# Patient Record
Sex: FEMALE | Race: WHITE | NOT HISPANIC OR LATINO | Employment: OTHER | URBAN - METROPOLITAN AREA
[De-identification: names, ages, dates, MRNs, and addresses within clinical notes are randomized per-mention and may not be internally consistent; named-entity substitution may affect disease eponyms.]

---

## 2017-01-07 DIAGNOSIS — M17.11 OSTEOARTHRITIS OF RIGHT KNEE, UNSPECIFIED OSTEOARTHRITIS TYPE: ICD-10-CM

## 2017-01-07 RX ORDER — MELOXICAM 15 MG/1
TABLET ORAL
Qty: 90 TABLET | Refills: 3 | Status: SHIPPED | OUTPATIENT
Start: 2017-01-07 | End: 2018-03-03 | Stop reason: SDUPTHER

## 2017-01-12 ENCOUNTER — TELEPHONE (OUTPATIENT)
Dept: OPHTHALMOLOGY | Facility: CLINIC | Age: 82
End: 2017-01-12

## 2017-01-12 DIAGNOSIS — H02.413 MECHANICAL PTOSIS OF EYELID OF BOTH EYES: Primary | ICD-10-CM

## 2017-02-03 DIAGNOSIS — E11.9 TYPE 2 DIABETES MELLITUS WITHOUT COMPLICATION: ICD-10-CM

## 2017-02-23 ENCOUNTER — TELEPHONE (OUTPATIENT)
Dept: OPTOMETRY | Facility: CLINIC | Age: 82
End: 2017-02-23

## 2017-02-23 PROBLEM — H02.413 MECHANICAL PTOSIS OF BILATERAL EYELIDS: Status: ACTIVE | Noted: 2017-02-23

## 2017-02-24 ENCOUNTER — TELEPHONE (OUTPATIENT)
Dept: OPHTHALMOLOGY | Facility: CLINIC | Age: 82
End: 2017-02-24

## 2017-02-24 ENCOUNTER — ANESTHESIA (OUTPATIENT)
Dept: SURGERY | Facility: HOSPITAL | Age: 82
End: 2017-02-24
Payer: MEDICARE

## 2017-02-24 ENCOUNTER — TELEPHONE (OUTPATIENT)
Dept: FAMILY MEDICINE | Facility: CLINIC | Age: 82
End: 2017-02-24

## 2017-02-24 ENCOUNTER — ANESTHESIA EVENT (OUTPATIENT)
Dept: SURGERY | Facility: HOSPITAL | Age: 82
End: 2017-02-24
Payer: MEDICARE

## 2017-02-24 ENCOUNTER — HOSPITAL ENCOUNTER (OUTPATIENT)
Facility: HOSPITAL | Age: 82
Discharge: HOME | End: 2017-02-24
Attending: OPHTHALMOLOGY | Admitting: OPHTHALMOLOGY
Payer: MEDICARE

## 2017-02-24 VITALS
DIASTOLIC BLOOD PRESSURE: 70 MMHG | SYSTOLIC BLOOD PRESSURE: 155 MMHG | TEMPERATURE: 98 F | BODY MASS INDEX: 25.52 KG/M2 | HEART RATE: 74 BPM | RESPIRATION RATE: 19 BRPM | HEIGHT: 60 IN | WEIGHT: 130 LBS | OXYGEN SATURATION: 100 %

## 2017-02-24 DIAGNOSIS — H02.413 MECHANICAL PTOSIS OF BILATERAL EYELIDS: Primary | ICD-10-CM

## 2017-02-24 LAB — POCT GLUCOSE: 95 MG/DL (ref 70–110)

## 2017-02-24 PROCEDURE — 36000706: Performed by: OPHTHALMOLOGY

## 2017-02-24 PROCEDURE — 37000008 HC ANESTHESIA 1ST 15 MINUTES: Performed by: OPHTHALMOLOGY

## 2017-02-24 PROCEDURE — 15822 BLEPHAROPLASTY UPPER EYELID: CPT | Mod: 50,,, | Performed by: OPHTHALMOLOGY

## 2017-02-24 PROCEDURE — 71000044 HC DOSC ROUTINE RECOVERY FIRST HOUR: Performed by: OPHTHALMOLOGY

## 2017-02-24 PROCEDURE — 36000707: Performed by: OPHTHALMOLOGY

## 2017-02-24 PROCEDURE — D9220A PRA ANESTHESIA: Mod: ANES,,, | Performed by: ANESTHESIOLOGY

## 2017-02-24 PROCEDURE — 25000003 PHARM REV CODE 250: Performed by: OPHTHALMOLOGY

## 2017-02-24 PROCEDURE — 27201423 OPTIME MED/SURG SUP & DEVICES STERILE SUPPLY: Performed by: OPHTHALMOLOGY

## 2017-02-24 PROCEDURE — 37000009 HC ANESTHESIA EA ADD 15 MINS: Performed by: OPHTHALMOLOGY

## 2017-02-24 PROCEDURE — D9220A PRA ANESTHESIA: Mod: CRNA,,, | Performed by: NURSE ANESTHETIST, CERTIFIED REGISTERED

## 2017-02-24 PROCEDURE — 71000015 HC POSTOP RECOV 1ST HR: Performed by: OPHTHALMOLOGY

## 2017-02-24 PROCEDURE — 63600175 PHARM REV CODE 636 W HCPCS: Performed by: OPHTHALMOLOGY

## 2017-02-24 PROCEDURE — 25000003 PHARM REV CODE 250: Performed by: NURSE ANESTHETIST, CERTIFIED REGISTERED

## 2017-02-24 RX ORDER — TETRACAINE HYDROCHLORIDE 5 MG/ML
1 SOLUTION OPHTHALMIC
Status: DISCONTINUED | OUTPATIENT
Start: 2017-02-24 | End: 2017-02-24 | Stop reason: ALTCHOICE

## 2017-02-24 RX ORDER — HYDROCODONE BITARTRATE AND ACETAMINOPHEN 5; 325 MG/1; MG/1
1 TABLET ORAL EVERY 6 HOURS PRN
Qty: 10 TABLET | Refills: 0 | Status: SHIPPED | OUTPATIENT
Start: 2017-02-24 | End: 2017-03-21

## 2017-02-24 RX ORDER — HYDROCODONE BITARTRATE AND ACETAMINOPHEN 5; 325 MG/1; MG/1
1 TABLET ORAL EVERY 4 HOURS PRN
Status: DISCONTINUED | OUTPATIENT
Start: 2017-02-24 | End: 2017-02-24 | Stop reason: HOSPADM

## 2017-02-24 RX ORDER — SODIUM CHLORIDE, SODIUM LACTATE, POTASSIUM CHLORIDE, CALCIUM CHLORIDE 600; 310; 30; 20 MG/100ML; MG/100ML; MG/100ML; MG/100ML
INJECTION, SOLUTION INTRAVENOUS CONTINUOUS
Status: DISCONTINUED | OUTPATIENT
Start: 2017-02-24 | End: 2017-02-24 | Stop reason: HOSPADM

## 2017-02-24 RX ORDER — TETRACAINE HYDROCHLORIDE 5 MG/ML
1 SOLUTION OPHTHALMIC
Status: DISCONTINUED | OUTPATIENT
Start: 2017-02-24 | End: 2017-02-24 | Stop reason: HOSPADM

## 2017-02-24 RX ORDER — BUPIVACAINE HYDROCHLORIDE 5 MG/ML
INJECTION, SOLUTION EPIDURAL; INTRACAUDAL
Status: DISCONTINUED | OUTPATIENT
Start: 2017-02-24 | End: 2017-02-24 | Stop reason: HOSPADM

## 2017-02-24 RX ORDER — ETOMIDATE 2 MG/ML
INJECTION INTRAVENOUS
Status: DISCONTINUED | OUTPATIENT
Start: 2017-02-24 | End: 2017-02-24

## 2017-02-24 RX ORDER — LIDOCAINE HCL/PF 100 MG/5ML
SYRINGE (ML) INTRAVENOUS
Status: DISCONTINUED | OUTPATIENT
Start: 2017-02-24 | End: 2017-02-24

## 2017-02-24 RX ORDER — BUPIVACAINE HYDROCHLORIDE 5 MG/ML
INJECTION, SOLUTION EPIDURAL; INTRACAUDAL
Status: DISCONTINUED
Start: 2017-02-24 | End: 2017-02-24 | Stop reason: HOSPADM

## 2017-02-24 RX ORDER — LIDOCAINE HYDROCHLORIDE AND EPINEPHRINE 20; 10 MG/ML; UG/ML
INJECTION, SOLUTION INFILTRATION; PERINEURAL
Status: DISCONTINUED | OUTPATIENT
Start: 2017-02-24 | End: 2017-02-24 | Stop reason: HOSPADM

## 2017-02-24 RX ORDER — LIDOCAINE HYDROCHLORIDE 10 MG/ML
1 INJECTION, SOLUTION EPIDURAL; INFILTRATION; INTRACAUDAL; PERINEURAL ONCE
Status: COMPLETED | OUTPATIENT
Start: 2017-02-24 | End: 2017-02-24

## 2017-02-24 RX ORDER — LIDOCAINE HCL/EPINEPHRINE/PF 2%-1:200K
VIAL (ML) INJECTION
Status: DISCONTINUED
Start: 2017-02-24 | End: 2017-02-24 | Stop reason: HOSPADM

## 2017-02-24 RX ORDER — ONDANSETRON 8 MG/1
8 TABLET, ORALLY DISINTEGRATING ORAL EVERY 8 HOURS PRN
Status: DISCONTINUED | OUTPATIENT
Start: 2017-02-24 | End: 2017-02-24 | Stop reason: HOSPADM

## 2017-02-24 RX ADMIN — SODIUM CHLORIDE, SODIUM LACTATE, POTASSIUM CHLORIDE, AND CALCIUM CHLORIDE: .6; .31; .03; .02 INJECTION, SOLUTION INTRAVENOUS at 01:02

## 2017-02-24 RX ADMIN — ETOMIDATE 2 MCG: 2 INJECTION, SOLUTION INTRAVENOUS at 06:02

## 2017-02-24 RX ADMIN — ETOMIDATE 6 MCG: 2 INJECTION, SOLUTION INTRAVENOUS at 06:02

## 2017-02-24 RX ADMIN — LIDOCAINE HYDROCHLORIDE 0.2 MG: 10 INJECTION, SOLUTION EPIDURAL; INFILTRATION; INTRACAUDAL; PERINEURAL at 01:02

## 2017-02-24 RX ADMIN — LIDOCAINE HYDROCHLORIDE 75 MG: 20 INJECTION, SOLUTION INTRAVENOUS at 06:02

## 2017-02-24 NOTE — PROGRESS NOTES
Patient awake and alert. Voice no complaints. No s/s of distress noted. All questions addressed. Verbalized understanding.     Report given to GRISELDA Hwang. All questions addressed. Verbalized understanding.

## 2017-02-24 NOTE — PROGRESS NOTES
OR nurse at bedside updating patient in reference to delay. Explained to patient that surgery will be delayed until 1700 or 1800. Patient verbalized understanding.

## 2017-02-24 NOTE — IP AVS SNAPSHOT
[facility]  [address]  [facility] [address]  Phone: [phone]           Patient Discharge Instructions     Our goal is to set you up for success. This packet includes information on your condition, medications, and your home care. It will help you to care for yourself so you don't get sicker and need to go back to the hospital.     Please ask your nurse if you have any questions.        There are many details to remember when preparing to leave the hospital. Here is what you will need to do:    1. Take your medicine. If you are prescribed medications, review your Medication List in the following pages. You may have new medications to  at the pharmacy and others that you'll need to stop taking. Review the instructions for how and when to take your medications. Talk with your doctor or nurses if you are unsure of what to do.     2. Go to your follow-up appointments. Specific follow-up information is listed in the following pages. Your may be contacted by a transition nurse or clinical provider about future appointments. Be sure we have all of the phone numbers to reach you, if needed. Please contact your provider's office if you are unable to make an appointment.     3. Watch for warning signs. Your doctor or nurse will give you detailed warning signs to watch for and when to call for assistance. These instructions may also include educational information about your condition. If you experience any of warning signs to your health, call your doctor.               [facility]  Unless otherwise directed by your provider, please contact [facility], our nurse care line that is available for 24/7 assistance.     [phone] (toll-free)    Registered nurses in the [facility] provide clinical advisement, health education, appointment booking, and other advisory services.                    ** Verify the list of medication(s) below is accurate and up  to date. Carry this with you in case of emergency. If your medications have changed, please notify your healthcare provider.             Medication List      ASK your doctor about these medications        Additional Info                      albuterol 90 mcg/actuation inhaler   Quantity:  18 g   Refills:  4   Dose:  2 puff    Instructions:  Inhale 2 puffs into the lungs every 4 (four) hours as needed for Wheezing.     Begin Date    AM    Noon    PM    Bedtime       atorvastatin 10 MG tablet   Commonly known as:  LIPITOR   Quantity:  30 tablet   Refills:  2   Dose:  10 mg    Instructions:  Take 1 tablet (10 mg total) by mouth once daily.     Begin Date    AM    Noon    PM    Bedtime       benzocaine 20 % Drop   Commonly known as:  OTICAINE   Quantity:  15 mL   Refills:  2   Dose:  1 drop    Instructions:  Place 1 drop into the left ear 4 (four) times daily as needed.     Begin Date    AM    Noon    PM    Bedtime       cetirizine 10 MG tablet   Commonly known as:  ZYRTEC   Quantity:  30 tablet   Refills:  2   Dose:  10 mg    Instructions:  Take 1 tablet (10 mg total) by mouth once daily.     Begin Date    AM    Noon    PM    Bedtime       cyclobenzaprine 10 MG tablet   Commonly known as:  FLEXERIL   Quantity:  90 tablet   Refills:  3   Dose:  10 mg    Instructions:  Take 1 tablet (10 mg total) by mouth nightly as needed.     Begin Date    AM    Noon    PM    Bedtime       escitalopram oxalate 10 MG tablet   Commonly known as:  LEXAPRO   Quantity:  30 tablet   Refills:  11   Dose:  10 mg    Instructions:  Take 1 tablet (10 mg total) by mouth once daily.     Begin Date    AM    Noon    PM    Bedtime       * estradiol 0.1 mg/24 hr td ptwk 0.1 mg/24 hr Ptwk   Quantity:  12 patch   Refills:  0    Instructions:  APPLY ONE PATCH TOPICALLY ONCE A WEEK     Begin Date    AM    Noon    PM    Bedtime       * estradiol 0.1 mg/24 hr td ptwk 0.1 mg/24 hr Ptwk   Quantity:  12 patch   Refills:  3   Dose:  1 patch    Instructions:   Place 1 patch onto the skin every 7 days.     Begin Date    AM    Noon    PM    Bedtime       fluoxetine 10 MG capsule   Commonly known as:  PROZAC   Quantity:  90 capsule   Refills:  3   Dose:  10 mg    Instructions:  Take 1 capsule (10 mg total) by mouth once daily.     Begin Date    AM    Noon    PM    Bedtime       gabapentin 100 MG capsule   Commonly known as:  NEURONTIN   Quantity:  90 capsule   Refills:  3   Dose:  100 mg    Instructions:  Take 1 capsule (100 mg total) by mouth 3 (three) times daily.     Begin Date    AM    Noon    PM    Bedtime       imipramine 25 MG tablet   Commonly known as:  TOFRANIL   Quantity:  270 tablet   Refills:  3   Dose:  25 mg    Instructions:  Take 1 tablet (25 mg total) by mouth 3 (three) times daily.     Begin Date    AM    Noon    PM    Bedtime       lisinopril 10 MG tablet   Quantity:  90 tablet   Refills:  3   Dose:  10 mg    Instructions:  Take 1 tablet (10 mg total) by mouth once daily.     Begin Date    AM    Noon    PM    Bedtime       lorazepam 0.5 MG tablet   Commonly known as:  ATIVAN   Quantity:  30 tablet   Refills:  0   Dose:  0.5 mg    Instructions:  Take 1 tablet (0.5 mg total) by mouth as needed.     Begin Date    AM    Noon    PM    Bedtime       meloxicam 15 MG tablet   Commonly known as:  MOBIC   Quantity:  90 tablet   Refills:  3    Instructions:  TAKE 1 TABLET DAILY AS     NEEDED FOR PAIN     Begin Date    AM    Noon    PM    Bedtime       omeprazole 20 MG capsule   Commonly known as:  PRILOSEC   Quantity:  180 capsule   Refills:  4   Dose:  40 mg    Instructions:  Take 2 capsules (40 mg total) by mouth once daily.     Begin Date    AM    Noon    PM    Bedtime       tretinoin 0.025 % gel   Commonly known as:  RETIN-A   Quantity:  45 g   Refills:  3    Instructions:  Apply topically as needed.     Begin Date    AM    Noon    PM    Bedtime       triamcinolone acetonide 0.1% 0.1 % ointment   Commonly known as:  KENALOG   Quantity:  90 g   Refills:  3     Instructions:  Apply topically 2 (two) times daily.     Begin Date    AM    Noon    PM    Bedtime       * Notice:  This list has 2 medication(s) that are the same as other medications prescribed for you. Read the directions carefully, and ask your doctor or other care provider to review them with you.               Please bring to all follow up appointments:    1. A copy of your discharge instructions.  2. All medicines you are currently taking in their original bottles.  3. Identification and insurance card.    Please arrive 15 minutes ahead of scheduled appointment time.    Please call 24 hours in advance if you must reschedule your appointment and/or time.            Discharge Instructions         Eyelid Surgery (Blepharoplasty)  Eyelid surgery (blepharoplasty) is a type of cosmetic surgery. It is most often done to improve the look of the eyelids. Both the upper and lower eyelids can be treated during the surgery. Discuss your treatment goals with your doctor. He or she can tell you more about what to expect.    Preparing for surgery  Prepare for the surgery as you have been told. In addition:  · Tell your doctor about all medications you take. This includes herbs and other supplements. It also includes any blood thinners, such as warfarin, clopidogrel, or daily aspirin. You may need to stop taking some or all of them before surgery.  · Do not eat or drink during the 8 hours before your surgery, or as directed by your surgeon. This includes coffee, water, gum, and mints. (If you have been instructed to take medications, take them with a small sip of water.)  The day of the surgery  The surgery takes about 1 to 2 hours. You will likely go home the same day.  Before the surgery begins:  · Youll remove any makeup from your eyes. Youll also remove contact lenses if you wear them.  · An IV line is put into a vein in your arm or hand. This line supplies fluids and medications.  · Youll be given medication to keep  you free of pain during the surgery. You may have general anesthesia, which puts you into a state like deep sleep during the surgery. (A tube may be inserted into your throat to help you breathe.) Or you may have sedation, which makes you relaxed and sleepy. With sedation, local anesthesia will be injected to numb the areas being worked on. The anesthesiologist will discuss your options with you.  During the surgery:  · For the upper eyelids, an incision is made along the eyelid crease.  · For the lower eyelids, an incision is made inside the lower eyelid. Or it is made in the skin just under the lower lash line.  · With either the upper or lower eyelids, fat may be shaped or removed. Skin may be removed. If muscles are loose, stretched, or torn, they may be tightened or repaired.  · Incisions are closed with stitches (sutures). In some cases, surgical glue is used.  After the surgery  Youre taken to a recovery room to wake up from the anesthesia. You may feel sleepy and nauseated. If a breathing tube was used, your throat may be sore at first. If needed, youll be given pain medication to relieve any discomfort. Youll sit semi-inclined or with your head propped on pillows, and may have cold packs on your eyes. These measures help reduce bruising and swelling. When you are ready to leave the hospital, an adult family member or friend must drive you.  Recovering at home  Once at home, follow all instructions you are given. Your doctor will tell you when you can return to your normal routine. You may have some bruising and swelling around your eyes and your vision may be blurry. This is normal and should improve within a week or two. And you may notice your eyes burning or feeling strained during certain activities, such as watching TV, reading a book, or using the computer. While this persists, avoid doing such activities for too long at a time. Be sure to:  · Take all medications exactly as directed. These may  include applying eye ointment or using eye drops.  · Apply an ice pack or cold compress to the eyes as directed, for the first 12 to 24 hours after surgery.  · Care for your incisions as instructed.  · Wear protective sunglasses as directed.  · Avoid wearing eye makeup and contact lenses as directed.  · Avoid swimming or placing your head under water as directed.  · Avoid heavy lifting and strenuous activities as directed.  · Avoid driving until your doctor says its OK. Do not drive while taking medications that make you drowsy or sleepy.  When to call the doctor  Call the doctor if you have any of the following:  · Chest pain or trouble breathing (call 911 or other emergency service)  · Fever of 100.4°F (38.0°C) or higher (or as directed by your doctor)  · Increased redness, tearing, or itching of the eyes  · Changes in vision, such as double vision, blurry vision, or loss of light perception  · Symptoms of infection at an incision site, such as increased redness or swelling, warmth, worsening pain, or foul-smelling drainage  · Discomfort or swelling thats worse in one eye than the other  · Pain that cannot be managed with medications  · Other signs or symptoms as indicated by your doctor   Follow-up  Youll have follow-up visits with your doctor. During these visits, your doctor will check the results of your surgery and how well youre healing. If stitches need to be removed, this is done in about 5 to 7 days.  Risks and complications  Risks and possible complications include:  · Bleeding  · Infection  · Problems with vision, such as blurry or double vision and trouble closing the eyes  · Dry or teary eyes  · Changes in sensation, such as numbness or pain  · Skin discoloration  · Damage or injury to the eyes  · Vision problems (including blindness)  · Displacement of the lower eyelid margin (ectropia), which can be temporary or permanent  · Not happy with cosmetic results  · Risks of anesthesia (the  anesthesiologist will discuss these with you)             Primary Diagnosis     Your primary diagnosis was:  Drooping Eyelid      Admission Information     Date & Time Provider Department CSN    2/24/2017  1:06 PM Jenn Smith MD [facility] [id number]      Care Providers     Provider Role Specialty Primary office phone    Jenn Smith MD Attending Provider Ophthalmology [phone]    Jenn Smith MD Surgeon  Ophthalmology [phone]      Your Vitals Were     BP                   156/69           Recent Lab Values        2/15/2012 2/19/2013 10/29/2013 2/19/2014 11/13/2014 2/20/2015 1/25/2016 7/26/2016      7:40 AM  7:20 AM  8:10 AM  7:21 AM  4:05 AM  9:48 AM  9:55 AM  7:20 AM    A1C 5.0 5.4 5.6 5.8 5.4 5.6 5.4 5.6    Comment for A1C at  7:20 AM on 7/26/2016:  According to ADA guidelines, hemoglobin A1C <7.0% represents  optimal control in non-pregnant diabetic patients.  Different  metrics may apply to specific populations.   Standards of Medical Care in Diabetes - 2016.  For the purpose of screening for the presence of diabetes:  <5.7%     Consistent with the absence of diabetes  5.7-6.4%  Consistent with increasing risk for diabetes   (prediabetes)  >or=6.5%  Consistent with diabetes  Currently no consensus exists for use of hemoglobin A1C  for diagnosis of diabetes for children.        Allergies as of 2/24/2017        Reactions    Propofol Analogues Other (See Comments)    Low blood pressure, tremors    Oxycodone Nausea And Vomiting    Pcn [Penicillins] Nausea Only    Tetracyclines Nausea Only      Advance Directives     An advance directive is a document which, in the event you are no longer able to make decisions for yourself, tells your healthcare team what kind of treatment you do or do not want to receive, or who you would like to make those decisions for you.  If you do not currently have an advance directive, [facility] encourages you to create one.  For more information call:   [phone]), [phone] ([phone]),  or log on to [email or web address].        Smoking Cessation     If you would like to quit smoking:   You may be eligible for free services if you are a [place] resident and started smoking cigarettes before September 1, 1988.  Call the Smoking Cessation Trust ([facility]) toll free at (481) 520-2359 or (954) [phone].   Call 9-570-QUIT-NOW if you do not meet the above criteria.            Language Assistance Services     ATTENTION: Language assistance services are available, free of charge. Please call [phone].      ATENCIÓN: Si oniella robyn, tiene a gomez disposición servicios gratuitos de asistencia lingüística. Llame al [phone].     CHÚ Ý: N?u b?n nói Ti?ng Vi?t, có các d?ch v? h? tr? ngôn ng? mi?n phí dành cho b?n. G?i s? [phone].        Diabetes Discharge Instructions                                    [facility] complies with applicable Federal civil rights laws and does not discriminate on the basis of race, color, national origin, age, disability, or sex.

## 2017-02-24 NOTE — TELEPHONE ENCOUNTER
Returned patients call. I informed the patient I never received the pre op form. I also informed her an appt is usually required for pre op. Patient verbalized understanding. She stated she will call the doctor and inform them we did not receive the paperwork.

## 2017-02-24 NOTE — H&P
Pre-Operative History & Physical  Ophthalmology      SUBJECTIVE:     History of Present Illness:  Patient is a 85 y.o. female presents with an eye problem requiring surgery as noted in the most recent ophthalmology progress note.    MEDICATIONS:   Prior to Admission medications    Medication Sig Start Date End Date Taking? Authorizing Provider   albuterol 90 mcg/actuation inhaler Inhale 2 puffs into the lungs every 4 (four) hours as needed for Wheezing. 2/26/15   Jaylin Sheppard MD   atorvastatin (LIPITOR) 10 MG tablet Take 1 tablet (10 mg total) by mouth once daily. 4/26/16 4/26/17  Francisco Munson MD   benzocaine (OTICAINE) 20 % Drop Place 1 drop into the left ear 4 (four) times daily as needed. 7/27/16   Francisco Munson MD   cetirizine (ZYRTEC) 10 MG tablet Take 1 tablet (10 mg total) by mouth once daily. 7/22/16 7/22/17  Francisco Munson MD   cyclobenzaprine (FLEXERIL) 10 MG tablet Take 1 tablet (10 mg total) by mouth nightly as needed. 2/26/15   Jaylin Sheppard MD   escitalopram oxalate (LEXAPRO) 10 MG tablet Take 1 tablet (10 mg total) by mouth once daily. 9/22/16 9/22/17  Wild Ortiz MD   estradiol 0.1 mg/24 hr td ptwk 0.1 mg/24 hr PTWK APPLY ONE PATCH TOPICALLY ONCE A WEEK 12/2/16   Francisco Munson MD   estradiol 0.1 mg/24 hr td ptwk 0.1 mg/24 hr PTWK Place 1 patch onto the skin every 7 days. 12/2/16   Francisco Munson MD   fluoxetine (PROZAC) 10 MG capsule Take 1 capsule (10 mg total) by mouth once daily. 1/25/16   Francisco Munson MD   gabapentin (NEURONTIN) 100 MG capsule Take 1 capsule (100 mg total) by mouth 3 (three) times daily. 2/26/15 2/26/16  Jaylin Sheppard MD   imipramine (TOFRANIL) 25 MG tablet Take 1 tablet (25 mg total) by mouth 3 (three) times daily. 3/11/14 7/23/15  Femi H. Vega, MD   lisinopril 10 MG tablet Take 1 tablet (10 mg total) by mouth once daily. 9/22/16 9/22/17  Wild Ortiz MD   lorazepam (ATIVAN) 0.5 MG tablet Take 1 tablet (0.5 mg total) by mouth as needed. 2/26/15   Jaylin Sheppard MD    meloxicam (MOBIC) 15 MG tablet TAKE 1 TABLET DAILY AS     NEEDED FOR PAIN 1/7/17   Francisco Munson MD   omeprazole (PRILOSEC) 20 MG capsule Take 2 capsules (40 mg total) by mouth once daily. 1/25/16   Francisco Munson MD   tretinoin (RETIN-A) 0.025 % gel Apply topically as needed. 1/25/16   Francisco Munson MD   triamcinolone acetonide 0.1% (KENALOG) 0.1 % ointment Apply topically 2 (two) times daily. 1/25/16 8/18/16  Francisco Munson MD     ALLERGIES:   Review of patient's allergies indicates:   Allergen Reactions    Propofol analogues Other (See Comments)     ELEVATED BLOOD PRESSURE, TREMORS    Oxycodone Nausea And Vomiting    Pcn [penicillins] Nausea Only    Tetracyclines Nausea Only     PAST MEDICAL HISTORY:   Past Medical History:   Diagnosis Date    Allergy     Seasonal    Depression     Diabetes mellitus     diet controlled    Diverticulosis     Fever blister     GERD (gastroesophageal reflux disease)     Hypertension 11/12/2014    Joint pain     Nuclear sclerosis - Both Eyes 7/16/2013    Osteoarthritis     Osteopenia     Osteoporosis     Seizures 2008    TIA    Tachycardia      PAST SURGICAL HISTORY:   Past Surgical History:   Procedure Laterality Date    ANKLE SURGERY  1951    ankle replacement, left    BLADDER SUSPENSION      FOOT SURGERY      HYSTERECTOMY      left ovary intact    JOINT REPLACEMENT      left ankle and knee arthroplasty    KNEE SURGERY  2008    left TKR    KNEE SURGERY  11-11-14    right TKR    SHOULDER SURGERY  2009    right arthroscopy    TONSILLECTOMY       PAST FAMILY HISTORY:   Family History   Problem Relation Age of Onset    Diabetes Mother     Diabetes Father     Heart disease Father     Diabetes Sister     Polychondritis Sister     Polychondritis      Cancer Neg Hx     Amblyopia Neg Hx     Blindness Neg Hx     Cataracts Neg Hx     Glaucoma Neg Hx     Hypertension Neg Hx     Macular degeneration Neg Hx     Retinal detachment Neg Hx      Strabismus Neg Hx     Stroke Neg Hx     Thyroid disease Neg Hx     Melanoma Neg Hx      SOCIAL HISTORY:   Social History   Substance Use Topics    Smoking status: Former Smoker     Types: Cigarettes     Quit date: 4/29/1981    Smokeless tobacco: Never Used    Alcohol use 1.2 oz/week     2 Glasses of wine per week      Comment: Nightly      MENTAL STATUS: Alert  REVIEW OF SYSTEMS: Negative x 10    OBJECTIVE:     Vital Signs (Most Recent)       Physical Exam:  General: NAD  HEENT: see ophthalmology note for eye exam  Lungs: Adequate respirations  Heart: Heart beating + pulses  Abdomen: Soft    ASSESSMENT/PLAN:     Patient is a 85 y.o. female with eye problem, planning for bul mechanical ptosis repair   - Risks/benefits/alternatives of the procedure including, but not limited to scarring, bleeding, infection, loss or decreased vision, and/or need for possible repeat surgery discussed with the patient and/or family, and Informed consent obtained prior to surgery and the patient/family voiced good understanding, witnessed, and placed in chart.  - Proceed to OR

## 2017-02-24 NOTE — TELEPHONE ENCOUNTER
----- Message from Peyton Centeno sent at 2/24/2017  8:54 AM CST -----  No. [phone]    Patient is having surgery at 1:00 today.  Has the surgery clearance been faxed yet.   Please call.

## 2017-02-24 NOTE — ANESTHESIA PREPROCEDURE EVALUATION
02/24/2017     Candy Escobar is a 85 y.o., female here for bilateral blepharoplasty.  H/o HTN, diet controlled diabetes, TIA.  History of low blood pressure in response to propofol.      [facility] Anesthesia Evaluation    I have reviewed the Patient Summary Reports.    I have reviewed the Nursing Notes.   I have reviewed the Medications.     Review of Systems  Anesthesia Hx:  Hx of Anesthetic complications Reports very low blood pressure in response to propofol History of prior surgery of interest to airway management or planning: Denies Family Hx of Anesthesia complications.  Personal Hx of Anesthesia complications (reports very low blood pressure in response to propofol)   Cardiovascular:   Exercise tolerance: good Hypertension Denies CP  Denies SOB   Pulmonary:  Pulmonary Normal    Hepatic/GI:   GERD, well controlled    Neurological:   TIA, Seizures    Psych:   depression          Physical Exam  General:  Well nourished    Airway/Jaw/Neck:  Airway Findings: Mouth Opening: Normal Tongue: Normal  General Airway Assessment: Adult  Mallampati: I  TM Distance: Normal, at least 6 cm      Dental:  Dental Findings: In tact   Chest/Lungs:  Chest/Lungs Findings: Normal Respiratory Rate     Heart/Vascular:  Heart Findings: Rate: Normal  Rhythm: Regular Rhythm        Mental Status:  Mental Status Findings:  Alert and Oriented, Cooperative         Anesthesia Plan  Type of Anesthesia, risks & benefits discussed:  Anesthesia Type:  MAC  Patient's Preference:   Intra-op Monitoring Plan:   Intra-op Monitoring Plan Comments:   Post Op Pain Control Plan:   Post Op Pain Control Plan Comments:   Induction:   IV  Beta Blocker:  Patient is not currently on a Beta-Blocker (No further documentation required).       Informed Consent: Patient understands risks and agrees with Anesthesia plan.  Questions answered. Anesthesia consent  signed with patient.  ASA Score: 2     Day of Surgery Review of History & Physical:    H&P update referred to the surgeon.         Ready For Surgery From Anesthesia Perspective.

## 2017-02-25 NOTE — TRANSFER OF CARE
Anesthesia Transfer of Care Note    Patient: Candy Escobar    Procedure(s) Performed: Procedure(s) (LRB):  BLEPHAROPLASTY (Bilateral)    Patient location: PACU    Anesthesia Type: general    Transport from OR: Transported from OR on room air with adequate spontaneous ventilation    Post pain: adequate analgesia    Post assessment: no apparent anesthetic complications    Post vital signs: stable    Level of consciousness: awake, alert and oriented    Nausea/Vomiting: no nausea/vomiting    Complications: none          Last vitals:   Visit Vitals    BP (!) 140/81 (BP Location: Right arm, Patient Position: Lying, BP Method: Automatic)    Pulse 73    Temp 36.7 °C (98.1 °F) (Oral)    Resp 18    Ht 5' (1.524 m)    Wt 59 kg (130 lb)    SpO2 99%    Breastfeeding No    BMI 25.39 kg/m2

## 2017-02-25 NOTE — DISCHARGE INSTRUCTIONS
Eyelid Surgery (Blepharoplasty)  Eyelid surgery (blepharoplasty) is a type of cosmetic surgery. It is most often done to improve the look of the eyelids. Both the upper and lower eyelids can be treated during the surgery. Discuss your treatment goals with your doctor. He or she can tell you more about what to expect.    Preparing for surgery  Prepare for the surgery as you have been told. In addition:  · Tell your doctor about all medications you take. This includes herbs and other supplements. It also includes any blood thinners, such as warfarin, clopidogrel, or daily aspirin. You may need to stop taking some or all of them before surgery.  · Do not eat or drink during the 8 hours before your surgery, or as directed by your surgeon. This includes coffee, water, gum, and mints. (If you have been instructed to take medications, take them with a small sip of water.)  The day of the surgery  The surgery takes about 1 to 2 hours. You will likely go home the same day.  Before the surgery begins:  · Youll remove any makeup from your eyes. Youll also remove contact lenses if you wear them.  · An IV line is put into a vein in your arm or hand. This line supplies fluids and medications.  · Youll be given medication to keep you free of pain during the surgery. You may have general anesthesia, which puts you into a state like deep sleep during the surgery. (A tube may be inserted into your throat to help you breathe.) Or you may have sedation, which makes you relaxed and sleepy. With sedation, local anesthesia will be injected to numb the areas being worked on. The anesthesiologist will discuss your options with you.  During the surgery:  · For the upper eyelids, an incision is made along the eyelid crease.  · For the lower eyelids, an incision is made inside the lower eyelid. Or it is made in the skin just under the lower lash line.  · With either the upper or lower eyelids, fat may be shaped or removed. Skin may be  removed. If muscles are loose, stretched, or torn, they may be tightened or repaired.  · Incisions are closed with stitches (sutures). In some cases, surgical glue is used.  After the surgery  Youre taken to a recovery room to wake up from the anesthesia. You may feel sleepy and nauseated. If a breathing tube was used, your throat may be sore at first. If needed, youll be given pain medication to relieve any discomfort. Youll sit semi-inclined or with your head propped on pillows, and may have cold packs on your eyes. These measures help reduce bruising and swelling. When you are ready to leave the hospital, an adult family member or friend must drive you.  Recovering at home  Once at home, follow all instructions you are given. Your doctor will tell you when you can return to your normal routine. You may have some bruising and swelling around your eyes and your vision may be blurry. This is normal and should improve within a week or two. And you may notice your eyes burning or feeling strained during certain activities, such as watching TV, reading a book, or using the computer. While this persists, avoid doing such activities for too long at a time. Be sure to:  · Take all medications exactly as directed. These may include applying eye ointment or using eye drops.  · Apply an ice pack or cold compress to the eyes as directed, for the first 12 to 24 hours after surgery.  · Care for your incisions as instructed.  · Wear protective sunglasses as directed.  · Avoid wearing eye makeup and contact lenses as directed.  · Avoid swimming or placing your head under water as directed.  · Avoid heavy lifting and strenuous activities as directed.  · Avoid driving until your doctor says its OK. Do not drive while taking medications that make you drowsy or sleepy.  When to call the doctor  Call the doctor if you have any of the following:  · Chest pain or trouble breathing (call 911 or other emergency service)  · Fever of  100.4°F (38.0°C) or higher (or as directed by your doctor)  · Increased redness, tearing, or itching of the eyes  · Changes in vision, such as double vision, blurry vision, or loss of light perception  · Symptoms of infection at an incision site, such as increased redness or swelling, warmth, worsening pain, or foul-smelling drainage  · Discomfort or swelling thats worse in one eye than the other  · Pain that cannot be managed with medications  · Other signs or symptoms as indicated by your doctor   Follow-up  Youll have follow-up visits with your doctor. During these visits, your doctor will check the results of your surgery and how well youre healing. If stitches need to be removed, this is done in about 5 to 7 days.  Risks and complications  Risks and possible complications include:  · Bleeding  · Infection  · Problems with vision, such as blurry or double vision and trouble closing the eyes  · Dry or teary eyes  · Changes in sensation, such as numbness or pain  · Skin discoloration  · Damage or injury to the eyes  · Vision problems (including blindness)  · Displacement of the lower eyelid margin (ectropia), which can be temporary or permanent  · Not happy with cosmetic results  · Risks of anesthesia (the anesthesiologist will discuss these with you)

## 2017-02-25 NOTE — OP NOTE
Operative Note  Ophthalmology Service        Date of Procedure: 02/24/2017     Attending Physician: Jenn Smith MD     Assistant: Sukumar Bolanos MD     Pre-Operative Diagnosis: Bilateral mechanical upper eyelid mechanical ptosis, bilateral upper eyelid dermatochalasis     Post-Operative Diagnosis: Same as pre-operative diagnosis     Treatments/Procedures: Bilateral upper eyelid blepharoplasty (76553)     Intraoperative Findings: Bilateral mechanical upper eyelid ptosis, bilateral upper eyelid dermatochalasis     Anesthesia: General with local     Complications: None     Estimated Blood Loss: < 20 cc     Indications for surgery: 85 y.o. female with a history of bilateral upper eyelid ptosis interfering with activities of daily living. Patient understands the risk of pain, bleeding, infection, ocular injury, loss of the eye, asymmetry, need for revision in future, scarring.       Procedure in detail: Prior to induction of anesthesia, with patient sitting in an upright position, both upper eyelids were marked at the medial pupil border. The patient was brought to the operating room and placed in supine position. Once adequate anesthesia was achieved, the skin incisions were remarked. Calipers were used to measure and meghana the natural eyelid creases of both upper eyelids. Several cc of 2% lidocaine with epinephrine, 0.75% bupivacaine, 4% bicarbonate, and Vitrase was injected subcutaneously into both upper eyelids. The full face was then prepped using topical Betadine, and the patient was draped in sterile fashion.       Attention was turned to the right upper eyelid. A corneal shield was placed in the right palpebral fissure. The skin was incised along the markings using a #15 scalpel. A skin-only flap was raised with cautery and Chance forceps. The orbicularis and the septum were incised on the medial aspect using a Leobardo scissors to expose the medial fat pad. The medial fat pad was resected using a monopolar cautery  with good hemostasis.The traction suture and corneal shield were removed. The procedure was repeated for the left upper eyelid.      The skin incisions were closed with a running 6-0 Prolene on a P-1 needle. Tobradex ointment was applied to both upper eyelids and to both eyes. The patient tolerated the procedure well without any complications. She was awoken and taken to the recovery room in stable condition.    Specimen: None

## 2017-02-25 NOTE — PLAN OF CARE
Problem: Patient Care Overview  Goal: Plan of Care Review  Outcome: Outcome(s) achieved Date Met:  02/24/17  Discharge instructions given and explained to patient and family with verbalization of understanding all instructions. Prescription given and explained next time and doses of each medication. Patients v/s stable, denies n/v and tolerating po, rates pain level tolerable, IV removed, and family at bedside for patient discharge home. Anesthesia and surgical consent in pt's chart at time of discharge.

## 2017-02-25 NOTE — ANESTHESIA RELEASE NOTE
Anesthesia Release from PACU Note    Patient: Candy Escobar    Procedure(s) Performed: Procedure(s) (LRB):  BLEPHAROPLASTY (Bilateral)    Anesthesia type: MAC    Post pain: Adequate analgesia    Post assessment: no apparent anesthetic complications, tolerated procedure well and no evidence of recall    Last Vitals:   Visit Vitals    BP (!) 156/69    Pulse 77    Temp 36.7 °C (98.1 °F) (Oral)    Resp 19    Ht 5' (1.524 m)    Wt 59 kg (130 lb)    SpO2 98%    Breastfeeding No    BMI 25.39 kg/m2       Post vital signs: stable    Level of consciousness: awake and alert     Nausea/Vomiting: no nausea/no vomiting    Complications: none    Airway Patency: patent    Respiratory: unassisted, spontaneous ventilation, room air    Cardiovascular: stable and blood pressure at baseline    Hydration: euvolemic

## 2017-02-25 NOTE — ANESTHESIA POSTPROCEDURE EVALUATION
Anesthesia Post Evaluation    Patient: Candy Escobar    Procedure(s) Performed: Procedure(s) (LRB):  BLEPHAROPLASTY (Bilateral)    Final Anesthesia Type: MAC  Patient location during evaluation: [facility]  Patient participation: Yes- Able to Participate  Level of consciousness: awake and alert  Post-procedure vital signs: reviewed and stable  Pain management: adequate  Airway patency: patent  PONV status at discharge: No PONV  Anesthetic complications: no      Cardiovascular status: blood pressure returned to baseline and hemodynamically stable  Respiratory status: unassisted, spontaneous ventilation and room air  Hydration status: euvolemic  Follow-up not needed.        Visit Vitals    BP (!) 149/71    Pulse 75    Temp 36.6 °C (97.9 °F) (Temporal)    Resp 18    Ht 5' (1.524 m)    Wt 59 kg (130 lb)    SpO2 99%    Breastfeeding No    BMI 25.39 kg/m2       Pain/Roxanne Score: Pain Assessment Performed: Yes (2/24/2017  8:00 PM)  Presence of Pain: denies (2/24/2017  8:00 PM)  Roxanne Score: 10 (2/24/2017  8:00 PM)  Modified Roxanne Score: 20 (2/24/2017  8:00 PM)

## 2017-02-27 LAB — POCT GLUCOSE: 90 MG/DL (ref 70–110)

## 2017-03-01 NOTE — DISCHARGE SUMMARY
Discharge Summary  Ophthalmology Service    Admit Date: 2/24/2017     Discharge Date: 2/24/2017    Attending Physician: Jenn Smith MD    Discharge Physician: Sukumar Bolanos MD    Discharged Condition: Good    Reason for Admission: Mechanical ptosis of eyelid of both eyes [H02.413]  Mechanical ptosis of bilateral eyelids [H02.413]     Treatments/Procedures: Procedure(s) (LRB):  BLEPHAROPLASTY (Bilateral) (see dictated report for details)    Hospital Course: Stable    Consults: None    Significant Diagnostic Studies: None     Disposition: Home    Patient Instructions:   - Resume same diet as prior to surgery  - Limit activity, no heavy lifting  - Call MD for severe uncontrolled pain  - Follow-up with ophthalmology as instructed    Patient Instructions:   Discharge Medication List as of 2/24/2017  8:06 PM      CONTINUE these medications which have NOT CHANGED    Details   fluoxetine (PROZAC) 10 MG capsule Take 1 capsule (10 mg total) by mouth once daily., Starting 1/25/2016, Until Discontinued, Normal      omeprazole (PRILOSEC) 20 MG capsule Take 2 capsules (40 mg total) by mouth once daily., Starting 1/25/2016, Until Discontinued, Normal      tretinoin (RETIN-A) 0.025 % gel Apply topically as needed., Starting 1/25/2016, Until Discontinued, Print      albuterol 90 mcg/actuation inhaler Inhale 2 puffs into the lungs every 4 (four) hours as needed for Wheezing., Starting 2/26/2015, Until Discontinued, Print      cyclobenzaprine (FLEXERIL) 10 MG tablet Take 1 tablet (10 mg total) by mouth nightly as needed., Starting 2/26/2015, Until Discontinued, Print      escitalopram oxalate (LEXAPRO) 10 MG tablet Take 1 tablet (10 mg total) by mouth once daily., Starting 9/22/2016, Until Fri 9/22/17, Normal      !! estradiol 0.1 mg/24 hr td ptwk 0.1 mg/24 hr PTWK APPLY ONE PATCH TOPICALLY ONCE A WEEK, Normal      !! estradiol 0.1 mg/24 hr td ptwk 0.1 mg/24 hr PTWK Place 1 patch onto the skin every 7 days., Starting 12/2/2016, Until  Discontinued, Print      gabapentin (NEURONTIN) 100 MG capsule Take 1 capsule (100 mg total) by mouth 3 (three) times daily., Starting 2/26/2015, Until Fri 2/26/16, Print      lisinopril 10 MG tablet Take 1 tablet (10 mg total) by mouth once daily., Starting 9/22/2016, Until Fri 9/22/17, Normal      lorazepam (ATIVAN) 0.5 MG tablet Take 1 tablet (0.5 mg total) by mouth as needed., Starting 2/26/2015, Until Discontinued, Print      meloxicam (MOBIC) 15 MG tablet TAKE 1 TABLET DAILY AS     NEEDED FOR PAIN, Normal      triamcinolone acetonide 0.1% (KENALOG) 0.1 % ointment Apply topically 2 (two) times daily., Starting 1/25/2016, Until Thu 8/18/16, Normal      atorvastatin (LIPITOR) 10 MG tablet Take 1 tablet (10 mg total) by mouth once daily., Starting 4/26/2016, Until Wed 4/26/17, Normal      benzocaine (OTICAINE) 20 % Drop Place 1 drop into the left ear 4 (four) times daily as needed., Starting 7/27/2016, Until Discontinued, Normal      cetirizine (ZYRTEC) 10 MG tablet Take 1 tablet (10 mg total) by mouth once daily., Starting 7/22/2016, Until Sat 7/22/17, Normal      imipramine (TOFRANIL) 25 MG tablet Take 1 tablet (25 mg total) by mouth 3 (three) times daily., Starting 3/11/2014, Until Thu 7/23/15, Normal       !! - Potential duplicate medications found. Please discuss with provider.            Discharge Procedure Orders  Diet general     Lifting restrictions   Order Comments: Avoid heavy lifting     Call MD for:  temperature >100.4     Call MD for:  persistent nausea and vomiting     Call MD for:  severe uncontrolled pain     Call MD for:  difficulty breathing, headache or visual disturbances     Call MD for:  redness, tenderness, or signs of infection (pain, swelling, redness, odor or green/yellow discharge around incision site)     Call MD for:  hives     Call MD for:  persistent dizziness or light-headedness     Call MD for:  extreme fatigue     No dressing needed

## 2017-03-06 DIAGNOSIS — F32.0 MILD SINGLE CURRENT EPISODE OF MAJOR DEPRESSIVE DISORDER: ICD-10-CM

## 2017-03-06 DIAGNOSIS — K21.9 GASTROESOPHAGEAL REFLUX DISEASE, ESOPHAGITIS PRESENCE NOT SPECIFIED: ICD-10-CM

## 2017-03-06 DIAGNOSIS — L70.0 ACNE VULGARIS: ICD-10-CM

## 2017-03-06 RX ORDER — FLUOXETINE 10 MG/1
10 CAPSULE ORAL DAILY
Qty: 90 CAPSULE | Refills: 3 | Status: SHIPPED | OUTPATIENT
Start: 2017-03-06 | End: 2018-05-01 | Stop reason: SDUPTHER

## 2017-03-06 RX ORDER — TRETINOIN 0.25 MG/G
GEL TOPICAL
Qty: 45 G | Refills: 3 | Status: SHIPPED | OUTPATIENT
Start: 2017-03-06 | End: 2020-03-26 | Stop reason: SDUPTHER

## 2017-03-06 RX ORDER — OMEPRAZOLE 20 MG/1
40 CAPSULE, DELAYED RELEASE ORAL DAILY
Qty: 180 CAPSULE | Refills: 4 | Status: SHIPPED | OUTPATIENT
Start: 2017-03-06 | End: 2017-07-05 | Stop reason: SDUPTHER

## 2017-03-06 NOTE — TELEPHONE ENCOUNTER
----- Message from Sharyn Brennan sent at 3/6/2017 10:54 AM CST -----  Contact: [phone]/ self   Pt requesting to speak with you in regarding her medications  . Please advise

## 2017-03-21 ENCOUNTER — OFFICE VISIT (OUTPATIENT)
Dept: OPHTHALMOLOGY | Facility: CLINIC | Age: 82
End: 2017-03-21
Payer: MEDICARE

## 2017-03-21 DIAGNOSIS — H02.413 MECHANICAL PTOSIS OF EYELID OF BOTH EYES: ICD-10-CM

## 2017-03-21 DIAGNOSIS — Z98.890 POST-OPERATIVE STATE: Primary | ICD-10-CM

## 2017-03-21 PROCEDURE — 99999 PR PBB SHADOW E&M-EST. PATIENT-LVL III: CPT | Mod: PBBFAC,,, | Performed by: OPHTHALMOLOGY

## 2017-03-21 PROCEDURE — 99024 POSTOP FOLLOW-UP VISIT: CPT | Mod: S$GLB,,, | Performed by: OPHTHALMOLOGY

## 2017-04-01 NOTE — PROGRESS NOTES
HPI     Post-op Evaluation    Additional comments: s/p bleph 2/24/17           Comments   Still has sutures, TD tamara Tid       Last edited by Bibi Flores on 3/21/2017  3:11 PM. (History)            Assessment /Plan     For exam results, see Encounter Report.    Post-operative state    Mechanical ptosis of eyelid of both eyes      Patient doing well! Sutures removed. Post-operative instructions reviewed. All questions answered. Return in 4 weeks prn sooner any worsening of vision/symptoms or any concerns.

## 2017-04-21 ENCOUNTER — PATIENT MESSAGE (OUTPATIENT)
Dept: FAMILY MEDICINE | Facility: CLINIC | Age: 82
End: 2017-04-21

## 2017-04-24 ENCOUNTER — OFFICE VISIT (OUTPATIENT)
Dept: FAMILY MEDICINE | Facility: CLINIC | Age: 82
End: 2017-04-24
Payer: MEDICARE

## 2017-04-24 VITALS
DIASTOLIC BLOOD PRESSURE: 73 MMHG | HEART RATE: 81 BPM | WEIGHT: 129.63 LBS | BODY MASS INDEX: 25.45 KG/M2 | OXYGEN SATURATION: 97 % | HEIGHT: 60 IN | SYSTOLIC BLOOD PRESSURE: 130 MMHG

## 2017-04-24 DIAGNOSIS — H10.13 ALLERGIC CONJUNCTIVITIS OF BOTH EYES: ICD-10-CM

## 2017-04-24 DIAGNOSIS — Z00.00 ROUTINE GENERAL MEDICAL EXAMINATION AT A HEALTH CARE FACILITY: Primary | ICD-10-CM

## 2017-04-24 DIAGNOSIS — E11.59 HYPERTENSION ASSOCIATED WITH DIABETES: ICD-10-CM

## 2017-04-24 DIAGNOSIS — E11.69 DYSLIPIDEMIA ASSOCIATED WITH TYPE 2 DIABETES MELLITUS: ICD-10-CM

## 2017-04-24 DIAGNOSIS — K21.9 GASTROESOPHAGEAL REFLUX DISEASE, ESOPHAGITIS PRESENCE NOT SPECIFIED: ICD-10-CM

## 2017-04-24 DIAGNOSIS — E78.5 DYSLIPIDEMIA ASSOCIATED WITH TYPE 2 DIABETES MELLITUS: ICD-10-CM

## 2017-04-24 DIAGNOSIS — F32.4 MAJOR DEPRESSIVE DISORDER WITH SINGLE EPISODE, IN PARTIAL REMISSION: ICD-10-CM

## 2017-04-24 DIAGNOSIS — I15.2 HYPERTENSION ASSOCIATED WITH DIABETES: ICD-10-CM

## 2017-04-24 DIAGNOSIS — E11.9 CONTROLLED TYPE 2 DIABETES MELLITUS WITHOUT COMPLICATION, WITHOUT LONG-TERM CURRENT USE OF INSULIN: ICD-10-CM

## 2017-04-24 DIAGNOSIS — E11.9 TYPE 2 DIABETES MELLITUS WITHOUT RETINOPATHY: ICD-10-CM

## 2017-04-24 PROCEDURE — 99999 PR PBB SHADOW E&M-EST. PATIENT-LVL III: CPT | Mod: PBBFAC,,, | Performed by: FAMILY MEDICINE

## 2017-04-24 PROCEDURE — 99397 PER PM REEVAL EST PAT 65+ YR: CPT | Mod: S$GLB,,, | Performed by: FAMILY MEDICINE

## 2017-04-24 PROCEDURE — 3078F DIAST BP <80 MM HG: CPT | Mod: S$GLB,,, | Performed by: FAMILY MEDICINE

## 2017-04-24 PROCEDURE — 3075F SYST BP GE 130 - 139MM HG: CPT | Mod: S$GLB,,, | Performed by: FAMILY MEDICINE

## 2017-04-24 PROCEDURE — 99499 UNLISTED E&M SERVICE: CPT | Mod: S$PBB,,, | Performed by: FAMILY MEDICINE

## 2017-04-24 RX ORDER — AZELASTINE HYDROCHLORIDE 0.5 MG/ML
1 SOLUTION/ DROPS OPHTHALMIC 2 TIMES DAILY
Qty: 4 ML | Refills: 11 | Status: SHIPPED | OUTPATIENT
Start: 2017-04-24 | End: 2019-01-22 | Stop reason: SDUPTHER

## 2017-04-24 NOTE — PROGRESS NOTES
Subjective:       Patient ID: Candy Escobar is a 85 y.o. female.    Chief Complaint: Annual Exam    HPI Comments: 85 yr old pleasant white female with  DM II controlled,  hypertension, depression, anxiety, GERD, OA knee, presents today for her routine 3 month follow up and also annual wellness check and lab work.    DM II - diet controlled - HGBA1C                   5.6                 07/26/2016                   foot and eye exam UTD - not on ACE    HTN - chronic - controlled - she is asymptomatic - no CP, SOB, MINER, PND or orthopnea, no bowel or bladder problems    Depression/anxiety - controlled - on prozac daily for years and ativan as needed - no current mood problems - she is lil anxious taking on surgery - no SI/HI    GERD - controlled - on PPI as needed    OA knee - she will be having knee replacement and she is anxious about it    HLD - at goal -  LDLCALC                  96.0                07/26/2016                - not on any meds - diet compliant     History as below - reviewed      Health maintenance  -labs UTD  -vaccines reports UTD      Hypertension   This is a chronic problem. The current episode started more than 1 year ago. The problem has been gradually improving since onset. The problem is controlled. Pertinent negatives include no chest pain, headaches, palpitations or shortness of breath. There are no associated agents to hypertension. Risk factors for coronary artery disease include dyslipidemia, diabetes mellitus and post-menopausal state. Past treatments include lifestyle changes. The current treatment provides significant improvement. There are no compliance problems.  There is no history of angina, CAD/MI, CVA, left ventricular hypertrophy, PVD, renovascular disease, retinopathy or a thyroid problem. There is no history of chronic renal disease, hypercortisolism, hyperparathyroidism or pheochromocytoma.     Review of Systems   Constitutional: Negative.  Negative for activity change,  diaphoresis and unexpected weight change.   HENT: Negative.  Negative for congestion, ear discharge, hearing loss, rhinorrhea, sore throat and voice change.    Eyes: Negative.  Negative for pain, discharge and visual disturbance.   Respiratory: Negative.  Negative for chest tightness, shortness of breath and wheezing.    Cardiovascular: Negative.  Negative for chest pain and palpitations.   Gastrointestinal: Negative.  Negative for abdominal distention, anal bleeding, constipation and nausea.   Endocrine: Negative.  Negative for cold intolerance, polydipsia and polyuria.   Genitourinary: Negative.  Negative for decreased urine volume, difficulty urinating, dysuria, frequency, menstrual problem and vaginal pain.   Musculoskeletal: Negative.  Negative for arthralgias, gait problem and myalgias.   Skin: Negative.  Negative for color change, pallor and wound.   Allergic/Immunologic: Negative.  Negative for environmental allergies and immunocompromised state.   Neurological: Negative.  Negative for dizziness, tremors, seizures, speech difficulty and headaches.   Hematological: Negative.  Negative for adenopathy. Does not bruise/bleed easily.   Psychiatric/Behavioral: Negative.  Negative for agitation, confusion, decreased concentration, hallucinations, self-injury and suicidal ideas. The patient is not nervous/anxious.        PMH/PSH/FH/SH/MED/ALLERGY reviewed    Objective:       Vitals:    04/24/17 1444   BP: 130/73   Pulse: 81       Physical Exam   Constitutional: She is oriented to person, place, and time. She appears well-developed and well-nourished. No distress.   HENT:   Head: Normocephalic and atraumatic.   Right Ear: External ear normal.   Left Ear: External ear normal.   Nose: Nose normal.   Mouth/Throat: Oropharynx is clear and moist. No oropharyngeal exudate.   Eyes: Conjunctivae and EOM are normal. Pupils are equal, round, and reactive to light. Right eye exhibits no discharge. Left eye exhibits no  discharge. No scleral icterus.   Neck: Normal range of motion. Neck supple. No JVD present. No tracheal deviation present. No thyromegaly present.   Cardiovascular: Normal rate, regular rhythm, normal heart sounds and intact distal pulses.  Exam reveals no gallop and no friction rub.    No murmur heard.  Pulmonary/Chest: Effort normal and breath sounds normal. No stridor. She has no wheezes. She has no rales. She exhibits no tenderness.   Abdominal: Soft. Bowel sounds are normal. She exhibits no distension and no mass. There is no tenderness. There is no rebound and no guarding. No hernia.   Musculoskeletal: Normal range of motion. She exhibits no edema or tenderness.   Lymphadenopathy:     She has no cervical adenopathy.   Neurological: She is alert and oriented to person, place, and time. She has normal reflexes. She displays normal reflexes. No cranial nerve deficit. She exhibits normal muscle tone. Coordination normal.   Skin: Skin is warm and dry. No rash noted. She is not diaphoretic. No erythema. No pallor.   Psychiatric: She has a normal mood and affect. Her behavior is normal. Judgment and thought content normal.       Assessment:       1. Routine general medical examination at a health care facility    2. Severe single current episode of major depressive disorder, without psychotic features    3. Hypertension associated with diabetes    4. Controlled type 2 diabetes mellitus without complication, without long-term current use of insulin    5. Dyslipidemia associated with type 2 diabetes mellitus    6. Type 2 diabetes mellitus without retinopathy    7. Gastroesophageal reflux disease, esophagitis presence not specified        Plan:       Candy was seen today for annual exam.    Diagnoses and all orders for this visit:    Routine general medical examination at a health care facility  -     CBC auto differential; Future  -     Comprehensive metabolic panel; Future  -     Lipid panel; Future    Hypertension  associated with diabetes  -     CBC auto differential; Future  -     Comprehensive metabolic panel; Future  -     Lipid panel; Future    Controlled type 2 diabetes mellitus without complication, without long-term current use of insulin  -     CBC auto differential; Future  -     Comprehensive metabolic panel; Future  -     Lipid panel; Future  -     Hemoglobin A1c; Future  -     Microalbumin/creatinine urine ratio; Future    Dyslipidemia associated with type 2 diabetes mellitus  -     CBC auto differential; Future  -     Comprehensive metabolic panel; Future  -     Lipid panel; Future    Type 2 diabetes mellitus without retinopathy  -     CBC auto differential; Future  -     Comprehensive metabolic panel; Future  -     Lipid panel; Future  -     Hemoglobin A1c; Future  -     Microalbumin/creatinine urine ratio; Future    Gastroesophageal reflux disease, esophagitis presence not specified    Allergic conjunctivitis of both eyes  -     azelastine (OPTIVAR) 0.05 % ophthalmic solution; Place 1 drop into both eyes 2 (two) times daily.    Major depressive disorder with single episode, in partial remission      Wellness check  -normal exam  -labs      HTN  -controlled    HLD  - at goal  -continue statin  -labs    DM II  -controlled  -labs    SK  -reassurance    MDD  -controlled    Spent adequate time in obtaining history and explaining differentials    40 minutes spent during this visit of which greater than 50% devoted to face-face counseling and coordination of care regarding diagnosis and management plan      Return in about 6 months (around 10/24/2017), or if symptoms worsen or fail to improve.

## 2017-04-24 NOTE — MR AVS SNAPSHOT
[address]  [place] [address]  Phone: [phone]  Fax: [phone]                  Candy Escobar   2017 2:20 PM   Office Visit    Description:  Female : 1932   Provider:  Francisco Munson MD   Department:  [facility]           Reason for Visit     Annual Exam           Diagnoses this Visit        Comments    Routine general medical examination at a health care facility    -  Primary     Severe single current episode of major depressive disorder, without psychotic features         Hypertension associated with diabetes         Controlled type 2 diabetes mellitus without complication, without long-term current use of insulin         Dyslipidemia associated with type 2 diabetes mellitus         Type 2 diabetes mellitus without retinopathy         Gastroesophageal reflux disease, esophagitis presence not specified         Allergic conjunctivitis of both eyes                To Do List           Goals (5 Years of Data)     None      Follow-Up and Disposition     Return in about 6 months (around 10/24/2017), or if symptoms worsen or fail to improve.       These Medications        Disp Refills Start End    azelastine (OPTIVAR) 0.05 % ophthalmic solution 4 mL 11 2017    Place 1 drop into both eyes 2 (two) times daily. - Both Eyes    Pharmacy: [facility] - [place] [address] AT [facility] Ph #: [phone]         [facility]     [facility] Nurse Care Line -  Assistance  Unless otherwise directed by your provider, please contact [facility], our nurse care line that is available for  assistance.     Registered nurses in the [facility] provide: appointment scheduling, clinical advisement, health education, and other advisory services.  Call: [phone] (toll free)               Medications           Message regarding  Medications     Verify the changes and/or additions to your medication regime listed below are the same as discussed with your clinician today.  If any of these changes or additions are incorrect, please notify your healthcare provider.        START taking these NEW medications        Refills    azelastine (OPTIVAR) 0.05 % ophthalmic solution 11    Sig: Place 1 drop into both eyes 2 (two) times daily.    Class: Normal    Route: Both Eyes      STOP taking these medications     lisinopril 10 MG tablet Take 1 tablet (10 mg total) by mouth once daily.           Verify that the below list of medications is an accurate representation of the medications you are currently taking.  If none reported, the list may be blank. If incorrect, please contact your healthcare provider. Carry this list with you in case of emergency.           Current Medications     albuterol 90 mcg/actuation inhaler Inhale 2 puffs into the lungs every 4 (four) hours as needed for Wheezing.    escitalopram oxalate (LEXAPRO) 10 MG tablet Take 1 tablet (10 mg total) by mouth once daily.    estradiol 0.1 mg/24 hr td ptwk 0.1 mg/24 hr PTWK APPLY ONE PATCH TOPICALLY ONCE A WEEK    estradiol 0.1 mg/24 hr td ptwk 0.1 mg/24 hr PTWK Place 1 patch onto the skin every 7 days.    fluoxetine (PROZAC) 10 MG capsule Take 1 capsule (10 mg total) by mouth once daily.    meloxicam (MOBIC) 15 MG tablet TAKE 1 TABLET DAILY AS     NEEDED FOR PAIN    omeprazole (PRILOSEC) 20 MG capsule Take 2 capsules (40 mg total) by mouth once daily.    tretinoin (RETIN-A) 0.025 % gel Apply topically as needed.    azelastine (OPTIVAR) 0.05 % ophthalmic solution Place 1 drop into both eyes 2 (two) times daily.    cyclobenzaprine (FLEXERIL) 10 MG tablet Take 1 tablet (10 mg total) by mouth nightly as needed.    gabapentin (NEURONTIN) 100 MG capsule Take 1 capsule (100 mg total) by mouth 3 (three) times daily.    triamcinolone acetonide 0.1% (KENALOG) 0.1 % ointment Apply topically 2 (two)  times daily.           Clinical Reference Information           Your Vitals Were     BP Pulse Height Weight SpO2 BMI    140/73 81 5' (1.524 m) 58.8 kg (129 lb 10.1 oz) 97% 25.32 kg/m2      Blood Pressure          Most Recent Value    BP  (!)  140/73      Allergies as of 4/24/2017     Propofol Analogues    Oxycodone    Pcn [Penicillins]    Tetracyclines      Immunizations Administered on Date of Encounter - 4/24/2017     None      Orders Placed During Today's Visit     Future Labs/Procedures Expected by Expires    CBC auto differential  4/24/2017 6/23/2018    Comprehensive metabolic panel  4/24/2017 6/23/2018    Hemoglobin A1c  4/24/2017 6/23/2018    Lipid panel  4/24/2017 6/23/2018    Microalbumin/creatinine urine ratio  4/24/2017 6/23/2018      Language Assistance Services     ATTENTION: Language assistance services are available, free of charge. Please call [phone].      ATENCIÓN: Si habla español, tiene a gomez disposición servicios gratuitos de asistencia lingüística. Llame al [phone].     CHÚ Ý: N?u b?n nói Ti?ng Vi?t, có các d?ch v? h? tr? ngôn ng? mi?n phí marcinh cho b?n. G?i s? [phone].         [facility] complies with applicable Federal civil rights laws and does not discriminate on the basis of race, color, national origin, age, disability, or sex.

## 2017-04-25 ENCOUNTER — LAB VISIT (OUTPATIENT)
Dept: LAB | Facility: HOSPITAL | Age: 82
End: 2017-04-25
Attending: FAMILY MEDICINE
Payer: MEDICARE

## 2017-04-25 DIAGNOSIS — E11.9 CONTROLLED TYPE 2 DIABETES MELLITUS WITHOUT COMPLICATION, WITHOUT LONG-TERM CURRENT USE OF INSULIN: ICD-10-CM

## 2017-04-25 DIAGNOSIS — E11.69 DYSLIPIDEMIA ASSOCIATED WITH TYPE 2 DIABETES MELLITUS: ICD-10-CM

## 2017-04-25 DIAGNOSIS — Z00.00 ROUTINE GENERAL MEDICAL EXAMINATION AT A HEALTH CARE FACILITY: ICD-10-CM

## 2017-04-25 DIAGNOSIS — E11.9 TYPE 2 DIABETES MELLITUS WITHOUT RETINOPATHY: ICD-10-CM

## 2017-04-25 DIAGNOSIS — E11.59 HYPERTENSION ASSOCIATED WITH DIABETES: ICD-10-CM

## 2017-04-25 DIAGNOSIS — I15.2 HYPERTENSION ASSOCIATED WITH DIABETES: ICD-10-CM

## 2017-04-25 DIAGNOSIS — E78.5 DYSLIPIDEMIA ASSOCIATED WITH TYPE 2 DIABETES MELLITUS: ICD-10-CM

## 2017-04-25 LAB
ALBUMIN SERPL BCP-MCNC: 3.6 G/DL
ALP SERPL-CCNC: 49 U/L
ALT SERPL W/O P-5'-P-CCNC: 10 U/L
ANION GAP SERPL CALC-SCNC: 5 MMOL/L
AST SERPL-CCNC: 19 U/L
BASOPHILS # BLD AUTO: 0.05 K/UL
BASOPHILS NFR BLD: 1.3 %
BILIRUB SERPL-MCNC: 0.8 MG/DL
BUN SERPL-MCNC: 12 MG/DL
CALCIUM SERPL-MCNC: 9.3 MG/DL
CHLORIDE SERPL-SCNC: 102 MMOL/L
CHOLEST/HDLC SERPL: 2.5 {RATIO}
CO2 SERPL-SCNC: 32 MMOL/L
CREAT SERPL-MCNC: 0.9 MG/DL
DIFFERENTIAL METHOD: ABNORMAL
EOSINOPHIL # BLD AUTO: 0.3 K/UL
EOSINOPHIL NFR BLD: 7.3 %
ERYTHROCYTE [DISTWIDTH] IN BLOOD BY AUTOMATED COUNT: 13.8 %
EST. GFR  (AFRICAN AMERICAN): >60 ML/MIN/1.73 M^2
EST. GFR  (NON AFRICAN AMERICAN): 58 ML/MIN/1.73 M^2
GLUCOSE SERPL-MCNC: 94 MG/DL
HCT VFR BLD AUTO: 36.2 %
HDL/CHOLESTEROL RATIO: 40 %
HDLC SERPL-MCNC: 200 MG/DL
HDLC SERPL-MCNC: 80 MG/DL
HGB BLD-MCNC: 12.3 G/DL
LDLC SERPL CALC-MCNC: 108.2 MG/DL
LYMPHOCYTES # BLD AUTO: 1.1 K/UL
LYMPHOCYTES NFR BLD: 27.6 %
MCH RBC QN AUTO: 31.9 PG
MCHC RBC AUTO-ENTMCNC: 34 %
MCV RBC AUTO: 94 FL
MONOCYTES # BLD AUTO: 0.3 K/UL
MONOCYTES NFR BLD: 7.8 %
NEUTROPHILS # BLD AUTO: 2.1 K/UL
NEUTROPHILS NFR BLD: 55.7 %
NONHDLC SERPL-MCNC: 120 MG/DL
PLATELET # BLD AUTO: 219 K/UL
PMV BLD AUTO: 11.6 FL
POTASSIUM SERPL-SCNC: 4.7 MMOL/L
PROT SERPL-MCNC: 6.5 G/DL
RBC # BLD AUTO: 3.85 M/UL
SODIUM SERPL-SCNC: 139 MMOL/L
TRIGL SERPL-MCNC: 59 MG/DL
WBC # BLD AUTO: 3.84 K/UL

## 2017-04-25 PROCEDURE — 80053 COMPREHEN METABOLIC PANEL: CPT

## 2017-04-25 PROCEDURE — 80061 LIPID PANEL: CPT

## 2017-04-25 PROCEDURE — 85025 COMPLETE CBC W/AUTO DIFF WBC: CPT

## 2017-04-25 PROCEDURE — 83036 HEMOGLOBIN GLYCOSYLATED A1C: CPT

## 2017-04-25 PROCEDURE — 36415 COLL VENOUS BLD VENIPUNCTURE: CPT

## 2017-04-26 ENCOUNTER — TELEPHONE (OUTPATIENT)
Dept: FAMILY MEDICINE | Facility: CLINIC | Age: 82
End: 2017-04-26

## 2017-04-26 LAB
ESTIMATED AVG GLUCOSE: 117 MG/DL
HBA1C MFR BLD HPLC: 5.7 %

## 2017-04-26 NOTE — TELEPHONE ENCOUNTER
Called patient to advise that labs were reassuring and to work on a low fat diet.  Patient verbalized understanding.

## 2017-04-26 NOTE — TELEPHONE ENCOUNTER
----- Message from Francisco Munson MD sent at 4/26/2017  1:56 PM CDT -----  Call and mail    Labs reassuring - work on low fat diet

## 2017-05-11 DIAGNOSIS — Z78.0 POST-MENOPAUSAL: ICD-10-CM

## 2017-05-11 RX ORDER — ESTRADIOL 0.1 MG/D
1 PATCH TRANSDERMAL
Qty: 12 PATCH | Refills: 3 | Status: SHIPPED | OUTPATIENT
Start: 2017-05-11 | End: 2017-10-24 | Stop reason: SDUPTHER

## 2017-05-11 NOTE — TELEPHONE ENCOUNTER
----- Message from Gigi Jhaveri sent at 5/11/2017 11:57 AM CDT -----  Contact: [phone]/self  Pt requesting to speak with you about her prescription (pt wouldn't give the name of the Rx she stated she only wanted to tell it to the nurse)  Please advise

## 2017-05-22 ENCOUNTER — TELEPHONE (OUTPATIENT)
Dept: FAMILY MEDICINE | Facility: CLINIC | Age: 82
End: 2017-05-22

## 2017-05-22 NOTE — TELEPHONE ENCOUNTER
----- Message from Nayeli Hurst sent at 5/22/2017  1:41 PM CDT -----  Contact: self/[phone]  Patient would like to speak with you about a personal matter.  Please advise.

## 2017-05-22 NOTE — TELEPHONE ENCOUNTER
Spoke to pt and stated that her insurance company will be sending a prior authorization for her Estradiol.

## 2017-05-23 ENCOUNTER — TELEPHONE (OUTPATIENT)
Dept: FAMILY MEDICINE | Facility: CLINIC | Age: 82
End: 2017-05-23

## 2017-05-23 NOTE — TELEPHONE ENCOUNTER
----- Message from Alejandra Goodwin sent at 5/23/2017  3:34 PM CDT -----  Contact: ms weaver with [facility] [phone] ext [phone]  Ms meg is requesting to speak with you regarding patient

## 2017-05-25 ENCOUNTER — TELEPHONE (OUTPATIENT)
Dept: FAMILY MEDICINE | Facility: CLINIC | Age: 82
End: 2017-05-25

## 2017-05-25 NOTE — TELEPHONE ENCOUNTER
----- Message from Alejandra Goodwin sent at 5/25/2017 12:06 PM CDT -----  Contact: [phone]  Patient would like to speak with you regarding a referral that was faxed from [facility]

## 2017-07-04 RX ORDER — ESTRADIOL 0.1 MG/D
PATCH TRANSDERMAL
Qty: 12 PATCH | Refills: 0 | Status: SHIPPED | OUTPATIENT
Start: 2017-07-04 | End: 2017-10-31 | Stop reason: SDUPTHER

## 2017-07-05 DIAGNOSIS — K21.9 GASTROESOPHAGEAL REFLUX DISEASE, ESOPHAGITIS PRESENCE NOT SPECIFIED: ICD-10-CM

## 2017-07-05 RX ORDER — OMEPRAZOLE 20 MG/1
40 CAPSULE, DELAYED RELEASE ORAL DAILY
Qty: 180 CAPSULE | Refills: 1 | Status: ON HOLD | OUTPATIENT
Start: 2017-07-05 | End: 2018-05-11 | Stop reason: CLARIF

## 2017-07-05 NOTE — TELEPHONE ENCOUNTER
----- Message from Peyton Centeno sent at 7/5/2017  9:25 AM CDT -----  No. [phone]   Patient needs Omeprazole 20mg, 2 daily, #180 called into [facility].

## 2017-07-28 ENCOUNTER — OFFICE VISIT (OUTPATIENT)
Dept: OPTOMETRY | Facility: CLINIC | Age: 82
End: 2017-07-28
Payer: MEDICARE

## 2017-07-28 DIAGNOSIS — E11.9 TYPE 2 DIABETES MELLITUS WITHOUT RETINOPATHY: Primary | ICD-10-CM

## 2017-07-28 DIAGNOSIS — H52.7 REFRACTIVE ERROR: ICD-10-CM

## 2017-07-28 DIAGNOSIS — H25.13 NUCLEAR SCLEROSIS, BILATERAL: ICD-10-CM

## 2017-07-28 DIAGNOSIS — H10.13 ALLERGIC CONJUNCTIVITIS, BILATERAL: ICD-10-CM

## 2017-07-28 DIAGNOSIS — H40.013 OPEN ANGLE WITH BORDERLINE FINDINGS AND LOW GLAUCOMA RISK IN BOTH EYES: ICD-10-CM

## 2017-07-28 PROCEDURE — 99499 UNLISTED E&M SERVICE: CPT | Mod: S$PBB,,, | Performed by: OPTOMETRIST

## 2017-07-28 PROCEDURE — 99999 PR PBB SHADOW E&M-EST. PATIENT-LVL III: CPT | Mod: PBBFAC,,, | Performed by: OPTOMETRIST

## 2017-07-28 PROCEDURE — 92014 COMPRE OPH EXAM EST PT 1/>: CPT | Mod: S$GLB,,, | Performed by: OPTOMETRIST

## 2017-07-28 PROCEDURE — 92015 DETERMINE REFRACTIVE STATE: CPT | Mod: S$GLB,,, | Performed by: OPTOMETRIST

## 2017-07-28 NOTE — PROGRESS NOTES
MALLIKA MANUEL 07/2015.  Had blepharoplasty with Dr. Smith 02/2017.Patient has   removed glasses lens for OS thinks eye was closing because of   prescription. Diabetic, doesn't monitor BS at home.  Eyes itch all the   time, patient has allergies.  Diabetic eye exam    Hemoglobin A1C       Date                     Value               Ref Range             Status                04/25/2017               5.7                 4.5 - 6.2 %           Final                  07/26/2016               5.6                 4.5 - 6.2 %           Final         01/25/2016               5.4                 4.5 - 6.2 %           Final                  Last edited by Jorge A Kahn, OD on 7/28/2017 12:05 PM. (History)            Assessment /Plan     For exam results, see Encounter Report.    Type 2 diabetes mellitus without retinopathy    Allergic conjunctivitis, bilateral    Open angle with borderline findings and low glaucoma risk in both eyes  -     Gamble Visual Field - OU - Intermediate - Both Eyes; Future  -     OCT - Optic Nerve; Future    Nuclear sclerosis, bilateral    Refractive error      1. No diabetic retinopathy, no csme. Return in 1 year for dilated eye exam.  2. COnt with Rx allergy drops.  3. CD changes OD, IOP normal, RTC for HVF(24-2)juliano std and OCT of rnfl.  4. Educated pt on presence of cataracts and effects on vision. No surgery at this time. Recheck in one year.  5. Spec Rx given. Different lens options discussed with patient. RTC 1 year full exam.

## 2017-10-24 DIAGNOSIS — Z78.0 POST-MENOPAUSAL: ICD-10-CM

## 2017-10-24 RX ORDER — ESTRADIOL 0.1 MG/D
1 PATCH TRANSDERMAL
Qty: 12 PATCH | Refills: 1 | Status: SHIPPED | OUTPATIENT
Start: 2017-10-24 | End: 2018-11-21 | Stop reason: SDUPTHER

## 2017-10-24 NOTE — TELEPHONE ENCOUNTER
----- Message from Jose Contreras sent at 10/24/2017 10:27 AM CDT -----  Contact: [phone] self  Patient called in requesting to speak with you. Patient prefers to speak with a nurse. Please advise.

## 2017-10-27 ENCOUNTER — OFFICE VISIT (OUTPATIENT)
Dept: CARDIOLOGY | Facility: CLINIC | Age: 82
End: 2017-10-27
Payer: MEDICARE

## 2017-10-27 VITALS
DIASTOLIC BLOOD PRESSURE: 78 MMHG | OXYGEN SATURATION: 97 % | SYSTOLIC BLOOD PRESSURE: 154 MMHG | HEIGHT: 60 IN | HEART RATE: 74 BPM | BODY MASS INDEX: 25.4 KG/M2 | WEIGHT: 129.38 LBS

## 2017-10-27 DIAGNOSIS — K21.9 GASTROESOPHAGEAL REFLUX DISEASE, ESOPHAGITIS PRESENCE NOT SPECIFIED: ICD-10-CM

## 2017-10-27 DIAGNOSIS — I83.93 VARICOSE VEINS OF BOTH LOWER EXTREMITIES: ICD-10-CM

## 2017-10-27 DIAGNOSIS — E78.5 DYSLIPIDEMIA ASSOCIATED WITH TYPE 2 DIABETES MELLITUS: ICD-10-CM

## 2017-10-27 DIAGNOSIS — M79.604 PAIN IN BOTH LOWER EXTREMITIES: Primary | ICD-10-CM

## 2017-10-27 DIAGNOSIS — E11.69 DYSLIPIDEMIA ASSOCIATED WITH TYPE 2 DIABETES MELLITUS: ICD-10-CM

## 2017-10-27 DIAGNOSIS — I10 ESSENTIAL HYPERTENSION: ICD-10-CM

## 2017-10-27 DIAGNOSIS — M79.605 PAIN IN BOTH LOWER EXTREMITIES: Primary | ICD-10-CM

## 2017-10-27 PROCEDURE — 99999 PR PBB SHADOW E&M-EST. PATIENT-LVL III: CPT | Mod: PBBFAC,,, | Performed by: INTERNAL MEDICINE

## 2017-10-27 PROCEDURE — 99499 UNLISTED E&M SERVICE: CPT | Mod: S$PBB,,, | Performed by: INTERNAL MEDICINE

## 2017-10-27 PROCEDURE — 99214 OFFICE O/P EST MOD 30 MIN: CPT | Mod: S$GLB,,, | Performed by: INTERNAL MEDICINE

## 2017-10-27 NOTE — PROGRESS NOTES
Subjective:    Patient ID:  Candy Escobar is a 85 y.o. female who presents for evaluation of Claudication      HPI  83 y/o female with hx of HTN, DM, GERD, venous insuff, vericose veins s/p sclerotherapy who presents for evaluation of leg pain.   Last clinic visit was evaluated for CP, had a negative stress test, and Holter without significant abnormality.   She has chronic bilateral leg pain which is difficult to describe. She has had worsening of left thigh pain mostly with ambulation. She states the pain becomes worse when she lays on the leg. No non healing ulceration or limb ischemia. She denies CP, SOB/MINER, syncope, orthopnea, PND.     Review of Systems   Constitution: Negative for weakness and malaise/fatigue.   HENT: Negative for congestion.    Eyes: Negative for blurred vision.   Cardiovascular: Negative for chest pain, claudication, cyanosis, dyspnea on exertion, irregular heartbeat, leg swelling, near-syncope, orthopnea, palpitations, paroxysmal nocturnal dyspnea and syncope.   Respiratory: Negative for shortness of breath.    Endocrine: Negative for polyuria.   Hematologic/Lymphatic: Negative for bleeding problem.   Skin: Negative for itching and rash.   Musculoskeletal: Negative for joint swelling, muscle cramps and muscle weakness.   Gastrointestinal: Negative for abdominal pain, hematemesis, hematochezia, melena, nausea and vomiting.   Genitourinary: Negative for dysuria and hematuria.   Neurological: Negative for dizziness, focal weakness, headaches, light-headedness and loss of balance.   Psychiatric/Behavioral: Negative for depression. The patient is not nervous/anxious.         Objective:    Physical Exam   Constitutional: She is oriented to person, place, and time. She appears well-developed and well-nourished.   HENT:   Head: Normocephalic and atraumatic.   Neck: Neck supple. No JVD present.   Cardiovascular: Normal rate, regular rhythm and normal heart sounds.    Pulses:       Carotid  pulses are 2+ on the right side, and 2+ on the left side.       Radial pulses are 2+ on the right side, and 2+ on the left side.        Femoral pulses are 2+ on the right side, and 2+ on the left side.  Biphasic RPT/RDP/LDP  Monophasic LPT   Pulmonary/Chest: Effort normal and breath sounds normal.   Abdominal: Soft. Bowel sounds are normal.   Musculoskeletal: She exhibits no edema.   Neurological: She is alert and oriented to person, place, and time.   Skin: Skin is warm and dry.   Psychiatric: She has a normal mood and affect. Her behavior is normal. Thought content normal.         Assessment:       1. Pain in both lower extremities    2. Dyslipidemia associated with type 2 diabetes mellitus    3. Essential hypertension    4. Gastroesophageal reflux disease, esophagitis presence not specified    5. Varicose veins of both lower extremities      86 y/o female with hx and presentation as above. Will evaluate leg symptoms with arterial doppler and venous insufficiency doppler.       Plan:       -Venous insufficiency doppler  -Arterial doppler  -F/u in 6 months

## 2017-10-31 ENCOUNTER — LAB VISIT (OUTPATIENT)
Dept: LAB | Facility: HOSPITAL | Age: 82
End: 2017-10-31
Attending: FAMILY MEDICINE
Payer: MEDICARE

## 2017-10-31 ENCOUNTER — OFFICE VISIT (OUTPATIENT)
Dept: FAMILY MEDICINE | Facility: CLINIC | Age: 82
End: 2017-10-31
Attending: FAMILY MEDICINE
Payer: MEDICARE

## 2017-10-31 VITALS
HEIGHT: 60 IN | WEIGHT: 128.75 LBS | SYSTOLIC BLOOD PRESSURE: 154 MMHG | OXYGEN SATURATION: 96 % | HEART RATE: 72 BPM | DIASTOLIC BLOOD PRESSURE: 70 MMHG | BODY MASS INDEX: 25.28 KG/M2

## 2017-10-31 DIAGNOSIS — E78.5 DYSLIPIDEMIA ASSOCIATED WITH TYPE 2 DIABETES MELLITUS: ICD-10-CM

## 2017-10-31 DIAGNOSIS — Z23 IMMUNIZATION DUE: ICD-10-CM

## 2017-10-31 DIAGNOSIS — E11.69 DYSLIPIDEMIA ASSOCIATED WITH TYPE 2 DIABETES MELLITUS: ICD-10-CM

## 2017-10-31 DIAGNOSIS — I15.2 HYPERTENSION ASSOCIATED WITH DIABETES: ICD-10-CM

## 2017-10-31 DIAGNOSIS — M25.561 CHRONIC PAIN OF RIGHT KNEE: ICD-10-CM

## 2017-10-31 DIAGNOSIS — E11.9 CONTROLLED TYPE 2 DIABETES MELLITUS WITHOUT COMPLICATION, WITHOUT LONG-TERM CURRENT USE OF INSULIN: Primary | ICD-10-CM

## 2017-10-31 DIAGNOSIS — E11.59 HYPERTENSION ASSOCIATED WITH DIABETES: ICD-10-CM

## 2017-10-31 DIAGNOSIS — E11.9 CONTROLLED TYPE 2 DIABETES MELLITUS WITHOUT COMPLICATION, WITHOUT LONG-TERM CURRENT USE OF INSULIN: ICD-10-CM

## 2017-10-31 DIAGNOSIS — F32.2 SEVERE SINGLE CURRENT EPISODE OF MAJOR DEPRESSIVE DISORDER, WITHOUT PSYCHOTIC FEATURES: ICD-10-CM

## 2017-10-31 DIAGNOSIS — G89.29 CHRONIC PAIN OF RIGHT KNEE: ICD-10-CM

## 2017-10-31 DIAGNOSIS — F32.4 MAJOR DEPRESSIVE DISORDER WITH SINGLE EPISODE, IN PARTIAL REMISSION: ICD-10-CM

## 2017-10-31 LAB
ALBUMIN SERPL BCP-MCNC: 3.7 G/DL
ALP SERPL-CCNC: 57 U/L
ALT SERPL W/O P-5'-P-CCNC: 10 U/L
ANION GAP SERPL CALC-SCNC: 2 MMOL/L
AST SERPL-CCNC: 21 U/L
BASOPHILS # BLD AUTO: 0.05 K/UL
BASOPHILS NFR BLD: 1.2 %
BILIRUB SERPL-MCNC: 0.7 MG/DL
BUN SERPL-MCNC: 16 MG/DL
CALCIUM SERPL-MCNC: 9.5 MG/DL
CHLORIDE SERPL-SCNC: 100 MMOL/L
CHOLEST SERPL-MCNC: 224 MG/DL
CHOLEST/HDLC SERPL: 2.3 {RATIO}
CO2 SERPL-SCNC: 34 MMOL/L
CREAT SERPL-MCNC: 1.1 MG/DL
DIFFERENTIAL METHOD: ABNORMAL
EOSINOPHIL # BLD AUTO: 0.3 K/UL
EOSINOPHIL NFR BLD: 7.2 %
ERYTHROCYTE [DISTWIDTH] IN BLOOD BY AUTOMATED COUNT: 13.6 %
EST. GFR  (AFRICAN AMERICAN): 53 ML/MIN/1.73 M^2
EST. GFR  (NON AFRICAN AMERICAN): 46 ML/MIN/1.73 M^2
ESTIMATED AVG GLUCOSE: 105 MG/DL
GLUCOSE SERPL-MCNC: 87 MG/DL
HBA1C MFR BLD HPLC: 5.3 %
HCT VFR BLD AUTO: 38.9 %
HDLC SERPL-MCNC: 96 MG/DL
HDLC SERPL: 42.9 %
HGB BLD-MCNC: 12.6 G/DL
LDLC SERPL CALC-MCNC: 116.2 MG/DL
LYMPHOCYTES # BLD AUTO: 1 K/UL
LYMPHOCYTES NFR BLD: 23 %
MCH RBC QN AUTO: 31.3 PG
MCHC RBC AUTO-ENTMCNC: 32.4 G/DL
MCV RBC AUTO: 97 FL
MONOCYTES # BLD AUTO: 0.4 K/UL
MONOCYTES NFR BLD: 8.8 %
NEUTROPHILS # BLD AUTO: 2.6 K/UL
NEUTROPHILS NFR BLD: 59.6 %
NONHDLC SERPL-MCNC: 128 MG/DL
PLATELET # BLD AUTO: 263 K/UL
PMV BLD AUTO: 11.6 FL
POTASSIUM SERPL-SCNC: 4.9 MMOL/L
PROT SERPL-MCNC: 7 G/DL
RBC # BLD AUTO: 4.03 M/UL
SODIUM SERPL-SCNC: 136 MMOL/L
TRIGL SERPL-MCNC: 59 MG/DL
WBC # BLD AUTO: 4.3 K/UL

## 2017-10-31 PROCEDURE — 99999 PR PBB SHADOW E&M-EST. PATIENT-LVL III: CPT | Mod: PBBFAC,,, | Performed by: FAMILY MEDICINE

## 2017-10-31 PROCEDURE — 83036 HEMOGLOBIN GLYCOSYLATED A1C: CPT

## 2017-10-31 PROCEDURE — 90662 IIV NO PRSV INCREASED AG IM: CPT | Mod: S$GLB,,, | Performed by: FAMILY MEDICINE

## 2017-10-31 PROCEDURE — 36415 COLL VENOUS BLD VENIPUNCTURE: CPT

## 2017-10-31 PROCEDURE — G0008 ADMIN INFLUENZA VIRUS VAC: HCPCS | Mod: S$GLB,,, | Performed by: FAMILY MEDICINE

## 2017-10-31 PROCEDURE — 85025 COMPLETE CBC W/AUTO DIFF WBC: CPT

## 2017-10-31 PROCEDURE — 80053 COMPREHEN METABOLIC PANEL: CPT

## 2017-10-31 PROCEDURE — 99214 OFFICE O/P EST MOD 30 MIN: CPT | Mod: 25,S$GLB,, | Performed by: FAMILY MEDICINE

## 2017-10-31 PROCEDURE — 80061 LIPID PANEL: CPT

## 2017-10-31 PROCEDURE — 99499 UNLISTED E&M SERVICE: CPT | Mod: S$PBB,,, | Performed by: FAMILY MEDICINE

## 2017-10-31 RX ORDER — GABAPENTIN 100 MG/1
100 CAPSULE ORAL 3 TIMES DAILY
Qty: 90 CAPSULE | Refills: 3 | Status: SHIPPED | OUTPATIENT
Start: 2017-10-31 | End: 2018-03-01 | Stop reason: SDUPTHER

## 2017-10-31 RX ORDER — LOSARTAN POTASSIUM 25 MG/1
25 TABLET ORAL DAILY
Qty: 90 TABLET | Refills: 3 | Status: SHIPPED | OUTPATIENT
Start: 2017-10-31 | End: 2018-07-10 | Stop reason: SDUPTHER

## 2017-10-31 NOTE — PROGRESS NOTES
Subjective:       Patient ID: Candy Escobar is a 85 y.o. female.    Chief Complaint: Hypertension; Dizziness; and Leg Pain    85 yr old pleasant white female with  DM II controlled,  hypertension, depression, anxiety, GERD, OA knee, presents today for her routine 6 month follow up and also neuropathic pain, joint aches and lab work.    DM II - diet controlled -  HGBA1C                   5.7                 04/25/2017                           foot and eye exam UTD - not on ACE    HTN - chronic - uncontrolled - she is asymptomatic - no CP, SOB, MINER, PND or orthopnea, no bowel or bladder problems    Depression/anxiety - controlled - on prozac daily for years and ativan as needed - no current mood problems - she is lil anxious taking on surgery - no SI/HI    GERD - controlled - on PPI as needed    OA knee - she will be having knee replacement and she is anxious about it    HLD - not at goal -  LDLCALC                  108.2               04/25/2017                         - not on any meds - diet compliant     History as below - reviewed      Health maintenance  -labs UTD  -vaccines reports UTD        Hypertension   This is a chronic problem. The current episode started more than 1 year ago. The problem has been gradually improving since onset. The problem is controlled. Pertinent negatives include no anxiety, chest pain, headaches, palpitations or shortness of breath. There are no associated agents to hypertension. Risk factors for coronary artery disease include dyslipidemia, diabetes mellitus and post-menopausal state. Past treatments include lifestyle changes. The current treatment provides significant improvement. There are no compliance problems.  There is no history of angina, CAD/MI, CVA, left ventricular hypertrophy, PVD, renovascular disease, retinopathy or a thyroid problem. There is no history of chronic renal disease, hypercortisolism, hyperparathyroidism or pheochromocytoma.   Dizziness:   Chronicity:   Recurrent  Onset:  1 to 4 weeks ago  Progression since onset:  Gradually improving  Frequency:  Every few days  Severity:  Mild  Duration:  5 minutes  Dizziness characteristics:  Sensation of movementno hearing loss, no headaches, no nausea, no diaphoresis, no palpitations and no chest pain.  Aggravated by:  Position changes  Treatments tried:  Rest and body position changes  Improvements on treatment:  Mildno strokes, no neurologic disease, no head trauma, no balance testing, no ear trauma, no head trauma, no anxiety, no ear tubes, no environmental allergies and no CT head.  Leg Pain        Review of Systems   Constitutional: Negative.  Negative for activity change, diaphoresis and unexpected weight change.   HENT: Negative.  Negative for congestion, ear discharge, hearing loss, rhinorrhea, sore throat and voice change.    Eyes: Negative.  Negative for pain, discharge and visual disturbance.   Respiratory: Negative.  Negative for chest tightness, shortness of breath and wheezing.    Cardiovascular: Negative.  Negative for chest pain and palpitations.   Gastrointestinal: Negative.  Negative for abdominal distention, anal bleeding, constipation and nausea.   Endocrine: Negative.  Negative for cold intolerance, polydipsia and polyuria.   Genitourinary: Negative.  Negative for decreased urine volume, difficulty urinating, dysuria, frequency, menstrual problem and vaginal pain.   Musculoskeletal: Negative.  Negative for arthralgias, gait problem and myalgias.   Skin: Negative.  Negative for color change, pallor and wound.   Allergic/Immunologic: Negative.  Negative for environmental allergies and immunocompromised state.   Neurological: Positive for dizziness. Negative for tremors, seizures, speech difficulty and headaches.   Hematological: Negative.  Negative for adenopathy. Does not bruise/bleed easily.   Psychiatric/Behavioral: Negative.  Negative for agitation, confusion, decreased concentration, hallucinations,  self-injury and suicidal ideas. The patient is not nervous/anxious.        PMH/PSH/FH/SH/MED/ALLERGY reviewed    Objective:       Vitals:    10/31/17 0804   BP: (!) 154/70   Pulse: 72       Physical Exam   Constitutional: She is oriented to person, place, and time. She appears well-developed and well-nourished. No distress.   HENT:   Head: Normocephalic and atraumatic.   Right Ear: External ear normal.   Left Ear: External ear normal.   Nose: Nose normal.   Mouth/Throat: Oropharynx is clear and moist. No oropharyngeal exudate.   Eyes: Conjunctivae and EOM are normal. Pupils are equal, round, and reactive to light. Right eye exhibits no discharge. Left eye exhibits no discharge. No scleral icterus.   Neck: Normal range of motion. Neck supple. No JVD present. No tracheal deviation present. No thyromegaly present.   Cardiovascular: Normal rate, regular rhythm, normal heart sounds and intact distal pulses.  Exam reveals no gallop and no friction rub.    No murmur heard.  Pulmonary/Chest: Effort normal and breath sounds normal. No stridor. She has no wheezes. She has no rales. She exhibits no tenderness.   Abdominal: Soft. Bowel sounds are normal. She exhibits no distension and no mass. There is no tenderness. There is no rebound and no guarding. No hernia.   Musculoskeletal: Normal range of motion. She exhibits no edema or tenderness.   Lymphadenopathy:     She has no cervical adenopathy.   Neurological: She is alert and oriented to person, place, and time. She has normal reflexes. She displays normal reflexes. No cranial nerve deficit. She exhibits normal muscle tone. Coordination normal.   Skin: Skin is warm and dry. No rash noted. She is not diaphoretic. No erythema. No pallor.   Psychiatric: She has a normal mood and affect. Her behavior is normal. Judgment and thought content normal.       Assessment:       1. Controlled type 2 diabetes mellitus without complication, without long-term current use of insulin    2.  Dyslipidemia associated with type 2 diabetes mellitus    3. Hypertension associated with diabetes    4. Major depressive disorder with single episode, in partial remission    5. Severe single current episode of major depressive disorder, without psychotic features    6. Chronic pain of right knee    7. Immunization due        Plan:       Candy was seen today for hypertension, dizziness and leg pain.    Diagnoses and all orders for this visit:    Controlled type 2 diabetes mellitus without complication, without long-term current use of insulin  -     CBC auto differential; Future  -     Comprehensive metabolic panel; Future  -     Lipid panel; Future  -     Hemoglobin A1c; Future  -     Microalbumin/creatinine urine ratio; Future  -     Urinalysis; Future    Dyslipidemia associated with type 2 diabetes mellitus  -     CBC auto differential; Future  -     Comprehensive metabolic panel; Future  -     Lipid panel; Future    Hypertension associated with diabetes  -     CBC auto differential; Future  -     Comprehensive metabolic panel; Future  -     Lipid panel; Future  -     Urinalysis; Future  -     losartan (COZAAR) 25 MG tablet; Take 1 tablet (25 mg total) by mouth once daily.    Major depressive disorder with single episode, in partial remission    Severe single current episode of major depressive disorder, without psychotic features    Chronic pain of right knee  -     gabapentin (NEURONTIN) 100 MG capsule; Take 1 capsule (100 mg total) by mouth 3 (three) times daily.    Immunization due  -     Influenza - High Dose (65+) (PF) (IM)        HTN  -uncontrolled  -start losartan 25 daily    HLD  - not at goal  -continue statin  -labs    DM II  -controlled  -labs    SK  -reassurance    MDD  -controlled    Spent adequate time in obtaining history and explaining differentials    40 minutes spent during this visit of which greater than 50% devoted to face-face counseling and coordination of care regarding diagnosis and  management plan      Return in about 4 weeks (around 11/28/2017), or if symptoms worsen or fail to improve.

## 2017-11-01 ENCOUNTER — TELEPHONE (OUTPATIENT)
Dept: FAMILY MEDICINE | Facility: CLINIC | Age: 82
End: 2017-11-01

## 2017-11-01 NOTE — TELEPHONE ENCOUNTER
----- Message from Francisco Munson MD sent at 11/1/2017  3:41 PM CDT -----  Call    Labs fine except high cholesterol - can u take a cholesterol lowering medication?

## 2017-11-03 ENCOUNTER — TELEPHONE (OUTPATIENT)
Dept: FAMILY MEDICINE | Facility: CLINIC | Age: 82
End: 2017-11-03

## 2017-11-06 ENCOUNTER — HOSPITAL ENCOUNTER (OUTPATIENT)
Dept: RADIOLOGY | Facility: HOSPITAL | Age: 82
Discharge: HOME | End: 2017-11-06
Attending: INTERNAL MEDICINE
Payer: MEDICARE

## 2017-11-06 DIAGNOSIS — M79.604 PAIN IN BOTH LOWER EXTREMITIES: ICD-10-CM

## 2017-11-06 DIAGNOSIS — M79.605 PAIN IN BOTH LOWER EXTREMITIES: ICD-10-CM

## 2017-11-06 PROCEDURE — 93970 EXTREMITY STUDY: CPT | Mod: 26,,, | Performed by: RADIOLOGY

## 2017-11-06 PROCEDURE — 93970 EXTREMITY STUDY: CPT | Mod: TC

## 2017-11-06 PROCEDURE — 93925 LOWER EXTREMITY STUDY: CPT | Mod: 26,,, | Performed by: RADIOLOGY

## 2017-11-06 PROCEDURE — 93925 LOWER EXTREMITY STUDY: CPT | Mod: TC

## 2017-11-07 ENCOUNTER — TELEPHONE (OUTPATIENT)
Dept: CARDIOLOGY | Facility: CLINIC | Age: 82
End: 2017-11-07

## 2017-11-07 NOTE — TELEPHONE ENCOUNTER
----- Message from Arturo Vigil MD sent at 11/7/2017  8:36 AM CST -----  Please let Ms Escobar know that her leg ultrasounds show no evidence of blockages and normal blood flow  Thanks  ANAYA

## 2017-11-28 ENCOUNTER — OFFICE VISIT (OUTPATIENT)
Dept: FAMILY MEDICINE | Facility: CLINIC | Age: 82
End: 2017-11-28
Attending: FAMILY MEDICINE
Payer: MEDICARE

## 2017-11-28 VITALS
WEIGHT: 130.06 LBS | HEART RATE: 82 BPM | BODY MASS INDEX: 25.53 KG/M2 | HEIGHT: 60 IN | DIASTOLIC BLOOD PRESSURE: 62 MMHG | OXYGEN SATURATION: 99 % | SYSTOLIC BLOOD PRESSURE: 121 MMHG

## 2017-11-28 DIAGNOSIS — E11.69 DYSLIPIDEMIA ASSOCIATED WITH TYPE 2 DIABETES MELLITUS: ICD-10-CM

## 2017-11-28 DIAGNOSIS — I15.2 HYPERTENSION ASSOCIATED WITH DIABETES: ICD-10-CM

## 2017-11-28 DIAGNOSIS — E78.5 DYSLIPIDEMIA ASSOCIATED WITH TYPE 2 DIABETES MELLITUS: ICD-10-CM

## 2017-11-28 DIAGNOSIS — E11.9 TYPE 2 DIABETES MELLITUS WITHOUT RETINOPATHY: Primary | ICD-10-CM

## 2017-11-28 DIAGNOSIS — E11.9 CONTROLLED TYPE 2 DIABETES MELLITUS WITHOUT COMPLICATION, WITHOUT LONG-TERM CURRENT USE OF INSULIN: ICD-10-CM

## 2017-11-28 DIAGNOSIS — K21.9 GASTROESOPHAGEAL REFLUX DISEASE, ESOPHAGITIS PRESENCE NOT SPECIFIED: ICD-10-CM

## 2017-11-28 DIAGNOSIS — E11.59 HYPERTENSION ASSOCIATED WITH DIABETES: ICD-10-CM

## 2017-11-28 DIAGNOSIS — F32.2 SEVERE SINGLE CURRENT EPISODE OF MAJOR DEPRESSIVE DISORDER, WITHOUT PSYCHOTIC FEATURES: ICD-10-CM

## 2017-11-28 DIAGNOSIS — F32.4 MAJOR DEPRESSIVE DISORDER WITH SINGLE EPISODE, IN PARTIAL REMISSION: ICD-10-CM

## 2017-11-28 PROCEDURE — 99999 PR PBB SHADOW E&M-EST. PATIENT-LVL IV: CPT | Mod: PBBFAC,,, | Performed by: FAMILY MEDICINE

## 2017-11-28 PROCEDURE — 99499 UNLISTED E&M SERVICE: CPT | Mod: S$PBB,,, | Performed by: FAMILY MEDICINE

## 2017-11-28 PROCEDURE — 99214 OFFICE O/P EST MOD 30 MIN: CPT | Mod: S$GLB,,, | Performed by: FAMILY MEDICINE

## 2017-11-28 RX ORDER — ATORVASTATIN CALCIUM 20 MG/1
20 TABLET, FILM COATED ORAL DAILY
Qty: 90 TABLET | Refills: 3 | Status: SHIPPED | OUTPATIENT
Start: 2017-11-28 | End: 2018-09-04 | Stop reason: SDUPTHER

## 2017-11-28 NOTE — PROGRESS NOTES
Subjective:       Patient ID: Candy Escobar is a 85 y.o. female.    Chief Complaint: Hypertension and Pain    85 yr old pleasant white female with  DM II controlled,  hypertension, depression, anxiety, GERD, OA knee, presents today for her routine 3 month follow up and also neuropathic pain, joint aches and lab work.    DM II - diet controlled -  HGBA1C                   5.3                 10/31/2017                                 foot and eye exam UTD - not on ACE    HTN - chronic - controlled - she is asymptomatic - no CP, SOB, MINER, PND or orthopnea, no bowel or bladder problems    Depression/anxiety - controlled - on prozac daily for years and ativan as needed - no current mood problems - she is lil anxious taking on surgery - no SI/HI    GERD - controlled - on PPI as needed    OA knee s/p surgery - on mobic as needed    HLD - not at goal -  LDLCALC                  116.2               10/31/2017                              - not on any meds - diet compliant    - need statin    History as below - reviewed      Health maintenance  -labs UTD  -vaccines reports UTD        Hypertension   This is a chronic problem. The current episode started more than 1 year ago. The problem has been gradually improving since onset. The problem is controlled. Pertinent negatives include no anxiety, chest pain, headaches, palpitations or shortness of breath. There are no associated agents to hypertension. Risk factors for coronary artery disease include dyslipidemia, diabetes mellitus and post-menopausal state. Past treatments include lifestyle changes. The current treatment provides significant improvement. There are no compliance problems.  There is no history of angina, CAD/MI, CVA, left ventricular hypertrophy, PVD, renovascular disease, retinopathy or a thyroid problem. There is no history of chronic renal disease, hypercortisolism, hyperparathyroidism or pheochromocytoma.   Pain   Associated symptoms include arthralgias  and myalgias. Pertinent negatives include no chest pain, congestion, diaphoresis, headaches, nausea or sore throat.   Dizziness:   Chronicity:  Recurrent  Onset:  1 to 4 weeks ago  Progression since onset:  Gradually improving  Frequency:  Every few days  Severity:  Mild  Duration:  5 minutes  Dizziness characteristics:  Sensation of movement   Associated symptoms: hearing loss.no headaches, no nausea, no diaphoresis, no palpitations and no chest pain.  Aggravated by:  Position changes  Treatments tried:  Rest and body position changes  Improvements on treatment:  Mildno strokes, no neurologic disease, no head trauma, no balance testing, no ear trauma, no head trauma, no anxiety, no ear tubes, no environmental allergies and no CT head.  Leg Pain        Review of Systems   Constitutional: Negative.  Negative for activity change, diaphoresis and unexpected weight change.   HENT: Positive for hearing loss. Negative for congestion, ear discharge, rhinorrhea, sore throat and voice change.    Eyes: Negative.  Negative for pain, discharge and visual disturbance.   Respiratory: Negative.  Negative for chest tightness, shortness of breath and wheezing.    Cardiovascular: Negative.  Negative for chest pain and palpitations.   Gastrointestinal: Negative.  Negative for abdominal distention, anal bleeding, constipation and nausea.   Endocrine: Negative.  Negative for cold intolerance, polydipsia and polyuria.   Genitourinary: Negative.  Negative for decreased urine volume, difficulty urinating, dysuria, frequency, menstrual problem and vaginal pain.   Musculoskeletal: Positive for arthralgias and myalgias. Negative for gait problem.   Skin: Negative.  Negative for color change, pallor and wound.   Allergic/Immunologic: Negative.  Negative for environmental allergies and immunocompromised state.   Neurological: Positive for dizziness. Negative for tremors, seizures, speech difficulty and headaches.   Hematological: Negative.   Negative for adenopathy. Does not bruise/bleed easily.   Psychiatric/Behavioral: Negative.  Negative for agitation, confusion, decreased concentration, hallucinations, self-injury and suicidal ideas. The patient is not nervous/anxious.        PMH/PSH/FH/SH/MED/ALLERGY reviewed    Objective:       Vitals:    11/28/17 0803   BP: 121/62   Pulse: 82       Physical Exam   Constitutional: She is oriented to person, place, and time. She appears well-developed and well-nourished. No distress.   HENT:   Head: Normocephalic and atraumatic.   Right Ear: External ear normal.   Left Ear: External ear normal.   Nose: Nose normal.   Mouth/Throat: Oropharynx is clear and moist. No oropharyngeal exudate.   Eyes: Conjunctivae and EOM are normal. Pupils are equal, round, and reactive to light. Right eye exhibits no discharge. Left eye exhibits no discharge. No scleral icterus.   Neck: Normal range of motion. Neck supple. No JVD present. No tracheal deviation present. No thyromegaly present.   Cardiovascular: Normal rate, regular rhythm, normal heart sounds and intact distal pulses.  Exam reveals no gallop and no friction rub.    No murmur heard.  Pulmonary/Chest: Effort normal and breath sounds normal. No stridor. She has no wheezes. She has no rales. She exhibits no tenderness.   Abdominal: Soft. Bowel sounds are normal. She exhibits no distension and no mass. There is no tenderness. There is no rebound and no guarding. No hernia.   Musculoskeletal: Normal range of motion. She exhibits no edema or tenderness.   Lymphadenopathy:     She has no cervical adenopathy.   Neurological: She is alert and oriented to person, place, and time. She has normal reflexes. She displays normal reflexes. No cranial nerve deficit. She exhibits normal muscle tone. Coordination normal.   Skin: Skin is warm and dry. No rash noted. She is not diaphoretic. No erythema. No pallor.   Psychiatric: She has a normal mood and affect. Her behavior is normal.  Judgment and thought content normal.       Assessment:       1. Type 2 diabetes mellitus without retinopathy    2. Major depressive disorder with single episode, in partial remission    3. Severe single current episode of major depressive disorder, without psychotic features    4. Hypertension associated with diabetes    5. Dyslipidemia associated with type 2 diabetes mellitus    6. Controlled type 2 diabetes mellitus without complication, without long-term current use of insulin    7. Gastroesophageal reflux disease, esophagitis presence not specified        Plan:       Candy was seen today for hypertension and pain.    Diagnoses and all orders for this visit:    Type 2 diabetes mellitus without retinopathy  -     Comprehensive metabolic panel; Future  -     Hemoglobin A1c; Future  -     Lipid panel; Future    Major depressive disorder with single episode, in partial remission    Severe single current episode of major depressive disorder, without psychotic features    Hypertension associated with diabetes  -     Comprehensive metabolic panel; Future  -     Lipid panel; Future    Dyslipidemia associated with type 2 diabetes mellitus  -     atorvastatin (LIPITOR) 20 MG tablet; Take 1 tablet (20 mg total) by mouth once daily.  -     Comprehensive metabolic panel; Future  -     Lipid panel; Future    Controlled type 2 diabetes mellitus without complication, without long-term current use of insulin  -     Comprehensive metabolic panel; Future  -     Hemoglobin A1c; Future  -     Lipid panel; Future    Gastroesophageal reflux disease, esophagitis presence not specified      HTN  -controlled  -continue losartan 25 daily    HLD  - not at goal  -start statin  -labs    DM II  -controlled  -labs    SK  -reassurance    MDD  -controlled    Spent adequate time in obtaining history and explaining differentials    40 minutes spent during this visit of which greater than 50% devoted to face-face counseling and coordination of care  regarding diagnosis and management plan    Return in about 3 months (around 2/28/2018), or if symptoms worsen or fail to improve.

## 2017-12-22 ENCOUNTER — TELEPHONE (OUTPATIENT)
Dept: FAMILY MEDICINE | Facility: CLINIC | Age: 82
End: 2017-12-22

## 2017-12-22 DIAGNOSIS — N39.0 URINARY TRACT INFECTION WITHOUT HEMATURIA, SITE UNSPECIFIED: Primary | ICD-10-CM

## 2017-12-22 NOTE — TELEPHONE ENCOUNTER
----- Message from Kimberly Clifford sent at 12/22/2017  1:02 PM CST -----  Contact: self/[phone]  Patient would like urine analysis orders put in the system to test for Botox injection issue.  Please call and advise.

## 2017-12-23 ENCOUNTER — NURSE TRIAGE (OUTPATIENT)
Dept: ADMINISTRATIVE | Facility: CLINIC | Age: 82
End: 2017-12-23

## 2017-12-23 NOTE — TELEPHONE ENCOUNTER
Reason for Disposition   Pharmacy calling with prescription questions and triager unable to answer question    Protocols used: ST MEDICATION QUESTION CALL-A-AH    Patient called and asked for more medication for her UTI to be called in because 3 days isn't enough. Attempted to explain to patient that she was given a 3 day supply until her urine culture results come back but she stated that I was not trying to help her. Patient is going out of town and did not want to be without medication for the duration of her trip. Patient hung up the phone as I was attempting to get her connected with on call provider to explain their protocol in treating UTI. Dr. Barry paged to discuss.     Dr. Barry affirmed the information provided to the patient and will not call in additional antibiotics.

## 2018-02-19 ENCOUNTER — OFFICE VISIT (OUTPATIENT)
Dept: RHEUMATOLOGY | Facility: CLINIC | Age: 83
End: 2018-02-19
Payer: MEDICARE

## 2018-02-19 ENCOUNTER — HOSPITAL ENCOUNTER (OUTPATIENT)
Dept: RADIOLOGY | Facility: HOSPITAL | Age: 83
Discharge: HOME | End: 2018-02-19
Attending: INTERNAL MEDICINE
Payer: MEDICARE

## 2018-02-19 VITALS
HEIGHT: 60 IN | BODY MASS INDEX: 24.94 KG/M2 | DIASTOLIC BLOOD PRESSURE: 82 MMHG | WEIGHT: 127 LBS | SYSTOLIC BLOOD PRESSURE: 175 MMHG | HEART RATE: 71 BPM

## 2018-02-19 DIAGNOSIS — M25.541 ARTHRALGIA OF HAND, RIGHT: Primary | ICD-10-CM

## 2018-02-19 DIAGNOSIS — M25.541 ARTHRALGIA OF HAND, RIGHT: ICD-10-CM

## 2018-02-19 DIAGNOSIS — G56.01 CARPAL TUNNEL SYNDROME ON RIGHT: ICD-10-CM

## 2018-02-19 PROCEDURE — 3008F BODY MASS INDEX DOCD: CPT | Mod: S$GLB,,, | Performed by: INTERNAL MEDICINE

## 2018-02-19 PROCEDURE — 73130 X-RAY EXAM OF HAND: CPT | Mod: 26,RT,, | Performed by: RADIOLOGY

## 2018-02-19 PROCEDURE — 1159F MED LIST DOCD IN RCRD: CPT | Mod: S$GLB,,, | Performed by: INTERNAL MEDICINE

## 2018-02-19 PROCEDURE — 99204 OFFICE O/P NEW MOD 45 MIN: CPT | Mod: S$GLB,,, | Performed by: INTERNAL MEDICINE

## 2018-02-19 PROCEDURE — 1125F AMNT PAIN NOTED PAIN PRSNT: CPT | Mod: S$GLB,,, | Performed by: INTERNAL MEDICINE

## 2018-02-19 PROCEDURE — 73130 X-RAY EXAM OF HAND: CPT | Mod: 26,59,LT, | Performed by: RADIOLOGY

## 2018-02-19 PROCEDURE — 73130 X-RAY EXAM OF HAND: CPT | Mod: 50,TC

## 2018-02-19 PROCEDURE — 99499 UNLISTED E&M SERVICE: CPT | Mod: S$GLB,,, | Performed by: INTERNAL MEDICINE

## 2018-02-19 PROCEDURE — 99999 PR PBB SHADOW E&M-EST. PATIENT-LVL IV: CPT | Mod: PBBFAC,,, | Performed by: INTERNAL MEDICINE

## 2018-02-19 RX ORDER — SULFAMETHOXAZOLE AND TRIMETHOPRIM 800; 160 MG/1; MG/1
TABLET ORAL
Status: ON HOLD | COMMUNITY
Start: 2017-12-22 | End: 2018-05-11

## 2018-02-19 RX ORDER — DICLOFENAC SODIUM 10 MG/G
2 GEL TOPICAL 2 TIMES DAILY PRN
Qty: 100 G | Refills: 5 | Status: SHIPPED | OUTPATIENT
Start: 2018-02-19 | End: 2018-02-23 | Stop reason: SDUPTHER

## 2018-02-19 RX ORDER — CIPROFLOXACIN 500 MG/1
TABLET ORAL
Status: ON HOLD | COMMUNITY
Start: 2017-12-29 | End: 2018-05-11

## 2018-02-19 ASSESSMENT — ROUTINE ASSESSMENT OF PATIENT INDEX DATA (RAPID3)
PAIN SCORE: 9
FATIGUE SCORE: 9
PATIENT GLOBAL ASSESSMENT SCORE: 8
PSYCHOLOGICAL DISTRESS SCORE: 0
WHEN YOU AWAKENED IN THE MORNING OVER THE LAST WEEK, PLEASE INDICATE THE AMOUNT OF TIME IT TAKES UNTIL YOU ARE AS LIMBER AS YOU WILL BE FOR THE DAY: 10 MINUTES
MDHAQ FUNCTION SCORE: 1.3
TOTAL RAPID3 SCORE: 7.11
AM STIFFNESS SCORE: 1, YES

## 2018-02-19 NOTE — PROGRESS NOTES
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    Left hand no symptoms    Right hip recent pain  Back of the hip hurts  Left leg longer than the right  Doesn't wear her corrective shoes    On estrogen for 30+ years  Has osteoporosis    She associates her symptoms to being off estrogen   Pain came immediately she says  She is now on the patch again    She has depression,GERD,lipoma,HTN,DVT,DM,HLD,depression,varicose veins    She has b/l knee Oa,both replaced  Left ankle Oa,replaced,s/p crush injury    She takes albuterol,prozac,losartan,azelastine,estradiol,cozaar,prilosec,lipitor    She takes gabapentin and meloxicam,she titrates the doses     Past history: stated above    Family history    Social history : former smoker quit 1981    Review of Systems   Constitutional: Negative for activity change, appetite change, chills, diaphoresis, fatigue, fever and unexpected weight change.   HENT: Negative for congestion, dental problem, drooling, ear discharge, ear pain, facial swelling, hearing loss, mouth sores, nosebleeds, postnasal drip, rhinorrhea, sinus pain, sinus pressure, sneezing, sore throat, tinnitus, trouble swallowing and voice change.    Eyes: Negative for photophobia, pain, discharge, redness, itching and visual disturbance.   Respiratory: Negative for apnea, cough, choking, chest tightness, shortness of breath, wheezing and stridor.    Cardiovascular: Negative for chest pain, palpitations and leg swelling.    Gastrointestinal: Negative for abdominal distention, abdominal pain, anal bleeding, blood in stool, constipation, diarrhea, nausea, rectal pain and vomiting.   Endocrine: Negative for cold intolerance, heat intolerance, polydipsia, polyphagia and polyuria.   Genitourinary: Negative for decreased urine volume, difficulty urinating, dysuria, enuresis, flank pain, frequency, genital sores, hematuria and urgency.   Musculoskeletal: Positive for arthralgias. Negative for back pain, gait problem, joint swelling, myalgias, neck pain and neck stiffness.   Skin: Negative for color change, pallor, rash and wound.   Allergic/Immunologic: Negative for environmental allergies, food allergies and immunocompromised state.   Neurological: Positive for numbness. Negative for dizziness, tremors, seizures, syncope, facial asymmetry, speech difficulty, weakness, light-headedness and headaches.   Hematological: Negative for adenopathy. Does not bruise/bleed easily.   Psychiatric/Behavioral: Negative for agitation, behavioral problems, confusion, decreased concentration, dysphoric mood, hallucinations, self-injury, sleep disturbance and suicidal ideas. The patient is not nervous/anxious and is not hyperactive.      Physical Exam     Tenderness:   RUE: wrist and carpometacarpal  Right hand: 1st MCP, 2nd MCP, 3rd MCP, 4th MCP, 1st PIP, 2nd PIP, 3rd PIP and 4th PIP  RLE: acetabulofemoral    CRAWFORD-28 tender joint count: 9  CRAWFORD-28 swollen joint count: 0    Right hand :    She has positive tinels and phalens sign  Slightly reduced hand      Physical Exam   Constitutional: She is oriented to person, place, and time and well-developed, well-nourished, and in no distress. No distress.   HENT:   Head: Normocephalic.   Mouth/Throat: Oropharynx is clear and moist.   Eyes: Conjunctivae are normal. Pupils are equal, round, and reactive to light. Right eye exhibits no discharge. Left eye exhibits no discharge. No scleral icterus.   Neck: Normal  range of motion. No thyromegaly present.   Cardiovascular: Normal rate, regular rhythm, normal heart sounds and intact distal pulses.    Pulmonary/Chest: Effort normal and breath sounds normal. No stridor.   Abdominal: Soft. Bowel sounds are normal.   Lymphadenopathy:     She has no cervical adenopathy.   Neurological: She is alert and oriented to person, place, and time.   Skin: Skin is warm. No rash noted. She is not diaphoretic.     Psychiatric: Affect and judgment normal.   Musculoskeletal: Normal range of motion.       No labs and xrays     Assessment     85 year old white female comes with right hand symptoms    Her complaints are : right hand 1rst three finger arthralgia and shooting pain and paresthesias  She does have PIP and DIP pain but right thumb all the 3 joints hurt a lot  She has no synovitis on exam today  Definitely positive tinels and phalens and slightly diminished hand     She has carpal tunnel syndrome and possibly ostoearthritis worse in the thumb MCP,PIP,DIP  She has no evidence of an inflammatory process    1. Arthralgia of hand, right    2. Carpal tunnel syndrome on right        New problem     Plan    Neurology eval for CTS in march scheduled     Carpal tunnel splints offered    Occupational therapy    Hand xrays  RA panel    NSAIDs topical preferred over oral  Titrate gabapentin    Candy was seen today for appointment.    Diagnoses and all orders for this visit:    Arthralgia of hand, right  -     Comprehensive metabolic panel; Standing  -     CBC auto differential; Standing  -     C-reactive protein; Standing  -     Sedimentation rate, manual; Standing  -     Rheumatoid factor; Future  -     Cyclic citrul peptide antibody, IgG; Future  -     HLA B27 Antigen; Future  -     Ambulatory Referral to Physical/Occupational Therapy  -     X-Ray Hand 3 View Bilateral; Future    Carpal tunnel syndrome on right  -     Ambulatory Referral to Physical/Occupational Therapy  -     X-Ray Hand 3 View  Bilateral; Future    Other orders  -     diclofenac sodium 1 % Gel; Apply 2 g topically 2 (two) times daily as needed.        rtc in 4 weeks

## 2018-02-23 RX ORDER — DICLOFENAC SODIUM 10 MG/G
2 GEL TOPICAL 2 TIMES DAILY PRN
Qty: 100 G | Refills: 5 | Status: SHIPPED | OUTPATIENT
Start: 2018-02-23 | End: 2018-03-12 | Stop reason: SDUPTHER

## 2018-02-27 ENCOUNTER — LAB VISIT (OUTPATIENT)
Dept: LAB | Facility: HOSPITAL | Age: 83
End: 2018-02-27
Attending: FAMILY MEDICINE
Payer: MEDICARE

## 2018-02-27 ENCOUNTER — TELEPHONE (OUTPATIENT)
Dept: FAMILY MEDICINE | Facility: CLINIC | Age: 83
End: 2018-02-27

## 2018-02-27 DIAGNOSIS — E11.9 TYPE 2 DIABETES MELLITUS WITHOUT RETINOPATHY: ICD-10-CM

## 2018-02-27 DIAGNOSIS — E11.69 DYSLIPIDEMIA ASSOCIATED WITH TYPE 2 DIABETES MELLITUS: ICD-10-CM

## 2018-02-27 DIAGNOSIS — I15.2 HYPERTENSION ASSOCIATED WITH DIABETES: ICD-10-CM

## 2018-02-27 DIAGNOSIS — E11.9 CONTROLLED TYPE 2 DIABETES MELLITUS WITHOUT COMPLICATION, WITHOUT LONG-TERM CURRENT USE OF INSULIN: ICD-10-CM

## 2018-02-27 DIAGNOSIS — E78.5 DYSLIPIDEMIA ASSOCIATED WITH TYPE 2 DIABETES MELLITUS: ICD-10-CM

## 2018-02-27 DIAGNOSIS — E11.59 HYPERTENSION ASSOCIATED WITH DIABETES: ICD-10-CM

## 2018-02-27 LAB
ALBUMIN SERPL BCP-MCNC: 3.9 G/DL
ALP SERPL-CCNC: 57 U/L
ALT SERPL W/O P-5'-P-CCNC: 14 U/L
ANION GAP SERPL CALC-SCNC: 7 MMOL/L
AST SERPL-CCNC: 24 U/L
BILIRUB SERPL-MCNC: 0.8 MG/DL
BUN SERPL-MCNC: 13 MG/DL
CALCIUM SERPL-MCNC: 9.6 MG/DL
CHLORIDE SERPL-SCNC: 100 MMOL/L
CHOLEST SERPL-MCNC: 165 MG/DL
CHOLEST/HDLC SERPL: 2 {RATIO}
CO2 SERPL-SCNC: 30 MMOL/L
CREAT SERPL-MCNC: 0.8 MG/DL
EST. GFR  (AFRICAN AMERICAN): >60 ML/MIN/1.73 M^2
EST. GFR  (NON AFRICAN AMERICAN): >60 ML/MIN/1.73 M^2
ESTIMATED AVG GLUCOSE: 97 MG/DL
GLUCOSE SERPL-MCNC: 96 MG/DL
HBA1C MFR BLD HPLC: 5 %
HDLC SERPL-MCNC: 81 MG/DL
HDLC SERPL: 49.1 %
LDLC SERPL CALC-MCNC: 72.6 MG/DL
NONHDLC SERPL-MCNC: 84 MG/DL
POTASSIUM SERPL-SCNC: 4.4 MMOL/L
PROT SERPL-MCNC: 6.7 G/DL
SODIUM SERPL-SCNC: 137 MMOL/L
TRIGL SERPL-MCNC: 57 MG/DL

## 2018-02-27 PROCEDURE — 83036 HEMOGLOBIN GLYCOSYLATED A1C: CPT

## 2018-02-27 PROCEDURE — 36415 COLL VENOUS BLD VENIPUNCTURE: CPT

## 2018-02-27 PROCEDURE — 80053 COMPREHEN METABOLIC PANEL: CPT

## 2018-02-27 PROCEDURE — 80061 LIPID PANEL: CPT

## 2018-02-27 NOTE — TELEPHONE ENCOUNTER
----- Message from Francisco Munson MD sent at 2/27/2018  8:53 AM CST -----  Call    Labs fine and stable

## 2018-03-01 ENCOUNTER — OFFICE VISIT (OUTPATIENT)
Dept: FAMILY MEDICINE | Facility: CLINIC | Age: 83
End: 2018-03-01
Attending: FAMILY MEDICINE
Payer: MEDICARE

## 2018-03-01 VITALS
TEMPERATURE: 98 F | SYSTOLIC BLOOD PRESSURE: 130 MMHG | HEART RATE: 77 BPM | HEIGHT: 60 IN | OXYGEN SATURATION: 96 % | DIASTOLIC BLOOD PRESSURE: 70 MMHG | WEIGHT: 125 LBS | BODY MASS INDEX: 24.54 KG/M2

## 2018-03-01 DIAGNOSIS — E11.69 DYSLIPIDEMIA ASSOCIATED WITH TYPE 2 DIABETES MELLITUS: ICD-10-CM

## 2018-03-01 DIAGNOSIS — G89.29 CHRONIC PAIN OF RIGHT KNEE: ICD-10-CM

## 2018-03-01 DIAGNOSIS — M47.812 FACET ARTHROPATHY, CERVICAL: ICD-10-CM

## 2018-03-01 DIAGNOSIS — I15.2 HYPERTENSION ASSOCIATED WITH DIABETES: Primary | ICD-10-CM

## 2018-03-01 DIAGNOSIS — F32.2 CURRENT SEVERE EPISODE OF MAJOR DEPRESSIVE DISORDER WITHOUT PSYCHOTIC FEATURES WITHOUT PRIOR EPISODE: ICD-10-CM

## 2018-03-01 DIAGNOSIS — E11.9 TYPE 2 DIABETES MELLITUS WITHOUT RETINOPATHY: ICD-10-CM

## 2018-03-01 DIAGNOSIS — E11.9 CONTROLLED TYPE 2 DIABETES MELLITUS WITHOUT COMPLICATION, WITHOUT LONG-TERM CURRENT USE OF INSULIN: ICD-10-CM

## 2018-03-01 DIAGNOSIS — F32.4 MAJOR DEPRESSIVE DISORDER WITH SINGLE EPISODE, IN PARTIAL REMISSION: ICD-10-CM

## 2018-03-01 DIAGNOSIS — E78.5 DYSLIPIDEMIA ASSOCIATED WITH TYPE 2 DIABETES MELLITUS: ICD-10-CM

## 2018-03-01 DIAGNOSIS — M25.561 CHRONIC PAIN OF RIGHT KNEE: ICD-10-CM

## 2018-03-01 DIAGNOSIS — E11.59 HYPERTENSION ASSOCIATED WITH DIABETES: Primary | ICD-10-CM

## 2018-03-01 PROCEDURE — 99999 PR PBB SHADOW E&M-EST. PATIENT-LVL IV: CPT | Mod: PBBFAC,,, | Performed by: FAMILY MEDICINE

## 2018-03-01 PROCEDURE — 99214 OFFICE O/P EST MOD 30 MIN: CPT | Mod: S$GLB,,, | Performed by: FAMILY MEDICINE

## 2018-03-01 PROCEDURE — 99499 UNLISTED E&M SERVICE: CPT | Mod: S$GLB,,, | Performed by: FAMILY MEDICINE

## 2018-03-01 RX ORDER — GABAPENTIN 300 MG/1
300 CAPSULE ORAL 3 TIMES DAILY
Qty: 90 CAPSULE | Refills: 3 | Status: SHIPPED | OUTPATIENT
Start: 2018-03-01 | End: 2019-01-04 | Stop reason: SDUPTHER

## 2018-03-01 RX ORDER — CYCLOBENZAPRINE HCL 10 MG
10 TABLET ORAL NIGHTLY
Qty: 90 TABLET | Refills: 3 | Status: SHIPPED | OUTPATIENT
Start: 2018-03-01 | End: 2018-10-29 | Stop reason: CLARIF

## 2018-03-01 NOTE — PROGRESS NOTES
Subjective:       Patient ID: Candy Escobar is a 86 y.o. female.    Chief Complaint: Follow-up (3 month)    85 yr old pleasant white female with  DM II controlled,  Facet arthropathy neck, hypertension, depression, anxiety, GERD, OA knee, presents today for her routine 3 month follow up and also neuropathic pain, joint aches and lab work.    DM II - diet controlled - A1C                   5.0                 02/27/2018                                     foot and eye exam UTD - not on ACE    HTN - chronic - controlled - she is asymptomatic - no CP, SOB, MINER, PND or orthopnea, no bowel or bladder problems    Facet arthropathy neck/spasms - stable - uses muscle relaxant as needed at night     Depression/anxiety - controlled - on prozac daily for years and ativan as needed - no current mood problems - she is lil anxious taking on surgery - no SI/HI    GERD - controlled - on PPI as needed    OA knee s/p surgery - on mobic as needed    HLD - not at goal -  LDLCALC                  72.6                02/27/2018                                   - not on any meds - diet compliant    - need statin    History as below - reviewed      Health maintenance  -labs UTD  -vaccines reports UTD        Hypertension   This is a chronic problem. The current episode started more than 1 year ago. The problem has been gradually improving since onset. The problem is controlled. Pertinent negatives include no anxiety, chest pain, headaches, palpitations or shortness of breath. There are no associated agents to hypertension. Risk factors for coronary artery disease include dyslipidemia, diabetes mellitus and post-menopausal state. Past treatments include lifestyle changes. The current treatment provides significant improvement. There are no compliance problems.  There is no history of angina, CAD/MI, CVA, left ventricular hypertrophy, PVD, renovascular disease or retinopathy. There is no history of chronic renal disease,  hypercortisolism, hyperparathyroidism, pheochromocytoma or a thyroid problem.   Pain   Associated symptoms include arthralgias and myalgias. Pertinent negatives include no chest pain, congestion, diaphoresis, headaches, nausea or sore throat.   Dizziness:   Chronicity:  Recurrent  Onset:  1 to 4 weeks ago  Progression since onset:  Gradually improving  Frequency:  Every few days  Severity:  Mild  Duration:  5 minutes  Dizziness characteristics:  Sensation of movement   Associated symptoms: hearing loss.no headaches, no nausea, no diaphoresis, no palpitations and no chest pain.  Aggravated by:  Position changes  Treatments tried:  Rest and body position changes  Improvements on treatment:  Mildno strokes, no neurologic disease, no head trauma, no balance testing, no ear trauma, no head trauma, no anxiety, no ear tubes, no environmental allergies and no CT head.  Leg Pain    There was no injury mechanism. The pain is present in the left leg, left knee, right knee and right leg. The quality of the pain is described as aching. The pain is at a severity of 5/10. The pain is moderate. The pain has been improving since onset. The symptoms are aggravated by movement. She has tried NSAIDs for the symptoms. The treatment provided moderate relief.     Review of Systems   Constitutional: Negative.  Negative for activity change, diaphoresis and unexpected weight change.   HENT: Positive for hearing loss. Negative for congestion, ear discharge, rhinorrhea, sore throat and voice change.    Eyes: Negative.  Negative for pain, discharge and visual disturbance.   Respiratory: Negative.  Negative for chest tightness, shortness of breath and wheezing.    Cardiovascular: Negative.  Negative for chest pain and palpitations.   Gastrointestinal: Negative.  Negative for abdominal distention, anal bleeding, constipation and nausea.   Endocrine: Negative.  Negative for cold intolerance, polydipsia and polyuria.   Genitourinary: Negative.   Negative for decreased urine volume, difficulty urinating, dysuria, frequency, menstrual problem and vaginal pain.   Musculoskeletal: Positive for arthralgias and myalgias. Negative for gait problem.   Skin: Negative.  Negative for color change, pallor and wound.   Allergic/Immunologic: Negative.  Negative for environmental allergies and immunocompromised state.   Neurological: Positive for dizziness. Negative for tremors, seizures, speech difficulty and headaches.   Hematological: Negative.  Negative for adenopathy. Does not bruise/bleed easily.   Psychiatric/Behavioral: Negative.  Negative for agitation, confusion, decreased concentration, hallucinations, self-injury and suicidal ideas. The patient is not nervous/anxious.        PMH/PSH/FH/SH/MED/ALLERGY reviewed    Objective:       Vitals:    03/01/18 0810   BP: 130/70   Pulse: 77   Temp: 97.8 °F (36.6 °C)       Physical Exam   Constitutional: She is oriented to person, place, and time. She appears well-developed and well-nourished. No distress.   HENT:   Head: Normocephalic and atraumatic.   Right Ear: External ear normal.   Left Ear: External ear normal.   Nose: Nose normal.   Mouth/Throat: Oropharynx is clear and moist. No oropharyngeal exudate.   Eyes: Conjunctivae and EOM are normal. Pupils are equal, round, and reactive to light. Right eye exhibits no discharge. Left eye exhibits no discharge. No scleral icterus.   Neck: Normal range of motion. Neck supple. No JVD present. No tracheal deviation present. No thyromegaly present.   Cardiovascular: Normal rate, regular rhythm, normal heart sounds and intact distal pulses.  Exam reveals no gallop and no friction rub.    No murmur heard.  Pulmonary/Chest: Effort normal and breath sounds normal. No stridor. She has no wheezes. She has no rales. She exhibits no tenderness.   Abdominal: Soft. Bowel sounds are normal. She exhibits no distension and no mass. There is no tenderness. There is no rebound and no guarding.  No hernia.   Musculoskeletal: Normal range of motion. She exhibits no edema or tenderness.   Lymphadenopathy:     She has no cervical adenopathy.   Neurological: She is alert and oriented to person, place, and time. She has normal reflexes. She displays normal reflexes. No cranial nerve deficit. She exhibits normal muscle tone. Coordination normal.   Skin: Skin is warm and dry. No rash noted. She is not diaphoretic. No erythema. No pallor.   Psychiatric: She has a normal mood and affect. Her behavior is normal. Judgment and thought content normal.       Protective Sensation (w/ 10 gram monofilament):  Right: Intact  Left: Intact    Visual Inspection:  Normal -  Bilateral    Pedal Pulses:   Right: Present  Left: Present    Posterior tibialis:   Right:Present  Left: Present      Assessment:       1. Hypertension associated with diabetes    2. Dyslipidemia associated with type 2 diabetes mellitus    3. Controlled type 2 diabetes mellitus without complication, without long-term current use of insulin    4. Major depressive disorder with single episode, in partial remission    5. Current severe episode of major depressive disorder without psychotic features without prior episode    6. Type 2 diabetes mellitus without retinopathy    7. Chronic pain of right knee    8. Facet arthropathy, cervical        Plan:       Candy was seen today for follow-up.    Diagnoses and all orders for this visit:    Hypertension associated with diabetes    Dyslipidemia associated with type 2 diabetes mellitus    Controlled type 2 diabetes mellitus without complication, without long-term current use of insulin    Major depressive disorder with single episode, in partial remission    Current severe episode of major depressive disorder without psychotic features without prior episode    Type 2 diabetes mellitus without retinopathy    Chronic pain of right knee  -     gabapentin (NEURONTIN) 300 MG capsule; Take 1 capsule (300 mg total) by mouth 3  (three) times daily.    Facet arthropathy, cervical  -     cyclobenzaprine (FLEXERIL) 10 MG tablet; Take 1 tablet (10 mg total) by mouth every evening.        HTN  -controlled  -continue losartan 25 daily    HLD  -controlled  -start statin  -labs    DM II  -controlled  -labs    SK  -reassurance    MDD  -controlled    Chronic knee pain  -continue mobic    Neuropathic pain  -not well controlled  -increase neurontin 300 TID    Spent adequate time in obtaining history and explaining differentials    40 minutes spent during this visit of which greater than 50% devoted to face-face counseling and coordination of care regarding diagnosis and management plan    Follow-up in about 6 months (around 9/1/2018), or if symptoms worsen or fail to improve.

## 2018-03-03 DIAGNOSIS — M17.11 OSTEOARTHRITIS OF RIGHT KNEE, UNSPECIFIED OSTEOARTHRITIS TYPE: ICD-10-CM

## 2018-03-03 RX ORDER — MELOXICAM 15 MG/1
TABLET ORAL
Qty: 90 TABLET | Refills: 3 | Status: SHIPPED | OUTPATIENT
Start: 2018-03-03 | End: 2018-09-04

## 2018-03-05 ENCOUNTER — OFFICE VISIT (OUTPATIENT)
Dept: NEUROLOGY | Facility: CLINIC | Age: 83
End: 2018-03-05
Payer: MEDICARE

## 2018-03-05 ENCOUNTER — CLINICAL SUPPORT (OUTPATIENT)
Dept: REHABILITATION | Facility: HOSPITAL | Age: 83
End: 2018-03-05
Payer: MEDICARE

## 2018-03-05 ENCOUNTER — TELEPHONE (OUTPATIENT)
Dept: REHABILITATION | Facility: HOSPITAL | Age: 83
End: 2018-03-05

## 2018-03-05 VITALS
SYSTOLIC BLOOD PRESSURE: 140 MMHG | HEIGHT: 60 IN | DIASTOLIC BLOOD PRESSURE: 74 MMHG | WEIGHT: 125 LBS | BODY MASS INDEX: 24.54 KG/M2 | HEART RATE: 79 BPM

## 2018-03-05 DIAGNOSIS — G56.03 BILATERAL CARPAL TUNNEL SYNDROME: ICD-10-CM

## 2018-03-05 DIAGNOSIS — G56.00 CARPAL TUNNEL SYNDROME: Primary | ICD-10-CM

## 2018-03-05 DIAGNOSIS — M79.641 PAIN IN RIGHT HAND: ICD-10-CM

## 2018-03-05 DIAGNOSIS — R20.2 NUMBNESS AND TINGLING IN RIGHT HAND: ICD-10-CM

## 2018-03-05 DIAGNOSIS — R20.0 NUMBNESS AND TINGLING IN RIGHT HAND: ICD-10-CM

## 2018-03-05 PROCEDURE — 97165 OT EVAL LOW COMPLEX 30 MIN: CPT | Mod: PN

## 2018-03-05 PROCEDURE — G8987 SELF CARE CURRENT STATUS: HCPCS | Mod: CK,PN

## 2018-03-05 PROCEDURE — G8988 SELF CARE GOAL STATUS: HCPCS | Mod: CJ,PN

## 2018-03-05 PROCEDURE — 99999 PR PBB SHADOW E&M-EST. PATIENT-LVL III: CPT | Mod: PBBFAC,,, | Performed by: PSYCHIATRY & NEUROLOGY

## 2018-03-05 PROCEDURE — 99204 OFFICE O/P NEW MOD 45 MIN: CPT | Mod: S$GLB,,, | Performed by: PSYCHIATRY & NEUROLOGY

## 2018-03-05 PROCEDURE — L3906 WHO W/O JOINTS CF: HCPCS | Mod: PN

## 2018-03-05 NOTE — PATIENT INSTRUCTIONS
[facility] [place]  [phone]  SONAL PIKE, OTDAMIAN, OTR/L, CHT  OCCUPATIONAL THERAPIST, CERTIFIED HAND THERAPIST  Complete 2-3 min, 4xday      Complete 2 x day pain free      .    Complete 5 repetitions, 4x day    MEDIAN NERVE: Mobilization I    Stand with right elbow resting at side, palm up. Use opposite hand to pull hand back, fingers relaxed.    MEDIAN NERVE: Mobilization II    Stand with right arm in front of body, palm up. Use opposite hand to pull hand back, fingers relaxed.    MEDIAN NERVE: Mobilization III    Stand with right elbow resting at side, palm up. Use opposite hand to pull hand and fingers back.         Orthosis Instructions and Proof of Delivery   Date: 3/5/2018  Name: Candy Escobar  MRN: [id number]  YOB: 1932  Referring Provider: David Hartmann*    Insurance: [employer]       [facility] Coverage: [facility]  Estimated Cost for Patient Responsibility: [email or web address]    [facility] Level II Code and Description      Orthosis Information  (X) Custom Fabricated              () Prefabricated  X() Right                                    () Left                                         () Bilateral  () Static                                   () Static Progressive                  () Dynamic    Orthosis Instructions    (X) Wear the orthosis at night only        (X) Wear the orthosis as needed to minimize your symptoms      Cleaning and Maintenance  Keep your orthosis away from any heat sources. Do not leave in your car.  You may clean your orthosis with cool water and soap or a mild cleanser.  Your orthosis may need adjustments due to changes in your medical condition (swelling, dressing size, additional surgery, etc.)  Monitor your skin color and integrity.  Do not try to adjust the orthosis on your own.     If you have any questions or concerns, please contact the therapy office.     I, Candy Sotomayor Escobar , have personally received the above described orthosis along with  instructions on the wear, care, and precautions related to this orthosis. I understand that I am responsible for payment to [facility] of my deductible, coinsurance, or other portion of my charges not paid in full by my insurance plans, including any item that is not covered under the insurance plan.     Signature: _________________________________ Date: __________________    Therapist:

## 2018-03-05 NOTE — PROGRESS NOTES
[facility] NEUROLOGY  [facility] [place]    Date: 3/5/18  Patient Name: Candy Escobar   MRN: [id number]   PCP: Francisco Munson  Referring Provider: Self, Aaareferral    Assessment:   Candy Escobar is a 86 y.o. 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  Counseled patient on conservative management strategies as she may consider surgical intervention, we will obtain EMG/nerve conduction study for diagnostic confirmation.    Plan:     Problem List Items Addressed This Visit        Neuro    Carpal tunnel syndrome    Current Assessment & Plan     -- obtaining EMG/NCS  -- patient counseled to wear wrist splints               Yohannes Hancock MD  [facility]   Department of Neurology    Patient note was created using Dragon Dictation.  Any errors in syntax or even information may not have been identified and edited on initial review prior to signing this note.  Subjective:          HPI:   Ms. Candy Escobar is a 86 y.o. female who presents with a chief complaint of bilateral hand pain and paresthesias.  The patient reports that for the last several weeks, she has noted severe numbness and tingling in digits one through 3 of her hands bilaterally, worst in her right hand.  She states that even grasping objects with her right hand provokes the pain which she states keeps her awake at night.  She states that she occasionally notes numbness and tingling in her fourth and fifth digits bilaterally. She has not yet undergone EMG/nerve conduction study but states that she has purchased wrist splints to wear at night.  She is currently the sole and primary caregiver for her  and states that if she were to consider possible surgery for CTS, she would want to move forward with it now rather than waiting.     PAST MEDICAL HISTORY:  Past Medical History:   Diagnosis Date     Allergy     Seasonal    Depression     Diabetes mellitus     diet controlled    Diverticulosis     Fever blister     GERD (gastroesophageal reflux disease)     Hypertension 11/12/2014    Joint pain     Nuclear sclerosis - Both Eyes 7/16/2013    Osteoarthritis     Osteopenia     Osteoporosis     Seizures 2008    TIA    Tachycardia        PAST SURGICAL HISTORY:  Past Surgical History:   Procedure Laterality Date    ANKLE SURGERY  1951    ankle replacement, left    BLADDER SUSPENSION      FOOT SURGERY      HYSTERECTOMY      left ovary intact    JOINT REPLACEMENT      left ankle and knee arthroplasty    KNEE SURGERY  2008    left TKR    KNEE SURGERY  11-11-14    right TKR    SHOULDER SURGERY  2009    right arthroscopy    TONSILLECTOMY         CURRENT MEDS:  Current Outpatient Prescriptions   Medication Sig Dispense Refill    estradiol 0.1 mg/24 hr td ptwk 0.1 mg/24 hr PTWK Place 1 patch onto the skin every 7 days. 12 patch 1    fluoxetine (PROZAC) 10 MG capsule Take 1 capsule (10 mg total) by mouth once daily. 90 capsule 3    gabapentin (NEURONTIN) 300 MG capsule Take 1 capsule (300 mg total) by mouth 3 (three) times daily. 90 capsule 3    losartan (COZAAR) 25 MG tablet Take 1 tablet (25 mg total) by mouth once daily. 90 tablet 3    meloxicam (MOBIC) 15 MG tablet TAKE 1 TABLET DAILY AS     NEEDED FOR PAIN 90 tablet 3    omeprazole (PRILOSEC) 20 MG capsule Take 2 capsules (40 mg total) by mouth once daily. 180 capsule 1    tretinoin (RETIN-A) 0.025 % gel Apply topically as needed. 45 g 3    albuterol 90 mcg/actuation inhaler Inhale 2 puffs into the lungs every 4 (four) hours as needed for Wheezing. 18 g 4    atorvastatin (LIPITOR) 20 MG tablet Take 1 tablet (20 mg total) by mouth once daily. 90 tablet 3    azelastine (OPTIVAR) 0.05 % ophthalmic solution Place 1 drop into both eyes 2 (two) times daily. 4 mL 11    ciprofloxacin HCl (CIPRO) 500 MG tablet       cyclobenzaprine (FLEXERIL) 10  MG tablet Take 1 tablet (10 mg total) by mouth every evening. 90 tablet 3    diclofenac sodium 1 % Gel Apply 2 g topically 2 (two) times daily as needed. 100 g 5    sulfamethoxazole-trimethoprim 800-160mg (BACTRIM DS) 800-160 mg Tab       triamcinolone acetonide 0.1% (KENALOG) 0.1 % ointment Apply topically 2 (two) times daily. 90 g 3     Current Facility-Administered Medications   Medication Dose Route Frequency Provider Last Rate Last Dose    sodium chloride injection 20 mEq  5 mL Intravenous Q30 Days Lester Peña MD   20 mEq at 06/09/15 1413       ALLERGIES:  Review of patient's allergies indicates:   Allergen Reactions    Propofol analogues Other (See Comments)     Low blood pressure, tremors    Oxycodone Nausea And Vomiting    Pcn [penicillins] Nausea Only    Tetracyclines Nausea Only       FAMILY HISTORY:  Family History   Problem Relation Age of Onset    Diabetes Mother     Diabetes Father     Heart disease Father     Diabetes Sister     Polychondritis Sister     Polychondritis      Cancer Neg Hx     Amblyopia Neg Hx     Blindness Neg Hx     Cataracts Neg Hx     Glaucoma Neg Hx     Hypertension Neg Hx     Macular degeneration Neg Hx     Retinal detachment Neg Hx     Strabismus Neg Hx     Stroke Neg Hx     Thyroid disease Neg Hx     Melanoma Neg Hx        SOCIAL HISTORY:  Social History   Substance Use Topics    Smoking status: Former Smoker     Types: Cigarettes     Quit date: 4/29/1981    Smokeless tobacco: Never Used    Alcohol use 1.2 oz/week     2 Glasses of wine per week      Comment: Nightly       Review of Systems:  12 review of systems is negative except for the symptoms mentioned in HPI.      Objective:     Vitals:    03/05/18 1030   BP: (!) 140/74   Pulse: 79   Weight: 56.7 kg (125 lb)   Height: 5' (1.524 m)     General: NAD, well nourished   Eyes: no tearing, discharge, no erythema   ENT: moist mucous membranes of the oral cavity, nares patent    Neck:  Supple, full range of motion  Cardiovascular: Warm and well perfused, pulses equal and symmetrical  Lungs: Normal work of breathing, normal chest wall excursions  Skin: No rash, lesions, or breakdown on exposed skin  Psychiatry: Mood and affect are appropriate   Abdomen: soft, non tender, non distended  Extremeties: No cyanosis, clubbing or edema.    Neurological   MENTAL STATUS: Alert and oriented to person, place, and time. Attention and concentration within normal limits. Speech without dysarthria, able to name and repeat without difficulty. Recent and remote memory within normal limits   CRANIAL NERVES: Visual fields intact. PERRL. EOMI. Facial sensation intact. Face symmetrical. Hearing grossly intact. Full shoulder shrug bilaterally. Tongue protrudes midline   SENSORY: Sensation is reduced to ST in median distribution bilaterally. Positive Tinel's. Positive Phalen's bilaterally.   MOTOR: Normal bulk and tone. 5/5 deltoid, biceps, triceps, 3/5 interosseous, hand  bilaterally. 5/5 iliopsoas, knee extension/flexion, foot dorsi/plantarflexion bilaterally.    REFLEXES: Symmetric and 1+ throughout.   CEREBELLAR/COORDINATION/GAIT: Gait steady with normal arm swing and stride length.  Finger to nose intact. Normal rapid alternating movements.

## 2018-03-05 NOTE — PATIENT INSTRUCTIONS
Having EMG and NCS Tests  You will be having electromyography (EMG) and nerve conduction studies (NCS) to measure muscle and nerve function. In most cases, both tests are done. NCS is most often done first. You will be asked to lie on an exam table with a blanket over you. You may have one or both of the following:    Nerve conduction study (NCS)  During NCS, mild electrical currents are used to test how fast impulses move along your nerves. The healthcare provider will put small metal disks (electrodes) on your skin on the area of your body being tested. This will be done using water-based gel or paste. A doctor or technologist will apply mild electrical currents to your skin. Your muscles will twitch, but the test wont harm you. Currents are usually applied to the same area several times. Usually the intensity of the electrical stimulation is increased on each body part. Despite some increasing discomfort that varies from person to person, the electrical shock is not dangerous. The test may continue on other parts of your body unless the reason for doing the test is limited to a small part of the body.  Electromyography (EMG)  Most of the electrodes will be removed for EMG. The doctor will clean the area being tested with alcohol. A very fine needle will be put into the muscles in this region. When the needle is inserted, you may feel as if your skin is being pinched. Try to relax and do as instructed, since you will be asked to relax and contract the muscle being tested. Following instructions will allow your doctor to interpret the test results.  Let the technologist know  For your safety and for the success of your test, tell the technologist if you:  · Have any bleeding problems.  · Take blood thinners (anticoagulants) or other medications, including aspirin.  · Have any immune system problems.  · Have had neck or back surgery.  You may also be asked questions about your overall health.  Before the  test  Prepare for your test as instructed. Shower or bathe, but don't use powder, oil, or lotion. Your skin should be clean and free of excess oil. Wear loose clothes. But know that you may be asked to change into a hospital gown. The entire test will take about 60 minutes. Be sure to allow extra time to check in.  After your test  Before you leave, all electrodes will be removed. You can then get right back to your normal routine. If you feel tired or have some discomfort, take it easy. If you were told to stop taking any medicines for your test, ask when you can start taking them again. Your doctor will let you know when your test results are ready.  Date Last Reviewed: 9/12/2015 © 2000-2017 [email or web address]. [address] [place] [address], [place] [address]. All rights reserved. This information is not intended as a substitute for professional medical care. Always follow your healthcare professional's instructions.

## 2018-03-05 NOTE — PLAN OF CARE
TIME RECORD    Date:  03/05/2018    Start Time:  1pm  Stop Time:  205 pm    PROCEDURES:    TIMED  Procedure Min.                     UNTIMED  Procedure Min.   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 Pain in right hand     2. Numbness and tingling in right hand       DOS: None  Certification Period:  3/5/2018  to 05/05/2018  Precautions:  Universal      Referral Orders:   Eval and treat right hand OA and CTS          Date of onset: 2 months  Mechanism of Injury:   No specific cause  Past Medical History/Physical Systems Review:   Past Medical History:   Diagnosis Date    Allergy     Seasonal    Depression     Diabetes mellitus     diet controlled    Diverticulosis     Fever blister     GERD (gastroesophageal reflux disease)     Hypertension 11/12/2014    Joint pain     Nuclear sclerosis - Both Eyes 7/16/2013    Osteoarthritis     Osteopenia     Osteoporosis     Seizures 2008    TIA    Tachycardia      Medications: Candy Escobar has a current medication list which includes the following prescription(s): albuterol, atorvastatin, azelastine, ciprofloxacin hcl, cyclobenzaprine, diclofenac sodium, estradiol 0.1 mg/24 hr td ptwk, fluoxetine, gabapentin, losartan, meloxicam, omeprazole, sulfamethoxazole-trimethoprim 800-160mg, tretinoin, and triamcinolone acetonide 0.1%, and the following Facility-Administered Medications: sodium chloride (23.4%).  Prior Therapy:  None this year, none for this  dx  Prior Level of Function: Independent  Hand dominance: Right      Subjective:     Pt reports They don't know if its arthritis or carpal tunnel or what. I had an appt with neurology and rheumatology and have one scheduled with orthopedics. I have EMG scheduled. They did not take any xrays. Pt. Reports increase in swelling in R>L and increase in pain and numbness with R>L.     Pain   At rest: 4 out of 10  With work/ Activity: 8 out of 10  Sleepin out of 10 pt reports she takes pain medication at night   Location of Pain :         Objective:       Observation  :Pt. presents for eval and treat    Sensation: numbness reported in all digits of Right hand except SF  Scar / Wound: NA  Edema:      Date 2018   (in cm) L R   Wrist crease 15.5 15.7   MPs 18.0 18.5   IF P1 6.3 6.6                 Range of Motion:       Range of Motion:       Date 3/5/2018 3/5/2018    LAROM RAROM*    Wrist ext/flex 65/67 55/47   Wrist RD/UD 25/35 20/21   Thumb Rad/palm abd 40/45 36/44   Thumb MP ext/flx 5/45 0/63   Thumb IP ext/flx 0+/50 0+/34   Opposition  dPC SF MP crease                                                                 Strength: (PAGE Dynamometer in lbs.)   Strength: (in pounds/psi)    Date 3/5/18    L/R   Rung II 19/12 with pain ulnar wrist   Rung III 6/15 pain both   Lateral pinch 6/5 pain both   Tripod pinch 5/2 pain both   Tip pinch NT 2/2 pain             Treatment Included: OT evaluation and fabrication of orthosis. Fabricated and applied custom right wrist cock-up orthosis, to be worn at night and at during day to minimize symptoms as needed. Pt. Verbalized understanding of wear, care, and precautions. Pt. Signed proof of delivery form. (see Media ). Plan to review HEP for motion, nerve glides, modalities next session.       Assessment:   Problem List :    Decreased ROM   Decreased  and pinch strength   Decreased muscle strength   Decreased functional hand use   Increased pain    Edema   Scar Adhesions       Profile and History Assessment of Occupational Performance Level of Clinical Decision Making Complexity Score   Occupational Profile:   Candy Escobar is a 86 y.o. 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 Candy Escobar has difficulty with  feeding, bathing, grooming and dressing  driving/transportation management, shopping, phone/computer use, housework/household chores and cutting with knife and fork, sweeping, dropping glasses  affecting his/her daily functional abilities. His/her main goal for therapy is to decrease pain and increase function of right, dominant hand.     Comorbidities:   diabetes    Medical and Therapy History Review:   Brief               Performance Deficits    Physical:  Joint Mobility  Muscle Power/Strength  Muscle Endurance  Edema   Strength  Pinch Strength  Gross Motor Coordination  Fine Motor Coordination  Proprioception Functions  Pain    Cognitive:  No Deficits    Psychosocial:    No Deficits     Clinical Decision Making:  low    Assessment Process:  Problem-Focused Assessments    Modification/Need for Assistance:  Not Necessary    Intervention Selection:  Limited Treatment Options    Focus on Therapeutic Outcomes (FOTO) Symptom Scale: Patient score indicates self perception of 57% impairment, limitation or restriction of function upon initial assessment.       Pt has limited required treatment options including ASTYM, IASTM, cupping, soft tissue mobs, joint mobs, therex, therapeutic activity, education, endurance training, modalities etc.      Discussed goals and Pt in agreement with goals.    Issued HEP at eval and pt able to perform all with minimal verbal and tactile cues provided by OT. Pt able to complete all without c/o and demonstrating good technique       low  Based on PMHX, co morbidities , data from assessments and functional level of assistance  required with task and clinical presentation directly impacting function.         Long term goals: (by d/c)  1)  Patient to be IND with HEP and modalities for pain management  2)  Increase ROM 5-10 degrees to increase functional hand use for ADLs/leisure activities  3)   Increase  strength 5-8 lbs. to carry laundry, carry groceries, sweep, and increase functional independence of right hand for ADLs/iADLs  4)   Increase pinch 3-4  psis for  Increase in functional independence in ADLs/iADLs such as manipulating fasteners and opening containers  5) Patient to score at 40%  or less on FOTO to demonstrate improved perception of functional rightUE Use.            Short term Goals: ( 4 weeks)   1)  Patient to be IND with HEP and modalities for pain management  2)  Increase ROM 3-5 degrees to increase functional hand use for ADLs/leisure activities  3)   Increase  strength 3-5 lbs. to carry laundry, carry groceries, sweep, and increase functional independence of right hand for ADLs/iADLs  4)   Increase pinch 1-3 psis for Increase in functional independence in ADLs/iADLs such as manipulating fasteners and opening containers  5)   Patient to score at 50%  or less on FOTO to demonstrate improved perception of functional right UE Use.      Plan:   Patient to be treated by Occupational Therapy    1   times per week for   4-6                  weeks  during the certification period from   3/5/2018     To 05/31/2018 to achieve the established goals.  Treatment to include :  paraffin, fluidotherapy, manual therapy/joint mobilizations,  modalities for pain management, US 3mhz, therapeutic exercises/activities, strengthening, scar and edema management, as well as any other treatments deemed necessary based on the patient's needs or progress.               I certify the need for these services furnished under this plan of treatment and while under my care    Peyton Gutierrez, OTDAMIAN, OTR/L, CHT   Occupational  Therapist      ____________________________________                         __________________  Physician/Referring Practitioner                                               Date of Signature

## 2018-03-06 NOTE — TELEPHONE ENCOUNTER
Called pt Re: pt question and request to call. Left message with  who answered but pt was not home. Will attempt to call back in the am.  Peyton Gutierrez, NATHANIEL, OTR/L, CHT

## 2018-03-12 ENCOUNTER — TELEPHONE (OUTPATIENT)
Dept: PHARMACY | Facility: CLINIC | Age: 83
End: 2018-03-12

## 2018-03-12 RX ORDER — DICLOFENAC SODIUM 10 MG/G
2 GEL TOPICAL 2 TIMES DAILY PRN
Qty: 100 G | Refills: 5 | Status: SHIPPED | OUTPATIENT
Start: 2018-03-12 | End: 2019-10-02 | Stop reason: SDUPTHER

## 2018-03-12 NOTE — TELEPHONE ENCOUNTER
Called and spoke with patient.      Notified her that PA on Diclofenac Sodium Gel 1% was approved on her insurance.    Patient wants prescription sent to [facility] @ [phone].    Notified patient we would send the prescription to her pharmacy of choice, she was thankful for the assistance.    [place] Information: [facility] [phone]    Please let me know if you any questions.     Thanks,   Ramandeep Rico  Patient Care Advocate   [facility]  Phone: [phone]  Fax: [phone]

## 2018-03-13 ENCOUNTER — CLINICAL SUPPORT (OUTPATIENT)
Dept: REHABILITATION | Facility: HOSPITAL | Age: 83
End: 2018-03-13
Payer: MEDICARE

## 2018-03-13 DIAGNOSIS — R20.2 NUMBNESS AND TINGLING IN RIGHT HAND: ICD-10-CM

## 2018-03-13 DIAGNOSIS — R20.0 NUMBNESS AND TINGLING IN RIGHT HAND: ICD-10-CM

## 2018-03-13 DIAGNOSIS — M79.641 PAIN IN RIGHT HAND: ICD-10-CM

## 2018-03-13 PROCEDURE — 97110 THERAPEUTIC EXERCISES: CPT | Mod: PN

## 2018-03-13 PROCEDURE — 97018 PARAFFIN BATH THERAPY: CPT | Mod: PN

## 2018-03-13 NOTE — PROGRESS NOTES
"OT Daily Progress Note    TIME RECORD    Date:  03/13/2018    Start Time:  904  Stop Time:  947    PROCEDURES:  TIMED  Procedure Min.       Manual Therapy 0   Therapeutic Exercise 28           UNTIMED  Procedure Min.       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 Pain in right hand      2. Numbness and tingling in right hand         DOS: None  Certification Period:  3/5/2018  to 05/05/2018  Precautions:  Universal          Subjective:     "i've been wearing my brace, It is helping I feel a little better"  Pain: 3-4 out of 10     Objective:     Patient seen by OT this session. Treatment  consist of the following:   Modalities:           Performed paraffin bath x 10 min to increase blood flow, circulation and tissue elasticity prior to joint mobilizations, PROM, AROM, and therex              TE per Log  AROM wave, hook, straight fist, spreads, opposition X 10 each way   Median nerve mobilization 1-3 X 5 each, 4xday                                      Assessment:      Pt. Returns to  OT for first visit following initial evaluation. She reports comfort with orthosis and compliance with wear. She feels she is doing better. Taught AROM and nerve glides to be added to HEP. Pt. demo'd understanding, reported the nerve glides hurt her wrist a little but she will try them.  Pt will continue to benefit from skilled OT intervention.   Patient is making good progress toward established goals.   Patient continues to " demonstrate limitation  with  Decreased fine motor coordination, Decreased functional use, Impaired sensation, Decreased strength and Continued pain       Patient Education/Response:     Pt. To continue HEP as instructed previously. Add new exercises provided this visit (see pt instructions from initial visit)    Plans and Goals:     Cont per OT POC   Long term goals: (by d/c)  1)  Patient to be IND with HEP and modalities for pain management  2)  Increase ROM 5-10 degrees to increase functional hand use for ADLs/leisure activities  3)   Increase  strength 5-8 lbs. to carry laundry, carry groceries, sweep, and increase functional independence of right hand for ADLs/iADLs  4)   Increase pinch 3-4  psis for  Increase in functional independence in ADLs/iADLs such as manipulating fasteners and opening containers  5) Patient to score at 40%  or less on FOTO to demonstrate improved perception of functional rightUE Use.                 Short term Goals: ( 4 weeks)   1)  Patient to be IND with HEP and modalities for pain management  2)  Increase ROM 3-5 degrees to increase functional hand use for ADLs/leisure activities  3)   Increase  strength 3-5 lbs. to carry laundry, carry groceries, sweep, and increase functional independence of right hand for ADLs/iADLs  4)   Increase pinch 1-3 psis for Increase in functional independence in ADLs/iADLs such as manipulating fasteners and opening containers  5)   Patient to score at 50%  or less on FOTO to demonstrate improved perception of functional right UE Use.        Plan:   Patient to be treated by Occupational Therapy    1   times per week for   4-6                  weeks  during the certification period from   3/5/2018     To 05/31/2018 to achieve the established goals.  Treatment to include :  paraffin, fluidotherapy, manual therapy/joint mobilizations,  modalities for pain management, US 3mhz, therapeutic exercises/activities, strengthening, scar and edema  management, as well as any other treatments deemed necessary based on the patient's needs or progress.

## 2018-03-22 ENCOUNTER — CLINICAL SUPPORT (OUTPATIENT)
Dept: REHABILITATION | Facility: HOSPITAL | Age: 83
End: 2018-03-22
Payer: MEDICARE

## 2018-03-22 DIAGNOSIS — K21.9 GASTROESOPHAGEAL REFLUX DISEASE, ESOPHAGITIS PRESENCE NOT SPECIFIED: ICD-10-CM

## 2018-03-22 DIAGNOSIS — R20.0 NUMBNESS AND TINGLING IN RIGHT HAND: ICD-10-CM

## 2018-03-22 DIAGNOSIS — M79.641 PAIN IN RIGHT HAND: ICD-10-CM

## 2018-03-22 DIAGNOSIS — R20.2 NUMBNESS AND TINGLING IN RIGHT HAND: ICD-10-CM

## 2018-03-22 PROCEDURE — 97110 THERAPEUTIC EXERCISES: CPT | Mod: PN

## 2018-03-22 PROCEDURE — 97018 PARAFFIN BATH THERAPY: CPT | Mod: PN

## 2018-03-22 RX ORDER — OMEPRAZOLE 20 MG/1
CAPSULE, DELAYED RELEASE ORAL
Qty: 180 CAPSULE | Refills: 3 | Status: SHIPPED | OUTPATIENT
Start: 2018-03-22 | End: 2020-03-25 | Stop reason: SDUPTHER

## 2018-03-22 NOTE — TELEPHONE ENCOUNTER
----- Message from Chelsy Gunter sent at 3/22/2018  1:07 PM CDT -----  Contact: Self/ [phone]  Patient called in requesting to speak with you. Patient prefers to speak with a nurse.   Please call and advise.

## 2018-03-22 NOTE — PROGRESS NOTES
"OT Daily Progress Note    TIME RECORD    Date:  03/22/2018    Start Time:  9am  Stop Time:  935am    PROCEDURES:  TIMED  Procedure Min.       Manual Therapy 0   Therapeutic Exercise 25           UNTIMED  Procedure Min.       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 Pain in right hand      2. Numbness and tingling in right hand         DOS: None  Certification Period:  3/5/2018  to 05/05/2018  Precautions:  Universal          Subjective:     " Can you adjust my brace, I feel like it is too tight" " I feel much better, I have an EMG next week to see about the carpal tunnel."   Pain: 3-4 out of 10     Objective:     Patient seen by OT this session. Treatment  consist of the following:        MODALITIES          Performed paraffin bath x 10 min to increase blood flow, circulation and tissue elasticity prior to joint mobilizations, PROM, AROM, and therex              TE per Log  AROM, wrist ext/flx wave, hook, straight fist, spreads, opposition X 10 each way   Median nerve mobilization 1-3 X 5 each, 4xday                                      Assessment:      Pt .has attended 3/3 skilled OT sessions. She reports comfort following adjustment of splint and replaced wrist velfoam with longer piece. Pt. With increased pain throughout thumb and wrist this date. Reviewed HEP. Pt. With pain completing AROM, therefore deferred functional activities this date.  Pt will continue to benefit from skilled OT intervention.   " Patient is making good progress toward established goals.   Patient continues to demonstrate limitation  with  Decreased fine motor coordination, Decreased functional use, Impaired sensation, Decreased strength and Continued pain       Patient Education/Response:     Pt. To continue HEP as instructed previously.    Plans and Goals:     Cont per OT POC   Long term goals: (by d/c)  1)  Patient to be IND with HEP and modalities for pain management  2)  Increase ROM 5-10 degrees to increase functional hand use for ADLs/leisure activities  3)   Increase  strength 5-8 lbs. to carry laundry, carry groceries, sweep, and increase functional independence of right hand for ADLs/iADLs  4)   Increase pinch 3-4  psis for  Increase in functional independence in ADLs/iADLs such as manipulating fasteners and opening containers  5) Patient to score at 40%  or less on FOTO to demonstrate improved perception of functional rightUE Use.                 Short term Goals: ( 4 weeks)   1)  Patient to be IND with HEP and modalities for pain management  2)  Increase ROM 3-5 degrees to increase functional hand use for ADLs/leisure activities  3)   Increase  strength 3-5 lbs. to carry laundry, carry groceries, sweep, and increase functional independence of right hand for ADLs/iADLs  4)   Increase pinch 1-3 psis for Increase in functional independence in ADLs/iADLs such as manipulating fasteners and opening containers  5)   Patient to score at 50%  or less on FOTO to demonstrate improved perception of functional right UE Use.        Plan:   Patient to be treated by Occupational Therapy    1   times per week for   4-6                  weeks  during the certification period from   3/5/2018     To 05/31/2018 to achieve the established goals.  Treatment to include :  paraffin, fluidotherapy, manual therapy/joint mobilizations,  modalities for pain management, US 3mhz, therapeutic exercises/activities, strengthening, scar and edema  management, as well as any other treatments deemed necessary based on the patient's needs or progress.

## 2018-03-22 NOTE — TELEPHONE ENCOUNTER
----- Message from Chelsy Gunter sent at 3/22/2018  1:07 PM CDT -----  Contact: Self/ [phone]  Patient called in requesting to speak with you. Patient prefers to speak with a nurse.   Please call and advise.

## 2018-03-26 ENCOUNTER — CLINICAL SUPPORT (OUTPATIENT)
Dept: REHABILITATION | Facility: HOSPITAL | Age: 83
End: 2018-03-26
Payer: MEDICARE

## 2018-03-26 ENCOUNTER — PROCEDURE VISIT (OUTPATIENT)
Dept: NEUROLOGY | Facility: CLINIC | Age: 83
End: 2018-03-26
Payer: MEDICARE

## 2018-03-26 DIAGNOSIS — R20.2 NUMBNESS AND TINGLING IN RIGHT HAND: ICD-10-CM

## 2018-03-26 DIAGNOSIS — R20.0 NUMBNESS AND TINGLING IN RIGHT HAND: ICD-10-CM

## 2018-03-26 DIAGNOSIS — G56.03 BILATERAL CARPAL TUNNEL SYNDROME: ICD-10-CM

## 2018-03-26 DIAGNOSIS — M79.641 PAIN IN RIGHT HAND: ICD-10-CM

## 2018-03-26 PROCEDURE — 95886 MUSC TEST DONE W/N TEST COMP: CPT | Mod: S$GLB,,, | Performed by: NEUROMUSCULOSKELETAL MEDICINE & OMM

## 2018-03-26 PROCEDURE — 97110 THERAPEUTIC EXERCISES: CPT | Mod: PN

## 2018-03-26 PROCEDURE — 97018 PARAFFIN BATH THERAPY: CPT | Mod: PN

## 2018-03-26 PROCEDURE — 95910 NRV CNDJ TEST 7-8 STUDIES: CPT | Mod: S$GLB,,, | Performed by: NEUROMUSCULOSKELETAL MEDICINE & OMM

## 2018-03-26 NOTE — PROGRESS NOTES
"OT Daily Progress Note    TIME RECORD    Date:  03/26/2018    Start Time:  905am  Stop Time:  935am    PROCEDURES:  TIMED  Procedure Min.       Manual Therapy 0   Therapeutic Exercise 20           UNTIMED  Procedure Min.       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 Pain in right hand      2. Numbness and tingling in right hand         DOS: None  Certification Period:  3/5/2018  to 05/05/2018  Precautions:  Universal          Subjective:     " My brace is working a lot better now" " My wrist and thumb always hurt- shooting pains- Maybe it's frost bite" " I have my imaging study today"   Pain: 3-4 out of 10     Objective:     Patient seen by OT this session. Treatment  consist of the following:        MODALITIES          Performed paraffin bath x 10 min to increase blood flow, circulation and tissue elasticity prior to joint mobilizations, PROM, AROM, and therex              TE per Log  AROM, wrist ext/flx wave, hook, straight fist,composite spreads, opposition X 10 each way   Median nerve mobilization 1-3 X 5 each, 4xday                                      Assessment:      Pt .has attended 4/4 skilled OT sessions. Attempted isometric C with pt on right as well as first webspace stretch and pt had immediate increase in pain reported.Unable to complete additional therex this date due to pain increase.  .  Pt will continue to benefit from skilled OT intervention.   Patient is making good progress toward " established goals.   Patient continues to demonstrate limitation  with  Decreased fine motor coordination, Decreased functional use, Impaired sensation, Decreased strength and Continued pain       Patient Education/Response:     Pt. To continue HEP as instructed previously.    Plans and Goals:     Cont per OT POC   Long term goals: (by d/c)  1)  Patient to be IND with HEP and modalities for pain management  2)  Increase ROM 5-10 degrees to increase functional hand use for ADLs/leisure activities  3)   Increase  strength 5-8 lbs. to carry laundry, carry groceries, sweep, and increase functional independence of right hand for ADLs/iADLs  4)   Increase pinch 3-4  psis for  Increase in functional independence in ADLs/iADLs such as manipulating fasteners and opening containers  5) Patient to score at 40%  or less on FOTO to demonstrate improved perception of functional rightUE Use.                 Short term Goals: ( 4 weeks)   1)  Patient to be IND with HEP and modalities for pain management  2)  Increase ROM 3-5 degrees to increase functional hand use for ADLs/leisure activities  3)   Increase  strength 3-5 lbs. to carry laundry, carry groceries, sweep, and increase functional independence of right hand for ADLs/iADLs  4)   Increase pinch 1-3 psis for Increase in functional independence in ADLs/iADLs such as manipulating fasteners and opening containers  5)   Patient to score at 50%  or less on FOTO to demonstrate improved perception of functional right UE Use.        Plan:   Patient to be treated by Occupational Therapy    1   times per week for   4-6                  weeks  during the certification period from   3/5/2018     To 05/31/2018 to achieve the established goals.  Treatment to include :  paraffin, fluidotherapy, manual therapy/joint mobilizations,  modalities for pain management, US 3mhz, therapeutic exercises/activities, strengthening, scar and edema management, as well as any other treatments  deemed necessary based on the patient's needs or progress.

## 2018-04-17 ENCOUNTER — TELEPHONE (OUTPATIENT)
Dept: FAMILY MEDICINE | Facility: CLINIC | Age: 83
End: 2018-04-17

## 2018-04-17 NOTE — TELEPHONE ENCOUNTER
----- Message from Mckenzie Valadez sent at 4/17/2018  9:44 AM CDT -----  Contact: [phone]/self  Patient states she is returning your call. Please advise.

## 2018-04-26 ENCOUNTER — TELEPHONE (OUTPATIENT)
Dept: FAMILY MEDICINE | Facility: CLINIC | Age: 83
End: 2018-04-26

## 2018-04-26 ENCOUNTER — TELEPHONE (OUTPATIENT)
Dept: NEUROLOGY | Facility: CLINIC | Age: 83
End: 2018-04-26

## 2018-04-26 DIAGNOSIS — G56.03 CARPAL TUNNEL SYNDROME, BILATERAL: Primary | ICD-10-CM

## 2018-04-26 NOTE — TELEPHONE ENCOUNTER
----- Message from Jose Contreras sent at 4/26/2018 10:27 AM CDT -----  Contact: [phone]  Patient called in requesting to speak with you. Patient prefers to speak with a nurse. Please advise.

## 2018-04-26 NOTE — TELEPHONE ENCOUNTER
----- Message from Joe Caruso sent at 4/26/2018 12:16 PM CDT -----  Contact: self/[phone]  She is returning the nurse call.

## 2018-04-26 NOTE — TELEPHONE ENCOUNTER
Spoke to pt and was requesting her EMG study. Informed pt she has to call her Neurologist for results and after care instructions. Pt verbalized understanding.

## 2018-05-01 DIAGNOSIS — F32.0 MILD SINGLE CURRENT EPISODE OF MAJOR DEPRESSIVE DISORDER: ICD-10-CM

## 2018-05-01 RX ORDER — FLUOXETINE 10 MG/1
10 CAPSULE ORAL DAILY
Qty: 90 CAPSULE | Refills: 3 | Status: SHIPPED | OUTPATIENT
Start: 2018-05-01 | End: 2019-04-25 | Stop reason: SDUPTHER

## 2018-05-01 NOTE — TELEPHONE ENCOUNTER
----- Message from Kizzy Felix sent at 5/1/2018 12:32 PM CDT -----  Contact: [phone]/self  Patient is requesting to have a refill of fluoxetine (PROZAC) 10 MG capsule sent to [facility] - [place], [place] - [address] AT PORTAL TO REGISTERED [facility] SITES . Please advise.

## 2018-05-10 ENCOUNTER — OFFICE VISIT (OUTPATIENT)
Dept: ORTHOPEDICS | Facility: CLINIC | Age: 83
End: 2018-05-10
Payer: MEDICARE

## 2018-05-10 ENCOUNTER — TELEPHONE (OUTPATIENT)
Dept: ORTHOPEDICS | Facility: CLINIC | Age: 83
End: 2018-05-10

## 2018-05-10 ENCOUNTER — TELEPHONE (OUTPATIENT)
Dept: FAMILY MEDICINE | Facility: CLINIC | Age: 83
End: 2018-05-10

## 2018-05-10 VITALS — WEIGHT: 125 LBS | HEIGHT: 60 IN | BODY MASS INDEX: 24.54 KG/M2

## 2018-05-10 DIAGNOSIS — E11.59 HYPERTENSION ASSOCIATED WITH DIABETES: ICD-10-CM

## 2018-05-10 DIAGNOSIS — G56.03 BILATERAL CARPAL TUNNEL SYNDROME: Primary | ICD-10-CM

## 2018-05-10 DIAGNOSIS — I15.2 HYPERTENSION ASSOCIATED WITH DIABETES: ICD-10-CM

## 2018-05-10 PROCEDURE — 99204 OFFICE O/P NEW MOD 45 MIN: CPT | Mod: S$GLB,,, | Performed by: ORTHOPAEDIC SURGERY

## 2018-05-10 PROCEDURE — 99999 PR PBB SHADOW E&M-EST. PATIENT-LVL III: CPT | Mod: PBBFAC,,, | Performed by: ORTHOPAEDIC SURGERY

## 2018-05-10 NOTE — TELEPHONE ENCOUNTER
----- Message from Jose Contreras sent at 5/10/2018  4:02 PM CDT -----  Contact: [phone]  Patient called in requesting to speak with you. Patient prefers to speak with a nurse. Please advise.

## 2018-05-10 NOTE — TELEPHONE ENCOUNTER
----- Message from Jim Cxo sent at 5/10/2018  1:37 PM CDT -----  Ms. Escobar came in to speak with Dr. Munson to see if she can get the new medicine she just got on lowered. The medicine is 25 mg but she couldn't remember the name of the medicine. She want to know if someone could call her because she is trying to broke the medicine in half.

## 2018-05-10 NOTE — TELEPHONE ENCOUNTER
Informed pt that the medication is Losartan 25 and she can cut in half and it may not come in 12.5 mg. Pt verbalized understanding.

## 2018-05-10 NOTE — LETTER
May 10, 2018        Yoahnnes Hancock MD  [address]  [address]  [place] [address]             [facility]  [address]  [place] [address]  Phone: [phone]   Patient: Candy Escobar   MR Number: [id number]   YOB: 1932   Date of Visit: 5/10/2018       Dear Dr. Hancock:    Thank you for referring Candy Escobar to me for evaluation. Below are the relevant portions of my assessment and plan of care.            If you have questions, please do not hesitate to call me. I look forward to following Candy along with you.    Sincerely,      Jose R Oleary Jr., MD           CC  No Recipients

## 2018-05-10 NOTE — TELEPHONE ENCOUNTER
"Spoke with pt, pt informed spoke with the insurance company, the pt stated she still wanted surgery done tomorrow and "if responsible for paying out of pocket then that's what it will be". Informed Mason in pre-service about conversation, was informed pt may need to come out of pocket before s/p. Understanding verbalized.    "

## 2018-05-10 NOTE — PROGRESS NOTES
PREOP H AND P     CHIEF COMPLAINT:  Bilateral carpal tunnel syndrome.    HISTORY OF PRESENT ILLNESS:  An 86-year-old female with ongoing symptoms both   hands for the past several months.  She recently had nerve conduction studies,   which showed evidence of bilateral severe carpal tunnel syndrome.  She has tried   wrist splints without improvement.  Symptoms are getting worse.  She is in   severe pain and numbness.    PAST MEDICAL HISTORY:  Significant for allergies, depression, diabetes,   diverticulosis, GERD, hypertension, osteoarthritis and osteoporosis.    PAST SURGICAL HISTORY:  Includes tonsillectomy, bladder surgery, hysterectomy,   foot surgery, joint replacement and shoulder surgery.    FAMILY HISTORY:  Positive for diabetes and heart disease.    SOCIAL HISTORY:  The patient is a former smoker.  Drinks two glasses of wine per   week.    REVIEW OF SYSTEMS:  Negative fever, chills, rashes.    CURRENT MEDICATIONS:  Reviewed on chart.    ALLERGIES:  Propofol, oxycodone, penicillin and tetracycline.    PHYSICAL EXAMINATION:  GENERAL:  Well-developed, elderly female, in no acute distress, alert and   oriented x3.  HEENT:  Unremarkable.  LUNGS:  Clear to auscultation.  HEART:  Regular rate and rhythm.  ABDOMEN:  Soft, nontender.  EXTREMITIES:  Significant for the hands, demonstrating mild swelling volar   compartment of the wrist bilaterally.  She has positive Tinel sign bilateral and   atrophy of the right thenar muscle.  Range of motion of fingers slightly   decreased.   strength decreased.  Sensation decreased in the right hand.    IMPRESSION:  Bilateral severe carpal tunnel syndrome, right worse than left.    PLAN:  She would like surgery as soon as possible, especially for the right hand   because of the degree of pain she is having.  We will set up surgery for   tomorrow for right carpal tunnel release.  The risks and benefits of surgery   explained to her.  She understands.      BRITTANY  dd: 05/10/2018  14:05:34 (DEVAN)  td: 05/11/2018 11:04:28 (DEVAN)  Doc ID   #[id number]  Job ID #[id number]    CC:

## 2018-05-11 ENCOUNTER — SURGERY (OUTPATIENT)
Age: 83
End: 2018-05-11

## 2018-05-11 ENCOUNTER — HOSPITAL ENCOUNTER (OUTPATIENT)
Facility: HOSPITAL | Age: 83
Discharge: HOME | End: 2018-05-11
Attending: ORTHOPAEDIC SURGERY | Admitting: ORTHOPAEDIC SURGERY
Payer: MEDICARE

## 2018-05-11 ENCOUNTER — ANESTHESIA (OUTPATIENT)
Dept: SURGERY | Facility: HOSPITAL | Age: 83
End: 2018-05-11
Payer: MEDICARE

## 2018-05-11 ENCOUNTER — ANESTHESIA EVENT (OUTPATIENT)
Dept: SURGERY | Facility: HOSPITAL | Age: 83
End: 2018-05-11
Payer: MEDICARE

## 2018-05-11 DIAGNOSIS — G56.03 BILATERAL CARPAL TUNNEL SYNDROME: ICD-10-CM

## 2018-05-11 LAB
ANION GAP SERPL CALC-SCNC: 7 MMOL/L
BUN SERPL-MCNC: 15 MG/DL
CALCIUM SERPL-MCNC: 9.5 MG/DL
CHLORIDE SERPL-SCNC: 98 MMOL/L
CO2 SERPL-SCNC: 30 MMOL/L
CREAT SERPL-MCNC: 0.8 MG/DL
EST. GFR  (AFRICAN AMERICAN): >60 ML/MIN/1.73 M^2
EST. GFR  (NON AFRICAN AMERICAN): >60 ML/MIN/1.73 M^2
GLUCOSE SERPL-MCNC: 104 MG/DL
GLUCOSE SERPL-MCNC: 89 MG/DL (ref 70–110)
POCT GLUCOSE: 89 MG/DL (ref 70–110)
POTASSIUM SERPL-SCNC: 5 MMOL/L
SODIUM SERPL-SCNC: 135 MMOL/L

## 2018-05-11 PROCEDURE — 37000008 HC ANESTHESIA 1ST 15 MINUTES: Performed by: ORTHOPAEDIC SURGERY

## 2018-05-11 PROCEDURE — 36000706: Performed by: ORTHOPAEDIC SURGERY

## 2018-05-11 PROCEDURE — 37000009 HC ANESTHESIA EA ADD 15 MINS: Performed by: ORTHOPAEDIC SURGERY

## 2018-05-11 PROCEDURE — 71000016 HC POSTOP RECOV ADDL HR: Performed by: ORTHOPAEDIC SURGERY

## 2018-05-11 PROCEDURE — 36415 COLL VENOUS BLD VENIPUNCTURE: CPT

## 2018-05-11 PROCEDURE — S0077 INJECTION, CLINDAMYCIN PHOSP: HCPCS | Performed by: ORTHOPAEDIC SURGERY

## 2018-05-11 PROCEDURE — 25000003 PHARM REV CODE 250: Performed by: ORTHOPAEDIC SURGERY

## 2018-05-11 PROCEDURE — 80048 BASIC METABOLIC PNL TOTAL CA: CPT

## 2018-05-11 PROCEDURE — 64721 CARPAL TUNNEL SURGERY: CPT | Mod: RT,,, | Performed by: ORTHOPAEDIC SURGERY

## 2018-05-11 PROCEDURE — 93005 ELECTROCARDIOGRAM TRACING: CPT

## 2018-05-11 PROCEDURE — 71000015 HC POSTOP RECOV 1ST HR: Performed by: ORTHOPAEDIC SURGERY

## 2018-05-11 PROCEDURE — 93010 ELECTROCARDIOGRAM REPORT: CPT | Mod: S$GLB,,, | Performed by: INTERNAL MEDICINE

## 2018-05-11 PROCEDURE — 36000707: Performed by: ORTHOPAEDIC SURGERY

## 2018-05-11 PROCEDURE — 63600175 PHARM REV CODE 636 W HCPCS: Performed by: NURSE ANESTHETIST, CERTIFIED REGISTERED

## 2018-05-11 PROCEDURE — S0020 INJECTION, BUPIVICAINE HYDRO: HCPCS | Performed by: ORTHOPAEDIC SURGERY

## 2018-05-11 RX ORDER — ONDANSETRON 8 MG/1
8 TABLET, ORALLY DISINTEGRATING ORAL EVERY 8 HOURS PRN
Status: CANCELLED | OUTPATIENT
Start: 2018-05-11

## 2018-05-11 RX ORDER — FENTANYL CITRATE 50 UG/ML
INJECTION, SOLUTION INTRAMUSCULAR; INTRAVENOUS
Status: DISCONTINUED | OUTPATIENT
Start: 2018-05-11 | End: 2018-05-11

## 2018-05-11 RX ORDER — HYDROCODONE BITARTRATE AND ACETAMINOPHEN 5; 325 MG/1; MG/1
1 TABLET ORAL EVERY 4 HOURS PRN
Qty: 20 TABLET | Refills: 0 | Status: ON HOLD | OUTPATIENT
Start: 2018-05-11 | End: 2018-10-30 | Stop reason: HOSPADM

## 2018-05-11 RX ORDER — HYDROCODONE BITARTRATE AND ACETAMINOPHEN 10; 325 MG/1; MG/1
1 TABLET ORAL EVERY 4 HOURS PRN
Status: CANCELLED | OUTPATIENT
Start: 2018-05-11

## 2018-05-11 RX ORDER — ACETAMINOPHEN 325 MG/1
650 TABLET ORAL EVERY 4 HOURS PRN
Status: CANCELLED | OUTPATIENT
Start: 2018-05-11

## 2018-05-11 RX ORDER — CLINDAMYCIN PHOSPHATE 900 MG/50ML
900 INJECTION, SOLUTION INTRAVENOUS
Status: COMPLETED | OUTPATIENT
Start: 2018-05-11 | End: 2018-05-11

## 2018-05-11 RX ORDER — MIDAZOLAM HYDROCHLORIDE 1 MG/ML
INJECTION, SOLUTION INTRAMUSCULAR; INTRAVENOUS
Status: DISCONTINUED | OUTPATIENT
Start: 2018-05-11 | End: 2018-05-11

## 2018-05-11 RX ORDER — KETOROLAC TROMETHAMINE 30 MG/ML
15 INJECTION, SOLUTION INTRAMUSCULAR; INTRAVENOUS ONCE
Status: CANCELLED | OUTPATIENT
Start: 2018-05-11 | End: 2018-05-14

## 2018-05-11 RX ORDER — LIDOCAINE HYDROCHLORIDE 10 MG/ML
INJECTION, SOLUTION EPIDURAL; INFILTRATION; INTRACAUDAL; PERINEURAL
Status: DISCONTINUED | OUTPATIENT
Start: 2018-05-11 | End: 2018-05-11 | Stop reason: HOSPADM

## 2018-05-11 RX ORDER — BUPIVACAINE HYDROCHLORIDE 5 MG/ML
INJECTION, SOLUTION EPIDURAL; INTRACAUDAL
Status: DISCONTINUED | OUTPATIENT
Start: 2018-05-11 | End: 2018-05-11 | Stop reason: HOSPADM

## 2018-05-11 RX ADMIN — CLINDAMYCIN PHOSPHATE 900 MG: 18 INJECTION, SOLUTION INTRAVENOUS at 01:05

## 2018-05-11 RX ADMIN — MIDAZOLAM 0.5 MG: 1 INJECTION INTRAMUSCULAR; INTRAVENOUS at 01:05

## 2018-05-11 RX ADMIN — LIDOCAINE HYDROCHLORIDE 5 ML: 10 INJECTION, SOLUTION EPIDURAL; INFILTRATION; INTRACAUDAL; PERINEURAL at 01:05

## 2018-05-11 RX ADMIN — FENTANYL CITRATE 25 MCG: 50 INJECTION, SOLUTION INTRAMUSCULAR; INTRAVENOUS at 01:05

## 2018-05-11 RX ADMIN — BUPIVACAINE HYDROCHLORIDE 5 ML: 5 INJECTION, SOLUTION EPIDURAL; INTRACAUDAL; PERINEURAL at 01:05

## 2018-05-11 NOTE — TRANSFER OF CARE
Anesthesia Transfer of Care Note    Patient: Candy Escobar    Procedure(s) Performed: Procedure(s) (LRB):  RELEASE-CARPAL TUNNEL (Right)    Patient location: [facility]    Anesthesia Type: MAC    Transport from OR: Transported from OR on room air with adequate spontaneous ventilation    Post pain: adequate analgesia    Post assessment: tolerated procedure well and no apparent anesthetic complications    Post vital signs: stable    Level of consciousness: awake, alert and oriented    Nausea/Vomiting: no nausea/vomiting    Complications: none    Transfer of care protocol was followed      Last vitals:   Visit Vitals  BP (!) 169/69 (Patient Position: Lying)   Pulse 73   Temp 36.7 °C (98.1 °F) (Skin)   Resp 16   Ht 5' (1.524 m)   Wt 56.7 kg (125 lb)   SpO2 96%   Breastfeeding? No   BMI 24.41 kg/m²

## 2018-05-11 NOTE — ANESTHESIA PREPROCEDURE EVALUATION
05/11/2018  Candy Escobar is a 86 y.o., female.    Anesthesia Evaluation    I have reviewed the Patient Summary Reports.    I have reviewed the Nursing Notes.   I have reviewed the Medications.     Review of Systems  Anesthesia Hx:  Personal Hx of Anesthesia complications (   BP dropped with propofol)  Myocardial Infarction   Cardiovascular:   Hypertension    Hepatic/GI:   GERD    Musculoskeletal:   Arthritis     Neurological:   Neuromuscular Disease, Seizures    Endocrine:   Diabetes    Psych:   Psychiatric History          Physical Exam  General:  Well nourished    Airway/Jaw/Neck:  Jaw/Neck Findings: Neck ROM: Normal ROM       Chest/Lungs:  Chest/Lungs Findings: Clear to auscultation, Normal Respiratory Rate     Heart/Vascular:  Heart Findings: Rate: Normal  Rhythm: Regular Rhythm  Sounds: Normal        Mental Status:  Mental Status Findings:  Cooperative, Alert and Oriented         Anesthesia Plan  Type of Anesthesia, risks & benefits discussed:  Anesthesia Type:  MAC  Patient's Preference:   Intra-op Monitoring Plan:   Intra-op Monitoring Plan Comments:   Post Op Pain Control Plan:   Post Op Pain Control Plan Comments:   Induction:   IV  Beta Blocker:  Patient is not currently on a Beta-Blocker (No further documentation required).       Informed Consent: Patient understands risks and agrees with Anesthesia plan.  Questions answered. Anesthesia consent signed with patient.  ASA Score: 2     Day of Surgery Review of History & Physical: I have interviewed and examined the patient. I have reviewed the patient's H&P dated:            Ready For Surgery From Anesthesia Perspective.

## 2018-05-11 NOTE — ANESTHESIA POSTPROCEDURE EVALUATION
Anesthesia Post Evaluation    Patient: Candy Escobar    Procedure(s) Performed: Procedure(s) (LRB):  RELEASE-CARPAL TUNNEL (Right)    Final Anesthesia Type: MAC  Patient location during evaluation: [facility]  Patient participation: Yes- Able to Participate  Level of consciousness: awake and alert and oriented  Post-procedure vital signs: reviewed and stable  Pain management: adequate  Airway patency: patent  PONV status at discharge: No PONV  Anesthetic complications: no      Cardiovascular status: blood pressure returned to baseline, hemodynamically stable and stable  Respiratory status: unassisted, spontaneous ventilation and room air  Hydration status: euvolemic  Follow-up not needed.        Visit Vitals  BP (!) 169/69 (Patient Position: Lying)   Pulse 73   Temp 36.7 °C (98.1 °F) (Skin)   Resp 16   Ht 5' (1.524 m)   Wt 56.7 kg (125 lb)   SpO2 96%   Breastfeeding? No   BMI 24.41 kg/m²       Pain/Roxanne Score: Pain Assessment Performed: Yes (5/11/2018 11:47 AM)  Presence of Pain: denies (5/11/2018 11:47 AM)

## 2018-05-11 NOTE — PLAN OF CARE
Pt meets discharge criteria, awake and alert, amb in room without diff, IV discontinued and pt getting dressing, No surgical site complications, right hand dressing and brace CDI, pt keeping it elevated and ice pack in place. Denies pain. VSS Tolerating juice. Wishes to go home. Discharge plan of care discussed with pt and she voiced understanding.  at bedside but not participating in conversation. Waiting for rx delivery.

## 2018-05-11 NOTE — PLAN OF CARE
Patient sitting up in bed, spouse at bedside. Patient aaox3. Vss. Speaks and understands english and does not require an . Patient does not need security for valuables. Oriented the patient to the room and pre/ post procedure protocol. Verbalized understanding.

## 2018-05-11 NOTE — DISCHARGE INSTRUCTIONS
ANESTHESIA  -For the first 24 hours after surgery:  Do not drive, use heavy equipment, make important decisions, or drink alcohol  -It is normal to feel sleepy for several hours.  Rest until you are more awake.  -Have someone stay with you, if needed.  They can watch for problems and help keep you safe.  -Some possible post anesthesia side effects include: nausea and vomiting, sore throat and hoarseness, sleepiness, and dizziness.    PAIN  -If you have pain after surgery, pain medicine will help you feel better.  Take it as directed, before pain becomes severe.  Most pain relievers taken by mouth need at least 20-30 minutes to start working.  -Do not drive or drink alcohol while taking pain medicine.  -Pain medication can upset your stomach.  Taking them with a little food may help.  -Other ways to help control pain: elevation, ice, and relaxation  -Call your surgeon if still having unmanageable pain an hour after taking pain medicine.  -Pain medicine can cause constipation.  Taking an over-the counter stool softener while on prescription pain medicine and drinking plenty of fluids can prevent this side effect.  -Call your surgeon if you have severe side effects like: breathing problems, trouble waking up, dizziness, confusion, or severe constipation.    NAUSEA  -Some people have nausea after surgery.  This is often because of anesthesia, pain, pain medicine, or the stress of surgery.  -Do not push yourself to eat.  Start off with clear liquids and soup.  Slowly move to solid foods.  Don't eat fatty, rich, spicy foods at first.  Eat smaller amounts.  -If you develop persistent nausea and vomiting please notify your surgeon immediately.    BLEEDING  -Different types of surgery require different types of care and dressing changes.  It is important to follow all instructions and advice from your surgeon.  Change dressing as directed.  Call your surgeon for any concerns regarding postop bleeding.    SIGNS OF  INFECTION  -Signs of infection include: fever, swelling, drainage, and redness  -Notify your surgeon if you have a fever of 100.4 F (38.0 C) or higher.  -Notify your surgeon if you notice redness, swelling, increased pain, pus, or a foul smell at the incision site.'              Discharge Instructions for Carpal Tunnel Release  You had a carpal tunnel release procedure to help relieve the symptoms of carpal tunnel syndrome. In carpal tunnel syndrome, a nerve in the wrist is compressed and irritated. This causes numbness and pain in the fingers and hand. Carpal tunnel release relieves the compression of the nerve. Here are instructions that will help you care for your arm and wrist when you are at home.  Home care  · Avoid gripping objects tightly or lifting with your affected arm.  · Wear your bandage, splint, or cast as directed by your doctor.  · Always keep the dressing, splint, or cast dry and clean.  · When showering, cover your hand and  wrist with plastic and use tape or rubberbands to keep the dressing, splint, or cast dry. Shower as necessary.    · Use an ice pack or bag of frozen peas -- or something similar -- wrapped in a thin towel on your wrist to reduce swelling for the first 48 hours. Leave the ice pack on for 20 minutes; then take it off for 20 minutes. Repeat as needed.  · Keep your arm elevated above your heart for 24 to 48 hours after surgery.  · Do the exercises you learned in the hospital, or as instructed by your doctor.  · Take pain medicine as directed.  · Dont drive until your doctor says its OK. Never drive while you are taking opioid pain medicine.  · Ask your doctor when you can return to work. If your job requires heavy lifting, you may not be able to begin working again for several weeks.  Follow-up care  Make a follow-up appointment as directed by your doctor.     When to seek medical care  Call 911 right away if you have any of the following:  · Chest pain  · Shortness of  breath  Otherwise, call your doctor immediately if you have any of the following:  · A splint, cast, or dressing that has gotten wet  · Increased bleeding or drainage from the incision (cut)  · Opening of the incision  · Fever above 100.4°F (38.9°C) taken by mouth, or shaking chills  · Any new numbness in the fingers or thumb  · Blue hand or fingers  · Increased pain with or without activity  · Increased redness, tenderness, or swelling of the incision               Acetaminophen; Hydrocodone tablets or capsules  What is this medicine?  ACETAMINOPHEN; HYDROCODONE (a set a RAUL elijah fen; esme droe KOE done) is a pain reliever. It is used to treat moderate to severe pain.  How should I use this medicine?  Take this medicine by mouth with a glass of water. Follow the directions on the prescription label. You can take it with or without food. If it upsets your stomach, take it with food. Do not take your medicine more often than directed.  A special MedGuide will be given to you by the pharmacist with each prescription and refill. Be sure to read this information carefully each time.  Talk to your pediatrician regarding the use of this medicine in children. Special care may be needed.  What side effects may I notice from receiving this medicine?  Side effects that you should report to your doctor or health care professional as soon as possible:  · allergic reactions like skin rash, itching or hives, swelling of the face, lips, or tongue  · breathing problems  · confusion  · redness, blistering, peeling or loosening of the skin, including inside the mouth  · signs and symptoms of low blood pressure like dizziness; feeling faint or lightheaded, falls; unusually weak or tired  · trouble passing urine or change in the amount of urine  · yellowing of the eyes or skin  Side effects that usually do not require medical attention (report to your doctor or health care professional if they continue or are  bothersome):  · constipation  · dry mouth  · nausea, vomiting  · tiredness  What may interact with this medicine?  This medicine may interact with the following medications:  · alcohol  · antiviral medicines for HIV or AIDS  · atropine  · antihistamines for allergy, cough and cold  · certain antibiotics like erythromycin, clarithromycin  · certain medicines for anxiety or sleep  · certain medicines for bladder problems like oxybutynin, tolterodine  · certain medicines for depression like amitriptyline, fluoxetine, sertraline  · certain medicines for fungal infections like ketoconazole and itraconazole  · certain medicines for Parkinson's disease like benztropine, trihexyphenidyl  · certain medicines for seizures like carbamazepine, phenobarbital, phenytoin, primidone  · certain medicines for stomach problems like dicyclomine, hyoscyamine  · certain medicines for travel sickness like scopolamine  · general anesthetics like halothane, isoflurane, methoxyflurane, propofol  · ipratropium  · local anesthetics like lidocaine, pramoxine, tetracaine  · MAOIs like Carbex, Eldepryl, Marplan, Nardil, and Parnate  · medicines that relax muscles for surgery  · other medicines with acetaminophen  · other narcotic medicines for pain or cough  · phenothiazines like chlorpromazine, mesoridazine, prochlorperazine, thioridazine  · rifampin  What if I miss a dose?  If you miss a dose, take it as soon as you can. If it is almost time for your next dose, take only that dose. Do not take double or extra doses.  Where should I keep my medicine?  Keep out of the reach of children. This medicine can be abused. Keep your medicine in a safe place to protect it from theft. Do not share this medicine with anyone. Selling or giving away this medicine is dangerous and against the law.  This medicine may cause accidental overdose and death if it taken by other adults, children, or pets. Mix any unused medicine with a substance like cat litter or  coffee grounds. Then throw the medicine away in a sealed container like a sealed bag or a coffee can with a lid. Do not use the medicine after the expiration date.  Store at room temperature between 15 and 30 degrees C (59 and 86 degrees F).  What should I tell my health care provider before I take this medicine?  They need to know if you have any of these conditions:  · brain tumor  · Crohn's disease, inflammatory bowel disease, or ulcerative colitis  · drug abuse or addiction  · head injury  · heart or circulation problems  · if you often drink alcohol  · kidney disease or problems going to the bathroom  · liver disease  · lung disease, asthma, or breathing problems  · an unusual or allergic reaction to acetaminophen, hydrocodone, other opioid analgesics, other medicines, foods, dyes, or preservatives  · pregnant or trying to get pregnant  · breast-feeding  What should I watch for while using this medicine?  Tell your doctor or health care professional if your pain does not go away, if it gets worse, or if you have new or a different type of pain. You may develop tolerance to the medicine. Tolerance means that you will need a higher dose of the medicine for pain relief. Tolerance is normal and is expected if you take the medicine for a long time.  Do not suddenly stop taking your medicine because you may develop a severe reaction. Your body becomes used to the medicine. This does NOT mean you are addicted. Addiction is a behavior related to getting and using a drug for a non-medical reason. If you have pain, you have a medical reason to take pain medicine. Your doctor will tell you how much medicine to take. If your doctor wants you to stop the medicine, the dose will be slowly lowered over time to avoid any side effects.  There are different types of narcotic medicines (opiates). If you take more than one type at the same time or if you are taking another medicine that also causes drowsiness, you may have more  side effects. Give your health care provider a list of all medicines you use. Your doctor will tell you how much medicine to take. Do not take more medicine than directed. Call emergency for help if you have problems breathing or unusual sleepiness.  Do not take other medicines that contain acetaminophen with this medicine. Always read labels carefully. If you have questions, ask your doctor or pharmacist.  If you take too much acetaminophen get medical help right away. Too much acetaminophen can be very dangerous and cause liver damage. Even if you do not have symptoms, it is important to get help right away.  You may get drowsy or dizzy. Do not drive, use machinery, or do anything that needs mental alertness until you know how this medicine affects you. Do not stand or sit up quickly, especially if you are an older patient. This reduces the risk of dizzy or fainting spells. Alcohol may interfere with the effect of this medicine. Avoid alcoholic drinks.  The medicine will cause constipation. Try to have a bowel movement at least every 2 to 3 days. If you do not have a bowel movement for 3 days, call your doctor or health care professional.  Your mouth may get dry. Chewing sugarless gum or sucking hard candy, and drinking plenty of water may help. Contact your doctor if the problem does not go away or is severe.  NOTE:This sheet is a summary. It may not cover all possible information. If you have questions about this medicine, talk to your doctor, pharmacist, or health care provider. Copyright© 2017 [employer]

## 2018-05-11 NOTE — OP NOTE
DATE OF PROCEDURE:  05/11/2018.    PREOPERATIVE DIAGNOSIS:  Right carpal tunnel syndrome.    POSTOPERATIVE DIAGNOSIS:  Right carpal tunnel syndrome.    OPERATIVE PROCEDURE:  Right carpal tunnel release.    SURGEON:  Jose R Oleary Jr., M.D.    FIRST ASSISTANT:  SALAZAR Amos.    ANESTHESIA:  MAC.    ESTIMATED BLOOD LOSS:  Minimal.    COMPLICATIONS:  None.    SPECIMENS:  None.    BRIEF INDICATIONS:  An 86-year-old female with severe right carpal tunnel   syndrome, unresponsive to conservative treatment.    OPERATIVE PROCEDURE IN DETAIL:  After operative consent was obtained, the   patient brought to the Operating Room, placed supine on the operating room   table.  Anesthesia by IV sedation followed by injection of Xylocaine, Marcaine   combination operative site right palm.  Following this, tourniquet applied right   arm, right upper extremity prepped and draped out in the normal sterile   fashion.  Esmarch used to exsanguinate the limb.  Tourniquet inflated to 225   mmHg.    Following this, curved incision made thenar crease right palm with a #15 blade.    Palmar fascia divided, deep retractors placed.  Transverse carpal ligament   identified, carefully divided with the [place] blade.  The median nerve protected   with the [place] elevator and division of ligament continued proximally and   distally under direct visualization.  After complete release of median nerve,   the contents of the carpal canal were inspected and noted to be intact including   the recurrent branch.  The wound irrigated with antibiotic saline solution.    Hemostasis achieved with the [place].  Subcutaneous tissue closed with 4-0   Monocryl.  Dermabond on the skin.  Sterile dressing applied followed by light   wrap.  Tourniquet deflated.  The patient was brought to the Recovery Room in   stable condition.  All sponge and needle counts reported as correct.      CHRISTINA/GENEVA  dd: 05/11/2018 13:59:42 (CDT)  td: 05/11/2018 16:13:54 (CDT)  Doc ID    #[id number]  Job ID #[id number]    CC:

## 2018-05-11 NOTE — BRIEF OP NOTE
[facility]  Brief Operative Note     SUMMARY     Surgery Date: 5/11/2018     Surgeon(s) and Role:     * Jose R Oleary Jr., MD - Primary    Assisting Surgeon: None    Pre-op Diagnosis:  Bilateral carpal tunnel syndrome [G56.03]    Post-op Diagnosis:  Post-Op Diagnosis Codes:     * Bilateral carpal tunnel syndrome [G56.03]    Procedure(s) (LRB):  RELEASE-CARPAL TUNNEL (Right)    Anesthesia: Local MAC    Description of the findings of the procedure: right CTS    Findings/Key Components: right CTR    Estimated Blood Loss: * No values recorded between 5/11/2018  1:35 PM and 5/11/2018  1:53 PM *         Specimens:   Specimen (12h ago through future)    None          Discharge Note    SUMMARY     Admit Date: 5/11/2018    Discharge Date and Time:  05/11/2018 1:57 PM    Hospital Course (synopsis of major diagnoses, care, treatment, and services provided during the course of the hospital stay): see op note     Final Diagnosis: Post-Op Diagnosis Codes:     * Bilateral carpal tunnel syndrome [G56.03]    Disposition: Home or Self Care    Follow Up/Patient Instructions:     Medications:  Reconciled Home Medications:      Medication List      START taking these medications    hydrocodone-acetaminophen 5-325mg 5-325 mg per tablet  Commonly known as:  NORCO  Take 1 tablet by mouth every 4 (four) hours as needed for Pain.        CONTINUE taking these medications    albuterol 90 mcg/actuation inhaler  Inhale 2 puffs into the lungs every 4 (four) hours as needed for Wheezing.     atorvastatin 20 MG tablet  Commonly known as:  LIPITOR  Take 1 tablet (20 mg total) by mouth once daily.     azelastine 0.05 % ophthalmic solution  Commonly known as:  OPTIVAR  Place 1 drop into both eyes 2 (two) times daily.     cyclobenzaprine 10 MG tablet  Commonly known as:  FLEXERIL  Take 1 tablet (10 mg total) by mouth every evening.     diclofenac sodium 1 % Gel  Apply 2 g topically 2 (two) times daily as needed.     estradiol 0.1  mg/24 hr td ptwk 0.1 mg/24 hr Ptwk  Place 1 patch onto the skin every 7 days.     FLUoxetine 10 MG capsule  Commonly known as:  PROZAC  Take 1 capsule (10 mg total) by mouth once daily.     gabapentin 300 MG capsule  Commonly known as:  NEURONTIN  Take 1 capsule (300 mg total) by mouth 3 (three) times daily.     losartan 25 MG tablet  Commonly known as:  COZAAR  Take 1 tablet (25 mg total) by mouth once daily.     meloxicam 15 MG tablet  Commonly known as:  MOBIC  TAKE 1 TABLET DAILY AS     NEEDED FOR PAIN     omeprazole 20 MG capsule  Commonly known as:  PRILOSEC  TAKE 2 CAPSULES (40MG      TOTAL) ONCE DAILY     tretinoin 0.025 % gel  Commonly known as:  RETIN-A  Apply topically as needed.     triamcinolone acetonide 0.1% 0.1 % ointment  Commonly known as:  KENALOG  Apply topically 2 (two) times daily.        STOP taking these medications    ciprofloxacin HCl 500 MG tablet  Commonly known as:  CIPRO     sulfamethoxazole-trimethoprim 800-160mg 800-160 mg Tab  Commonly known as:  BACTRIM DS            Discharge Procedure Orders  Diet general     Shower on day dressing removed (No bath)     Call MD for:  temperature >100.4     Call MD for:  persistent nausea and vomiting     Call MD for:  severe uncontrolled pain     Keep surgical extremity elevated     Remove dressing in 48 hours       Follow-up Information     Jose R Oleary Jr, MD. Schedule an appointment as soon as possible for a visit in 10 days.    Specialties:  Hand Surgery, Orthopedic Surgery  Why:  For wound re-check  Contact information:  [address]  [address]  [place] [address]  [phone]

## 2018-05-11 NOTE — OP NOTE
Certification of Assistant at Surgery       Surgery Date: 5/11/2018     Participating Surgeons:  Surgeon(s) and Role:     * Jose R Oleary Jr., MD - Primary    Procedures:  Procedure(s) (LRB):  RELEASE-CARPAL TUNNEL (Right)    Assistant Surgeon's Certification of Necessity:  I understand that section 1842 (b) (6) (d) of the Social Security Act generally prohibits Medicare Part B reasonable charge payment for the services of assistants at surgery in teaching hospitals when qualified residents are available to furnish such services. I certify that the services for which payment is claimed were medically necessary, and that no qualified resident was available to perform the services. I further understand that these services are subject to post-payment review by the Medicare carrier.      Carey Arroyo PA-C    05/11/2018  1:54 PM

## 2018-05-11 NOTE — H&P (VIEW-ONLY)
PREOP H AND P     CHIEF COMPLAINT:  Bilateral carpal tunnel syndrome.    HISTORY OF PRESENT ILLNESS:  An 86-year-old female with ongoing symptoms both   hands for the past several months.  She recently had nerve conduction studies,   which showed evidence of bilateral severe carpal tunnel syndrome.  She has tried   wrist splints without improvement.  Symptoms are getting worse.  She is in   severe pain and numbness.    PAST MEDICAL HISTORY:  Significant for allergies, depression, diabetes,   diverticulosis, GERD, hypertension, osteoarthritis and osteoporosis.    PAST SURGICAL HISTORY:  Includes tonsillectomy, bladder surgery, hysterectomy,   foot surgery, joint replacement and shoulder surgery.    FAMILY HISTORY:  Positive for diabetes and heart disease.    SOCIAL HISTORY:  The patient is a former smoker.  Drinks two glasses of wine per   week.    REVIEW OF SYSTEMS:  Negative fever, chills, rashes.    CURRENT MEDICATIONS:  Reviewed on chart.    ALLERGIES:  Propofol, oxycodone, penicillin and tetracycline.    PHYSICAL EXAMINATION:  GENERAL:  Well-developed, elderly female, in no acute distress, alert and   oriented x3.  HEENT:  Unremarkable.  LUNGS:  Clear to auscultation.  HEART:  Regular rate and rhythm.  ABDOMEN:  Soft, nontender.  EXTREMITIES:  Significant for the hands, demonstrating mild swelling volar   compartment of the wrist bilaterally.  She has positive Tinel sign bilateral and   atrophy of the right thenar muscle.  Range of motion of fingers slightly   decreased.   strength decreased.  Sensation decreased in the right hand.    IMPRESSION:  Bilateral severe carpal tunnel syndrome, right worse than left.    PLAN:  She would like surgery as soon as possible, especially for the right hand   because of the degree of pain she is having.  We will set up surgery for   tomorrow for right carpal tunnel release.  The risks and benefits of surgery   explained to her.  She understands.      BRITTANY  dd: 05/10/2018  14:05:34 (DEVAN)  td: 05/11/2018 11:04:28 (DEVAN)  Doc ID   #[id number]  Job ID #[id number]    CC:

## 2018-05-14 VITALS
OXYGEN SATURATION: 97 % | HEART RATE: 74 BPM | HEIGHT: 60 IN | DIASTOLIC BLOOD PRESSURE: 76 MMHG | WEIGHT: 125 LBS | SYSTOLIC BLOOD PRESSURE: 151 MMHG | RESPIRATION RATE: 18 BRPM | BODY MASS INDEX: 24.54 KG/M2 | TEMPERATURE: 98 F

## 2018-05-22 ENCOUNTER — OFFICE VISIT (OUTPATIENT)
Dept: ORTHOPEDICS | Facility: CLINIC | Age: 83
End: 2018-05-22
Payer: MEDICARE

## 2018-05-22 VITALS — HEIGHT: 60 IN | WEIGHT: 125 LBS | BODY MASS INDEX: 24.54 KG/M2

## 2018-05-22 DIAGNOSIS — G56.03 BILATERAL CARPAL TUNNEL SYNDROME: ICD-10-CM

## 2018-05-22 DIAGNOSIS — Z98.890 S/P CARPAL TUNNEL RELEASE: Primary | ICD-10-CM

## 2018-05-22 PROCEDURE — 99024 POSTOP FOLLOW-UP VISIT: CPT | Mod: S$GLB,,, | Performed by: ORTHOPAEDIC SURGERY

## 2018-05-22 PROCEDURE — 99999 PR PBB SHADOW E&M-EST. PATIENT-LVL II: CPT | Mod: PBBFAC,,, | Performed by: ORTHOPAEDIC SURGERY

## 2018-05-22 NOTE — PROGRESS NOTES
Subjective:      Patient ID: Candy Escobar is a 86 y.o. female.    Chief Complaint: Post-op Evaluation of the Right Wrist      HPI: Candy Escobar is here for a post-op visit. She is 11 days s/p right carpal tunnel release. She states she still has numbness in the tips of the first three fingers but it seems to be improved compared to before the surgery. She has been using her hand for normal aactivites without difficulty. She reports minimal to no pain. She has no other complaints today.     Past Medical History:   Diagnosis Date    Allergy     Seasonal    Depression     Diabetes mellitus     diet controlled    Diverticulosis     Encounter for blood transfusion     Fever blister     GERD (gastroesophageal reflux disease)     Hypertension 11/12/2014    Joint pain     Nuclear sclerosis - Both Eyes 7/16/2013    Osteoarthritis     Osteopenia     Osteoporosis     Seizures 2008    TIA    Tachycardia        Current Outpatient Prescriptions:     albuterol 90 mcg/actuation inhaler, Inhale 2 puffs into the lungs every 4 (four) hours as needed for Wheezing., Disp: 18 g, Rfl: 4    atorvastatin (LIPITOR) 20 MG tablet, Take 1 tablet (20 mg total) by mouth once daily., Disp: 90 tablet, Rfl: 3    azelastine (OPTIVAR) 0.05 % ophthalmic solution, Place 1 drop into both eyes 2 (two) times daily., Disp: 4 mL, Rfl: 11    diclofenac sodium 1 % Gel, Apply 2 g topically 2 (two) times daily as needed., Disp: 100 g, Rfl: 5    estradiol 0.1 mg/24 hr td ptwk 0.1 mg/24 hr PTWK, Place 1 patch onto the skin every 7 days., Disp: 12 patch, Rfl: 1    FLUoxetine (PROZAC) 10 MG capsule, Take 1 capsule (10 mg total) by mouth once daily., Disp: 90 capsule, Rfl: 3    gabapentin (NEURONTIN) 300 MG capsule, Take 1 capsule (300 mg total) by mouth 3 (three) times daily., Disp: 90 capsule, Rfl: 3    hydrocodone-acetaminophen 5-325mg (NORCO) 5-325 mg per tablet, Take 1 tablet by mouth every 4 (four) hours as needed for Pain.,  Disp: 20 tablet, Rfl: 0    losartan (COZAAR) 25 MG tablet, Take 1 tablet (25 mg total) by mouth once daily., Disp: 90 tablet, Rfl: 3    meloxicam (MOBIC) 15 MG tablet, TAKE 1 TABLET DAILY AS     NEEDED FOR PAIN, Disp: 90 tablet, Rfl: 3    omeprazole (PRILOSEC) 20 MG capsule, TAKE 2 CAPSULES (40MG      TOTAL) ONCE DAILY, Disp: 180 capsule, Rfl: 3    tretinoin (RETIN-A) 0.025 % gel, Apply topically as needed., Disp: 45 g, Rfl: 3    cyclobenzaprine (FLEXERIL) 10 MG tablet, Take 1 tablet (10 mg total) by mouth every evening., Disp: 90 tablet, Rfl: 3    triamcinolone acetonide 0.1% (KENALOG) 0.1 % ointment, Apply topically 2 (two) times daily., Disp: 90 g, Rfl: 3    Current Facility-Administered Medications:     sodium chloride injection 20 mEq, 5 mL, Intravenous, Q30 Days, Lester Peña MD, 20 mEq at 06/09/15 1413  Review of patient's allergies indicates:   Allergen Reactions    Propofol analogues Other (See Comments)     Low blood pressure, tremors    Oxycodone Nausea And Vomiting    Pcn [penicillins] Nausea Only    Tetracyclines Nausea Only       Ht 5' (1.524 m)   Wt 56.7 kg (125 lb)   BMI 24.41 kg/m²     Review of Systems   Constitution: Negative for chills and fever.   Cardiovascular: Negative for chest pain and palpitations.   Respiratory: Negative for shortness of breath and wheezing.    Skin: Negative for poor wound healing and rash.   Musculoskeletal: Negative for falls, joint pain, joint swelling, muscle weakness and stiffness.   Gastrointestinal: Negative for nausea and vomiting.   Neurological: Negative for seizures and tremors.   Psychiatric/Behavioral: Negative for altered mental status.         Objective:    Ortho Exam   RIGHT HAND: Significant for healed carpal tunnel incision. Some dryness of the incision. But no redness or sign of infection. She does has mild swelling of the radial sided volar wrist but it is not TTP. ROM wrist and fingers full. Decreased sensation of first-third  finger tips.  strength full. Cap refill <2 sec.      Assessment:     Imaging: none        1. S/P carpal tunnel release    2. Bilateral carpal tunnel syndrome          Plan:     I explained to the patient that remaining numbness should improve with time.   Return to normal use of the hand.   Start scar massage.   I recommended 1 month follow up to make sure numbness and swelling improve. The patient thinks she is doing perfectly and would like to keep any eye of things for now. She states she will make the apportionment if things are not improving.     Follow-up in about 4 weeks (around 6/19/2018).

## 2018-06-29 ENCOUNTER — DOCUMENTATION ONLY (OUTPATIENT)
Dept: REHABILITATION | Facility: HOSPITAL | Age: 83
End: 2018-06-29

## 2018-06-29 PROBLEM — R20.2 NUMBNESS AND TINGLING IN RIGHT HAND: Status: RESOLVED | Noted: 2018-03-05 | Resolved: 2018-06-29

## 2018-06-29 PROBLEM — R20.0 NUMBNESS AND TINGLING IN RIGHT HAND: Status: RESOLVED | Noted: 2018-03-05 | Resolved: 2018-06-29

## 2018-06-29 PROBLEM — M79.641 PAIN IN RIGHT HAND: Status: RESOLVED | Noted: 2018-03-05 | Resolved: 2018-06-29

## 2018-06-29 NOTE — PROGRESS NOTES
REHAB SERVICES OUTPATIENT DISCHARGE SUMMARY  Occupational Therapy      Name:  Candy Escobar  Date:  6/29/2018    Date of Evaluation: 03/05/2018  Physician:  Dr. Oleary  Total # Of Visits:  4  Cancelled:  0  No Shows:  0  Diagnosis:  Right CTS and wrist OA    Physical/Functional Status:  At time of discharge, pt's status was unknown.     The patient is to be discharged from our Therapy service for the following reason(s):  Pt had surgery for dx    Degree of Goal Achievement:  Patient has not met goals secondary to:  Non-compliance with therapy    Patient Education:  See initial evaluation    Discharge Plan:  Home Program:  Per initial evaluation.  Peyton Gutierrez, OTDAMIAN, OTR/L, CHT

## 2018-07-10 DIAGNOSIS — I15.2 HYPERTENSION ASSOCIATED WITH DIABETES: ICD-10-CM

## 2018-07-10 DIAGNOSIS — E11.59 HYPERTENSION ASSOCIATED WITH DIABETES: ICD-10-CM

## 2018-07-10 RX ORDER — LOSARTAN POTASSIUM 25 MG/1
25 TABLET ORAL DAILY
Qty: 90 TABLET | Refills: 1 | Status: SHIPPED | OUTPATIENT
Start: 2018-07-10 | End: 2019-07-11 | Stop reason: SDUPTHER

## 2018-09-04 ENCOUNTER — OFFICE VISIT (OUTPATIENT)
Dept: FAMILY MEDICINE | Facility: CLINIC | Age: 83
End: 2018-09-04
Attending: FAMILY MEDICINE
Payer: MEDICARE

## 2018-09-04 ENCOUNTER — LAB VISIT (OUTPATIENT)
Dept: LAB | Facility: HOSPITAL | Age: 83
End: 2018-09-04
Attending: FAMILY MEDICINE
Payer: MEDICARE

## 2018-09-04 ENCOUNTER — TELEPHONE (OUTPATIENT)
Dept: FAMILY MEDICINE | Facility: CLINIC | Age: 83
End: 2018-09-04

## 2018-09-04 VITALS
HEART RATE: 86 BPM | OXYGEN SATURATION: 98 % | WEIGHT: 125.69 LBS | HEIGHT: 60 IN | TEMPERATURE: 98 F | DIASTOLIC BLOOD PRESSURE: 60 MMHG | SYSTOLIC BLOOD PRESSURE: 123 MMHG | BODY MASS INDEX: 24.68 KG/M2

## 2018-09-04 DIAGNOSIS — I15.2 HYPERTENSION ASSOCIATED WITH DIABETES: ICD-10-CM

## 2018-09-04 DIAGNOSIS — E11.69 DYSLIPIDEMIA ASSOCIATED WITH TYPE 2 DIABETES MELLITUS: ICD-10-CM

## 2018-09-04 DIAGNOSIS — N30.00 ACUTE CYSTITIS WITHOUT HEMATURIA: Primary | ICD-10-CM

## 2018-09-04 DIAGNOSIS — I15.2 HYPERTENSION ASSOCIATED WITH DIABETES: Primary | ICD-10-CM

## 2018-09-04 DIAGNOSIS — E78.5 DYSLIPIDEMIA ASSOCIATED WITH TYPE 2 DIABETES MELLITUS: ICD-10-CM

## 2018-09-04 DIAGNOSIS — M17.11 OSTEOARTHRITIS OF RIGHT KNEE, UNSPECIFIED OSTEOARTHRITIS TYPE: ICD-10-CM

## 2018-09-04 DIAGNOSIS — Z23 IMMUNIZATION DUE: ICD-10-CM

## 2018-09-04 DIAGNOSIS — E11.9 CONTROLLED TYPE 2 DIABETES MELLITUS WITHOUT COMPLICATION, WITHOUT LONG-TERM CURRENT USE OF INSULIN: ICD-10-CM

## 2018-09-04 DIAGNOSIS — E11.59 HYPERTENSION ASSOCIATED WITH DIABETES: Primary | ICD-10-CM

## 2018-09-04 DIAGNOSIS — M47.812 FACET ARTHROPATHY, CERVICAL: ICD-10-CM

## 2018-09-04 DIAGNOSIS — F32.2 CURRENT SEVERE EPISODE OF MAJOR DEPRESSIVE DISORDER WITHOUT PSYCHOTIC FEATURES WITHOUT PRIOR EPISODE: ICD-10-CM

## 2018-09-04 DIAGNOSIS — E11.9 TYPE 2 DIABETES MELLITUS WITHOUT RETINOPATHY: ICD-10-CM

## 2018-09-04 DIAGNOSIS — E11.59 HYPERTENSION ASSOCIATED WITH DIABETES: ICD-10-CM

## 2018-09-04 DIAGNOSIS — I83.93 VARICOSE VEINS OF BOTH LOWER EXTREMITIES: ICD-10-CM

## 2018-09-04 DIAGNOSIS — F32.4 MAJOR DEPRESSIVE DISORDER WITH SINGLE EPISODE, IN PARTIAL REMISSION: ICD-10-CM

## 2018-09-04 LAB
ALBUMIN SERPL BCP-MCNC: 3.8 G/DL
ALP SERPL-CCNC: 55 U/L
ALT SERPL W/O P-5'-P-CCNC: 12 U/L
ANION GAP SERPL CALC-SCNC: 6 MMOL/L
AST SERPL-CCNC: 21 U/L
BILIRUB SERPL-MCNC: 0.7 MG/DL
BUN SERPL-MCNC: 13 MG/DL
CALCIUM SERPL-MCNC: 9.5 MG/DL
CHLORIDE SERPL-SCNC: 102 MMOL/L
CO2 SERPL-SCNC: 29 MMOL/L
CREAT SERPL-MCNC: 0.8 MG/DL
EST. GFR  (AFRICAN AMERICAN): >60 ML/MIN/1.73 M^2
EST. GFR  (NON AFRICAN AMERICAN): >60 ML/MIN/1.73 M^2
ESTIMATED AVG GLUCOSE: 103 MG/DL
GLUCOSE SERPL-MCNC: 82 MG/DL
HBA1C MFR BLD HPLC: 5.2 %
POTASSIUM SERPL-SCNC: 4.5 MMOL/L
PROT SERPL-MCNC: 6.7 G/DL
SODIUM SERPL-SCNC: 137 MMOL/L

## 2018-09-04 PROCEDURE — 83036 HEMOGLOBIN GLYCOSYLATED A1C: CPT

## 2018-09-04 PROCEDURE — 99499 UNLISTED E&M SERVICE: CPT | Mod: S$GLB,,, | Performed by: FAMILY MEDICINE

## 2018-09-04 PROCEDURE — 99999 PR PBB SHADOW E&M-EST. PATIENT-LVL III: CPT | Mod: PBBFAC,,, | Performed by: FAMILY MEDICINE

## 2018-09-04 PROCEDURE — 90662 IIV NO PRSV INCREASED AG IM: CPT | Mod: PBBFAC,PO

## 2018-09-04 PROCEDURE — 80053 COMPREHEN METABOLIC PANEL: CPT

## 2018-09-04 PROCEDURE — 99214 OFFICE O/P EST MOD 30 MIN: CPT | Mod: 25,S$PBB,, | Performed by: FAMILY MEDICINE

## 2018-09-04 PROCEDURE — 36415 COLL VENOUS BLD VENIPUNCTURE: CPT

## 2018-09-04 PROCEDURE — 99213 OFFICE O/P EST LOW 20 MIN: CPT | Mod: PBBFAC,25,PO | Performed by: FAMILY MEDICINE

## 2018-09-04 RX ORDER — CIPROFLOXACIN 500 MG/1
500 TABLET ORAL 2 TIMES DAILY
Qty: 20 TABLET | Refills: 0 | Status: SHIPPED | OUTPATIENT
Start: 2018-09-04 | End: 2018-10-22 | Stop reason: ALTCHOICE

## 2018-09-04 RX ORDER — MELOXICAM 7.5 MG/1
7.5 TABLET ORAL DAILY
Qty: 90 TABLET | Refills: 1 | Status: SHIPPED | OUTPATIENT
Start: 2018-09-04 | End: 2018-12-21 | Stop reason: SDUPTHER

## 2018-09-04 RX ORDER — ATORVASTATIN CALCIUM 20 MG/1
20 TABLET, FILM COATED ORAL DAILY
Qty: 90 TABLET | Refills: 3 | Status: SHIPPED | OUTPATIENT
Start: 2018-09-04 | End: 2018-10-29

## 2018-09-04 NOTE — PROGRESS NOTES
Subjective:       Patient ID: Candy Escobar is a 86 y.o. female.    Chief Complaint: Sinusitis and Orders (hgbA1c, Eye Exam, Flu vaccine)    86 yr old pleasant white female with  DM II controlled,  Facet arthropathy neck, hypertension, depression, anxiety, GERD, OA knee, presents today for her routine 3 month follow up and also neuropathic pain, joint aches and lab work.    DM II - diet controlled - A1C                   5.0                 02/27/2018                                     foot and eye exam UTD - not on ACE    HTN - chronic - controlled - she is asymptomatic - no CP, SOB, MINER, PND or orthopnea, no bowel or bladder problems    Facet arthropathy neck/spasms - stable - uses muscle relaxant as needed at night     Depression/anxiety - controlled - on prozac daily for years and ativan as needed - no current mood problems - she is lil anxious taking on surgery - no SI/HI    GERD - controlled - on PPI as needed    OA knee s/p surgery - on mobic as needed    HLD - not at goal -  LDLCALC                  72.6                02/27/2018                                   - not on any meds - diet compliant    - need statin    History as below - reviewed      Health maintenance  -labs UTD  -vaccines reports UTD        Hypertension   This is a chronic problem. The current episode started more than 1 year ago. The problem has been gradually improving since onset. The problem is controlled. Pertinent negatives include no anxiety, chest pain, headaches, palpitations or shortness of breath. There are no associated agents to hypertension. Risk factors for coronary artery disease include dyslipidemia, diabetes mellitus and post-menopausal state. Past treatments include lifestyle changes. The current treatment provides significant improvement. There are no compliance problems.  There is no history of angina, CAD/MI, CVA, left ventricular hypertrophy, PVD or retinopathy. There is no history of chronic renal disease,  hypercortisolism, hyperparathyroidism, pheochromocytoma, renovascular disease or a thyroid problem.   Pain   Associated symptoms include arthralgias and myalgias. Pertinent negatives include no chest pain, congestion, diaphoresis, headaches, nausea or sore throat.   Dizziness:   Chronicity:  Recurrent  Onset:  1 to 4 weeks ago  Progression since onset:  Gradually improving  Frequency:  Every few days  Severity:  Mild  Duration:  5 minutes  Dizziness characteristics:  Sensation of movement   Associated symptoms: hearing loss.no headaches, no nausea, no diaphoresis, no palpitations and no chest pain.  Aggravated by:  Position changes  Treatments tried:  Rest and body position changes  Improvements on treatment:  Mildno strokes, no neurologic disease, no head trauma, no balance testing, no ear trauma, no head trauma, no anxiety, no ear tubes, no environmental allergies and no CT head.  Leg Pain    There was no injury mechanism. The pain is present in the left leg, left knee, right knee and right leg. The quality of the pain is described as aching. The pain is at a severity of 5/10. The pain is moderate. The pain has been improving since onset. The symptoms are aggravated by movement. She has tried NSAIDs for the symptoms. The treatment provided moderate relief.     Review of Systems   Constitutional: Negative.  Negative for activity change, diaphoresis and unexpected weight change.   HENT: Positive for hearing loss. Negative for congestion, ear discharge, rhinorrhea, sore throat and voice change.    Eyes: Negative.  Negative for pain, discharge and visual disturbance.   Respiratory: Negative.  Negative for chest tightness, shortness of breath and wheezing.    Cardiovascular: Negative.  Negative for chest pain and palpitations.   Gastrointestinal: Negative.  Negative for abdominal distention, anal bleeding, constipation and nausea.   Endocrine: Negative.  Negative for cold intolerance, polydipsia and polyuria.    Genitourinary: Negative.  Negative for decreased urine volume, difficulty urinating, dysuria, frequency, menstrual problem and vaginal pain.   Musculoskeletal: Positive for arthralgias and myalgias. Negative for gait problem.   Skin: Negative.  Negative for color change, pallor and wound.   Allergic/Immunologic: Negative.  Negative for environmental allergies and immunocompromised state.   Neurological: Positive for dizziness. Negative for tremors, seizures, speech difficulty and headaches.   Hematological: Negative.  Negative for adenopathy. Does not bruise/bleed easily.   Psychiatric/Behavioral: Negative.  Negative for agitation, confusion, decreased concentration, hallucinations, self-injury and suicidal ideas. The patient is not nervous/anxious.        PMH/PSH/FH/SH/MED/ALLERGY reviewed    Objective:       Vitals:    09/04/18 0843   BP: 123/60   Pulse: 86   Temp: 98.1 °F (36.7 °C)       Physical Exam   Constitutional: She is oriented to person, place, and time. She appears well-developed and well-nourished. No distress.   HENT:   Head: Normocephalic and atraumatic.   Right Ear: External ear normal.   Left Ear: External ear normal.   Nose: Nose normal.   Mouth/Throat: Oropharynx is clear and moist. No oropharyngeal exudate.   Eyes: Conjunctivae and EOM are normal. Pupils are equal, round, and reactive to light. Right eye exhibits no discharge. Left eye exhibits no discharge. No scleral icterus.   Neck: Normal range of motion. Neck supple. No JVD present. No tracheal deviation present. No thyromegaly present.   Cardiovascular: Normal rate, regular rhythm, normal heart sounds and intact distal pulses. Exam reveals no gallop and no friction rub.   No murmur heard.  Pulmonary/Chest: Effort normal and breath sounds normal. No stridor. She has no wheezes. She has no rales. She exhibits no tenderness.   Abdominal: Soft. Bowel sounds are normal. She exhibits no distension and no mass. There is no tenderness. There is  no rebound and no guarding. No hernia.   Musculoskeletal: Normal range of motion. She exhibits no edema or tenderness.   Lymphadenopathy:     She has no cervical adenopathy.   Neurological: She is alert and oriented to person, place, and time. She has normal reflexes. She displays normal reflexes. No cranial nerve deficit. She exhibits normal muscle tone. Coordination normal.   Skin: Skin is warm and dry. No rash noted. She is not diaphoretic. No erythema. No pallor.   Psychiatric: She has a normal mood and affect. Her behavior is normal. Judgment and thought content normal.       Assessment:       1. Hypertension associated with diabetes    2. Major depressive disorder with single episode, in partial remission    3. Current severe episode of major depressive disorder without psychotic features without prior episode    4. Controlled type 2 diabetes mellitus without complication, without long-term current use of insulin    5. Dyslipidemia associated with type 2 diabetes mellitus    6. Facet arthropathy, cervical    7. Type 2 diabetes mellitus without retinopathy    8. Varicose veins of both lower extremities    9. Osteoarthritis of right knee, unspecified osteoarthritis type    10. Immunization due        Plan:       Candy was seen today for sinusitis and orders.    Diagnoses and all orders for this visit:    Hypertension associated with diabetes  -     Comprehensive metabolic panel; Future    Major depressive disorder with single episode, in partial remission    Current severe episode of major depressive disorder without psychotic features without prior episode    Controlled type 2 diabetes mellitus without complication, without long-term current use of insulin  -     Comprehensive metabolic panel; Future  -     Hemoglobin A1c; Future  -     Urinalysis; Future  -     Microalbumin/creatinine urine ratio; Future    Dyslipidemia associated with type 2 diabetes mellitus  -     Comprehensive metabolic panel; Future  -      atorvastatin (LIPITOR) 20 MG tablet; Take 1 tablet (20 mg total) by mouth once daily.    Facet arthropathy, cervical    Type 2 diabetes mellitus without retinopathy  -     Comprehensive metabolic panel; Future  -     Hemoglobin A1c; Future  -     Urinalysis; Future  -     Microalbumin/creatinine urine ratio; Future    Varicose veins of both lower extremities    Osteoarthritis of right knee, unspecified osteoarthritis type  -     meloxicam (MOBIC) 7.5 MG tablet; Take 1 tablet (7.5 mg total) by mouth once daily.    Immunization due  -     Influenza - High Dose (65+) (PF) (IM)        HTN  -controlled  -continue losartan 25 daily    HLD  -controlled  -start statin  -labs    DM II  -controlled  -labs    SK  -reassurance    MDD  -controlled    Chronic knee pain  -continue mobic    Neuropathic pain  -not well controlled  -increase neurontin 300 TID    Spent adequate time in obtaining history and explaining differentials    40 minutes spent during this visit of which greater than 50% devoted to face-face counseling and coordination of care regarding diagnosis and management plan    Follow-up in about 6 months (around 3/4/2019), or if symptoms worsen or fail to improve.

## 2018-09-04 NOTE — TELEPHONE ENCOUNTER
Informed pt her urine showed infection. Informed pt that Dr. Munson sent in antibiotic sent to pharmacy. Pt verbalized understanding. Spoke to the lab and was able to link her urine culture to her U/A today.

## 2018-09-04 NOTE — TELEPHONE ENCOUNTER
Urine showed infection - antibiotic sent to pharmacy - please inform pt    Also ordered urine culture - please add-on to urine sample done today

## 2018-09-25 ENCOUNTER — OFFICE VISIT (OUTPATIENT)
Dept: OPTOMETRY | Facility: CLINIC | Age: 83
End: 2018-09-25
Payer: MEDICARE

## 2018-09-25 DIAGNOSIS — E11.9 TYPE 2 DIABETES MELLITUS WITHOUT RETINOPATHY: Primary | ICD-10-CM

## 2018-09-25 DIAGNOSIS — H25.13 NUCLEAR SCLEROSIS, BILATERAL: ICD-10-CM

## 2018-09-25 PROCEDURE — 99999 PR PBB SHADOW E&M-EST. PATIENT-LVL II: CPT | Mod: PBBFAC,,, | Performed by: OPTOMETRIST

## 2018-09-25 PROCEDURE — 99212 OFFICE O/P EST SF 10 MIN: CPT | Mod: PBBFAC,PO | Performed by: OPTOMETRIST

## 2018-09-25 PROCEDURE — 92014 COMPRE OPH EXAM EST PT 1/>: CPT | Mod: S$PBB,,, | Performed by: OPTOMETRIST

## 2018-09-25 NOTE — PROGRESS NOTES
HPI     Diabetic eye exam  KALEB 07/2017  Diabetic patient doesn't monitor BS at home. Vision seems a   little worse OS than last year. Patient may want cataract consult. Glasses   about 1 yr. Old. Not using any drops on a regular basis.    Hemoglobin A1C       Date                     Value               Ref Range             Status                09/04/2018               5.2                 4.0 - 5.6 %           Final                02/27/2018               5.0                 4.0 - 5.6 %           Final                   10/31/2017               5.3                 4.0 - 5.6 %           Final                 Last edited by Jorge A Kahn, OD on 9/25/2018 11:48 AM. (History)            Assessment /Plan     For exam results, see Encounter Report.    Type 2 diabetes mellitus without retinopathy    Nuclear sclerosis, bilateral  -     Ambulatory Referral to Ophthalmology      1. No diabetic retinopathy, no csme. Return in 1 year for dilated eye exam.  2. Refer to Dr. Miller for cataract evaluation and possible removal.

## 2018-09-27 RX ORDER — TRIAMCINOLONE ACETONIDE 1 MG/G
OINTMENT TOPICAL 2 TIMES DAILY
Qty: 80 G | Refills: 1 | Status: SHIPPED | OUTPATIENT
Start: 2018-09-27 | End: 2018-10-22 | Stop reason: ALTCHOICE

## 2018-10-22 ENCOUNTER — OFFICE VISIT (OUTPATIENT)
Dept: OPHTHALMOLOGY | Facility: CLINIC | Age: 83
End: 2018-10-22
Payer: MEDICARE

## 2018-10-22 DIAGNOSIS — H52.7 REFRACTIVE ERROR: ICD-10-CM

## 2018-10-22 DIAGNOSIS — H25.13 NUCLEAR SCLEROSIS OF BOTH EYES: Primary | ICD-10-CM

## 2018-10-22 DIAGNOSIS — E11.9 DM TYPE 2 WITHOUT RETINOPATHY: ICD-10-CM

## 2018-10-22 DIAGNOSIS — H26.9 CORTICAL CATARACT OF LEFT EYE: ICD-10-CM

## 2018-10-22 PROCEDURE — 92136 OPHTHALMIC BIOMETRY: CPT | Mod: PBBFAC,PO | Performed by: OPHTHALMOLOGY

## 2018-10-22 PROCEDURE — 99212 OFFICE O/P EST SF 10 MIN: CPT | Mod: PBBFAC,PO | Performed by: OPHTHALMOLOGY

## 2018-10-22 PROCEDURE — 99999 PR PBB SHADOW E&M-EST. PATIENT-LVL II: CPT | Mod: PBBFAC,,, | Performed by: OPHTHALMOLOGY

## 2018-10-22 PROCEDURE — 92014 COMPRE OPH EXAM EST PT 1/>: CPT | Mod: S$PBB,,, | Performed by: OPHTHALMOLOGY

## 2018-10-22 RX ORDER — DIFLUPREDNATE OPHTHALMIC 0.5 MG/ML
1 EMULSION OPHTHALMIC 4 TIMES DAILY
Qty: 5 ML | Refills: 1 | Status: SHIPPED | OUTPATIENT
Start: 2018-10-30 | End: 2018-11-29

## 2018-10-22 RX ORDER — OFLOXACIN 3 MG/ML
1 SOLUTION/ DROPS OPHTHALMIC 4 TIMES DAILY
Qty: 5 ML | Refills: 1 | Status: SHIPPED | OUTPATIENT
Start: 2018-10-27 | End: 2018-11-06

## 2018-10-22 RX ORDER — NEPAFENAC 3 MG/ML
1 SUSPENSION/ DROPS OPHTHALMIC DAILY
Qty: 3 ML | Refills: 1 | Status: SHIPPED | OUTPATIENT
Start: 2018-10-27 | End: 2018-11-26

## 2018-10-22 NOTE — PROGRESS NOTES
Subjective:       Patient ID: Candy Escobar is a 86 y.o. female.    Chief Complaint: Cataract (Cataract Eval per Dr. Kahn)    HPI     Cataract      Additional comments: Cataract Eval per Dr. Kahn              Comments     86 y.o. Female is here for Cataract Eval per Dr. Kahn. Denies eye   pain and f/f. Pt states that she see prism in her vision, notice once a   week more or less while doing near work. No noticeable VA changes since   last visit. No problems with glare.     Eye Meds: Allergy drops prn OU           Last edited by ELMER Aponte on 10/22/2018 10:00 AM. (History)             Assessment:       1. Nuclear sclerosis of both eyes    2. Cortical cataract of left eye    3. DM type 2 without retinopathy    4. Refractive error        Plan:       Visually significant cataract OS -Pt. Wants Sx.     Cataract OD- Not visually significant.  DM-No NPDR OU.  RE        Cataract Surgery Consent: Patient with a visually significant cataract with difficulties of ADLs, reading, driving, night vision, glare (any and all).  Discussed with Patient/Family/Caregiver: options, risks and benefits, expectations of cataract surgery, utilized an eye model with questions and answers to facilitate discussion.  Discussed lens options and patient understands that glasses may be required for optimal vision for distance and/or near vision after cataract surgery.  The Patient/Family/Caregiver  voice good understanding and patient wishes to proceed with surgery.  The patient will likely benefit from surgery and patient signed consent for Left Eye.  CE OS 10/30/18 SN60WF 20.0.  Control DM.

## 2018-10-23 ENCOUNTER — TELEPHONE (OUTPATIENT)
Dept: OPTOMETRY | Facility: CLINIC | Age: 83
End: 2018-10-23

## 2018-10-23 DIAGNOSIS — H25.12 NS (NUCLEAR SCLEROSIS), LEFT: Primary | ICD-10-CM

## 2018-10-27 NOTE — H&P
[facility]  History & Physical    Subjective:      Chief Complaint/Reason for Admission:     Candy Escobar is a 86 y.o. female.    Past Medical History:   Diagnosis Date    Allergy     Seasonal    Cataract     Depression     Diabetes mellitus     diet controlled    Diverticulosis     Encounter for blood transfusion     Fever blister     GERD (gastroesophageal reflux disease)     Hypertension 11/12/2014    Joint pain     Nuclear sclerosis - Both Eyes 7/16/2013    Osteoarthritis     Osteopenia     Osteoporosis     Seizures 2008    TIA    Tachycardia      Past Surgical History:   Procedure Laterality Date    ANKLE SURGERY  1951    ankle replacement, left    BLADDER SUSPENSION      BLEPHAROPLASTY Bilateral 2/24/2017    Performed by Jenn Smith MD at [facility] OR 2ND FLR    CYSTOSCOPY N/A 5/14/2014    Performed by Femi Vega MD at [facility] OR 1ST FLR    CYSTOSCOPY, WITH PERIURETHRAL BULKING AGENT INJECTION N/A 5/14/2014    Performed by Femi Vega MD at [facility] OR 1ST FLR    EXCISION MASS EXTREMITY Left 3/24/2015    Performed by Jay Murphy MD at [facility] OR 2ND FLR    FOOT SURGERY      HYSTERECTOMY      left ovary intact    JOINT REPLACEMENT      left ankle and knee arthroplasty    KNEE SURGERY  2008    left TKR    KNEE SURGERY  11-11-14    right TKR    RELEASE-CARPAL TUNNEL Right 5/11/2018    Per[facility] Jose R Oleary Jr[facility] OR    REPLACEMENT-KNEE-TOTAL Right 11/11/2014    Performed by Roni White MD at [facility] OR 2ND FLR    SHOULDER SURGERY  2009    right arthroscopy    TONSILLECTOMY       Family History   Problem Relation Age of Onset    Diabetes Mother     Diabetes Father     Heart disease Father     Diabetes Sister     Polychondritis Sister     Polychondritis Unknown     Cancer Neg Hx     Amblyopia Neg Hx     Blindness Neg Hx     Cataracts Neg Hx     Glaucoma Neg Hx     Hypertension Neg Hx     Macular degeneration Neg Hx     Retinal  detachment Neg Hx     Strabismus Neg Hx     Stroke Neg Hx     Thyroid disease Neg Hx     Melanoma Neg Hx      Social History     Tobacco Use    Smoking status: Former Smoker     Types: Cigarettes     Last attempt to quit: 1981     Years since quittin.5    Smokeless tobacco: Never Used   Substance Use Topics    Alcohol use: Yes     Alcohol/week: 1.2 oz     Types: 2 Glasses of wine per week     Comment: Nightly    Drug use: No       No medications prior to admission.     Review of patient's allergies indicates:   Allergen Reactions    Propofol analogues Other (See Comments)     Low blood pressure, tremors    Oxycodone Nausea And Vomiting    Pcn [penicillins] Nausea Only    Tetracyclines Nausea Only        Review of Systems   Eyes: Positive for blurred vision.   All other systems reviewed and are negative.      Objective:      Vital Signs (Most Recent)       Vital Signs Range (Last 24H):       Physical Exam   Constitutional: She is oriented to person, place, and time. She appears well-developed and well-nourished.   HENT:   Head: Normocephalic.   Eyes: Conjunctivae and EOM are normal. Pupils are equal, round, and reactive to light.   Neck: Normal range of motion. Neck supple.   Cardiovascular: Normal rate, regular rhythm and normal heart sounds.   Pulmonary/Chest: Effort normal and breath sounds normal.   Abdominal: Soft. Bowel sounds are normal.   Musculoskeletal: Normal range of motion.   Neurological: She is alert and oriented to person, place, and time.   Skin: Skin is warm.   Psychiatric: She has a normal mood and affect.       Data Review:  ECG:     Assessment:      Cataract OS.    Plan:    CE OS.

## 2018-10-29 ENCOUNTER — ANESTHESIA EVENT (OUTPATIENT)
Dept: SURGERY | Facility: HOSPITAL | Age: 83
End: 2018-10-29
Payer: MEDICARE

## 2018-10-29 NOTE — PRE-PROCEDURE INSTRUCTIONS
PreOp Instructions given:   - Verbal medication information (what to hold and what to take)   - NPO guidelines   - Arrival place directions given;   - Bathing with antibacterial soap   - Don't wear any jewelry or bring any valuables AM of surgery   - No makeup or moisturizer to face   - No perfume/cologne, powder, lotions or aftershave   Pt. verbalized understanding.     Pt aware that instructions apply to second eye surgery scheduled in ~2 weeks. Pt verbalized an understanding.    Hx of Anesthetic complications Reports very low blood pressure in response to propofol  Personal Hx of Anesthesia complications (reports very low blood pressure in response to propofol)

## 2018-10-29 NOTE — ANESTHESIA PREPROCEDURE EVALUATION
10/29/2018  Candy Escobar is a 86 y.o., female.    Anesthesia Evaluation    I have reviewed the Patient Summary Reports.     I have reviewed the Medications.     Review of Systems  Anesthesia Hx:  No problems with previous Anesthesia  History of prior surgery of interest to airway management or planning: Previous anesthesia: General Personal Hx of Anesthesia complications   Social:  Former Smoker, Social Alcohol Use    Hematology/Oncology:  Hematology Normal   Oncology Normal     EENT/Dental:EENT/Dental Normal   Cardiovascular:   Exercise tolerance: good Hypertension, well controlled    Pulmonary:  Pulmonary Normal    Renal/:  Renal/ Normal     Hepatic/GI:   GERD, well controlled    Musculoskeletal:   Arthritis     Neurological:   Neuromuscular Disease, Seizures, well controlled    Endocrine:   Diabetes, well controlled, type 2    Dermatological:  Skin Normal    Psych:   Psychiatric History          Physical Exam  General:  Well nourished    Airway/Jaw/Neck:  Airway Findings: Mouth Opening: Normal Tongue: Normal  General Airway Assessment: Adult  Mallampati: II  TM Distance: Normal, at least 6 cm  Jaw/Neck Findings:  Neck ROM: Normal ROM      Dental:  Dental Findings: In tact        Mental Status:  Mental Status Findings:  Cooperative, Alert and Oriented      Hx of Anesthetic complications Reports very low blood pressure in response to propofol        Anesthesia Plan  Type of Anesthesia, risks & benefits discussed:  Anesthesia Type:  MAC  Patient's Preference: MAC  Intra-op Monitoring Plan: standard ASA monitors  Intra-op Monitoring Plan Comments:   Post Op Pain Control Plan: multimodal analgesia  Post Op Pain Control Plan Comments:   Induction:   IV  Beta Blocker:  Patient is not currently on a Beta-Blocker (No further documentation required).       Informed Consent: Patient understands risks and  agrees with Anesthesia plan.  Questions answered. Anesthesia consent signed with patient.  ASA Score: 3     Day of Surgery Review of History & Physical:    H&P update referred to the surgeon.         Ready For Surgery From Anesthesia Perspective.

## 2018-10-30 ENCOUNTER — ANESTHESIA (OUTPATIENT)
Dept: SURGERY | Facility: HOSPITAL | Age: 83
End: 2018-10-30
Payer: MEDICARE

## 2018-10-30 ENCOUNTER — HOSPITAL ENCOUNTER (OUTPATIENT)
Facility: HOSPITAL | Age: 83
Discharge: HOME | End: 2018-10-30
Attending: OPHTHALMOLOGY | Admitting: OPHTHALMOLOGY
Payer: MEDICARE

## 2018-10-30 VITALS
RESPIRATION RATE: 18 BRPM | DIASTOLIC BLOOD PRESSURE: 73 MMHG | HEART RATE: 61 BPM | SYSTOLIC BLOOD PRESSURE: 146 MMHG | OXYGEN SATURATION: 98 % | TEMPERATURE: 98 F | HEIGHT: 60 IN | WEIGHT: 125 LBS | BODY MASS INDEX: 24.54 KG/M2

## 2018-10-30 DIAGNOSIS — H25.10 SENILE NUCLEAR SCLEROSIS: ICD-10-CM

## 2018-10-30 LAB — POCT GLUCOSE: 104 MG/DL (ref 70–110)

## 2018-10-30 PROCEDURE — 82962 GLUCOSE BLOOD TEST: CPT | Performed by: OPHTHALMOLOGY

## 2018-10-30 PROCEDURE — D9220A PRA ANESTHESIA: Mod: ANES,,, | Performed by: ANESTHESIOLOGY

## 2018-10-30 PROCEDURE — 37000009 HC ANESTHESIA EA ADD 15 MINS: Performed by: OPHTHALMOLOGY

## 2018-10-30 PROCEDURE — 25000003 PHARM REV CODE 250

## 2018-10-30 PROCEDURE — 63600175 PHARM REV CODE 636 W HCPCS: Performed by: OPHTHALMOLOGY

## 2018-10-30 PROCEDURE — V2632 POST CHMBR INTRAOCULAR LENS: HCPCS | Performed by: OPHTHALMOLOGY

## 2018-10-30 PROCEDURE — 25000003 PHARM REV CODE 250: Performed by: OPHTHALMOLOGY

## 2018-10-30 PROCEDURE — 66984 XCAPSL CTRC RMVL W/O ECP: CPT | Mod: LT,,, | Performed by: OPHTHALMOLOGY

## 2018-10-30 PROCEDURE — 37000008 HC ANESTHESIA 1ST 15 MINUTES: Performed by: OPHTHALMOLOGY

## 2018-10-30 PROCEDURE — 71000015 HC POSTOP RECOV 1ST HR: Performed by: OPHTHALMOLOGY

## 2018-10-30 PROCEDURE — 36000707: Performed by: OPHTHALMOLOGY

## 2018-10-30 PROCEDURE — 63600175 PHARM REV CODE 636 W HCPCS: Performed by: NURSE ANESTHETIST, CERTIFIED REGISTERED

## 2018-10-30 PROCEDURE — 36000706: Performed by: OPHTHALMOLOGY

## 2018-10-30 PROCEDURE — D9220A PRA ANESTHESIA: Mod: CRNA,,, | Performed by: NURSE ANESTHETIST, CERTIFIED REGISTERED

## 2018-10-30 PROCEDURE — C9447 INJ, PHENYLEPHRINE KETOROLAC: HCPCS | Performed by: OPHTHALMOLOGY

## 2018-10-30 DEVICE — LENS 20.0: Type: IMPLANTABLE DEVICE | Site: EYE | Status: FUNCTIONAL

## 2018-10-30 RX ORDER — CYCLOPENTOLATE HYDROCHLORIDE 10 MG/ML
1 SOLUTION/ DROPS OPHTHALMIC
Status: DISCONTINUED | OUTPATIENT
Start: 2018-10-30 | End: 2024-02-23

## 2018-10-30 RX ORDER — MULTIVIT WITH IRON,MINERALS
100 TABLET ORAL NIGHTLY
Status: ON HOLD | COMMUNITY
End: 2021-09-29

## 2018-10-30 RX ORDER — PROPARACAINE HYDROCHLORIDE 5 MG/ML
1 SOLUTION/ DROPS OPHTHALMIC
Status: DISCONTINUED | OUTPATIENT
Start: 2018-10-30 | End: 2024-02-23

## 2018-10-30 RX ORDER — PROPARACAINE HYDROCHLORIDE 5 MG/ML
SOLUTION/ DROPS OPHTHALMIC
Status: COMPLETED
Start: 2018-10-30 | End: 2018-10-30

## 2018-10-30 RX ORDER — PREDNISOLONE ACETATE 10 MG/ML
SUSPENSION/ DROPS OPHTHALMIC
Status: DISCONTINUED | OUTPATIENT
Start: 2018-10-30 | End: 2018-10-30 | Stop reason: HOSPADM

## 2018-10-30 RX ORDER — ACETAMINOPHEN 325 MG/1
650 TABLET ORAL EVERY 4 HOURS PRN
Status: DISCONTINUED | OUTPATIENT
Start: 2018-10-30 | End: 2018-10-30 | Stop reason: HOSPADM

## 2018-10-30 RX ORDER — LIDOCAINE HYDROCHLORIDE 40 MG/ML
INJECTION, SOLUTION RETROBULBAR
Status: DISCONTINUED | OUTPATIENT
Start: 2018-10-30 | End: 2018-10-30 | Stop reason: HOSPADM

## 2018-10-30 RX ORDER — HYDROCODONE BITARTRATE AND ACETAMINOPHEN 5; 325 MG/1; MG/1
1 TABLET ORAL EVERY 4 HOURS PRN
Status: DISCONTINUED | OUTPATIENT
Start: 2018-10-30 | End: 2018-10-30 | Stop reason: HOSPADM

## 2018-10-30 RX ORDER — OFLOXACIN 3 MG/ML
SOLUTION/ DROPS OPHTHALMIC
Status: COMPLETED
Start: 2018-10-30 | End: 2018-10-30

## 2018-10-30 RX ORDER — PHENYLEPHRINE HYDROCHLORIDE 25 MG/ML
SOLUTION/ DROPS OPHTHALMIC
Status: COMPLETED
Start: 2018-10-30 | End: 2018-10-30

## 2018-10-30 RX ORDER — OFLOXACIN 3 MG/ML
SOLUTION/ DROPS OPHTHALMIC
Status: DISCONTINUED | OUTPATIENT
Start: 2018-10-30 | End: 2018-10-30 | Stop reason: HOSPADM

## 2018-10-30 RX ORDER — DIPHENHYDRAMINE HCL 50 MG
50 CAPSULE ORAL NIGHTLY
Status: ON HOLD | COMMUNITY
End: 2021-10-01 | Stop reason: HOSPADM

## 2018-10-30 RX ORDER — FENTANYL CITRATE 50 UG/ML
INJECTION, SOLUTION INTRAMUSCULAR; INTRAVENOUS
Status: DISCONTINUED | OUTPATIENT
Start: 2018-10-30 | End: 2018-10-30

## 2018-10-30 RX ORDER — SODIUM CHLORIDE 0.9 % (FLUSH) 0.9 %
3 SYRINGE (ML) INJECTION
Status: DISCONTINUED | OUTPATIENT
Start: 2018-10-30 | End: 2018-10-30 | Stop reason: HOSPADM

## 2018-10-30 RX ORDER — OFLOXACIN 3 MG/ML
1 SOLUTION/ DROPS OPHTHALMIC
Status: COMPLETED | OUTPATIENT
Start: 2018-10-30 | End: 2018-10-30

## 2018-10-30 RX ORDER — TROPICAMIDE 10 MG/ML
1 SOLUTION/ DROPS OPHTHALMIC
Status: DISCONTINUED | OUTPATIENT
Start: 2018-10-30 | End: 2024-02-23

## 2018-10-30 RX ORDER — SODIUM CHLORIDE 9 MG/ML
INJECTION, SOLUTION INTRAVENOUS CONTINUOUS
Status: DISCONTINUED | OUTPATIENT
Start: 2018-10-30 | End: 2024-02-23

## 2018-10-30 RX ORDER — TROPICAMIDE 10 MG/ML
SOLUTION/ DROPS OPHTHALMIC
Status: COMPLETED
Start: 2018-10-30 | End: 2018-10-30

## 2018-10-30 RX ORDER — PHENYLEPHRINE HYDROCHLORIDE 25 MG/ML
1 SOLUTION/ DROPS OPHTHALMIC
Status: DISCONTINUED | OUTPATIENT
Start: 2018-10-30 | End: 2024-02-23

## 2018-10-30 RX ORDER — MIDAZOLAM HYDROCHLORIDE 1 MG/ML
INJECTION INTRAMUSCULAR; INTRAVENOUS
Status: DISCONTINUED | OUTPATIENT
Start: 2018-10-30 | End: 2018-10-30

## 2018-10-30 RX ORDER — CYCLOPENTOLATE HYDROCHLORIDE 10 MG/ML
SOLUTION/ DROPS OPHTHALMIC
Status: COMPLETED
Start: 2018-10-30 | End: 2018-10-30

## 2018-10-30 RX ADMIN — OFLOXACIN 1 DROP: 3 SOLUTION OPHTHALMIC at 07:10

## 2018-10-30 RX ADMIN — PHENYLEPHRINE HYDROCHLORIDE 1 DROP: 25 SOLUTION/ DROPS OPHTHALMIC at 07:10

## 2018-10-30 RX ADMIN — TROPICAMIDE 1 DROP: 10 SOLUTION/ DROPS OPHTHALMIC at 07:10

## 2018-10-30 RX ADMIN — CYCLOPENTOLATE HYDROCHLORIDE 1 DROP: 10 SOLUTION/ DROPS OPHTHALMIC at 07:10

## 2018-10-30 RX ADMIN — SODIUM CHLORIDE 1000 ML: 0.9 INJECTION, SOLUTION INTRAVENOUS at 07:10

## 2018-10-30 RX ADMIN — PROPARACAINE HYDROCHLORIDE 1 DROP: 5 SOLUTION/ DROPS OPHTHALMIC at 07:10

## 2018-10-30 RX ADMIN — PROPARACAINE HYDROCHLORIDE: 5 SOLUTION/ DROPS OPHTHALMIC at 07:10

## 2018-10-30 RX ADMIN — FENTANYL CITRATE 25 MCG: 50 INJECTION, SOLUTION INTRAMUSCULAR; INTRAVENOUS at 08:10

## 2018-10-30 RX ADMIN — MIDAZOLAM HYDROCHLORIDE 0.5 MG: 1 INJECTION, SOLUTION INTRAMUSCULAR; INTRAVENOUS at 08:10

## 2018-10-30 NOTE — TRANSFER OF CARE
Anesthesia Transfer of Care Note    Patient: Candy Escobar    Procedure(s) Performed: Procedure(s) (LRB):  PHACOEMULSIFICATION, CATARACT (Left)  INSERTION, IOL PROSTHESIS (Left)    Patient location: PACU    Anesthesia Type: MAC    Transport from OR: Transported from OR on room air with adequate spontaneous ventilation    Post pain: adequate analgesia    Post assessment: no apparent anesthetic complications    Post vital signs: stable    Level of consciousness: awake    Nausea/Vomiting: no nausea/vomiting    Complications: none    Transfer of care protocol was followed      Last vitals:   Visit Vitals  /66 (BP Location: Left arm, Patient Position: Lying)   Pulse 67   Temp 36.6 °C (97.9 °F) (Temporal)   Resp 16   Ht 5' (1.524 m)   Wt 56.7 kg (125 lb)   SpO2 95%   Breastfeeding? No   BMI 24.41 kg/m²

## 2018-10-30 NOTE — BRIEF OP NOTE
Operative Note     SUMMARY     Surgery Date: 10/30/2018    Surgeon(s) and Role:      Jorge A Miller MD - Primary    Pre-op Diagnosis:  Nuclear sclerosis     Post-op/ Diagnosis:  Same    Final Diagnosis: Cataract    Procedure(s) (LRB):  PHACOEMULSIFICATION-ASPIRATION-CATARACT   INSERTION-INTRAOCULAR LENS (IOL)     Anesthesia: Monitored Anesthesia Care    Findings/Key Components:  Cataract    Outcome: Tolerated procedure well    Estimated Blood Loss: None         Specimens     None          Follow-up:  Tomorrow in clinic      Discharge Note      SUMMARY     Admit Date: 10/30/2018    Attending Physician: Jorge A Miller MD    Discharge Physician: Jorge A Miller MD    Discharge Date: 10/30/2018    Final Diagnosis: Cataract    Outcome: Tolerated procedure well    Disposition: Discharge to Home.    Medications:       Candy Escobar   Home Medication Instructions AGATA:[id number]    Printed on:10/30/18 0924   Medication Information                      albuterol 90 mcg/actuation inhaler  Inhale 2 puffs into the lungs every 4 (four) hours as needed for Wheezing.             azelastine (OPTIVAR) 0.05 % ophthalmic solution  Place 1 drop into both eyes 2 (two) times daily.             diclofenac sodium 1 % Gel  Apply 2 g topically 2 (two) times daily as needed.             difluprednate (DUREZOL) 0.05 % Drop ophthalmic solution  Place 1 drop into the left eye 4 (four) times daily. For 30 days             diphenhydrAMINE (BENADRYL) 50 MG capsule  Take 50 mg by mouth every 6 (six) hours as needed for Itching.             estradiol 0.1 mg/24 hr td ptwk 0.1 mg/24 hr PTWK  Place 1 patch onto the skin every 7 days.             FLUoxetine (PROZAC) 10 MG capsule  Take 1 capsule (10 mg total) by mouth once daily.             gabapentin (NEURONTIN) 300 MG capsule  Take 1 capsule (300 mg total) by mouth 3 (three) times daily.             ILEVRO 0.3 % DrpS  Place 1 drop into both eyes once daily. For 30 days              losartan (COZAAR) 25 MG tablet  Take 1 tablet (25 mg total) by mouth once daily.             meloxicam (MOBIC) 7.5 MG tablet  Take 1 tablet (7.5 mg total) by mouth once daily.             niacin 100 MG Tab  Take 100 mg by mouth every evening.             ofloxacin (OCUFLOX) 0.3 % ophthalmic solution  Place 1 drop into the left eye 4 (four) times daily. for 10 days             omeprazole (PRILOSEC) 20 MG capsule  TAKE 2 CAPSULES (40MG      TOTAL) ONCE DAILY             tretinoin (RETIN-A) 0.025 % gel  Apply topically as needed.                   Patient Discharge Instructions:     Keep Elias Shield over operated eye when not using drops.     DIET:  Regular    Activity: No heavy lifting or bending X 1 week.    Follow-up:  Tomorrow in clinic

## 2018-10-30 NOTE — ANESTHESIA POSTPROCEDURE EVALUATION
Anesthesia Post Evaluation    Patient: Candy Escobar    Procedure(s) Performed: Procedure(s) (LRB):  PHACOEMULSIFICATION, CATARACT (Left)  INSERTION, IOL PROSTHESIS (Left)    Final Anesthesia Type: MAC  Patient location during evaluation: PACU  Patient participation: Yes- Able to Participate  Level of consciousness: awake and alert  Post-procedure vital signs: reviewed and stable  Pain management: adequate  Airway patency: patent  PONV status at discharge: No PONV  Anesthetic complications: no      Cardiovascular status: blood pressure returned to baseline  Respiratory status: unassisted  Hydration status: euvolemic  Follow-up not needed.        Visit Vitals  BP (!) 147/59 (BP Location: Left arm, Patient Position: Lying)   Pulse 61   Temp 36.6 °C (97.9 °F) (Temporal)   Resp 17   Ht 5' (1.524 m)   Wt 56.7 kg (125 lb)   SpO2 96%   Breastfeeding? No   BMI 24.41 kg/m²       Pain/Roxanne Score: Pain Assessment Performed: Yes (10/30/2018  9:28 AM)  Presence of Pain: denies (10/30/2018  9:28 AM)  Pain Rating Prior to Med Admin: 0 (10/30/2018  9:28 AM)  Pain Rating Post Med Admin: 0 (10/30/2018  9:28 AM)  Roxanne Score: 9 (10/30/2018  9:28 AM)

## 2018-10-30 NOTE — DISCHARGE INSTRUCTIONS
Discharge Instructions for Cataract Surgery  A surgeon removed the cloudy lens in your eye and replaced it with a clear man-made lens. Be sure to have an adult family member or friend drive you home after surgery. Heres what you can expect following surgery and tips for a healthy recovery.  It is normal to have the following:  · Bruised or bloodshot eye for 7 days  · Itching and mild discomfort for several days  · Some fluid discharge  · Sensitivity to light  · Scratchy, sandlike feeling in the eye for 2 weeks  · Feeling tired, especially during the first 24 hours  Activity level  · Do not drive for 2 days or as instructed by your doctor.  · Do not drink alcohol for at least 24 hours.  · Avoid bending at the waist to  objects or lifting anything heavy for 2 days.  · Relax for the first 24 hours after surgery. Watching TV and reading are OK and wont harm your eye.  Eye protection  · Do not rub or press on your eye.  · Sleep on your back or on your unoperated side for 2 nights.  · If instructed, wear a bandage over your eye for 2 days and 2 nights.  · If instructed, wear a shield to protect your eye for 2 days and 2 nights.  Using eyedrops  You may be given special eyedrops or ointment. Here is one way to use eyedrops:  · Tilt your head back.  · Pull your bottom eyelid down.  · Squeeze one drop into your eye. Do not touch your eye with the bottle tip.  · Close your eyes for a few seconds.  · If you need more than one drop, wait a few minutes before adding the next one.   Call your doctor right away if you have any of the following:  · Bleeding or discharge from the eye  · Your vision suddenly becomes worse  · Pain medicine you are told to take does not ease your pain  · Nausea or vomiting  · Chills or fever over 100.4°F (39.1°C)   Date Last Reviewed: 5/30/2015  © 2900-4919 [employer]. [address] [place] [address], [place] [address]. All rights reserved. This information is not intended as a  substitute for professional medical care. Always follow your healthcare professional's instructions.

## 2018-10-30 NOTE — OP NOTE
DATE OF PROCEDURE:  10/30/2018.    SURGEON:  Jorge A Miller M.D.    PREOPERATIVE DIAGNOSIS:  Nuclear sclerotic cataract, left eye.     POSTOPERATIVE DIAGNOSIS:  Nuclear sclerotic cataract, left eye.     PROCEDURE:  Clear corneal phacoemulsification with posterior chamber intraocular   lens implant, left eye.     ANESTHESIA:  Monitored anesthesia care with 4% Xylocaine topically.     PROCEDURE IN DETAIL:  After the appropriate preoperative consent was obtained,   the patient had the 2% Xylocaine jelly applied to the left cornea.  The patient   was then brought to the operating room and placed into the supine position.  The   left periorbit was then prepped and draped in the usual sterile ophthalmic   fashion.  A lid speculum was then inserted into the left eye.  Several drops of   the 1% lidocaine were placed onto the left cornea.  The 1% lidocaine was also   applied to the perilimbal region with the Weck-renny sponge.  Using the 0.12-mm   forceps and the Super Sharp blade, a paracentesis site was made at the 6 o'clock   position.  Approximately 0.5 mL of the 1% lidocaine was injected into the   anterior chamber.  Next, Viscoat was injected into the anterior chamber through   the paracentesis site.  The 2.75-mm keratome and the cyclodialysis spatula were   used to create a 2.75-mm clear corneal temporal groove.  The cystitomy needle   and Utrata forceps were then used to create a continuous tear 360-degree   capsulorrhexis.  BSS in a cannula was then used for hydrodissection.    Phacoemulsification then proceeded in a stop-and-chop fashion without any   complications.  Another 0.5 mL of the 1% lidocaine was injected into the   anterior chamber.  The curved tip irrigation aspiration handpiece was then used   to remove the residual cortical material from the capsular bag.  Again, the 1%   lidocaine was applied to the perilimbal region with the Weck-renny sponge.  Healon   GV was then injected into the anterior chamber  and capsular bag.  An [employer] intraocular lens model SN60WF, 20.0 diopters in power, and serial   #[id number] was injected into the capsular bag with the lens injector.  The   Sinskey hook was used to position the lens into its proper place.  The residual   viscoelastic material was removed from the anterior chamber and capsular bag   with the curved tip irrigation aspiration handpiece.  Both wounds were hydrated   with BSS on a cannula.  Both wounds were checked and found to be watertight.    The lid speculum was then removed from the left eye.  The patient then had 1   drop of Vigamox ophthalmic drop and 1 drop of Econopred +1% ophthalmic drop   instilled onto the left cornea.  The eye was then shielded.  The patient   tolerated the procedure well and was then brought to the recovery room in good   condition.      MARCO ANTONIO  dd: 10/30/2018 17:54:13 (CDT)  td: 10/30/2018 18:47:58 (CDT)  Doc ID   #[id number]  Job ID #[id number]    CC:

## 2018-10-31 ENCOUNTER — OFFICE VISIT (OUTPATIENT)
Dept: OPHTHALMOLOGY | Facility: CLINIC | Age: 83
End: 2018-10-31
Payer: MEDICARE

## 2018-10-31 VITALS — DIASTOLIC BLOOD PRESSURE: 70 MMHG | SYSTOLIC BLOOD PRESSURE: 117 MMHG | HEART RATE: 79 BPM

## 2018-10-31 DIAGNOSIS — Z98.890 POST-OPERATIVE STATE: Primary | ICD-10-CM

## 2018-10-31 PROCEDURE — 99024 POSTOP FOLLOW-UP VISIT: CPT | Mod: ,,, | Performed by: OPHTHALMOLOGY

## 2018-10-31 PROCEDURE — 99999 PR PBB SHADOW E&M-EST. PATIENT-LVL III: CPT | Mod: PBBFAC,,, | Performed by: OPHTHALMOLOGY

## 2018-10-31 PROCEDURE — 99213 OFFICE O/P EST LOW 20 MIN: CPT | Mod: PBBFAC,PO | Performed by: OPHTHALMOLOGY

## 2018-10-31 NOTE — PROGRESS NOTES
Subjective:       Patient ID: Candy Escobar is a 86 y.o. female.    Chief Complaint: Post-op Evaluation    HPI     1 day PO CE/IOL OS.  Slept fine, no discomfort.  PO instructions and   follow up appointment given.      Last edited by Jenniffer Phillips on 10/31/2018 10:31 AM. (History)             Assessment:       1. Post-operative state        Plan:       S/p CE OS- Doing well.         CPM OS.  RTC 1 wk.

## 2018-11-07 ENCOUNTER — OFFICE VISIT (OUTPATIENT)
Dept: OPHTHALMOLOGY | Facility: CLINIC | Age: 83
End: 2018-11-07
Payer: MEDICARE

## 2018-11-07 DIAGNOSIS — Z98.890 POST-OPERATIVE STATE: Primary | ICD-10-CM

## 2018-11-07 PROCEDURE — 99999 PR PBB SHADOW E&M-EST. PATIENT-LVL II: CPT | Mod: PBBFAC,,, | Performed by: OPHTHALMOLOGY

## 2018-11-07 PROCEDURE — 99024 POSTOP FOLLOW-UP VISIT: CPT | Mod: S$GLB,,, | Performed by: OPHTHALMOLOGY

## 2018-11-07 NOTE — PROGRESS NOTES
Subjective:       Patient ID: Candy Escobar is a 86 y.o. female.    Chief Complaint: Post-op Evaluation (1 week po os)    HPI     Post-op Evaluation      Additional comments: 1 week po os              Comments     1 week po os    Pt states     Eyemeds  Durezol BID OS  Ilevro QD OS           Last edited by Andie Neri on 11/7/2018  8:30 AM. (History)             Assessment:       1. Post-operative state        Plan:       S/p CE OS- Doing well.           Taper gtts OS.  RTC 3 wks.

## 2018-11-21 DIAGNOSIS — Z78.0 POST-MENOPAUSAL: ICD-10-CM

## 2018-11-22 RX ORDER — ESTRADIOL 0.1 MG/D
PATCH TRANSDERMAL
Qty: 12 PATCH | Refills: 1 | Status: SHIPPED | OUTPATIENT
Start: 2018-11-22 | End: 2019-10-04 | Stop reason: SDUPTHER

## 2018-12-21 DIAGNOSIS — M17.11 OSTEOARTHRITIS OF RIGHT KNEE, UNSPECIFIED OSTEOARTHRITIS TYPE: ICD-10-CM

## 2018-12-21 RX ORDER — MELOXICAM 7.5 MG/1
7.5 TABLET ORAL DAILY
Qty: 90 TABLET | Refills: 1 | Status: SHIPPED | OUTPATIENT
Start: 2018-12-21 | End: 2019-04-11 | Stop reason: SDUPTHER

## 2018-12-21 NOTE — TELEPHONE ENCOUNTER
----- Message from Peyton Centeno sent at 12/21/2018  8:09 AM CST -----  No. [phone]  Patient needs Meloxicam 7.5mg, 1 daily, #90.  [facility]

## 2019-01-04 DIAGNOSIS — M25.561 CHRONIC PAIN OF RIGHT KNEE: ICD-10-CM

## 2019-01-04 DIAGNOSIS — G89.29 CHRONIC PAIN OF RIGHT KNEE: ICD-10-CM

## 2019-01-04 RX ORDER — GABAPENTIN 300 MG/1
300 CAPSULE ORAL 3 TIMES DAILY
Qty: 90 CAPSULE | Refills: 1 | Status: SHIPPED | OUTPATIENT
Start: 2019-01-04 | End: 2019-03-04 | Stop reason: SDUPTHER

## 2019-01-04 NOTE — TELEPHONE ENCOUNTER
----- Message from Mckenzie Valadez sent at 1/4/2019 11:25 AM CST -----  Contact: [phone]/self  Patient requesting a refill for gabapentin. [facility]. Please advise.

## 2019-01-22 ENCOUNTER — OFFICE VISIT (OUTPATIENT)
Dept: OPTOMETRY | Facility: CLINIC | Age: 84
End: 2019-01-22
Payer: MEDICARE

## 2019-01-22 DIAGNOSIS — Z98.890 POST-OPERATIVE STATE: Primary | ICD-10-CM

## 2019-01-22 DIAGNOSIS — H10.13 ALLERGIC CONJUNCTIVITIS OF BOTH EYES: ICD-10-CM

## 2019-01-22 PROCEDURE — 99024 PR POST-OP FOLLOW-UP VISIT: ICD-10-PCS | Mod: S$GLB,,, | Performed by: OPTOMETRIST

## 2019-01-22 PROCEDURE — 99024 POSTOP FOLLOW-UP VISIT: CPT | Mod: S$GLB,,, | Performed by: OPTOMETRIST

## 2019-01-22 PROCEDURE — 99999 PR PBB SHADOW E&M-EST. PATIENT-LVL II: ICD-10-PCS | Mod: PBBFAC,,, | Performed by: OPTOMETRIST

## 2019-01-22 PROCEDURE — 99999 PR PBB SHADOW E&M-EST. PATIENT-LVL II: CPT | Mod: PBBFAC,,, | Performed by: OPTOMETRIST

## 2019-01-22 RX ORDER — AZELASTINE HYDROCHLORIDE 0.5 MG/ML
1 SOLUTION/ DROPS OPHTHALMIC 2 TIMES DAILY
Qty: 4 ML | Refills: 11 | Status: SHIPPED | OUTPATIENT
Start: 2019-01-22 | End: 2019-01-22 | Stop reason: SDUPTHER

## 2019-01-22 RX ORDER — AZELASTINE HYDROCHLORIDE 0.5 MG/ML
1 SOLUTION/ DROPS OPHTHALMIC 2 TIMES DAILY
Qty: 4 ML | Refills: 11 | Status: ON HOLD | OUTPATIENT
Start: 2019-01-22 | End: 2021-09-29

## 2019-01-22 NOTE — PROGRESS NOTES
HPI     PO CE/IOL OS with Dr. Miller 10/30/2018.  Patient would like updated   RX for new glasses today.    Last edited by Jenniffer Phillips on 1/22/2019  1:07 PM. (History)            Assessment /Plan     For exam results, see Encounter Report.    Post-operative state    Allergic conjunctivitis of both eyes  -     Discontinue: azelastine (OPTIVAR) 0.05 % ophthalmic solution; Place 1 drop into both eyes 2 (two) times daily.  Dispense: 4 mL; Refill: 11  -     azelastine (OPTIVAR) 0.05 % ophthalmic solution; Place 1 drop into both eyes 2 (two) times daily.  Dispense: 4 mL; Refill: 11      1. Doing well post op. No flash or floaters, happy with vision. Spec Rx given. Different lens options discussed with patient. RTC 1 year full exam.  2. OK for Optivar for allergy.

## 2019-03-04 ENCOUNTER — LAB VISIT (OUTPATIENT)
Dept: LAB | Facility: HOSPITAL | Age: 84
End: 2019-03-04
Attending: FAMILY MEDICINE
Payer: MEDICARE

## 2019-03-04 ENCOUNTER — OFFICE VISIT (OUTPATIENT)
Dept: FAMILY MEDICINE | Facility: CLINIC | Age: 84
End: 2019-03-04
Attending: FAMILY MEDICINE
Payer: MEDICARE

## 2019-03-04 VITALS
SYSTOLIC BLOOD PRESSURE: 130 MMHG | DIASTOLIC BLOOD PRESSURE: 72 MMHG | BODY MASS INDEX: 24.85 KG/M2 | HEIGHT: 60 IN | OXYGEN SATURATION: 97 % | HEART RATE: 74 BPM | WEIGHT: 126.56 LBS

## 2019-03-04 DIAGNOSIS — E11.9 CONTROLLED TYPE 2 DIABETES MELLITUS WITHOUT COMPLICATION, WITHOUT LONG-TERM CURRENT USE OF INSULIN: ICD-10-CM

## 2019-03-04 DIAGNOSIS — F32.4 MAJOR DEPRESSIVE DISORDER WITH SINGLE EPISODE, IN PARTIAL REMISSION: ICD-10-CM

## 2019-03-04 DIAGNOSIS — M25.561 CHRONIC PAIN OF RIGHT KNEE: ICD-10-CM

## 2019-03-04 DIAGNOSIS — I15.2 HYPERTENSION ASSOCIATED WITH DIABETES: ICD-10-CM

## 2019-03-04 DIAGNOSIS — M46.92 INFLAMMATORY SPONDYLOPATHY OF CERVICAL REGION: ICD-10-CM

## 2019-03-04 DIAGNOSIS — E55.9 VITAMIN D DEFICIENCY: ICD-10-CM

## 2019-03-04 DIAGNOSIS — E11.69 DYSLIPIDEMIA ASSOCIATED WITH TYPE 2 DIABETES MELLITUS: ICD-10-CM

## 2019-03-04 DIAGNOSIS — I15.2 HYPERTENSION ASSOCIATED WITH DIABETES: Primary | ICD-10-CM

## 2019-03-04 DIAGNOSIS — I83.93 ASYMPTOMATIC VARICOSE VEINS OF BOTH LOWER EXTREMITIES: ICD-10-CM

## 2019-03-04 DIAGNOSIS — E11.9 DM TYPE 2 WITHOUT RETINOPATHY: ICD-10-CM

## 2019-03-04 DIAGNOSIS — D53.9 NUTRITIONAL ANEMIA: ICD-10-CM

## 2019-03-04 DIAGNOSIS — R53.83 FATIGUE, UNSPECIFIED TYPE: ICD-10-CM

## 2019-03-04 DIAGNOSIS — G89.29 CHRONIC PAIN OF RIGHT KNEE: ICD-10-CM

## 2019-03-04 DIAGNOSIS — E78.5 DYSLIPIDEMIA ASSOCIATED WITH TYPE 2 DIABETES MELLITUS: ICD-10-CM

## 2019-03-04 DIAGNOSIS — F32.2 CURRENT SEVERE EPISODE OF MAJOR DEPRESSIVE DISORDER WITHOUT PSYCHOTIC FEATURES WITHOUT PRIOR EPISODE: ICD-10-CM

## 2019-03-04 DIAGNOSIS — E11.59 HYPERTENSION ASSOCIATED WITH DIABETES: Primary | ICD-10-CM

## 2019-03-04 DIAGNOSIS — E11.59 HYPERTENSION ASSOCIATED WITH DIABETES: ICD-10-CM

## 2019-03-04 LAB
25(OH)D3+25(OH)D2 SERPL-MCNC: 12 NG/ML
ALBUMIN SERPL BCP-MCNC: 4 G/DL
ALP SERPL-CCNC: 59 U/L
ALT SERPL W/O P-5'-P-CCNC: 13 U/L
ANION GAP SERPL CALC-SCNC: 4 MMOL/L
AST SERPL-CCNC: 23 U/L
BASOPHILS # BLD AUTO: 0.03 K/UL
BASOPHILS NFR BLD: 0.8 %
BILIRUB SERPL-MCNC: 0.9 MG/DL
BUN SERPL-MCNC: 15 MG/DL
CALCIUM SERPL-MCNC: 9.8 MG/DL
CHLORIDE SERPL-SCNC: 99 MMOL/L
CHOLEST SERPL-MCNC: 238 MG/DL
CHOLEST/HDLC SERPL: 2.6 {RATIO}
CO2 SERPL-SCNC: 32 MMOL/L
CREAT SERPL-MCNC: 0.8 MG/DL
DIFFERENTIAL METHOD: ABNORMAL
EOSINOPHIL # BLD AUTO: 0.2 K/UL
EOSINOPHIL NFR BLD: 4.9 %
ERYTHROCYTE [DISTWIDTH] IN BLOOD BY AUTOMATED COUNT: 13.5 %
EST. GFR  (AFRICAN AMERICAN): >60 ML/MIN/1.73 M^2
EST. GFR  (NON AFRICAN AMERICAN): >60 ML/MIN/1.73 M^2
ESTIMATED AVG GLUCOSE: 108 MG/DL
GLUCOSE SERPL-MCNC: 93 MG/DL
HBA1C MFR BLD HPLC: 5.4 %
HCT VFR BLD AUTO: 38.8 %
HDLC SERPL-MCNC: 93 MG/DL
HDLC SERPL: 39.1 %
HGB BLD-MCNC: 12.7 G/DL
LDLC SERPL CALC-MCNC: 132 MG/DL
LYMPHOCYTES # BLD AUTO: 0.8 K/UL
LYMPHOCYTES NFR BLD: 20.4 %
MCH RBC QN AUTO: 31.9 PG
MCHC RBC AUTO-ENTMCNC: 32.7 G/DL
MCV RBC AUTO: 98 FL
MONOCYTES # BLD AUTO: 0.4 K/UL
MONOCYTES NFR BLD: 9 %
NEUTROPHILS # BLD AUTO: 2.5 K/UL
NEUTROPHILS NFR BLD: 64.6 %
NONHDLC SERPL-MCNC: 145 MG/DL
PLATELET # BLD AUTO: 249 K/UL
PMV BLD AUTO: 11.2 FL
POTASSIUM SERPL-SCNC: 4.6 MMOL/L
PROT SERPL-MCNC: 7.1 G/DL
RBC # BLD AUTO: 3.98 M/UL
SODIUM SERPL-SCNC: 135 MMOL/L
TRIGL SERPL-MCNC: 65 MG/DL
TSH SERPL DL<=0.005 MIU/L-ACNC: 2.83 UIU/ML
VIT B12 SERPL-MCNC: 400 PG/ML
WBC # BLD AUTO: 3.87 K/UL

## 2019-03-04 PROCEDURE — 99213 PR OFFICE/OUTPT VISIT, EST, LEVL III, 20-29 MIN: ICD-10-PCS | Mod: S$GLB,,, | Performed by: FAMILY MEDICINE

## 2019-03-04 PROCEDURE — 84443 ASSAY THYROID STIM HORMONE: CPT

## 2019-03-04 PROCEDURE — 82607 VITAMIN B-12: CPT

## 2019-03-04 PROCEDURE — 80061 LIPID PANEL: CPT

## 2019-03-04 PROCEDURE — 99999 PR PBB SHADOW E&M-EST. PATIENT-LVL III: CPT | Mod: PBBFAC,,, | Performed by: FAMILY MEDICINE

## 2019-03-04 PROCEDURE — 80053 COMPREHEN METABOLIC PANEL: CPT

## 2019-03-04 PROCEDURE — 82306 VITAMIN D 25 HYDROXY: CPT

## 2019-03-04 PROCEDURE — 85025 COMPLETE CBC W/AUTO DIFF WBC: CPT

## 2019-03-04 PROCEDURE — 36415 COLL VENOUS BLD VENIPUNCTURE: CPT

## 2019-03-04 PROCEDURE — 99213 OFFICE O/P EST LOW 20 MIN: CPT | Mod: S$GLB,,, | Performed by: FAMILY MEDICINE

## 2019-03-04 PROCEDURE — 99999 PR PBB SHADOW E&M-EST. PATIENT-LVL III: ICD-10-PCS | Mod: PBBFAC,,, | Performed by: FAMILY MEDICINE

## 2019-03-04 PROCEDURE — 83036 HEMOGLOBIN GLYCOSYLATED A1C: CPT

## 2019-03-04 PROCEDURE — 99499 RISK ADDL DX/OHS AUDIT: ICD-10-PCS | Mod: S$GLB,,, | Performed by: FAMILY MEDICINE

## 2019-03-04 PROCEDURE — 99499 UNLISTED E&M SERVICE: CPT | Mod: S$GLB,,, | Performed by: FAMILY MEDICINE

## 2019-03-04 RX ORDER — GABAPENTIN 300 MG/1
600 CAPSULE ORAL NIGHTLY
Qty: 180 CAPSULE | Refills: 3 | Status: SHIPPED | OUTPATIENT
Start: 2019-03-04 | End: 2020-03-03

## 2019-03-04 RX ORDER — GABAPENTIN 300 MG/1
600 CAPSULE ORAL NIGHTLY
Qty: 180 CAPSULE | Refills: 3 | Status: SHIPPED | OUTPATIENT
Start: 2019-03-04 | End: 2019-03-04 | Stop reason: SDUPTHER

## 2019-03-04 NOTE — PROGRESS NOTES
Subjective:       Patient ID: Candy Escobar is a 87 y.o. female.    Chief Complaint: Follow-up (6 month); Diabetic Foot Exam; and Medication Refill    87 yr old pleasant white female with  DM II controlled,  Facet arthropathy neck, hypertension, depression, anxiety, GERD, OA knee, presents today for her annual wellness check, routine 6 month follow up and also neuropathic pain, joint aches and lab work.    DM II - diet controlled - A1C                   5.2                 09/04/2018                                             foot and eye exam UTD - not on ACE    HTN - chronic - controlled - she is asymptomatic - no CP, SOB, MINER, PND or orthopnea, no bowel or bladder problems    Facet arthropathy neck/spasms - stable - uses muscle relaxant as needed at night     Depression/anxiety - controlled - on prozac daily for years and ativan as needed - no current mood problems - she is lil anxious taking on surgery - no SI/HI    GERD - controlled - on PPI as needed    OA knee s/p surgery - on mobic as needed    HLD - at goal -  LDLCALC                  72.6                02/27/2018                                   - not on any meds - diet compliant    - need statin    History as below - reviewed      Health maintenance  -labs UTD  -vaccines reports UTD        Hypertension   This is a chronic problem. The current episode started more than 1 year ago. The problem has been gradually improving since onset. The problem is controlled. Pertinent negatives include no anxiety, chest pain, headaches, palpitations or shortness of breath. There are no associated agents to hypertension. Risk factors for coronary artery disease include dyslipidemia, diabetes mellitus and post-menopausal state. Past treatments include lifestyle changes. The current treatment provides significant improvement. There are no compliance problems.  There is no history of angina, CAD/MI, CVA, left ventricular hypertrophy, PVD or retinopathy. There is no  history of chronic renal disease, hypercortisolism, hyperparathyroidism, pheochromocytoma, renovascular disease or a thyroid problem.   Pain   Associated symptoms include arthralgias and myalgias. Pertinent negatives include no chest pain, congestion, diaphoresis, headaches, nausea or sore throat.   Dizziness:   Chronicity:  Recurrent  Onset:  1 to 4 weeks ago  Progression since onset:  Gradually improving  Frequency:  Every few days  Severity:  Mild  Duration:  5 minutes  Dizziness characteristics:  Sensation of movement   Associated symptoms: hearing loss.no headaches, no nausea, no diaphoresis, no palpitations and no chest pain.  Aggravated by:  Position changes  Treatments tried:  Rest and body position changes  Improvements on treatment:  Mildno strokes, no neurologic disease, no head trauma, no balance testing, no ear trauma, no head trauma, no anxiety, no ear tubes, no environmental allergies and no CT head.  Leg Pain    There was no injury mechanism. The pain is present in the left leg, left knee, right knee and right leg. The quality of the pain is described as aching. The pain is at a severity of 5/10. The pain is moderate. The pain has been improving since onset. The symptoms are aggravated by movement. She has tried NSAIDs for the symptoms. The treatment provided moderate relief.     Review of Systems   Constitutional: Negative.  Negative for activity change, diaphoresis and unexpected weight change.   HENT: Positive for hearing loss. Negative for congestion, ear discharge, rhinorrhea, sore throat and voice change.    Eyes: Negative.  Negative for pain, discharge and visual disturbance.   Respiratory: Negative.  Negative for chest tightness, shortness of breath and wheezing.    Cardiovascular: Negative.  Negative for chest pain and palpitations.   Gastrointestinal: Negative.  Negative for abdominal distention, anal bleeding, constipation and nausea.   Endocrine: Negative.  Negative for cold intolerance,  polydipsia and polyuria.   Genitourinary: Negative.  Negative for decreased urine volume, difficulty urinating, dysuria, frequency, menstrual problem and vaginal pain.   Musculoskeletal: Positive for arthralgias and myalgias. Negative for gait problem.   Skin: Negative.  Negative for color change, pallor and wound.   Allergic/Immunologic: Negative.  Negative for environmental allergies and immunocompromised state.   Neurological: Positive for dizziness. Negative for tremors, seizures, speech difficulty and headaches.   Hematological: Negative.  Negative for adenopathy. Does not bruise/bleed easily.   Psychiatric/Behavioral: Negative.  Negative for agitation, confusion, decreased concentration, hallucinations, self-injury and suicidal ideas. The patient is not nervous/anxious.        PMH/PSH/FH/SH/MED/ALLERGY reviewed    Objective:       Vitals:    03/04/19 0904   BP: 130/72   Pulse: 74       Physical Exam   Constitutional: She is oriented to person, place, and time. She appears well-developed and well-nourished. No distress.   HENT:   Head: Normocephalic and atraumatic.   Right Ear: External ear normal.   Left Ear: External ear normal.   Nose: Nose normal.   Mouth/Throat: Oropharynx is clear and moist. No oropharyngeal exudate.   Eyes: Conjunctivae and EOM are normal. Pupils are equal, round, and reactive to light. Right eye exhibits no discharge. Left eye exhibits no discharge. No scleral icterus.   Neck: Normal range of motion. Neck supple. No JVD present. No tracheal deviation present. No thyromegaly present.   Cardiovascular: Normal rate, regular rhythm, normal heart sounds and intact distal pulses. Exam reveals no gallop and no friction rub.   No murmur heard.  Pulmonary/Chest: Effort normal and breath sounds normal. No stridor. She has no wheezes. She has no rales. She exhibits no tenderness.   Abdominal: Soft. Bowel sounds are normal. She exhibits no distension and no mass. There is no tenderness. There is no  rebound and no guarding. No hernia.   Musculoskeletal: Normal range of motion. She exhibits no edema or tenderness.   Lymphadenopathy:     She has no cervical adenopathy.   Neurological: She is alert and oriented to person, place, and time. She has normal reflexes. She displays normal reflexes. No cranial nerve deficit. She exhibits normal muscle tone. Coordination normal.   Skin: Skin is warm and dry. No rash noted. She is not diaphoretic. No erythema. No pallor.   Psychiatric: She has a normal mood and affect. Her behavior is normal. Judgment and thought content normal.       Protective Sensation (w/ 10 gram monofilament):  Right: Intact  Left: Intact    Visual Inspection:  Normal -  Bilateral    Pedal Pulses:   Right: Present  Left: Present    Posterior tibialis:   Right:Present  Left: Present      Assessment:       1. Hypertension associated with diabetes    2. Inflammatory spondylopathy of cervical region    3. Major depressive disorder with single episode, in partial remission    4. Current severe episode of major depressive disorder without psychotic features without prior episode    5. Dyslipidemia associated with type 2 diabetes mellitus    6. DM type 2 without retinopathy    7. Controlled type 2 diabetes mellitus without complication, without long-term current use of insulin    8. Asymptomatic varicose veins of both lower extremities    9. Fatigue, unspecified type    10. Nutritional anemia     11. Vitamin D deficiency     12. Chronic pain of right knee        Plan:             Candy was seen today for follow-up, diabetic foot exam and medication refill.    Diagnoses and all orders for this visit:    Hypertension associated with diabetes  -     CBC auto differential; Future  -     Comprehensive metabolic panel; Future  -     Lipid panel; Future  -     TSH; Future    Inflammatory spondylopathy of cervical region    Major depressive disorder with single episode, in partial remission    Current severe episode  of major depressive disorder without psychotic features without prior episode    Dyslipidemia associated with type 2 diabetes mellitus  -     CBC auto differential; Future  -     Comprehensive metabolic panel; Future  -     Lipid panel; Future    DM type 2 without retinopathy  -     CBC auto differential; Future  -     Comprehensive metabolic panel; Future  -     Lipid panel; Future  -     Hemoglobin A1c; Future  -     TSH; Future    Controlled type 2 diabetes mellitus without complication, without long-term current use of insulin  -     CBC auto differential; Future  -     Comprehensive metabolic panel; Future  -     Lipid panel; Future  -     Hemoglobin A1c; Future    Asymptomatic varicose veins of both lower extremities    Fatigue, unspecified type  -     Vitamin B12; Future  -     Vitamin D; Future    Nutritional anemia   -     Vitamin B12; Future    Vitamin D deficiency   -     Vitamin D; Future    Chronic pain of right knee  -     gabapentin (NEURONTIN) 300 MG capsule; Take 2 capsules (600 mg total) by mouth every evening.      Wellness check  -normal exam  -labs    Fatigue  -labs    HTN  -controlled  -continue losartan 25 daily    HLD  -controlled  -start statin  -labs    DM II  -controlled  -labs    SK  -reassurance    MDD  -controlled    Chronic knee pain  -continue mobic    Neuropathic pain  -not well controlled  -increase neurontin 300 TID    Spent adequate time in obtaining history and explaining differentials    40 minutes spent during this visit of which greater than 50% devoted to face-face counseling and coordination of care regarding diagnosis and management plan    .RTC 6 months or prn

## 2019-03-11 ENCOUNTER — TELEPHONE (OUTPATIENT)
Dept: FAMILY MEDICINE | Facility: CLINIC | Age: 84
End: 2019-03-11

## 2019-03-11 DIAGNOSIS — E55.9 VITAMIN D DEFICIENCY: Primary | ICD-10-CM

## 2019-03-11 RX ORDER — ERGOCALCIFEROL 1.25 MG/1
50000 CAPSULE ORAL
Qty: 12 CAPSULE | Refills: 3 | Status: SHIPPED | OUTPATIENT
Start: 2019-03-11 | End: 2020-11-19 | Stop reason: SDUPTHER

## 2019-03-11 NOTE — TELEPHONE ENCOUNTER
Informed pt her lab showed Vit D deficiency on lab. Dr. Munson sent medication to pharmacy. Pt verbalized understanding.

## 2019-03-19 DIAGNOSIS — K21.9 GASTROESOPHAGEAL REFLUX DISEASE, ESOPHAGITIS PRESENCE NOT SPECIFIED: ICD-10-CM

## 2019-03-19 RX ORDER — OMEPRAZOLE 20 MG/1
CAPSULE, DELAYED RELEASE ORAL
Qty: 180 CAPSULE | Refills: 3 | Status: SHIPPED | OUTPATIENT
Start: 2019-03-19 | End: 2020-08-15 | Stop reason: SDUPTHER

## 2019-03-25 NOTE — PATIENT INSTRUCTIONS
Diagnosis and Plan reviewed with patient.   Subjective:       Patient ID: Stephanie Sears is a 69 y.o. female.    Chief Complaint: Back Pain    Back Pain   Pertinent negatives include no abdominal pain, chest pain, fever, numbness or weakness. Headaches:     Neck Pain    Pertinent negatives include no chest pain, fever, numbness, trouble swallowing or weakness. Headaches:     Arm Pain    Pertinent negatives include no chest pain or numbness.      Mrs. Sears is 68 y/o female, who returns to clinic for upper back pain.  LCV 01/23/19.   Today, she c/o low back pain,   She is s/p Lumbar spinal L4-5 fusion in 2006, by dr. Alen Rsoe.   On LCV, she was referred to PT, and she is still going.     Back Pain Description:  Length: pain is chronic pain. Length > 6 month(s)  No past, recent injury, falls.  Intensity:  CURRENT  6/10,  AVERAGE  Pain   4-5/10. at BEST   3-4/10 , At WORST   7/10 on the WORST day.   Location: pain is localized across lower back, in both buttocks, and running down the both legs.   It is more Back >> leg pain.  Radiation: Positive to buttocks. Positive to legs, back of  Legs, to the knee level.   Timing : intermittent  morning pain , than eases later in day time, get better  with activity, in evening  QUALITY:  Dull/ toothache like pain in back,a nd in legs is more shooting pain.   No neuropathic : burning, tingling, numbness  Negative leg weakness. Can walk 2 blocks, and than gets tired.  Worsening factors: prolong sitting.  Alleviating factors: laying down  Symptoms interfere with daily activity, sleeping and work.   Current medications: Gabapentin 600 mg tid, Mobic,  Tramadol 50 mg 3-4 x/day.  Failed medications:  None   Prior procedures. Back surgery ,multiple JOHN, never helped.  PT/OT: none  Patient denies night fever/night sweats, bowel incontinence, significant weight loss and significant motor weakness ( red flags).  Patient denies any suicidal or homicidal ideations.      Pain medications are helping. She takes Gabapentin 600  mg BID, and Tramadol 50 mg 3-4 x/day.  She is here for follow up,re  evaluation and treatment.     Past Medical History:   Diagnosis Date    Angina pectoris     Anxiety     Bronchitis     Cataract     Cervical spondylosis with radiculopathy 7/10/2012    Chest pain     Chronic neck pain 7/26/2012    Depression     Fibrocystic breast     GERD (gastroesophageal reflux disease)     Glaucoma     Hyperlipidemia     Hypertension     Neck pain 7/10/2012    JAM (obstructive sleep apnea)     Primary osteoarthritis of both knees     Psoriasis     Subacromial or subdeltoid bursitis 7/10/2012    Thyroid disease     Type 2 diabetes mellitus 12/10/2013       Past Surgical History:   Procedure Laterality Date    ARTHROSCOPY - LEFT THUMB CMCJ - LINVATEC - CMC Left 12/7/2016    [facility] at [facility] OR [facility]ST [facility]    BLOCK-NERVE Right 2/27/2018    Performed by Teresa Surgeon at [facility]    BREAST LUMPECTOMY Left 1988    [facility] in the rt breast surgery  1988    BREAST MASS EXCISION Right     benign    CHOLECYSTECTOMY      CHOLECYSTECTOMY, LAPAROSCOPIC N/A 1/28/2013    Performed by [facility]ay Cordova [facility] at [facility] OR 2ND FLR    HYSTERECTOMY  1980's    KNEE SURGERY Left 1-20-16    [facility]    KNEE SURGERY Right 02/26/2018    [facility]    L4-L5 fusion  2006    REPLACEMENT-KNEE-TOTAL Right 2/26/2018    Per[facility] at [facility] OR 2ND [facility]LR    REPLACEMENT-KNEE-TOTAL Left 1/20/2016    [facility] OR 2ND FLR    SPINE SURGERY         Family History   Problem Relation Age of Onset    Diabetes Mother     Hypertension Mother         CHF, angina    Angina Mother     Kidney disease Mother     Heart disease Mother         CHF    Hypertension Father         glaucoma, Alzhiemer's , supra pubic    Alzheimer's disease Father     Glaucoma Father     Kidney disease Brother         kidney transplant, HTN,DM    Diabetes Brother     Glaucoma Sister      Hypertension Sister     Hyperlipidemia Sister     Gout Son     Hypertension Son     Diabetes Son     Sleep apnea Sister     Hypertension Sister     Thyroid disease Sister     No Known Problems Sister     No Known Problems Sister     Hepatitis Brother     Amblyopia Neg Hx     Blindness Neg Hx        Social History     Socioeconomic History    Marital status:      Spouse name: None    Number of children: None    Years of education: None    Highest education level: None   Occupational History    None   Social Needs    Financial resource strain: None    Food insecurity:     Worry: None     Inability: None    Transportation needs:     Medical: None     Non-medical: None   Tobacco Use    Smoking status: Current Every Day Smoker     Packs/day: 1.00     Years: 40.00     Pack years: 40.00     Types: Cigarettes    Smokeless tobacco: Never Used    Tobacco comment: smokes a pack a week   Substance and Sexual Activity    Alcohol use: No     Alcohol/week: 0.0 oz    Drug use: No    Sexual activity: Yes     Partners: Male   Lifestyle    Physical activity:     Days per week: None     Minutes per session: None    Stress: None   Relationships    Social connections:     Talks on phone: None     Gets together: None     Attends Hoahaoism service: None     Active member of club or organization: None     Attends meetings of clubs or organizations: None     Relationship status: None    Intimate partner violence:     Fear of current or ex partner: None     Emotionally abused: None     Physically abused: None     Forced sexual activity: None   Other Topics Concern    None   Social History Narrative    None       Current Outpatient Medications   Medication Sig Dispense Refill    albuterol (PROAIR HFA) 90 mcg/actuation inhaler Inhale 2 puffs into the lungs every 6 (six) hours as needed for Wheezing or Shortness of Breath. 1 Inhaler 3    amlodipine-benazepril (LOTREL) 10-40 mg per capsule TAKE 1 CAPSULE  DAILY 90 capsule 1    atorvastatin (LIPITOR) 20 MG tablet TAKE 1 TABLET(20 MG) BY MOUTH EVERY DAY 30 tablet 3    benzonatate (TESSALON) 200 MG capsule Take 1 capsule (200 mg total) by mouth 3 (three) times daily as needed for Cough. 60 capsule 1    blood sugar diagnostic (ACCU-CHEK JOSE G PLUS TEST STRP) Strp Inject 1 each into the skin 2 (two) times daily. 200 each 11    blood sugar diagnostic Strp Use to test blood glucose levels 2 times per day as instructed. 200 strip 11    CALCIUM CARBONATE (TUMS ORAL) Take by mouth as needed (acid reflux, indigestion).      carvedilol (COREG) 12.5 MG tablet       diphenhydrAMINE (BENADRYL) 25 mg capsule Take 25 mg by mouth every 6 (six) hours as needed for Itching or Allergies.      famotidine (PEPCID) 20 MG tablet Take 1 tablet (20 mg total) by mouth 2 (two) times daily. 60 tablet 0    fluticasone (FLONASE) 50 mcg/actuation nasal spray 1 spray by Each Nare route 2 (two) times daily. 1 Bottle 1    furosemide (LASIX) 40 MG tablet TAKE 1 TABLET DAILY 90 tablet 1    gabapentin (NEURONTIN) 600 MG tablet TAKE 1 TABLET THREE TIMES A  tablet 1    INULIN (FIBER GUMMIES ORAL) Take by mouth every morning.       lancets (ACCU-CHEK MULTICLIX LANCET) Misc Test blood sugar twice daily 300 each 3    lancets (ONETOUCH DELICA LANCETS) 33 gauge Misc Inject 1 lancet into the skin 2 (two) times daily before meals. 200 each 11    meloxicam (MOBIC) 7.5 MG tablet TAKE 1 TABLET DAILY 90 tablet 1    traMADol (ULTRAM) 50 mg tablet Take 1 tablet (50 mg total) by mouth every 8 (eight) hours as needed. 90 tablet 3    triamcinolone acetonide 0.1% (KENALOG) 0.1 % ointment KENDRA AA ON THE SKIN BID ON RASH ON LEGS  0     No current facility-administered medications for this visit.        Review of patient's allergies indicates:   Allergen Reactions    Hydrocodone Shortness Of Breath     Pt states can take oxycodone    Iodinated contrast- oral and iv dye Shortness Of Breath      Difficulty breathing    Adhesive Itching    Sulfa (sulfonamide antibiotics) Hives and Itching           Review of Systems   Constitutional: Negative for appetite change, chills, fatigue, fever and unexpected weight change.   HENT: Negative for drooling, trouble swallowing and voice change.    Eyes: Negative for pain and visual disturbance.   Respiratory: Negative for shortness of breath and wheezing.    Cardiovascular: Negative for chest pain and palpitations.   Gastrointestinal: Negative for abdominal distention, abdominal pain, constipation and diarrhea.   Genitourinary: Negative for difficulty urinating.   Musculoskeletal: Positive for back pain and neck pain. Negative for gait problem, joint swelling, myalgias and neck stiffness. Arthralgias: Rt knee pain, s/p Lt TKA,  Lt thumb pain, she is                Skin: Negative for color change and rash.   Neurological: Negative for dizziness, facial asymmetry, speech difficulty, weakness, light-headedness and numbness. Headaches:     Hematological: Negative for adenopathy.   Psychiatric/Behavioral: Negative for behavioral problems, confusion and sleep disturbance. The patient is not nervous/anxious.        Objective:      Physical Exam      GENERAL: The patient is alert, oriented, pleasant.   HEENT; PERRLA  NECK: supple,  No masses  MUSCULOSKELETAL:   Gait is normal .  Cervical spine :  Minimally decreased AROM in cervical spine.  Lumbar spine, decreased active range of motion in all planes, flexion to 90 degrees , ext. 0.  Side bending and rotation to 35-40 degrees.   Positive facet loading b/l.  Positive tenderness in lumbar paravertebral musculature, b/l.  Straight leg raising Negative bilaterally.   Full range of motion in all joints x4 extremities,    Muscle strength 5/5 throughout x4 extremities.   No  joint laxity throughout x4 extremities.   NEUROLOGIC: Cranial nerves II through XII intact.   Deep tendon reflexes is normal, +2 in the upper and lower  extremities bilaterally.   Muscle tone is normal.   Sensory is intact to light touch and pinprick throughout x4 extremities.    Xray of Lumbar spine ( 01/23/19) showed:  The lumbar vertebra are intact.  No compression fracture or obvious paraspinal lesion is detected.  Degenerative changes are present with small osteophytes at most levels. There is progressive disc space narrowing at L3-4; this level also shows development of a grade 1 anterolisthesis, stable between flexion and extension.    The other lumbar disc spaces show no significant narrowing, but lower thoracic disc spaces are narrowed, some with vacuum disc.  Postoperative findings from posterior instrumented fusion are again seen at L4 and 5 with bilateral pedicle screws, vertical connecting rods, and a device in the disc space.  Impression:  Stable postoperative findings of L4-5 fusion  Degenerative spine changes with interval worsening at L3-4    Xray of Lumbar spine ( 2014) showed:  Postoperative changes of spinal fusion with associated pedicle screws identified at L4 and L5 levels.  The position and alignment is satisfactory.    Mild DJD.  No fracture or bone destruction identified.  No spondylolisthesis evident.         Assessment:       1. Chronic bilateral low back pain with bilateral sciatica    2. Status post lumbar laminectomy    3. Chronic bilateral low back pain with sciatica, sciatica laterality unspecified    4. Lumbar radiculopathy    5. Trochanteric bursitis of left hip        Plan:       Chronic bilateral low back pain with bilateral sciatica  -     traMADol (ULTRAM) 50 mg tablet; Take 1 tablet (50 mg total) by mouth every 8 (eight) hours as needed.  Dispense: 90 tablet; Refill: 3    Status post lumbar laminectomy  -     traMADol (ULTRAM) 50 mg tablet; Take 1 tablet (50 mg total) by mouth every 8 (eight) hours as needed.  Dispense: 90 tablet; Refill: 3    Chronic bilateral low back pain with sciatica, sciatica laterality unspecified  -      traMADol (ULTRAM) 50 mg tablet; Take 1 tablet (50 mg total) by mouth every 8 (eight) hours as needed.  Dispense: 90 tablet; Refill: 3    Lumbar radiculopathy  -     traMADol (ULTRAM) 50 mg tablet; Take 1 tablet (50 mg total) by mouth every 8 (eight) hours as needed.  Dispense: 90 tablet; Refill: 3    Trochanteric bursitis of left hip  -     traMADol (ULTRAM) 50 mg tablet; Take 1 tablet (50 mg total) by mouth every 8 (eight) hours as needed.  Dispense: 90 tablet; Refill: 3      Patient with chronic back pain.    --  Xray of Lumbar spine results discussed.    -- Pain management   Will resume Gabapentin bid, Tramadol prn, Mobic daily.      RTC in 4 months.    Total time spent face to face with patient was 25 minutes.   More than 50% of that time was spent in counseling on diagnosis , prognosis and treatment options.   I also  patient  on common and most usual side effect of prescribed medications.   Risk and benefits of opiates, possible risk of developing opiate dependence and tolerance, need of strict compliance with prescribed medications.  Pain contract, rules and obligations were discussed with patient in details.  She is aware that I would be the only doctor prescribing her pain medications and ED in a case of emergency.  I reviewed Primary care , and other specialty's notes to better coordinate patient's  care.   All questions were answered, and patient voiced understanding.

## 2019-04-07 DIAGNOSIS — M17.11 OSTEOARTHRITIS OF RIGHT KNEE, UNSPECIFIED OSTEOARTHRITIS TYPE: ICD-10-CM

## 2019-04-07 RX ORDER — MELOXICAM 15 MG/1
TABLET ORAL
Qty: 90 TABLET | Refills: 3 | Status: SHIPPED | OUTPATIENT
Start: 2019-04-07 | End: 2019-06-24 | Stop reason: SDUPTHER

## 2019-04-11 DIAGNOSIS — M17.11 OSTEOARTHRITIS OF RIGHT KNEE, UNSPECIFIED OSTEOARTHRITIS TYPE: ICD-10-CM

## 2019-04-11 NOTE — TELEPHONE ENCOUNTER
----- Message from Kizzy Crawford sent at 4/11/2019  4:18 PM CDT -----  Contact: [phone]/self  Patient is requesting to have a refill of   meloxicam (MOBIC) 7.5 MG tablet. Take 1 tablet (7.5 mg total) by mouth once daily. - Oral  90 tablet    [facility] [place] [place] - [address] AT PORTAL TO REGISTERED [facility] SITES  . Please advise.

## 2019-04-12 ENCOUNTER — PES CALL (OUTPATIENT)
Dept: ADMINISTRATIVE | Facility: CLINIC | Age: 84
End: 2019-04-12

## 2019-04-12 RX ORDER — MELOXICAM 7.5 MG/1
7.5 TABLET ORAL DAILY
Qty: 90 TABLET | Refills: 1 | Status: SHIPPED | OUTPATIENT
Start: 2019-04-12 | End: 2019-10-05 | Stop reason: SDUPTHER

## 2019-04-25 DIAGNOSIS — F32.0 MILD SINGLE CURRENT EPISODE OF MAJOR DEPRESSIVE DISORDER: ICD-10-CM

## 2019-04-25 RX ORDER — FLUOXETINE 10 MG/1
CAPSULE ORAL
Qty: 90 CAPSULE | Refills: 3 | Status: SHIPPED | OUTPATIENT
Start: 2019-04-25 | End: 2019-10-02 | Stop reason: SDUPTHER

## 2019-06-04 ENCOUNTER — TELEPHONE (OUTPATIENT)
Dept: FAMILY MEDICINE | Facility: CLINIC | Age: 84
End: 2019-06-04

## 2019-06-04 NOTE — TELEPHONE ENCOUNTER
----- Message from Gigi Jhaveri sent at 6/4/2019 12:13 PM CDT -----  Contact: [phone]/self  Patient calling for her lab results.  Please call back to assist at [phone]

## 2019-06-04 NOTE — TELEPHONE ENCOUNTER
Spoke to pt and states that she has a sore on her arm that will not heal. Pt was scheduled for an appt with Dr. Brown.

## 2019-06-24 ENCOUNTER — OFFICE VISIT (OUTPATIENT)
Dept: URGENT CARE | Facility: CLINIC | Age: 84
End: 2019-06-24
Payer: MEDICARE

## 2019-06-24 VITALS
WEIGHT: 126 LBS | HEART RATE: 82 BPM | HEIGHT: 60 IN | TEMPERATURE: 99 F | OXYGEN SATURATION: 96 % | SYSTOLIC BLOOD PRESSURE: 149 MMHG | DIASTOLIC BLOOD PRESSURE: 72 MMHG | RESPIRATION RATE: 16 BRPM | BODY MASS INDEX: 24.74 KG/M2

## 2019-06-24 DIAGNOSIS — M25.511 PAIN AND SWELLING OF RIGHT SHOULDER: Primary | ICD-10-CM

## 2019-06-24 DIAGNOSIS — M17.11 OSTEOARTHRITIS OF RIGHT KNEE, UNSPECIFIED OSTEOARTHRITIS TYPE: ICD-10-CM

## 2019-06-24 DIAGNOSIS — M25.411 PAIN AND SWELLING OF RIGHT SHOULDER: Primary | ICD-10-CM

## 2019-06-24 DIAGNOSIS — M77.8 RIGHT SHOULDER TENDONITIS: ICD-10-CM

## 2019-06-24 PROCEDURE — 99214 OFFICE O/P EST MOD 30 MIN: CPT | Mod: S$GLB,,, | Performed by: FAMILY MEDICINE

## 2019-06-24 PROCEDURE — 73030 XR SHOULDER COMPLETE 2 OR MORE VIEWS RIGHT: ICD-10-PCS | Mod: FY,RT,S$GLB, | Performed by: RADIOLOGY

## 2019-06-24 PROCEDURE — 96372 THER/PROPH/DIAG INJ SC/IM: CPT | Mod: S$GLB,,, | Performed by: FAMILY MEDICINE

## 2019-06-24 PROCEDURE — 1101F PR PT FALLS ASSESS DOC 0-1 FALLS W/OUT INJ PAST YR: ICD-10-PCS | Mod: CPTII,S$GLB,, | Performed by: FAMILY MEDICINE

## 2019-06-24 PROCEDURE — 99214 PR OFFICE/OUTPT VISIT, EST, LEVL IV, 30-39 MIN: ICD-10-PCS | Mod: S$GLB,,, | Performed by: FAMILY MEDICINE

## 2019-06-24 PROCEDURE — 73030 X-RAY EXAM OF SHOULDER: CPT | Mod: FY,RT,S$GLB, | Performed by: RADIOLOGY

## 2019-06-24 PROCEDURE — 96372 PR INJECTION,THERAP/PROPH/DIAG2ST, IM OR SUBCUT: ICD-10-PCS | Mod: S$GLB,,, | Performed by: FAMILY MEDICINE

## 2019-06-24 PROCEDURE — 1101F PT FALLS ASSESS-DOCD LE1/YR: CPT | Mod: CPTII,S$GLB,, | Performed by: FAMILY MEDICINE

## 2019-06-24 RX ORDER — KETOROLAC TROMETHAMINE 30 MG/ML
30 INJECTION, SOLUTION INTRAMUSCULAR; INTRAVENOUS
Status: COMPLETED | OUTPATIENT
Start: 2019-06-24 | End: 2019-06-24

## 2019-06-24 RX ORDER — DEXAMETHASONE SODIUM PHOSPHATE 100 MG/10ML
10 INJECTION INTRAMUSCULAR; INTRAVENOUS
Status: COMPLETED | OUTPATIENT
Start: 2019-06-24 | End: 2019-06-24

## 2019-06-24 RX ORDER — MELOXICAM 15 MG/1
TABLET ORAL
Qty: 90 TABLET | Refills: 0 | Status: SHIPPED | OUTPATIENT
Start: 2019-06-24 | End: 2019-06-25 | Stop reason: SDUPTHER

## 2019-06-24 RX ADMIN — DEXAMETHASONE SODIUM PHOSPHATE 10 MG: 100 INJECTION INTRAMUSCULAR; INTRAVENOUS at 03:06

## 2019-06-24 RX ADMIN — KETOROLAC TROMETHAMINE 30 MG: 30 INJECTION, SOLUTION INTRAMUSCULAR; INTRAVENOUS at 03:06

## 2019-06-24 NOTE — TELEPHONE ENCOUNTER
----- Message from Kimberly Clifford sent at 6/24/2019 11:48 AM CDT -----  Contact: Self - [phone]  Patient is requesting a call back regarding, can you see her today ? she said she hurt her arm. Please advise

## 2019-06-24 NOTE — PATIENT INSTRUCTIONS
Tendonitis  A tendon is the thick fibrous cord that joins muscle to bone and allows joints to move. When a tendon becomes inflamed, it is called tendonitis. This can occur from overuse, injury, or infection. This usually involves the shoulders, forearm, wrist, hands and foot. Symptoms include pain, swelling and tenderness to the touch. Moving the joint increases the pain.  It takes 4 to 6 weeks for tendonitis to heal. It is treated by preventing motion of the tendon with a splint or brace and the use of anti-inflammatory medicine.  Home care  · Some people find relief with ice packs. These can be crushed or cubed ice in a plastic bag or a bag of frozen vegetables wrapped in a thin towel. Other people get better relief with heat. This can include a hot shower, hot bath, or a moist towel warmed in a microwave. Try each and use the method that feels best, for 15 to 20 minutes several times a day.  · Rest the inflamed joint and protect it from movement.  · You may use over-the-counter ibuprofen or naproxen to treat pain and inflammation, unless another medicine was prescribed. If you can't take these medicines, acetaminophen may help with the pain, but does not treat inflammation. If you have chronic liver or kidney disease or ever had a stomach ulcer or gastrointestinal bleeding, talk with your doctor before using these medicines.  · As your symptoms improve, begin gradual motion at the involved joint.  Follow-up care  Follow up with your healthcare provider if you are not improving after 5 days of treatment.  When to seek medical advice  Call your healthcare provider right away if any of these occur:  · Redness over the painful area  · Increasing pain or swelling at the joint  · Fever (1 degree above your normal temperature) lasting 24 to 48 hours Or, whatever your healthcare provider told you to report based on your condition  Date Last Reviewed: 11/21/2015  © 3557-9042 [employer]. [address] [place] [address]  Road, [place] [address]. All rights reserved. This information is not intended as a substitute for professional medical care. Always follow your healthcare professional's instructions.

## 2019-06-24 NOTE — PROGRESS NOTES
Subjective:       Patient ID: Candy Escobar is a 87 y.o. female.    Vitals:  height is 5' (1.524 m) and weight is 57.2 kg (126 lb). Her oral temperature is 98.7 °F (37.1 °C). Her blood pressure is 149/72 (abnormal) and her pulse is 82. Her respiration is 16 and oxygen saturation is 96%.     Chief Complaint: Shoulder Injury (Right Shoulder)    Patient states that she was putting a fitted sheet on her bed and felt something pop in her right shoulder.no previous hx of shoulder dislocation, no parestheisas.     Shoulder Injury    The incident occurred at home. The right shoulder is affected. The incident occurred 12 to 24 hours ago. Injury mechanism: Pulling injury. The quality of the pain is described as stabbing. The pain radiates to the right arm and right hand. The pain is at a severity of 10/10. The pain is severe. The symptoms are aggravated by movement (reaching downwards). She has tried NSAIDs and acetaminophen for the symptoms.       Constitution: Negative for fatigue.   HENT: Negative for facial swelling and facial trauma.    Neck: Negative for neck stiffness.   Cardiovascular: Negative for chest trauma.   Eyes: Negative for eye trauma, double vision and blurred vision.   Gastrointestinal: Negative for abdominal trauma, abdominal pain and rectal bleeding.   Genitourinary: Negative for hematuria, missed menses, genital trauma and pelvic pain.   Musculoskeletal: Positive for pain. Negative for trauma, joint swelling and abnormal ROM of joint.   Skin: Negative for color change, wound, abrasion, laceration and bruising.   Neurological: Negative for dizziness, history of vertigo, light-headedness, coordination disturbances, altered mental status and loss of consciousness.   Hematologic/Lymphatic: Negative for history of bleeding disorder.   Psychiatric/Behavioral: Negative for altered mental status.       Objective:      Physical Exam   Constitutional: She is oriented to person, place, and time. She appears  well-developed and well-nourished. She is cooperative.  Non-toxic appearance. She does not appear ill.   HENT:   Head: Normocephalic and atraumatic.   Right Ear: Hearing, tympanic membrane, external ear and ear canal normal.   Left Ear: Hearing, tympanic membrane, external ear and ear canal normal.   Nose: Nose normal. No mucosal edema, rhinorrhea or nasal deformity. No epistaxis. Right sinus exhibits no maxillary sinus tenderness and no frontal sinus tenderness. Left sinus exhibits no maxillary sinus tenderness and no frontal sinus tenderness.   Mouth/Throat: Uvula is midline, oropharynx is clear and moist and mucous membranes are normal. No trismus in the jaw. Normal dentition. No uvula swelling. No posterior oropharyngeal erythema.   Eyes: Conjunctivae and lids are normal. Right eye exhibits no discharge. Left eye exhibits no discharge. No scleral icterus.   Sclera clear bilat   Neck: Trachea normal, normal range of motion, full passive range of motion without pain and phonation normal. Neck supple. No JVD present. No tracheal deviation present.   Cardiovascular: Normal rate, regular rhythm, normal heart sounds, intact distal pulses and normal pulses. Exam reveals no gallop and no friction rub.   No murmur heard.  Pulmonary/Chest: Effort normal and breath sounds normal. No respiratory distress.   Abdominal: Soft. Normal appearance and bowel sounds are normal. She exhibits no distension, no pulsatile midline mass and no mass. There is no tenderness.   Musculoskeletal: Normal range of motion. She exhibits no edema or deformity.   Right shoulder no gross deformity    Moderate TTP around right a/c joint area  Abduction /over head ROM with increase tenderness     Lymphadenopathy:     She has no cervical adenopathy.   Neurological: She is alert and oriented to person, place, and time. She exhibits normal muscle tone. Coordination normal.   Skin: Skin is warm, dry and intact. She is not diaphoretic. No pallor.    Psychiatric: She has a normal mood and affect. Her speech is normal and behavior is normal. Judgment and thought content normal. Cognition and memory are normal.   Nursing note and vitals reviewed.      Assessment:       1. Pain and swelling of right shoulder    2. Right shoulder tendonitis        Plan:         Pain and swelling of right shoulder  -     X-ray Shoulder 2 or More Views Right; Future; Expected date: 06/24/2019  -     ketorolac injection 30 mg  -     dexamethasone injection 10 mg    Right shoulder tendonitis  -     X-ray Shoulder 2 or More Views Right; Future; Expected date: 06/24/2019  -     dexamethasone injection 10 mg         Continue Mobic    FU with PCP if pain persists

## 2019-06-25 RX ORDER — MELOXICAM 15 MG/1
TABLET ORAL
Qty: 90 TABLET | Refills: 0 | Status: SHIPPED | OUTPATIENT
Start: 2019-06-25 | End: 2020-03-25 | Stop reason: SDUPTHER

## 2019-07-09 ENCOUNTER — TELEPHONE (OUTPATIENT)
Dept: FAMILY MEDICINE | Facility: CLINIC | Age: 84
End: 2019-07-09

## 2019-07-09 NOTE — TELEPHONE ENCOUNTER
----- Message from Gigi Jhaveri sent at 7/9/2019  2:52 PM CDT -----  Contact: [phone]/SELF  Patient states it's an emergency she's requesting a callback (patient did not care to elaborate).    Please call back to assist at [phone]

## 2019-07-11 DIAGNOSIS — E11.59 HYPERTENSION ASSOCIATED WITH DIABETES: ICD-10-CM

## 2019-07-11 DIAGNOSIS — I15.2 HYPERTENSION ASSOCIATED WITH DIABETES: ICD-10-CM

## 2019-07-11 RX ORDER — LOSARTAN POTASSIUM 25 MG/1
25 TABLET ORAL NIGHTLY
Qty: 90 TABLET | Refills: 3 | Status: SHIPPED | OUTPATIENT
Start: 2019-07-11 | End: 2019-11-09

## 2019-09-03 ENCOUNTER — TELEPHONE (OUTPATIENT)
Dept: FAMILY MEDICINE | Facility: CLINIC | Age: 84
End: 2019-09-03

## 2019-09-03 NOTE — TELEPHONE ENCOUNTER
----- Message from Consuelo Bejarano sent at 9/3/2019 10:08 AM CDT -----  Contact: self, [phone]  Patient requests to speak with you, requests to be seen earlier than her appointment tomorrow at 8:40 am. Please advise.

## 2019-09-18 DIAGNOSIS — E11.9 CONTROLLED TYPE 2 DIABETES MELLITUS WITHOUT COMPLICATION, WITHOUT LONG-TERM CURRENT USE OF INSULIN: Primary | ICD-10-CM

## 2019-10-02 ENCOUNTER — OFFICE VISIT (OUTPATIENT)
Dept: FAMILY MEDICINE | Facility: CLINIC | Age: 84
End: 2019-10-02
Attending: FAMILY MEDICINE
Payer: MEDICARE

## 2019-10-02 VITALS
OXYGEN SATURATION: 96 % | SYSTOLIC BLOOD PRESSURE: 110 MMHG | BODY MASS INDEX: 24.71 KG/M2 | DIASTOLIC BLOOD PRESSURE: 60 MMHG | WEIGHT: 125.88 LBS | HEART RATE: 71 BPM | HEIGHT: 60 IN

## 2019-10-02 DIAGNOSIS — I83.93 ASYMPTOMATIC VARICOSE VEINS OF BOTH LOWER EXTREMITIES: ICD-10-CM

## 2019-10-02 DIAGNOSIS — E11.9 CONTROLLED TYPE 2 DIABETES MELLITUS WITHOUT COMPLICATION, WITHOUT LONG-TERM CURRENT USE OF INSULIN: ICD-10-CM

## 2019-10-02 DIAGNOSIS — E55.9 VITAMIN D DEFICIENCY: ICD-10-CM

## 2019-10-02 DIAGNOSIS — F32.0 MILD SINGLE CURRENT EPISODE OF MAJOR DEPRESSIVE DISORDER: ICD-10-CM

## 2019-10-02 DIAGNOSIS — I15.2 HYPERTENSION ASSOCIATED WITH DIABETES: Primary | ICD-10-CM

## 2019-10-02 DIAGNOSIS — E11.69 DYSLIPIDEMIA ASSOCIATED WITH TYPE 2 DIABETES MELLITUS: ICD-10-CM

## 2019-10-02 DIAGNOSIS — M79.601 RIGHT ARM PAIN: ICD-10-CM

## 2019-10-02 DIAGNOSIS — E78.5 DYSLIPIDEMIA ASSOCIATED WITH TYPE 2 DIABETES MELLITUS: ICD-10-CM

## 2019-10-02 DIAGNOSIS — F32.4 MAJOR DEPRESSIVE DISORDER WITH SINGLE EPISODE, IN PARTIAL REMISSION: ICD-10-CM

## 2019-10-02 DIAGNOSIS — N39.46 MIXED INCONTINENCE URGE AND STRESS: ICD-10-CM

## 2019-10-02 DIAGNOSIS — E11.59 HYPERTENSION ASSOCIATED WITH DIABETES: Primary | ICD-10-CM

## 2019-10-02 DIAGNOSIS — E11.9 DM TYPE 2 WITHOUT RETINOPATHY: ICD-10-CM

## 2019-10-02 DIAGNOSIS — Z23 IMMUNIZATION DUE: ICD-10-CM

## 2019-10-02 PROCEDURE — 90662 IIV NO PRSV INCREASED AG IM: CPT | Mod: S$GLB,,, | Performed by: FAMILY MEDICINE

## 2019-10-02 PROCEDURE — G0008 FLU VACCINE - HIGH DOSE (65+) PRESERVATIVE FREE IM: ICD-10-PCS | Mod: S$GLB,,, | Performed by: FAMILY MEDICINE

## 2019-10-02 PROCEDURE — 99214 PR OFFICE/OUTPT VISIT, EST, LEVL IV, 30-39 MIN: ICD-10-PCS | Mod: 25,S$GLB,, | Performed by: FAMILY MEDICINE

## 2019-10-02 PROCEDURE — 99214 OFFICE O/P EST MOD 30 MIN: CPT | Mod: 25,S$GLB,, | Performed by: FAMILY MEDICINE

## 2019-10-02 PROCEDURE — 1101F PR PT FALLS ASSESS DOC 0-1 FALLS W/OUT INJ PAST YR: ICD-10-PCS | Mod: CPTII,S$GLB,, | Performed by: FAMILY MEDICINE

## 2019-10-02 PROCEDURE — 1101F PT FALLS ASSESS-DOCD LE1/YR: CPT | Mod: CPTII,S$GLB,, | Performed by: FAMILY MEDICINE

## 2019-10-02 PROCEDURE — 99999 PR PBB SHADOW E&M-EST. PATIENT-LVL III: ICD-10-PCS | Mod: PBBFAC,,, | Performed by: FAMILY MEDICINE

## 2019-10-02 PROCEDURE — 99999 PR PBB SHADOW E&M-EST. PATIENT-LVL III: CPT | Mod: PBBFAC,,, | Performed by: FAMILY MEDICINE

## 2019-10-02 PROCEDURE — 99499 RISK ADDL DX/OHS AUDIT: ICD-10-PCS | Mod: S$GLB,,, | Performed by: FAMILY MEDICINE

## 2019-10-02 PROCEDURE — 99499 UNLISTED E&M SERVICE: CPT | Mod: S$GLB,,, | Performed by: FAMILY MEDICINE

## 2019-10-02 PROCEDURE — 90662 FLU VACCINE - HIGH DOSE (65+) PRESERVATIVE FREE IM: ICD-10-PCS | Mod: S$GLB,,, | Performed by: FAMILY MEDICINE

## 2019-10-02 PROCEDURE — G0008 ADMIN INFLUENZA VIRUS VAC: HCPCS | Mod: S$GLB,,, | Performed by: FAMILY MEDICINE

## 2019-10-02 RX ORDER — DICLOFENAC SODIUM 10 MG/G
2 GEL TOPICAL 2 TIMES DAILY PRN
Qty: 100 G | Refills: 5 | Status: SHIPPED | OUTPATIENT
Start: 2019-10-02 | End: 2020-03-25 | Stop reason: SDUPTHER

## 2019-10-02 RX ORDER — FLUOXETINE 10 MG/1
10 CAPSULE ORAL DAILY
Qty: 90 CAPSULE | Refills: 3 | Status: SHIPPED | OUTPATIENT
Start: 2019-10-02 | End: 2020-03-25 | Stop reason: SDUPTHER

## 2019-10-02 NOTE — PROGRESS NOTES
Subjective:       Patient ID: Candy Escobar is a 87 y.o. female.    Chief Complaint: Follow-up (6 mths follow-up); Flu Vaccine; and Fatigue    87 yr old pleasant white female with  DM II controlled,  Facet arthropathy neck, hypertension, depression, anxiety, GERD, OA knee, presents today for her routine 6 month follow up and also neuropathic pain, joint aches and lab work.    DM II - diet controlled -A1C                   5.4                 03/04/2019                                                    foot and eye exam UTD - not on ACE    HTN - chronic - controlled - she is asymptomatic - no CP, SOB, MINER, PND or orthopnea, no bowel or bladder problems    Facet arthropathy neck/spasms - stable - uses muscle relaxant as needed at night     Depression/anxiety - controlled - on prozac daily for years and ativan as needed - no current mood problems - she is lil anxious taking on surgery - no SI/HI    GERD - controlled - on PPI as needed    OA knee s/p surgery - on mobic as needed    HLD - at goal -  LDLCALC                  72.6                02/27/2018                                   - not on any meds - diet compliant    - need statin    History as below - reviewed      Health maintenance  -labs UTD  -vaccines reports UTD      Hypertension   This is a chronic problem. The current episode started more than 1 year ago. The problem has been gradually improving since onset. The problem is controlled. Pertinent negatives include no anxiety, chest pain, headaches, palpitations or shortness of breath. There are no associated agents to hypertension. Risk factors for coronary artery disease include dyslipidemia, diabetes mellitus and post-menopausal state. Past treatments include lifestyle changes. The current treatment provides significant improvement. There are no compliance problems.  There is no history of angina, CAD/MI, CVA, left ventricular hypertrophy, PVD or retinopathy. There is no history of chronic renal  disease, hypercortisolism, hyperparathyroidism, pheochromocytoma, renovascular disease or a thyroid problem.   Pain   Associated symptoms include arthralgias and myalgias. Pertinent negatives include no chest pain, congestion, diaphoresis, headaches, nausea or sore throat.   Dizziness:   Chronicity:  Recurrent  Onset:  1 to 4 weeks ago  Progression since onset:  Gradually improving  Frequency:  Every few days  Severity:  Mild  Duration:  5 minutes  Dizziness characteristics:  Sensation of movement   Associated symptoms: hearing loss.no headaches, no nausea, no diaphoresis, no palpitations and no chest pain.  Aggravated by:  Position changes  Treatments tried:  Rest and body position changes  Improvements on treatment:  Mildno strokes, no neurologic disease, no head trauma, no balance testing, no ear trauma, no head trauma, no anxiety, no ear tubes, no environmental allergies and no CT head.  Leg Pain    There was no injury mechanism. The pain is present in the left leg, left knee, right knee and right leg. The quality of the pain is described as aching. The pain is at a severity of 5/10. The pain is moderate. The pain has been improving since onset. The symptoms are aggravated by movement. She has tried NSAIDs for the symptoms. The treatment provided moderate relief.     Review of Systems   Constitutional: Negative.  Negative for activity change, diaphoresis and unexpected weight change.   HENT: Positive for hearing loss. Negative for congestion, ear discharge, rhinorrhea, sore throat and voice change.    Eyes: Negative.  Negative for pain, discharge and visual disturbance.   Respiratory: Negative.  Negative for chest tightness, shortness of breath and wheezing.    Cardiovascular: Negative.  Negative for chest pain and palpitations.   Gastrointestinal: Negative.  Negative for abdominal distention, anal bleeding, constipation and nausea.   Endocrine: Negative.  Negative for cold intolerance, polydipsia and polyuria.    Genitourinary: Negative.  Negative for decreased urine volume, difficulty urinating, dysuria, frequency, menstrual problem and vaginal pain.   Musculoskeletal: Positive for arthralgias and myalgias. Negative for gait problem.   Skin: Negative.  Negative for color change, pallor and wound.   Allergic/Immunologic: Negative.  Negative for environmental allergies and immunocompromised state.   Neurological: Positive for dizziness. Negative for tremors, seizures, speech difficulty and headaches.   Hematological: Negative.  Negative for adenopathy. Does not bruise/bleed easily.   Psychiatric/Behavioral: Negative.  Negative for agitation, confusion, decreased concentration, hallucinations, self-injury and suicidal ideas. The patient is not nervous/anxious.        PMH/PSH/FH/SH/MED/ALLERGY reviewed    Objective:       Vitals:    10/02/19 0814   BP: 110/60   Pulse: 71       Physical Exam   Constitutional: She is oriented to person, place, and time. She appears well-developed and well-nourished. No distress.   HENT:   Head: Normocephalic and atraumatic.   Right Ear: External ear normal.   Left Ear: External ear normal.   Nose: Nose normal.   Mouth/Throat: Oropharynx is clear and moist. No oropharyngeal exudate.   Eyes: Pupils are equal, round, and reactive to light. Conjunctivae and EOM are normal. Right eye exhibits no discharge. Left eye exhibits no discharge. No scleral icterus.   Neck: Normal range of motion. Neck supple. No JVD present. No tracheal deviation present. No thyromegaly present.   Cardiovascular: Normal rate, regular rhythm, normal heart sounds and intact distal pulses. Exam reveals no gallop and no friction rub.   No murmur heard.  Pulmonary/Chest: Effort normal and breath sounds normal. No stridor. She has no wheezes. She has no rales. She exhibits no tenderness.   Abdominal: Soft. Bowel sounds are normal. She exhibits no distension and no mass. There is no tenderness. There is no rebound and no guarding.  No hernia.   Musculoskeletal: Normal range of motion. She exhibits no edema or tenderness.   Lymphadenopathy:     She has no cervical adenopathy.   Neurological: She is alert and oriented to person, place, and time. She has normal reflexes. She displays normal reflexes. No cranial nerve deficit. She exhibits normal muscle tone. Coordination normal.   Skin: Skin is warm and dry. No rash noted. She is not diaphoretic. No erythema. No pallor.   Psychiatric: She has a normal mood and affect. Her behavior is normal. Judgment and thought content normal.       Assessment:       1. Hypertension associated with diabetes    2. Dyslipidemia associated with type 2 diabetes mellitus    3. Major depressive disorder with single episode, in partial remission    4. Asymptomatic varicose veins of both lower extremities    5. Mixed incontinence urge and stress    6. DM type 2 without retinopathy    7. Controlled type 2 diabetes mellitus without complication, without long-term current use of insulin    8. Vitamin D deficiency    9. Mild single current episode of major depressive disorder    10. Right arm pain    11. Immunization due        Plan:           Candy was seen today for follow-up, flu vaccine and fatigue.    Diagnoses and all orders for this visit:    Hypertension associated with diabetes  -     CBC auto differential; Future  -     Comprehensive metabolic panel; Future  -     Lipid panel; Future    Dyslipidemia associated with type 2 diabetes mellitus  -     CBC auto differential; Future  -     Comprehensive metabolic panel; Future  -     Lipid panel; Future    Major depressive disorder with single episode, in partial remission    Asymptomatic varicose veins of both lower extremities    Mixed incontinence urge and stress    DM type 2 without retinopathy  -     CBC auto differential; Future  -     Comprehensive metabolic panel; Future  -     Hemoglobin A1c; Future  -     Lipid panel; Future  -     Vitamin D; Future  -      Urinalysis; Future  -     Microalbumin/creatinine urine ratio; Future    Controlled type 2 diabetes mellitus without complication, without long-term current use of insulin  -     CBC auto differential; Future  -     Comprehensive metabolic panel; Future  -     Hemoglobin A1c; Future  -     Lipid panel; Future  -     Vitamin D; Future  -     Urinalysis; Future  -     Microalbumin/creatinine urine ratio; Future    Vitamin D deficiency  -     Vitamin D; Future    Mild single current episode of major depressive disorder  -     FLUoxetine 10 MG capsule; Take 1 capsule (10 mg total) by mouth once daily.    Right arm pain  -     diclofenac sodium (VOLTAREN) 1 % Gel; Apply 2 g topically 2 (two) times daily as needed.    Immunization due  -     Influenza - High Dose (65+) (PF) (IM)      HTN  -controlled  -continue losartan 25 daily    HLD  -controlled  -start statin  -labs    DM II  -controlled  -labs    SK  -reassurance    MDD  -controlled    Chronic knee pain  -continue mobic    Neuropathic pain  -not well controlled  -increase neurontin 300 TID    Spent adequate time in obtaining history and explaining differentials    40 minutes spent during this visit of which greater than 50% devoted to face-face counseling and coordination of care regarding diagnosis and management plan    .RTC 6 months or prn

## 2019-10-03 ENCOUNTER — LAB VISIT (OUTPATIENT)
Dept: LAB | Facility: HOSPITAL | Age: 84
End: 2019-10-03
Attending: FAMILY MEDICINE
Payer: MEDICARE

## 2019-10-03 DIAGNOSIS — E11.9 CONTROLLED TYPE 2 DIABETES MELLITUS WITHOUT COMPLICATION, WITHOUT LONG-TERM CURRENT USE OF INSULIN: ICD-10-CM

## 2019-10-03 DIAGNOSIS — E11.69 DYSLIPIDEMIA ASSOCIATED WITH TYPE 2 DIABETES MELLITUS: ICD-10-CM

## 2019-10-03 DIAGNOSIS — I15.2 HYPERTENSION ASSOCIATED WITH DIABETES: ICD-10-CM

## 2019-10-03 DIAGNOSIS — E78.5 DYSLIPIDEMIA ASSOCIATED WITH TYPE 2 DIABETES MELLITUS: ICD-10-CM

## 2019-10-03 DIAGNOSIS — E11.9 DM TYPE 2 WITHOUT RETINOPATHY: ICD-10-CM

## 2019-10-03 DIAGNOSIS — E11.59 HYPERTENSION ASSOCIATED WITH DIABETES: ICD-10-CM

## 2019-10-03 DIAGNOSIS — E55.9 VITAMIN D DEFICIENCY: ICD-10-CM

## 2019-10-03 LAB
25(OH)D3+25(OH)D2 SERPL-MCNC: 33 NG/ML (ref 30–96)
ALBUMIN SERPL BCP-MCNC: 3.8 G/DL (ref 3.5–5.2)
ALP SERPL-CCNC: 51 U/L (ref 55–135)
ALT SERPL W/O P-5'-P-CCNC: 8 U/L (ref 10–44)
ANION GAP SERPL CALC-SCNC: 8 MMOL/L (ref 8–16)
AST SERPL-CCNC: 20 U/L (ref 10–40)
BASOPHILS # BLD AUTO: 0.02 K/UL (ref 0–0.2)
BASOPHILS NFR BLD: 0.5 % (ref 0–1.9)
BILIRUB SERPL-MCNC: 0.9 MG/DL (ref 0.1–1)
BUN SERPL-MCNC: 14 MG/DL (ref 8–23)
CALCIUM SERPL-MCNC: 9.5 MG/DL (ref 8.7–10.5)
CHLORIDE SERPL-SCNC: 96 MMOL/L (ref 95–110)
CHOLEST SERPL-MCNC: 222 MG/DL (ref 120–199)
CHOLEST/HDLC SERPL: 2.8 {RATIO} (ref 2–5)
CO2 SERPL-SCNC: 30 MMOL/L (ref 23–29)
CREAT SERPL-MCNC: 0.9 MG/DL (ref 0.5–1.4)
DIFFERENTIAL METHOD: ABNORMAL
EOSINOPHIL # BLD AUTO: 0.2 K/UL (ref 0–0.5)
EOSINOPHIL NFR BLD: 5.7 % (ref 0–8)
ERYTHROCYTE [DISTWIDTH] IN BLOOD BY AUTOMATED COUNT: 13.4 % (ref 11.5–14.5)
EST. GFR  (AFRICAN AMERICAN): >60 ML/MIN/1.73 M^2
EST. GFR  (NON AFRICAN AMERICAN): 58 ML/MIN/1.73 M^2
ESTIMATED AVG GLUCOSE: 111 MG/DL (ref 68–131)
GLUCOSE SERPL-MCNC: 89 MG/DL (ref 70–110)
HBA1C MFR BLD HPLC: 5.5 % (ref 4–5.6)
HCT VFR BLD AUTO: 35.9 % (ref 37–48.5)
HDLC SERPL-MCNC: 78 MG/DL (ref 40–75)
HDLC SERPL: 35.1 % (ref 20–50)
HGB BLD-MCNC: 11.6 G/DL (ref 12–16)
LDLC SERPL CALC-MCNC: 129.6 MG/DL (ref 63–159)
LYMPHOCYTES # BLD AUTO: 0.5 K/UL (ref 1–4.8)
LYMPHOCYTES NFR BLD: 13.8 % (ref 18–48)
MCH RBC QN AUTO: 31.2 PG (ref 27–31)
MCHC RBC AUTO-ENTMCNC: 32.3 G/DL (ref 32–36)
MCV RBC AUTO: 97 FL (ref 82–98)
MONOCYTES # BLD AUTO: 0.4 K/UL (ref 0.3–1)
MONOCYTES NFR BLD: 11.4 % (ref 4–15)
NEUTROPHILS # BLD AUTO: 2.6 K/UL (ref 1.8–7.7)
NEUTROPHILS NFR BLD: 68.6 % (ref 38–73)
NONHDLC SERPL-MCNC: 144 MG/DL
PLATELET # BLD AUTO: 228 K/UL (ref 150–350)
PMV BLD AUTO: 11.3 FL (ref 9.2–12.9)
POTASSIUM SERPL-SCNC: 5 MMOL/L (ref 3.5–5.1)
PROT SERPL-MCNC: 6.8 G/DL (ref 6–8.4)
RBC # BLD AUTO: 3.72 M/UL (ref 4–5.4)
SODIUM SERPL-SCNC: 134 MMOL/L (ref 136–145)
TRIGL SERPL-MCNC: 72 MG/DL (ref 30–150)
WBC # BLD AUTO: 3.85 K/UL (ref 3.9–12.7)

## 2019-10-03 PROCEDURE — 80061 LIPID PANEL: CPT

## 2019-10-03 PROCEDURE — 85025 COMPLETE CBC W/AUTO DIFF WBC: CPT

## 2019-10-03 PROCEDURE — 80053 COMPREHEN METABOLIC PANEL: CPT

## 2019-10-03 PROCEDURE — 83036 HEMOGLOBIN GLYCOSYLATED A1C: CPT

## 2019-10-03 PROCEDURE — 36415 COLL VENOUS BLD VENIPUNCTURE: CPT

## 2019-10-03 PROCEDURE — 82306 VITAMIN D 25 HYDROXY: CPT

## 2019-10-04 DIAGNOSIS — Z78.0 POST-MENOPAUSAL: ICD-10-CM

## 2019-10-04 RX ORDER — ESTRADIOL 0.1 MG/D
PATCH TRANSDERMAL
Qty: 12 PATCH | Refills: 1 | Status: SHIPPED | OUTPATIENT
Start: 2019-10-04 | End: 2020-03-26 | Stop reason: SDUPTHER

## 2019-10-05 DIAGNOSIS — M25.561 CHRONIC PAIN OF RIGHT KNEE: ICD-10-CM

## 2019-10-05 DIAGNOSIS — M17.11 OSTEOARTHRITIS OF RIGHT KNEE, UNSPECIFIED OSTEOARTHRITIS TYPE: ICD-10-CM

## 2019-10-05 DIAGNOSIS — G89.29 CHRONIC PAIN OF RIGHT KNEE: ICD-10-CM

## 2019-10-05 RX ORDER — MELOXICAM 7.5 MG/1
TABLET ORAL
Qty: 90 TABLET | Refills: 1 | Status: SHIPPED | OUTPATIENT
Start: 2019-10-05 | End: 2021-05-04 | Stop reason: SDUPTHER

## 2019-10-05 RX ORDER — GABAPENTIN 300 MG/1
CAPSULE ORAL
Qty: 90 CAPSULE | Refills: 1 | Status: SHIPPED | OUTPATIENT
Start: 2019-10-05 | End: 2020-03-25 | Stop reason: SDUPTHER

## 2019-11-09 ENCOUNTER — HOSPITAL ENCOUNTER (EMERGENCY)
Facility: HOSPITAL | Age: 84
Discharge: HOME | End: 2019-11-09
Attending: EMERGENCY MEDICINE
Payer: MEDICARE

## 2019-11-09 ENCOUNTER — NURSE TRIAGE (OUTPATIENT)
Dept: ADMINISTRATIVE | Facility: CLINIC | Age: 84
End: 2019-11-09

## 2019-11-09 VITALS
BODY MASS INDEX: 24.54 KG/M2 | HEIGHT: 60 IN | HEART RATE: 77 BPM | WEIGHT: 125 LBS | SYSTOLIC BLOOD PRESSURE: 148 MMHG | RESPIRATION RATE: 16 BRPM | TEMPERATURE: 98 F | DIASTOLIC BLOOD PRESSURE: 79 MMHG | OXYGEN SATURATION: 98 %

## 2019-11-09 DIAGNOSIS — R53.1 WEAKNESS: ICD-10-CM

## 2019-11-09 DIAGNOSIS — I15.2 HYPERTENSION ASSOCIATED WITH DIABETES: ICD-10-CM

## 2019-11-09 DIAGNOSIS — R53.83 FATIGUE, UNSPECIFIED TYPE: Primary | ICD-10-CM

## 2019-11-09 DIAGNOSIS — E11.59 HYPERTENSION ASSOCIATED WITH DIABETES: ICD-10-CM

## 2019-11-09 LAB
ANION GAP SERPL CALC-SCNC: 10 MMOL/L (ref 8–16)
BASOPHILS # BLD AUTO: 0.01 K/UL (ref 0–0.2)
BASOPHILS NFR BLD: 0.2 % (ref 0–1.9)
BILIRUB UR QL STRIP: NEGATIVE
BUN SERPL-MCNC: 13 MG/DL (ref 8–23)
CALCIUM SERPL-MCNC: 10 MG/DL (ref 8.7–10.5)
CHLORIDE SERPL-SCNC: 94 MMOL/L (ref 95–110)
CK SERPL-CCNC: 87 U/L (ref 20–180)
CLARITY UR: CLEAR
CO2 SERPL-SCNC: 28 MMOL/L (ref 23–29)
COLOR UR: YELLOW
CREAT SERPL-MCNC: 0.8 MG/DL (ref 0.5–1.4)
D DIMER PPP IA.FEU-MCNC: 1.07 MG/L FEU
DIFFERENTIAL METHOD: ABNORMAL
EOSINOPHIL # BLD AUTO: 0.1 K/UL (ref 0–0.5)
EOSINOPHIL NFR BLD: 0.9 % (ref 0–8)
ERYTHROCYTE [DISTWIDTH] IN BLOOD BY AUTOMATED COUNT: 13.2 % (ref 11.5–14.5)
EST. GFR  (AFRICAN AMERICAN): >60 ML/MIN/1.73 M^2
EST. GFR  (NON AFRICAN AMERICAN): >60 ML/MIN/1.73 M^2
GLUCOSE SERPL-MCNC: 111 MG/DL (ref 70–110)
GLUCOSE UR QL STRIP: NEGATIVE
HCT VFR BLD AUTO: 38.8 % (ref 37–48.5)
HGB BLD-MCNC: 13 G/DL (ref 12–16)
HGB UR QL STRIP: NEGATIVE
INFLUENZA A, MOLECULAR: NEGATIVE
INFLUENZA B, MOLECULAR: NEGATIVE
KETONES UR QL STRIP: ABNORMAL
LEUKOCYTE ESTERASE UR QL STRIP: NEGATIVE
LYMPHOCYTES # BLD AUTO: 0.5 K/UL (ref 1–4.8)
LYMPHOCYTES NFR BLD: 9.3 % (ref 18–48)
MAGNESIUM SERPL-MCNC: 1.5 MG/DL (ref 1.6–2.6)
MCH RBC QN AUTO: 31.6 PG (ref 27–31)
MCHC RBC AUTO-ENTMCNC: 33.5 G/DL (ref 32–36)
MCV RBC AUTO: 94 FL (ref 82–98)
MONOCYTES # BLD AUTO: 0.4 K/UL (ref 0.3–1)
MONOCYTES NFR BLD: 7.1 % (ref 4–15)
NEUTROPHILS # BLD AUTO: 4.5 K/UL (ref 1.8–7.7)
NEUTROPHILS NFR BLD: 82.5 % (ref 38–73)
NITRITE UR QL STRIP: NEGATIVE
PH UR STRIP: 7 [PH] (ref 5–8)
PLATELET # BLD AUTO: 249 K/UL (ref 150–350)
PMV BLD AUTO: 11.2 FL (ref 9.2–12.9)
POTASSIUM SERPL-SCNC: 4.3 MMOL/L (ref 3.5–5.1)
PROT UR QL STRIP: NEGATIVE
RBC # BLD AUTO: 4.11 M/UL (ref 4–5.4)
SODIUM SERPL-SCNC: 132 MMOL/L (ref 136–145)
SP GR UR STRIP: 1.01 (ref 1–1.03)
SPECIMEN SOURCE: NORMAL
TSH SERPL DL<=0.005 MIU/L-ACNC: 2.06 UIU/ML (ref 0.4–4)
URN SPEC COLLECT METH UR: ABNORMAL
UROBILINOGEN UR STRIP-ACNC: NEGATIVE EU/DL
WBC # BLD AUTO: 5.46 K/UL (ref 3.9–12.7)

## 2019-11-09 PROCEDURE — 85379 FIBRIN DEGRADATION QUANT: CPT

## 2019-11-09 PROCEDURE — 83735 ASSAY OF MAGNESIUM: CPT

## 2019-11-09 PROCEDURE — 84443 ASSAY THYROID STIM HORMONE: CPT

## 2019-11-09 PROCEDURE — 25500020 PHARM REV CODE 255: Performed by: EMERGENCY MEDICINE

## 2019-11-09 PROCEDURE — 25000003 PHARM REV CODE 250: Performed by: EMERGENCY MEDICINE

## 2019-11-09 PROCEDURE — 87502 INFLUENZA DNA AMP PROBE: CPT

## 2019-11-09 PROCEDURE — 80048 BASIC METABOLIC PNL TOTAL CA: CPT

## 2019-11-09 PROCEDURE — 82550 ASSAY OF CK (CPK): CPT

## 2019-11-09 PROCEDURE — 96360 HYDRATION IV INFUSION INIT: CPT

## 2019-11-09 PROCEDURE — 99285 EMERGENCY DEPT VISIT HI MDM: CPT | Mod: 25

## 2019-11-09 PROCEDURE — 93005 ELECTROCARDIOGRAM TRACING: CPT

## 2019-11-09 PROCEDURE — 85025 COMPLETE CBC W/AUTO DIFF WBC: CPT

## 2019-11-09 PROCEDURE — 93010 ELECTROCARDIOGRAM REPORT: CPT | Mod: ,,, | Performed by: INTERNAL MEDICINE

## 2019-11-09 PROCEDURE — 93010 EKG 12-LEAD: ICD-10-PCS | Mod: ,,, | Performed by: INTERNAL MEDICINE

## 2019-11-09 PROCEDURE — 63600175 PHARM REV CODE 636 W HCPCS: Performed by: EMERGENCY MEDICINE

## 2019-11-09 PROCEDURE — 81003 URINALYSIS AUTO W/O SCOPE: CPT

## 2019-11-09 RX ORDER — LANOLIN ALCOHOL/MO/W.PET/CERES
400 CREAM (GRAM) TOPICAL ONCE
Status: DISCONTINUED | OUTPATIENT
Start: 2019-11-09 | End: 2019-11-09

## 2019-11-09 RX ORDER — ONDANSETRON 4 MG/1
4 TABLET, ORALLY DISINTEGRATING ORAL EVERY 6 HOURS PRN
Qty: 30 TABLET | Refills: 0 | Status: SHIPPED | OUTPATIENT
Start: 2019-11-09 | End: 2019-11-09 | Stop reason: SDUPTHER

## 2019-11-09 RX ORDER — LANOLIN ALCOHOL/MO/W.PET/CERES
400 CREAM (GRAM) TOPICAL ONCE
Status: COMPLETED | OUTPATIENT
Start: 2019-11-09 | End: 2019-11-09

## 2019-11-09 RX ORDER — ONDANSETRON 4 MG/1
4 TABLET, ORALLY DISINTEGRATING ORAL EVERY 6 HOURS PRN
Qty: 30 TABLET | Refills: 0 | Status: SHIPPED | OUTPATIENT
Start: 2019-11-09 | End: 2020-05-19

## 2019-11-09 RX ORDER — LOSARTAN POTASSIUM 25 MG/1
25 TABLET ORAL NIGHTLY
Qty: 90 TABLET | Refills: 3 | Status: SHIPPED | OUTPATIENT
Start: 2019-11-09 | End: 2019-11-09 | Stop reason: SDUPTHER

## 2019-11-09 RX ORDER — LOSARTAN POTASSIUM 25 MG/1
25 TABLET ORAL NIGHTLY
Qty: 90 TABLET | Refills: 3 | Status: SHIPPED | OUTPATIENT
Start: 2019-11-09 | End: 2020-02-13 | Stop reason: SDUPTHER

## 2019-11-09 RX ORDER — ONDANSETRON 4 MG/1
4 TABLET, ORALLY DISINTEGRATING ORAL
Status: COMPLETED | OUTPATIENT
Start: 2019-11-09 | End: 2019-11-09

## 2019-11-09 RX ADMIN — IOHEXOL 100 ML: 350 INJECTION, SOLUTION INTRAVENOUS at 01:11

## 2019-11-09 RX ADMIN — SODIUM CHLORIDE 500 ML: 0.9 INJECTION, SOLUTION INTRAVENOUS at 12:11

## 2019-11-09 RX ADMIN — ONDANSETRON 4 MG: 4 TABLET, ORALLY DISINTEGRATING ORAL at 12:11

## 2019-11-09 RX ADMIN — MAGNESIUM OXIDE TAB 400 MG (241.3 MG ELEMENTAL MG) 400 MG: 400 (241.3 MG) TAB at 02:11

## 2019-11-09 NOTE — TELEPHONE ENCOUNTER
Nauseated since Wednesday. Reported weakness, splitting headaches. Patient states that her skin is clammy and she has been in bed all week. Reports dehydration. Patient advised to hand up, call 911, and go to the ER immediately. Patient verbalized understanding.     Reason for Disposition   Shock suspected (e.g., cold/pale/clammy skin, too weak to stand, low BP, rapid pulse)    Additional Information   Negative: Severe difficulty breathing (e.g., struggling for each breath, speaks in single words)    Protocols used: ST WEAKNESS (GENERALIZED) AND FATIGUE-A-AH

## 2019-11-09 NOTE — ED PROVIDER NOTES
Encounter Date: 11/9/2019    SCRIBE #1 NOTE: I, Michelle Ahumada, am scribing for, and in the presence of,  Dr. London. I have scribed the entire note.       History     Chief Complaint   Patient presents with    Fatigue     Reports over the past month has been having continuous fatigue and over the past week has been having increased fatigue, headaches, and N/V.      Pt is a 86 yo female w/h/o HTN, DM, and osteoarthritis who presents to the ED for continuous fatigue over the last week. Patient also complains of associated intermittent headache, nausea, vomiting, productive cough, and postnasal drip. Patient reports she feels her headache is located mainly to her forehead and the back of her neck, intermittent for last week, not the worst HA of her life, onset not like a thunderclap, denies photophobia and fever. She says she has been unable to tolerate any PO intake as it causes her to vomit. Patient denies shortness of breath, leg swelling or pain, diarrhea, or any other complaints at this time. She reports to not be compliant with her Losartan as she has not been able to get a refill on the medication. Patient received her flu vaccine this year.    The history is provided by the patient.     Review of patient's allergies indicates:   Allergen Reactions    Propofol analogues Other (See Comments)     Low blood pressure, tremors    Oxycodone Nausea And Vomiting    Pcn [penicillins] Nausea Only    Tetracyclines Nausea Only     Past Medical History:   Diagnosis Date    Allergy     Seasonal    Cataract     Depression     Diabetes mellitus     diet controlled    Diverticulosis     Encounter for blood transfusion     Fever blister     GERD (gastroesophageal reflux disease)     Hypertension 11/12/2014    Joint pain     Nuclear sclerosis - Both Eyes 7/16/2013    Osteoarthritis     Osteopenia     Osteoporosis     Seizures 2008    TIA    Tachycardia      Past Surgical History:   Procedure Laterality Date     ANKLE SURGERY  89    ankle replacement, left    BLADDER SUSPENSION      FOOT SURGERY      HYSTERECTOMY      left ovary intact    INTRAOCULAR PROSTHESES INSERTION Left 10/30/2018    Procedure: INSERTION, IOL PROSTHESIS;  Surgeon: Jorge A Miller MD;  Location: [address];  Service: Ophthalmology;  Laterality: Left;    JOINT REPLACEMENT      left ankle and knee arthroplasty    KNEE SURGERY      left TKR    KNEE SURGERY  14    right TKR    PHACOEMULSIFICATION OF CATARACT Left 10/30/2018    Procedure: PHACOEMULSIFICATION, CATARACT;  Surgeon: Jorge A Miller MD;  Location: [address];  Service: Ophthalmology;  Laterality: Left;    SHOULDER SURGERY  2009    right arthroscopy    TONSILLECTOMY       Family History   Problem Relation Age of Onset    Diabetes Mother     Diabetes Father     Heart disease Father     Diabetes Sister     Polychondritis Sister     Polychondritis Unknown     Cancer Neg Hx     Amblyopia Neg Hx     Blindness Neg Hx     Cataracts Neg Hx     Glaucoma Neg Hx     Hypertension Neg Hx     Macular degeneration Neg Hx     Retinal detachment Neg Hx     Strabismus Neg Hx     Stroke Neg Hx     Thyroid disease Neg Hx     Melanoma Neg Hx      Social History     Tobacco Use    Smoking status: Former Smoker     Types: Cigarettes     Last attempt to quit: 1981     Years since quittin.5    Smokeless tobacco: Never Used   Substance Use Topics    Alcohol use: Yes     Alcohol/week: 4.0 standard drinks     Types: 4 Glasses of wine per week     Comment: Nightly    Drug use: No     Review of Systems   Constitutional: Positive for fatigue.   HENT: Positive for postnasal drip.    Respiratory: Positive for cough.    Gastrointestinal: Positive for nausea and vomiting.   Neurological: Positive for headaches.   All other systems reviewed and are negative.      Physical Exam     Initial Vitals [19 1015]   BP Pulse Resp Temp SpO2   138/72 92 19  98.1 °F (36.7 °C) (!) 94 %      MAP       --         Physical Exam  Appearance: No acute distress.  HEENT: Normocephalic. Atraumatic. No conjunctival injection. EOMI. PERRL. Oropharynx clear. No sinus ttp.   Neck: No JVD. No LN. Neck supple.    Chest: Non-tender. Clear to auscultation bilaterally.  Good air movement.  No wheezes.  No rhonchi.  Cardiovascular: Regular rate and rhythm. No murmurs. No gallops. No rubs. +2 radial pulses bilaterally.  Abdomen: Soft. Not distended. Nontender. No guarding. No rebound. No Masses  Musculoskeletal: Good range of motion all joints. No deformities.    Neurologic: Alert and oriented x 3.  Equal strength in upper and lower extremities bilaterally. Normal sensation. No facial droop. Normal speech. Neg Kernigs and Brudzinski.   Psych:  Appropriate, conversant.    Integumentary: No rashes seen.  Good turgor.  No abrasions.  No ecchymoses.    ED Course   Procedures  Labs Reviewed   CBC W/ AUTO DIFFERENTIAL - Abnormal; Notable for the following components:       Result Value    Mean Corpuscular Hemoglobin 31.6 (*)     Lymph # 0.5 (*)     Gran% 82.5 (*)     Lymph% 9.3 (*)     All other components within normal limits   BASIC METABOLIC PANEL - Abnormal; Notable for the following components:    Sodium 132 (*)     Chloride 94 (*)     Glucose 111 (*)     All other components within normal limits   MAGNESIUM - Abnormal; Notable for the following components:    Magnesium 1.5 (*)     All other components within normal limits   URINALYSIS, REFLEX TO URINE CULTURE - Abnormal; Notable for the following components:    Ketones, UA 2+ (*)     All other components within normal limits    Narrative:     Preferred Collection Type->Urine, Clean Catch   D DIMER, QUANTITATIVE - Abnormal; Notable for the following components:    D-Dimer 1.07 (*)     All other components within normal limits   INFLUENZA A & B BY MOLECULAR   TSH   CK          X-Rays:   Independently Interpreted Readings:   Other  Readings:  Reviewed by myself, read by radiology.    Imaging Results          CTA Chest Non-Coronary - PE Study (Final result)  Result time 11/09/19 13:16:48    Final result by Viral Plaza DO (11/09/19 13:16:48)                 Impression:      1. No evidence to suggest pulmonary embolism.  2. Findings suggesting early nonspecific interstitial lung disease.      Electronically signed by: Viral Plaza DO  Date:    11/09/2019  Time:    13:16             Narrative:    EXAMINATION:  CTA CHEST NON CORONARY    CLINICAL HISTORY:  Chest pain, acute, PE suspected, intermed prob, positive D-dimer;    TECHNIQUE:  CT of the chest was acquired helically utilizing a low-dose technique from the lung apices through the posterior costophrenic angles status post administration of 100 cc of Omnipaque 350.  Bolus timing was utilized to optimize opacification of the pulmonary arterial system. 3-D maximum intensity projection and multiplanar reconstructions were created from the source data set and interpreted.    COMPARISON:  Chest x-ray from 11/09/2019 and CT chest from 01/28/2013    FINDINGS:  Very minimal nonspecific subpleural reticulation noted diffusely throughout both lungs.  There is also mosaic attenuation of the lung parenchyma bilaterally.  Findings are suggestive of interstitial lung disease.  No honeycombing.  These interstitial changes appear to be new when compared to the 2013 exam.  No infiltrates or pleural effusions.  No discrete pulmonary nodules or masses are identified.    The aorta demonstrates normal caliber and contour. There is good opacification of the pulmonary arterial system. No intraluminal filling defects are notified within the pulmonary arterial system to suggest pulmonary embolism. There is no pericardial effusion.  No enlarged mediastinal, hilar or axillary lymph nodes are identified.    The visualized upper abdomen is unremarkable in appearance.    The osseous structures are unremarkable in  appearance.                               X-Ray Chest AP Portable (Final result)  Result time 11/09/19 11:40:50    Final result by Viral Plaza DO (11/09/19 11:40:50)                 Impression:      No acute cardiopulmonary process.      Electronically signed by: Viral Plaza DO  Date:    11/09/2019  Time:    11:40             Narrative:    EXAMINATION:  XR CHEST AP PORTABLE    CLINICAL HISTORY:  cough;    TECHNIQUE:  Single frontal view of the chest was performed.    COMPARISON:  10/27/2014    FINDINGS:  There are chronic increased peribronchial markings noted diffusely throughout the chest.  No infiltrates or effusions are identified.  The heart is at the upper limits of normal in size.                              Medical Decision Making:   Clinical Tests:   Lab Tests: Ordered and Reviewed  Radiological Study: Ordered and Reviewed  Medical Tests: Ordered and Reviewed  88 yo female presents for fatigue for last week. Exam shows VSS, afeb. Non-focal and benign. Labs reassuring except mildly low Mg and elevated d-dimer. CTPA neg for PE, but showed mild interstitial disease - discussed these results with pt and  at bedside who confirm understanding and need for f/u. Repleted Mg.    Discussed results, diagnosis, and treatment plan with pt; advised close follow-up with PCP. Reviewed strict return precautions. Pt confirms understanding and ability to comply.                                   Clinical Impression:     1. Fatigue, unspecified type    2. Weakness    3. Hypertension associated with diabetes      Disposition:   Disposition: Discharged  Condition: Stable      I, Dr. Susanne London, personally performed the services described in this documentation. All medical record entries made by the scribe were at my direction and in my presence.  I have reviewed the chart and agree that the record reflects my personal performance and is accurate and complete. Susanne London MD.  6:26 PM  11/09/2019                 Susanne London MD  11/09/19 [id number]

## 2019-11-09 NOTE — ED NOTES
APPEARANCE: Alert, oriented and in no acute distress.  CARDIAC: Normal rate and rhythm, no murmur heard. C/o weakness  PERIPHERAL VASCULAR: peripheral pulses present. Normal cap refill. No edema. Warm to touch.    RESPIRATORY:Normal rate and effort, breath sounds clear bilaterally throughout chest. Respirations are equal and unlabored no obvious signs of distress.  GASTRO: soft, bowel sounds normal, no tenderness, no abdominal distention.  MUSC: Full ROM. No bony tenderness or soft tissue tenderness. No obvious deformity.  SKIN: Skin is warm and dry, normal skin turgor, mucous membranes moist.  NEURO: 5/5 strength major flexors/extensors bilaterally. Sensory intact to light touch bilaterally. Council coma scale: eyes open spontaneously-4, oriented & converses-5, obeys commands-6. No neurological abnormalities.   MENTAL STATUS: awake, alert and aware of environment.  EYE: PERRL, both eyes: pupils brisk and reactive to light. Normal size.  ENT: EARS: no obvious drainage. NOSE: no active bleeding.  + sinus drip.

## 2019-12-31 ENCOUNTER — TELEPHONE (OUTPATIENT)
Dept: FAMILY MEDICINE | Facility: CLINIC | Age: 84
End: 2019-12-31

## 2019-12-31 NOTE — TELEPHONE ENCOUNTER
----- Message from Darlene Verdin sent at 12/31/2019 10:06 AM CST -----  Contact: PT   Pt is requesting a refill on medication  FLUoxetine 10 MG capsule  [id number] - [place] - [address] AT [facility] [address]  Pt can be reached at [phone]

## 2019-12-31 NOTE — TELEPHONE ENCOUNTER
Spoke to pt and informed her that I spoke to Maria Fernanda at [facility] and her Fluoxetine 10mg was received on 10/2/2019, but pt has not ordered any refills. Informed pt that an order for refill was submitted to the pharmacy. Pt verbalized understanding.

## 2020-01-06 ENCOUNTER — TELEPHONE (OUTPATIENT)
Dept: FAMILY MEDICINE | Facility: CLINIC | Age: 85
End: 2020-01-06

## 2020-01-06 NOTE — TELEPHONE ENCOUNTER
----- Message from Mary Jo Hammond sent at 1/6/2020  9:32 AM CST -----  Contact: self  Pt would like you to call [facility] [phone] to confirm medication

## 2020-01-06 NOTE — TELEPHONE ENCOUNTER
Spoke to pt and stated that she left the number for [facility], but was unsure why she left the message or which medication was being requested for.

## 2020-02-13 DIAGNOSIS — I15.2 HYPERTENSION ASSOCIATED WITH DIABETES: ICD-10-CM

## 2020-02-13 DIAGNOSIS — E11.59 HYPERTENSION ASSOCIATED WITH DIABETES: ICD-10-CM

## 2020-02-13 RX ORDER — LOSARTAN POTASSIUM 25 MG/1
25 TABLET ORAL NIGHTLY
Qty: 90 TABLET | Refills: 1 | OUTPATIENT
Start: 2020-02-13 | End: 2020-03-25 | Stop reason: SDUPTHER

## 2020-02-13 NOTE — TELEPHONE ENCOUNTER
----- Message from Kacey Clark sent at 2/13/2020 11:31 AM CST -----  Contact: self  Patient is requesting a call back concerning the pt is out of losartan (COZAAR) 25 MG tablet, and the pt would like it sent to [facility], but would also like a few sent to a different Pharmacy until she can get her medication through her mail order Pharmacy. [facility] number is [phone]. Please call

## 2020-02-19 ENCOUNTER — PATIENT OUTREACH (OUTPATIENT)
Dept: ADMINISTRATIVE | Facility: OTHER | Age: 85
End: 2020-02-19

## 2020-02-19 NOTE — PROGRESS NOTES
Care Everywhere: n/a  Immunization: n/a  Health Maintenance: n/a  Media Review: n/a  Legacy Review: n/a  Order placed: n/a  Upcoming appts:optometry 2.21.2020

## 2020-02-21 ENCOUNTER — OFFICE VISIT (OUTPATIENT)
Dept: OPTOMETRY | Facility: CLINIC | Age: 85
End: 2020-02-21
Payer: MEDICARE

## 2020-02-21 DIAGNOSIS — H25.11 NUCLEAR SCLEROSIS OF RIGHT EYE: Primary | ICD-10-CM

## 2020-02-21 DIAGNOSIS — H52.7 REFRACTIVE ERROR: ICD-10-CM

## 2020-02-21 DIAGNOSIS — H40.013 OPEN ANGLE WITH BORDERLINE FINDINGS AND LOW GLAUCOMA RISK IN BOTH EYES: ICD-10-CM

## 2020-02-21 DIAGNOSIS — E11.9 TYPE 2 DIABETES MELLITUS WITHOUT RETINOPATHY: ICD-10-CM

## 2020-02-21 PROCEDURE — 92015 DETERMINE REFRACTIVE STATE: CPT | Mod: S$GLB,,, | Performed by: OPTOMETRIST

## 2020-02-21 PROCEDURE — 92133 POSTERIOR SEGMENT OCT OPTIC NERVE(OCULAR COHERENCE TOMOGRAPHY) - OU - BOTH EYES: ICD-10-PCS | Mod: S$GLB,,, | Performed by: OPTOMETRIST

## 2020-02-21 PROCEDURE — 92014 PR EYE EXAM, EST PATIENT,COMPREHESV: ICD-10-PCS | Mod: S$GLB,,, | Performed by: OPTOMETRIST

## 2020-02-21 PROCEDURE — 99999 PR PBB SHADOW E&M-EST. PATIENT-LVL II: CPT | Mod: PBBFAC,,, | Performed by: OPTOMETRIST

## 2020-02-21 PROCEDURE — 92015 PR REFRACTION: ICD-10-PCS | Mod: S$GLB,,, | Performed by: OPTOMETRIST

## 2020-02-21 PROCEDURE — 99499 RISK ADDL DX/OHS AUDIT: ICD-10-PCS | Mod: S$GLB,,, | Performed by: OPTOMETRIST

## 2020-02-21 PROCEDURE — 92133 CPTRZD OPH DX IMG PST SGM ON: CPT | Mod: S$GLB,,, | Performed by: OPTOMETRIST

## 2020-02-21 PROCEDURE — 99499 UNLISTED E&M SERVICE: CPT | Mod: S$GLB,,, | Performed by: OPTOMETRIST

## 2020-02-21 PROCEDURE — 99999 PR PBB SHADOW E&M-EST. PATIENT-LVL II: ICD-10-PCS | Mod: PBBFAC,,, | Performed by: OPTOMETRIST

## 2020-02-21 PROCEDURE — 92014 COMPRE OPH EXAM EST PT 1/>: CPT | Mod: S$GLB,,, | Performed by: OPTOMETRIST

## 2020-02-21 NOTE — PROGRESS NOTES
HPI     KALEB 01/2019  Diabetic doesn't test BS at home.  Glasses about 1 yr. Old   and patient is noticing distance and near is not as clear.  Some trouble   seeing street signs.  Using optivar prn for allergies.  Sometimes eyes   water especially at night when reading.    Hemoglobin A1C       Date                     Value               Ref Range             Status                10/03/2019               5.5                 4.0 - 5.6 %           Final          03/04/2019               5.4                 4.0 - 5.6 %           Final        09/04/2018               5.2                 4.0 - 5.6 %           Final                  Last edited by Jenniffer Phillips on 2/21/2020  9:40 AM. (History)            Assessment /Plan     For exam results, see Encounter Report.    Nuclear sclerosis of right eye    Type 2 diabetes mellitus without retinopathy    Open angle with borderline findings and low glaucoma risk in both eyes    Refractive error      1. Educated pt on presence of cataracts and effects on vision. No surgery at this time. Recheck in one year.  2. No diabetic retinopathy, no csme. Return in 1 year for dilated eye exam.  3. RNFL thinning OD >OS, IOP stable and fine for nerve, will monitor OD for now, no fam history of glaucoma, RTC yearly.      4. New Spec Rx given. Different lens options discussed with patient. RTC 1 year full exam.

## 2020-03-16 ENCOUNTER — TELEPHONE (OUTPATIENT)
Dept: FAMILY MEDICINE | Facility: CLINIC | Age: 85
End: 2020-03-16

## 2020-03-16 NOTE — TELEPHONE ENCOUNTER
----- Message from Brandon Campbell sent at 3/16/2020  3:35 PM CDT -----  Contact: [phone] / self   Patient would like a call back from your office , pt states she would only like to explain what she needs to the office. Please Advise.

## 2020-03-19 ENCOUNTER — PATIENT OUTREACH (OUTPATIENT)
Dept: ADMINISTRATIVE | Facility: HOSPITAL | Age: 85
End: 2020-03-19

## 2020-03-25 DIAGNOSIS — I15.2 HYPERTENSION ASSOCIATED WITH DIABETES: ICD-10-CM

## 2020-03-25 DIAGNOSIS — M25.561 CHRONIC PAIN OF RIGHT KNEE: ICD-10-CM

## 2020-03-25 DIAGNOSIS — F32.0 MILD SINGLE CURRENT EPISODE OF MAJOR DEPRESSIVE DISORDER: ICD-10-CM

## 2020-03-25 DIAGNOSIS — M79.601 RIGHT ARM PAIN: ICD-10-CM

## 2020-03-25 DIAGNOSIS — M17.11 OSTEOARTHRITIS OF RIGHT KNEE, UNSPECIFIED OSTEOARTHRITIS TYPE: ICD-10-CM

## 2020-03-25 DIAGNOSIS — E11.59 HYPERTENSION ASSOCIATED WITH DIABETES: ICD-10-CM

## 2020-03-25 DIAGNOSIS — G89.29 CHRONIC PAIN OF RIGHT KNEE: ICD-10-CM

## 2020-03-25 DIAGNOSIS — K21.9 GASTROESOPHAGEAL REFLUX DISEASE, ESOPHAGITIS PRESENCE NOT SPECIFIED: ICD-10-CM

## 2020-03-25 RX ORDER — FLUOXETINE 10 MG/1
10 CAPSULE ORAL DAILY
Qty: 90 CAPSULE | Refills: 1 | Status: SHIPPED | OUTPATIENT
Start: 2020-03-25 | End: 2020-08-15 | Stop reason: SDUPTHER

## 2020-03-25 RX ORDER — OMEPRAZOLE 20 MG/1
CAPSULE, DELAYED RELEASE ORAL
Qty: 180 CAPSULE | Refills: 1 | Status: SHIPPED | OUTPATIENT
Start: 2020-03-25 | End: 2020-05-19 | Stop reason: SDUPTHER

## 2020-03-25 RX ORDER — MELOXICAM 15 MG/1
TABLET ORAL
Qty: 90 TABLET | Refills: 0 | Status: SHIPPED | OUTPATIENT
Start: 2020-03-25 | End: 2020-05-19

## 2020-03-25 RX ORDER — GABAPENTIN 300 MG/1
CAPSULE ORAL
Qty: 180 CAPSULE | Refills: 1 | Status: SHIPPED | OUTPATIENT
Start: 2020-03-25 | End: 2020-08-15 | Stop reason: SDUPTHER

## 2020-03-25 RX ORDER — LOSARTAN POTASSIUM 25 MG/1
25 TABLET ORAL NIGHTLY
Qty: 90 TABLET | Refills: 1 | Status: SHIPPED | OUTPATIENT
Start: 2020-03-25 | End: 2020-11-19

## 2020-03-25 RX ORDER — DICLOFENAC SODIUM 10 MG/G
2 GEL TOPICAL 2 TIMES DAILY PRN
Qty: 100 G | Refills: 1 | Status: SHIPPED | OUTPATIENT
Start: 2020-03-25 | End: 2020-11-19

## 2020-03-25 NOTE — TELEPHONE ENCOUNTER
----- Message from Kacey Clark sent at 3/25/2020 12:42 PM CDT -----  Contact: [phone]-self  Patient is requesting a call back concerning faxing [facility] for her prescriptions. Please call

## 2020-03-26 DIAGNOSIS — L70.0 ACNE VULGARIS: ICD-10-CM

## 2020-03-26 DIAGNOSIS — Z78.0 POST-MENOPAUSAL: ICD-10-CM

## 2020-03-26 RX ORDER — ESTRADIOL 0.1 MG/D
PATCH TRANSDERMAL
Qty: 12 PATCH | Refills: 1 | Status: SHIPPED | OUTPATIENT
Start: 2020-03-26 | End: 2020-05-19 | Stop reason: SDUPTHER

## 2020-03-26 RX ORDER — TRETINOIN 0.25 MG/G
GEL TOPICAL
Qty: 45 G | Refills: 1 | Status: SHIPPED | OUTPATIENT
Start: 2020-03-26 | End: 2020-05-19 | Stop reason: SDUPTHER

## 2020-04-22 ENCOUNTER — TELEPHONE (OUTPATIENT)
Dept: FAMILY MEDICINE | Facility: CLINIC | Age: 85
End: 2020-04-22

## 2020-04-22 NOTE — TELEPHONE ENCOUNTER
Pt called back and left a msg stating that the CDC has suspended all vaccines for now.  No need to call now

## 2020-04-22 NOTE — TELEPHONE ENCOUNTER
Spoke to pt and would like to know if she and her  can get DTaP vaccine at the pharmacy. Pls advise.

## 2020-04-22 NOTE — TELEPHONE ENCOUNTER
----- Message from Kacey Clark sent at 4/22/2020 12:24 PM CDT -----  Contact: [phone]-self  Patient is requesting a call back concerning if the pt can come in an get a DPT shot for her and her  . Please call

## 2020-05-19 ENCOUNTER — LAB VISIT (OUTPATIENT)
Dept: LAB | Facility: HOSPITAL | Age: 85
End: 2020-05-19
Attending: FAMILY MEDICINE
Payer: MEDICARE

## 2020-05-19 ENCOUNTER — TELEPHONE (OUTPATIENT)
Dept: FAMILY MEDICINE | Facility: CLINIC | Age: 85
End: 2020-05-19

## 2020-05-19 ENCOUNTER — OFFICE VISIT (OUTPATIENT)
Dept: FAMILY MEDICINE | Facility: CLINIC | Age: 85
End: 2020-05-19
Attending: FAMILY MEDICINE
Payer: MEDICARE

## 2020-05-19 VITALS
BODY MASS INDEX: 23.63 KG/M2 | TEMPERATURE: 98 F | WEIGHT: 120.38 LBS | HEART RATE: 74 BPM | HEIGHT: 60 IN | OXYGEN SATURATION: 98 % | DIASTOLIC BLOOD PRESSURE: 62 MMHG | SYSTOLIC BLOOD PRESSURE: 130 MMHG

## 2020-05-19 DIAGNOSIS — E11.9 CONTROLLED TYPE 2 DIABETES MELLITUS WITHOUT COMPLICATION, WITHOUT LONG-TERM CURRENT USE OF INSULIN: ICD-10-CM

## 2020-05-19 DIAGNOSIS — Z00.00 ROUTINE GENERAL MEDICAL EXAMINATION AT HEALTH CARE FACILITY: ICD-10-CM

## 2020-05-19 DIAGNOSIS — E78.5 DYSLIPIDEMIA ASSOCIATED WITH TYPE 2 DIABETES MELLITUS: ICD-10-CM

## 2020-05-19 DIAGNOSIS — F32.4 MAJOR DEPRESSIVE DISORDER WITH SINGLE EPISODE, IN PARTIAL REMISSION: ICD-10-CM

## 2020-05-19 DIAGNOSIS — L70.0 ACNE VULGARIS: ICD-10-CM

## 2020-05-19 DIAGNOSIS — I15.2 HYPERTENSION ASSOCIATED WITH DIABETES: ICD-10-CM

## 2020-05-19 DIAGNOSIS — E11.9 DM TYPE 2 WITHOUT RETINOPATHY: ICD-10-CM

## 2020-05-19 DIAGNOSIS — E11.69 DYSLIPIDEMIA ASSOCIATED WITH TYPE 2 DIABETES MELLITUS: ICD-10-CM

## 2020-05-19 DIAGNOSIS — M46.92 INFLAMMATORY SPONDYLOPATHY OF CERVICAL REGION: ICD-10-CM

## 2020-05-19 DIAGNOSIS — E11.59 HYPERTENSION ASSOCIATED WITH DIABETES: ICD-10-CM

## 2020-05-19 DIAGNOSIS — Z78.0 POST-MENOPAUSAL: ICD-10-CM

## 2020-05-19 DIAGNOSIS — Z00.00 ROUTINE GENERAL MEDICAL EXAMINATION AT HEALTH CARE FACILITY: Primary | ICD-10-CM

## 2020-05-19 LAB
ALBUMIN SERPL BCP-MCNC: 3.8 G/DL (ref 3.5–5.2)
ALP SERPL-CCNC: 49 U/L (ref 55–135)
ALT SERPL W/O P-5'-P-CCNC: 8 U/L (ref 10–44)
ANION GAP SERPL CALC-SCNC: 5 MMOL/L (ref 8–16)
AST SERPL-CCNC: 18 U/L (ref 10–40)
BASOPHILS # BLD AUTO: 0.06 K/UL (ref 0–0.2)
BASOPHILS NFR BLD: 1.5 % (ref 0–1.9)
BILIRUB SERPL-MCNC: 1 MG/DL (ref 0.1–1)
BUN SERPL-MCNC: 13 MG/DL (ref 8–23)
CALCIUM SERPL-MCNC: 9.4 MG/DL (ref 8.7–10.5)
CHLORIDE SERPL-SCNC: 99 MMOL/L (ref 95–110)
CHOLEST SERPL-MCNC: 218 MG/DL (ref 120–199)
CHOLEST/HDLC SERPL: 2.6 {RATIO} (ref 2–5)
CO2 SERPL-SCNC: 31 MMOL/L (ref 23–29)
CREAT SERPL-MCNC: 0.8 MG/DL (ref 0.5–1.4)
DIFFERENTIAL METHOD: ABNORMAL
EOSINOPHIL # BLD AUTO: 0.2 K/UL (ref 0–0.5)
EOSINOPHIL NFR BLD: 5.2 % (ref 0–8)
ERYTHROCYTE [DISTWIDTH] IN BLOOD BY AUTOMATED COUNT: 13.3 % (ref 11.5–14.5)
EST. GFR  (AFRICAN AMERICAN): >60 ML/MIN/1.73 M^2
EST. GFR  (NON AFRICAN AMERICAN): >60 ML/MIN/1.73 M^2
ESTIMATED AVG GLUCOSE: 111 MG/DL (ref 68–131)
GLUCOSE SERPL-MCNC: 89 MG/DL (ref 70–110)
HBA1C MFR BLD HPLC: 5.5 % (ref 4–5.6)
HCT VFR BLD AUTO: 37.1 % (ref 37–48.5)
HDLC SERPL-MCNC: 83 MG/DL (ref 40–75)
HDLC SERPL: 38.1 % (ref 20–50)
HGB BLD-MCNC: 11.9 G/DL (ref 12–16)
IMM GRANULOCYTES # BLD AUTO: 0.01 K/UL (ref 0–0.04)
IMM GRANULOCYTES NFR BLD AUTO: 0.2 % (ref 0–0.5)
LDLC SERPL CALC-MCNC: 120.4 MG/DL (ref 63–159)
LYMPHOCYTES # BLD AUTO: 0.7 K/UL (ref 1–4.8)
LYMPHOCYTES NFR BLD: 17.8 % (ref 18–48)
MCH RBC QN AUTO: 31.2 PG (ref 27–31)
MCHC RBC AUTO-ENTMCNC: 32.1 G/DL (ref 32–36)
MCV RBC AUTO: 97 FL (ref 82–98)
MONOCYTES # BLD AUTO: 0.4 K/UL (ref 0.3–1)
MONOCYTES NFR BLD: 8.7 % (ref 4–15)
NEUTROPHILS # BLD AUTO: 2.7 K/UL (ref 1.8–7.7)
NEUTROPHILS NFR BLD: 66.6 % (ref 38–73)
NONHDLC SERPL-MCNC: 135 MG/DL
NRBC BLD-RTO: 0 /100 WBC
PLATELET # BLD AUTO: 155 K/UL (ref 150–350)
PMV BLD AUTO: 12.2 FL (ref 9.2–12.9)
POTASSIUM SERPL-SCNC: 5 MMOL/L (ref 3.5–5.1)
PROT SERPL-MCNC: 6.7 G/DL (ref 6–8.4)
RBC # BLD AUTO: 3.82 M/UL (ref 4–5.4)
SODIUM SERPL-SCNC: 135 MMOL/L (ref 136–145)
TRIGL SERPL-MCNC: 73 MG/DL (ref 30–150)
WBC # BLD AUTO: 4.04 K/UL (ref 3.9–12.7)

## 2020-05-19 PROCEDURE — 99999 PR PBB SHADOW E&M-EST. PATIENT-LVL III: ICD-10-PCS | Mod: PBBFAC,,, | Performed by: FAMILY MEDICINE

## 2020-05-19 PROCEDURE — 85025 COMPLETE CBC W/AUTO DIFF WBC: CPT

## 2020-05-19 PROCEDURE — 99499 UNLISTED E&M SERVICE: CPT | Mod: S$GLB,,, | Performed by: FAMILY MEDICINE

## 2020-05-19 PROCEDURE — 80061 LIPID PANEL: CPT

## 2020-05-19 PROCEDURE — 99214 OFFICE O/P EST MOD 30 MIN: CPT | Mod: S$GLB,,, | Performed by: FAMILY MEDICINE

## 2020-05-19 PROCEDURE — 83036 HEMOGLOBIN GLYCOSYLATED A1C: CPT

## 2020-05-19 PROCEDURE — 99999 PR PBB SHADOW E&M-EST. PATIENT-LVL III: CPT | Mod: PBBFAC,,, | Performed by: FAMILY MEDICINE

## 2020-05-19 PROCEDURE — 36415 COLL VENOUS BLD VENIPUNCTURE: CPT

## 2020-05-19 PROCEDURE — 80053 COMPREHEN METABOLIC PANEL: CPT

## 2020-05-19 PROCEDURE — 99214 PR OFFICE/OUTPT VISIT, EST, LEVL IV, 30-39 MIN: ICD-10-PCS | Mod: S$GLB,,, | Performed by: FAMILY MEDICINE

## 2020-05-19 PROCEDURE — 99499 RISK ADDL DX/OHS AUDIT: ICD-10-PCS | Mod: S$GLB,,, | Performed by: FAMILY MEDICINE

## 2020-05-19 RX ORDER — TRETINOIN 0.25 MG/G
GEL TOPICAL
Qty: 45 G | Refills: 1 | Status: SHIPPED | OUTPATIENT
Start: 2020-05-19 | End: 2021-05-04 | Stop reason: SDUPTHER

## 2020-05-19 RX ORDER — ESTRADIOL 0.1 MG/D
PATCH TRANSDERMAL
Qty: 12 PATCH | Refills: 1 | Status: SHIPPED | OUTPATIENT
Start: 2020-05-19 | End: 2021-03-09 | Stop reason: SDUPTHER

## 2020-05-19 RX ORDER — TRETINOIN 0.25 MG/G
GEL TOPICAL
Qty: 45 G | Refills: 1 | Status: SHIPPED | OUTPATIENT
Start: 2020-05-19 | End: 2020-05-19 | Stop reason: SDUPTHER

## 2020-05-19 NOTE — TELEPHONE ENCOUNTER
----- Message from Kacey Clark sent at 5/19/2020  9:45 AM CDT -----  Contact: [facility] Tcgecdeh-[phone]  [facility] Qobvwgav-[phone] is requesting a call back regarding verifying the pt's directions and that the medication for tretinoin (RETIN-A) 0.025 % gel has a high cost and would like to Talk to the Dr regarding possibly changing it to cream. Please call

## 2020-05-19 NOTE — TELEPHONE ENCOUNTER
Spoke to pharmacistAmelia at [facility] for insurance purposes she needs to know how many times a day is the pt suppose to apply the Tretinoin. Pls advise.

## 2020-05-19 NOTE — PROGRESS NOTES
Subjective:       Patient ID: Candy Escobar is a 88 y.o. female.    Chief Complaint: Annual Exam    88 yr old pleasant white female with  DM II controlled,  Facet arthropathy neck, hypertension, depression, anxiety, GERD, OA knee, presents today for her annual wellness check,  routine 6 month follow up and also neuropathic pain, joint aches and lab work.    DM II - diet controlled - A1C                   5.5                 10/03/2019                                                 foot and eye exam UTD - not on ACE    HTN - chronic - controlled - she is asymptomatic - no CP, SOB, MINER, PND or orthopnea, no bowel or bladder problems    Facet arthropathy neck/spasms - stable - uses muscle relaxant as needed at night     Depression/anxiety - controlled - on prozac daily for years and ativan as needed - no current mood problems - she is lil anxious taking on surgery - no SI/HI    GERD - controlled - on PPI as needed    OA knee s/p surgery - on mobic as needed    HLD - at goal -   LDLCALC                  129.6               10/03/2019                                        - not on any meds - diet compliant    - need statin    History as below - reviewed      Health maintenance  -labs UTD  -vaccines reports UTD      Hypertension   This is a chronic problem. The current episode started more than 1 year ago. The problem has been gradually improving since onset. The problem is controlled. Pertinent negatives include no anxiety, chest pain, headaches, palpitations or shortness of breath. There are no associated agents to hypertension. Risk factors for coronary artery disease include dyslipidemia, diabetes mellitus and post-menopausal state. Past treatments include lifestyle changes. The current treatment provides significant improvement. There are no compliance problems.  There is no history of angina, CAD/MI, CVA, left ventricular hypertrophy, PVD or retinopathy. There is no history of chronic renal disease,  hypercortisolism, hyperparathyroidism, pheochromocytoma, renovascular disease or a thyroid problem.   Pain   Associated symptoms include arthralgias and myalgias. Pertinent negatives include no chest pain, congestion, diaphoresis, headaches, nausea or sore throat.   Dizziness:   Chronicity:  Recurrent  Onset:  1 to 4 weeks ago  Progression since onset:  Gradually improving  Frequency:  Every few days  Severity:  Mild  Duration:  5 minutes  Dizziness characteristics:  Sensation of movement   Associated symptoms: hearing loss.no headaches, no nausea, no diaphoresis, no palpitations and no chest pain.  Aggravated by:  Position changes  Treatments tried:  Rest and body position changes  Improvements on treatment:  Mildno strokes, no neurologic disease, no head trauma, no balance testing, no ear trauma, no head trauma, no anxiety, no ear tubes, no environmental allergies and no CT head.  Leg Pain    There was no injury mechanism. The pain is present in the left leg, left knee, right knee and right leg. The quality of the pain is described as aching. The pain is at a severity of 5/10. The pain is moderate. The pain has been improving since onset. The symptoms are aggravated by movement. She has tried NSAIDs for the symptoms. The treatment provided moderate relief.     Review of Systems   Constitutional: Negative.  Negative for activity change, diaphoresis and unexpected weight change.   HENT: Positive for hearing loss. Negative for congestion, ear discharge, rhinorrhea, sore throat and voice change.    Eyes: Negative.  Negative for pain, discharge and visual disturbance.   Respiratory: Negative.  Negative for chest tightness, shortness of breath and wheezing.    Cardiovascular: Negative.  Negative for chest pain and palpitations.   Gastrointestinal: Negative.  Negative for abdominal distention, anal bleeding, constipation and nausea.   Endocrine: Negative.  Negative for cold intolerance, polydipsia and polyuria.    Genitourinary: Negative.  Negative for decreased urine volume, difficulty urinating, dysuria, frequency, menstrual problem and vaginal pain.   Musculoskeletal: Positive for arthralgias and myalgias. Negative for gait problem.   Skin: Negative.  Negative for color change, pallor and wound.   Allergic/Immunologic: Negative.  Negative for environmental allergies and immunocompromised state.   Neurological: Positive for dizziness. Negative for tremors, seizures, speech difficulty and headaches.   Hematological: Negative.  Negative for adenopathy. Does not bruise/bleed easily.   Psychiatric/Behavioral: Negative.  Negative for agitation, confusion, decreased concentration, hallucinations, self-injury and suicidal ideas. The patient is not nervous/anxious.          Active Ambulatory Problems     Diagnosis Date Noted    Depression 07/05/2012    Seasonal allergies 07/05/2012    GERD (gastroesophageal reflux disease) 07/05/2012    Lipoma of thigh 07/05/2012    SK (seborrheic keratosis) 07/05/2012    Nuclear sclerosis - Both Eyes 07/16/2013    Ankle pain 02/25/2014    Arthritis of knee 02/25/2014    Mixed incontinence urge and stress 03/11/2014    Osteoarthritis of right knee 04/22/2014    Right knee DJD     Knee pain     Right knee DJD 11/11/2014    Hypertension 11/12/2014    Status post total knee replacement 11/18/2014    Mass of left hip region 03/24/2015    Right knee pain 06/23/2015    Muscle weakness 06/23/2015    Difficulty walking 06/23/2015    DM type 2 without retinopathy 07/23/2015    Hypertension associated with diabetes 01/25/2016    Dyslipidemia associated with type 2 diabetes mellitus 01/25/2016    Diabetes mellitus type II, controlled 01/25/2016    MDD (major depressive disorder) 07/22/2016    Sensorineural hearing loss 08/30/2016    Mechanical ptosis of bilateral eyelids 02/23/2017    Major depressive disorder with single episode, in partial remission 04/24/2017    Varicose veins  of both lower extremities 10/27/2017    Facet arthropathy, cervical 03/01/2018    Carpal tunnel syndrome 03/05/2018    Bilateral carpal tunnel syndrome 05/11/2018    Refractive error 10/22/2018    Cortical cataract of left eye 10/22/2018    Senile nuclear sclerosis 10/30/2018    Post-operative state 10/31/2018    Inflammatory spondylopathy of cervical region 03/04/2019     Resolved Ambulatory Problems     Diagnosis Date Noted    Diabetes mellitus, type II 07/05/2012    Non-cardiac chest pain 07/10/2012    Non-cardiac chest pain 07/10/2012    Palpitations 07/10/2012    Neck pain 03/06/2013    Shoulder pain 03/06/2013    Pain in limb 03/06/2013    Type 2 diabetes mellitus without retinopathy 07/16/2013    ANAM (stress urinary incontinence), male 05/14/2014    Glucose intolerance (no malabsorption) 11/12/2014    Deep vein thrombosis prophylaxis 11/13/2014    Pain in limb 12/12/2014    Difficulty walking 12/12/2014    Muscle weakness 12/12/2014    Chest pain 08/16/2016    Pain in right hand 03/05/2018    Numbness and tingling in right hand 03/05/2018     Past Medical History:   Diagnosis Date    Allergy     Cataract     Diabetes mellitus     Diverticulosis     Encounter for blood transfusion     Fever blister     Joint pain     Osteoarthritis     Osteopenia     Osteoporosis     Seizures 2008    Tachycardia      Past Surgical History:   Procedure Laterality Date    ANKLE SURGERY  1951    ankle replacement, left    BLADDER SUSPENSION      FOOT SURGERY      HYSTERECTOMY      left ovary intact    INTRAOCULAR PROSTHESES INSERTION Left 10/30/2018    Procedure: INSERTION, IOL PROSTHESIS;  Surgeon: Jorge A Miller MD;  [place] [facility] [place] [address] [place] [address];  Service: Ophthalmology;  Laterality: Left;    JOINT REPLACEMENT      left ankle and knee arthroplasty    KNEE SURGERY  2008    left TKR    KNEE SURGERY  11-11-14    right TKR    PHACOEMULSIFICATION OF CATARACT Left 10/30/2018     Procedure: PHACOEMULSIFICATION, CATARACT;  Surgeon: Jorge A Miller MD;  Location: [address];  Service: Ophthalmology;  Laterality: Left;    SHOULDER SURGERY  2009    right arthroscopy    TONSILLECTOMY       Family History   Problem Relation Age of Onset    Diabetes Mother     Diabetes Father     Heart disease Father     Diabetes Sister     Polychondritis Sister     Polychondritis Unknown     Cancer Neg Hx     Amblyopia Neg Hx     Blindness Neg Hx     Cataracts Neg Hx     Glaucoma Neg Hx     Hypertension Neg Hx     Macular degeneration Neg Hx     Retinal detachment Neg Hx     Strabismus Neg Hx     Stroke Neg Hx     Thyroid disease Neg Hx     Melanoma Neg Hx      Social History     Socioeconomic History    Marital status:      Spouse name: Not on file    Number of children: Not on file    Years of education: Not on file    Highest education level: Not on file   Occupational History    Occupation: Retired Teacher     Employer: OTHER   Social Needs    Financial resource strain: Not on file    Food insecurity:     Worry: Not on file     Inability: Not on file    Transportation needs:     Medical: Not on file     Non-medical: Not on file   Tobacco Use    Smoking status: Former Smoker     Types: Cigarettes     Last attempt to quit: 1981     Years since quittin.0    Smokeless tobacco: Never Used   Substance and Sexual Activity    Alcohol use: Yes     Alcohol/week: 4.0 standard drinks     Types: 4 Glasses of wine per week     Comment: Nightly    Drug use: No    Sexual activity: Not on file   Lifestyle    Physical activity:     Days per week: Not on file     Minutes per session: Not on file    Stress: Not on file   Relationships    Social connections:     Talks on phone: Not on file     Gets together: Not on file     Attends Jew service: Not on file     Active member of club or organization: Not on file     Attends meetings of clubs or organizations: Not  on file     Relationship status: Not on file   Other Topics Concern    Are you pregnant or think you may be? No    Breast-feeding No   Social History Narrative    Not on file     Review of patient's allergies indicates:   Allergen Reactions    Propofol analogues Other (See Comments)     Low blood pressure, tremors    Oxycodone Nausea And Vomiting    Pcn [penicillins] Nausea Only    Tetracyclines Nausea Only     Current Outpatient Medications on File Prior to Visit   Medication Sig Dispense Refill    albuterol 90 mcg/actuation inhaler Inhale 2 puffs into the lungs every 4 (four) hours as needed for Wheezing. 18 g 4    diclofenac sodium (VOLTAREN) 1 % Gel Apply 2 g topically 2 (two) times daily as needed. 100 g 1    diphenhydrAMINE (BENADRYL) 50 MG capsule Take 50 mg by mouth 3 (three) times daily.       ergocalciferol (ERGOCALCIFEROL) 50,000 unit Cap Take 1 capsule (50,000 Units total) by mouth every 7 days. 12 capsule 3    FLUoxetine 10 MG capsule Take 1 capsule (10 mg total) by mouth once daily. 90 capsule 1    gabapentin (NEURONTIN) 300 MG capsule TAKE 1 CAPSULE 2 TIMES A    capsule 1    losartan (COZAAR) 25 MG tablet Take 1 tablet (25 mg total) by mouth every evening. 90 tablet 1    meloxicam (MOBIC) 7.5 MG tablet TAKE 1 TABLET DAILY 90 tablet 1    niacin 100 MG Tab Take 100 mg by mouth every evening.      omeprazole (PRILOSEC) 20 MG capsule TAKE 2 CAPSULES (40MG      TOTAL) ONCE DAILY 180 capsule 3    [DISCONTINUED] estradiol 0.1 mg/24 hr td ptwk 0.1 mg/24 hr PTWK APPLY 1 PATCH TOPICALLY ONCE A WEEK 12 patch 1    [DISCONTINUED] tretinoin (RETIN-A) 0.025 % gel Apply topically as needed. 45 g 1    azelastine (OPTIVAR) 0.05 % ophthalmic solution Place 1 drop into both eyes 2 (two) times daily. 4 mL 11    [DISCONTINUED] meloxicam (MOBIC) 15 MG tablet TAKE 1 TABLET DAILY AS     NEEDED FOR PAIN (Patient not taking: Reported on 5/19/2020) 90 tablet 0    [DISCONTINUED] omeprazole (PRILOSEC)  20 MG capsule TAKE 2 CAPSULES (40MG      TOTAL) ONCE DAILY (Patient not taking: Reported on 5/19/2020) 180 capsule 1    [DISCONTINUED] ondansetron (ZOFRAN-ODT) 4 MG TbDL Take 1 tablet (4 mg total) by mouth every 6 (six) hours as needed. (Patient not taking: Reported on 5/19/2020) 30 tablet 0     Current Facility-Administered Medications on File Prior to Visit   Medication Dose Route Frequency Provider Last Rate Last Dose    0.9%  NaCl infusion   Intravenous Continuous Jorge A Miller MD   Stopped at 10/30/18 0918    cyclopentolate 1% ophthalmic solution 1 drop  1 drop Left Eye On Call Procedure Jorge A Miller MD   1 drop at 10/30/18 0725    phenylephrine HCL 2.5% ophthalmic solution 1 drop  1 drop Left Eye On Call Procedure Jorge A Miller MD   1 drop at 10/30/18 0724    proparacaine 0.5 % ophthalmic solution 1 drop  1 drop Left Eye On Call Procedure Jorge A Miller MD   1 drop at 10/30/18 0712    tropicamide 1% ophthalmic solution 1 drop  1 drop Left Eye On Call Procedure Jorge A Miller MD   1 drop at 10/30/18 0726       Objective:       Vitals:    05/19/20 0859   BP: 130/62   Pulse: 74   Temp: 98.3 °F (36.8 °C)       Physical Exam   Constitutional: She is oriented to person, place, and time. She appears well-developed and well-nourished. No distress.   HENT:   Head: Normocephalic and atraumatic.   Right Ear: External ear normal.   Left Ear: External ear normal.   Nose: Nose normal.   Mouth/Throat: Oropharynx is clear and moist. No oropharyngeal exudate.   Eyes: Pupils are equal, round, and reactive to light. Conjunctivae and EOM are normal. Right eye exhibits no discharge. Left eye exhibits no discharge. No scleral icterus.   Neck: Normal range of motion. Neck supple. No JVD present. No tracheal deviation present. No thyromegaly present.   Cardiovascular: Normal rate, regular rhythm, normal heart sounds and intact distal pulses. Exam reveals no gallop and no friction rub.    No murmur heard.  Pulmonary/Chest: Effort normal and breath sounds normal. No stridor. She has no wheezes. She has no rales. She exhibits no tenderness.   Abdominal: Soft. Bowel sounds are normal. She exhibits no distension and no mass. There is no tenderness. There is no rebound and no guarding. No hernia.   Musculoskeletal: Normal range of motion. She exhibits no edema or tenderness.   Lymphadenopathy:     She has no cervical adenopathy.   Neurological: She is alert and oriented to person, place, and time. She has normal reflexes. She displays normal reflexes. No cranial nerve deficit. She exhibits normal muscle tone. Coordination normal.   Skin: Skin is warm and dry. No rash noted. She is not diaphoretic. No erythema. No pallor.   Psychiatric: She has a normal mood and affect. Her behavior is normal. Judgment and thought content normal.       Assessment:       1. Routine general medical examination at health care facility    2. Hypertension associated with diabetes    3. Inflammatory spondylopathy of cervical region    4. Major depressive disorder with single episode, in partial remission    5. Dyslipidemia associated with type 2 diabetes mellitus    6. Controlled type 2 diabetes mellitus without complication, without long-term current use of insulin    7. DM type 2 without retinopathy    8. Post-menopausal    9. Acne vulgaris        Plan:           Candy was seen today for annual exam.    Diagnoses and all orders for this visit:    Routine general medical examination at [facility] care facility  -     CBC auto differential; Future  -     Comprehensive metabolic panel; Future  -     Lipid Panel; Future  -     Hemoglobin A1C; Future    Hypertension associated with diabetes  -     CBC auto differential; Future  -     Comprehensive metabolic panel; Future  -     Lipid Panel; Future    Inflammatory spondylopathy of cervical region    Major depressive disorder with single episode, in partial remission    Dyslipidemia  associated with type 2 diabetes mellitus  -     CBC auto differential; Future  -     Comprehensive metabolic panel; Future  -     Lipid Panel; Future    Controlled type 2 diabetes mellitus without complication, without long-term current use of insulin  -     CBC auto differential; Future  -     Comprehensive metabolic panel; Future  -     Lipid Panel; Future  -     Hemoglobin A1C; Future    DM type 2 without retinopathy  -     CBC auto differential; Future  -     Comprehensive metabolic panel; Future  -     Lipid Panel; Future  -     Hemoglobin A1C; Future    Post-menopausal  -     estradiol 0.1 mg/24 hr td ptwk 0.1 mg/24 hr PTWK; APPLY 1 PATCH TOPICALLY ONCE A WEEK    Acne vulgaris  -     tretinoin (RETIN-A) 0.025 % gel; Apply topically as needed.    'wellness check  -normal exam  -labs    HTN  -controlled  -continue losartan 25 daily    HLD  -controlled  -start statin  -labs    DM II  -controlled  -labs    SK  -reassurance    MDD  -controlled    Chronic knee pain  -continue mobic    Neuropathic pain  -well controlled  - neurontin 300 TID    Spent adequate time in obtaining history and explaining differentials      RTC 6 m or prn

## 2020-05-26 ENCOUNTER — TELEPHONE (OUTPATIENT)
Dept: FAMILY MEDICINE | Facility: CLINIC | Age: 85
End: 2020-05-26

## 2020-05-26 NOTE — TELEPHONE ENCOUNTER
Spoke to pt and stated she thinks her diverticulosis is acting up. Pt is laying down and her pain has subsided. Pt scheduled an appt with Dr. Munson for Friday.

## 2020-05-26 NOTE — TELEPHONE ENCOUNTER
----- Message from Rubi Gonzales sent at 5/26/2020  3:50 PM CDT -----  Contact: [phone]/ self   Patient requesting to speak with you regarding pain that she is experiencing on her left side. Please advise.

## 2020-05-29 ENCOUNTER — OFFICE VISIT (OUTPATIENT)
Dept: FAMILY MEDICINE | Facility: CLINIC | Age: 85
End: 2020-05-29
Attending: FAMILY MEDICINE
Payer: MEDICARE

## 2020-05-29 VITALS
DIASTOLIC BLOOD PRESSURE: 72 MMHG | BODY MASS INDEX: 23.85 KG/M2 | OXYGEN SATURATION: 98 % | SYSTOLIC BLOOD PRESSURE: 130 MMHG | HEIGHT: 60 IN | TEMPERATURE: 99 F | HEART RATE: 73 BPM | WEIGHT: 121.5 LBS

## 2020-05-29 DIAGNOSIS — F32.0 MILD SINGLE CURRENT EPISODE OF MAJOR DEPRESSIVE DISORDER: ICD-10-CM

## 2020-05-29 DIAGNOSIS — K57.92 ACUTE DIVERTICULITIS: ICD-10-CM

## 2020-05-29 DIAGNOSIS — R10.9 LEFT SIDED ABDOMINAL PAIN: Primary | ICD-10-CM

## 2020-05-29 PROCEDURE — 99499 RISK ADDL DX/OHS AUDIT: ICD-10-PCS | Mod: S$GLB,,, | Performed by: FAMILY MEDICINE

## 2020-05-29 PROCEDURE — 99214 OFFICE O/P EST MOD 30 MIN: CPT | Mod: S$GLB,,, | Performed by: FAMILY MEDICINE

## 2020-05-29 PROCEDURE — 1101F PT FALLS ASSESS-DOCD LE1/YR: CPT | Mod: CPTII,S$GLB,, | Performed by: FAMILY MEDICINE

## 2020-05-29 PROCEDURE — 99214 PR OFFICE/OUTPT VISIT, EST, LEVL IV, 30-39 MIN: ICD-10-PCS | Mod: S$GLB,,, | Performed by: FAMILY MEDICINE

## 2020-05-29 PROCEDURE — 1101F PR PT FALLS ASSESS DOC 0-1 FALLS W/OUT INJ PAST YR: ICD-10-PCS | Mod: CPTII,S$GLB,, | Performed by: FAMILY MEDICINE

## 2020-05-29 PROCEDURE — 99499 UNLISTED E&M SERVICE: CPT | Mod: S$GLB,,, | Performed by: FAMILY MEDICINE

## 2020-05-29 PROCEDURE — 1126F AMNT PAIN NOTED NONE PRSNT: CPT | Mod: S$GLB,,, | Performed by: FAMILY MEDICINE

## 2020-05-29 PROCEDURE — 99999 PR PBB SHADOW E&M-EST. PATIENT-LVL III: ICD-10-PCS | Mod: PBBFAC,,, | Performed by: FAMILY MEDICINE

## 2020-05-29 PROCEDURE — 1159F MED LIST DOCD IN RCRD: CPT | Mod: S$GLB,,, | Performed by: FAMILY MEDICINE

## 2020-05-29 PROCEDURE — 99999 PR PBB SHADOW E&M-EST. PATIENT-LVL III: CPT | Mod: PBBFAC,,, | Performed by: FAMILY MEDICINE

## 2020-05-29 PROCEDURE — 1126F PR PAIN SEVERITY QUANTIFIED, NO PAIN PRESENT: ICD-10-PCS | Mod: S$GLB,,, | Performed by: FAMILY MEDICINE

## 2020-05-29 PROCEDURE — 1159F PR MEDICATION LIST DOCUMENTED IN MEDICAL RECORD: ICD-10-PCS | Mod: S$GLB,,, | Performed by: FAMILY MEDICINE

## 2020-05-29 RX ORDER — FLUOXETINE HYDROCHLORIDE 20 MG/1
20 CAPSULE ORAL DAILY
Qty: 90 CAPSULE | Refills: 3 | Status: SHIPPED | OUTPATIENT
Start: 2020-05-29 | End: 2021-05-04 | Stop reason: SDUPTHER

## 2020-05-29 RX ORDER — METRONIDAZOLE 500 MG/1
500 TABLET ORAL EVERY 8 HOURS
Qty: 30 TABLET | Refills: 0 | Status: SHIPPED | OUTPATIENT
Start: 2020-05-29 | End: 2020-11-19

## 2020-05-29 RX ORDER — CIPROFLOXACIN 500 MG/1
500 TABLET ORAL 2 TIMES DAILY
Qty: 20 TABLET | Refills: 0 | Status: SHIPPED | OUTPATIENT
Start: 2020-05-29 | End: 2020-08-27

## 2020-05-29 NOTE — PATIENT INSTRUCTIONS
Diverticulitis    Some people get pouches along the wall of the colon as they get older. The pouches, called diverticuli, usually cause no symptoms. If the pouches become blocked, you can get an infection. This infection is called diverticulitis. It causes pain in your lower abdomen and fever. If not treated, it can become a serious condition, causing an abscess to form inside the pouch. The abscess may block the intestinal tract even or rupture, spreading infection throughout the abdomen.  When treatment is started early, oral antibiotics alone may be enough to cure diverticulitis. This method is tried first. But, if you don't improve or if your condition gets worse while using oral antibiotics, you may need to be admitted to the hospital for IV antibiotics. Severe cases may require surgery.  Home care  The following guidelines will help you care for yourself at home:  · During the acute illness, rest and follow your healthcare provider's instructions about diet. Sometimes you will need to follow a clear liquid diet to rest your bowel. Once your symptoms are better, you may be told to follow a low-fiber diet for some time. Include foods like:  ¨ Flake cereal, mashed potatoes, pancakes, waffles, pasta, white bread, rice, applesauce, bananas, eggs, fish, poultry, tofu, and cooked soft vegetables  · Take antibiotics exactly as instructed. Don't miss any doses or stop taking the medication, even if you feel better.  · Monitor your temperature and tell your healthcare provider if you have rising temperatures.  Preventing future attacks  Once you have an episode of diverticulitis, you are at risk for having it again. After you have recovered from this episode, you may be able to lower your risk by eating a high-fiber diet (20 gm/day to 35 gm/day of fiber). This cleans out the colon pouches that already exist and may prevent new ones from forming. Foods high in fiber include fresh fruits and edible peelings, raw or  lightly cooked vegetables, whole grain cereals and breads, dried beans and peas, and bran.  Other steps that can help prevent future attacks include:  · Take your medicines, such as antibiotics, as your healthcare provider says.  · Drink 6 to 8 glasses of water every day, unless told otherwise.  · Use a heating pad or hot water bottle to help abdominal cramping or pain.  · Begin an exercise program. Ask your healthcare provider how to get started. You can benefit from simple activities such as walking or gardening.  · Treat diarrhea with a bland diet. Start with liquids only; then slowly add fiber over time.  · Watch for changes in your bowel movements (constipation to diarrhea). Avoid constipation by eating a high fiber diet and taking a stool softener if needed.  · Get plenty of rest and sleep.  Follow-up care  Follow up with your healthcare provider as advised or sooner if you are not getting better in the next 2 days.  When to seek medical advice  Call your healthcare provider right away if any of these occur:  · Fever of 100.4°F (38°C) or higher, or as directed by your healthcare provider  · Repeated vomiting or swelling of the abdomen  · Weakness, dizziness, light-headedness  · Pain in your abdomen that gets worse, severe, or spreads to your back  · Pain that moves to the right lower abdomen  · Rectal bleeding (stools that are red, black or maroon color)  · Unexpected vaginal bleeding  Date Last Reviewed: 9/1/2016  © 0637-9483 [employer]. [address] [place] [address], [place] [address]. All rights reserved. This information is not intended as a substitute for professional medical care. Always follow your healthcare professional's instructions.

## 2020-05-29 NOTE — PROGRESS NOTES
Subjective:       Patient ID: Candy Escobar is a 88 y.o. female.    Chief Complaint: Abdominal Pain    88 yr old pleasant white female with  DM II controlled,  Facet arthropathy neck, hypertension, depression, anxiety, GERD, OA knee, presents today for evaluation of LLQ abdominal pain n depression.    Abdominal pain - she has diverticulosis and every few years she gets flare up diverticulitis - she do notw ant labs/CT scan - details as bolow -         Depression/anxiety - uncontrolled - on prozac 10 daily for years and ativan as needed - fluctuating mood problems - she is lil anxious taking on surgery - no SI/HI    Abdominal Pain   This is a recurrent problem. The current episode started in the past 7 days. The onset quality is sudden. The problem occurs constantly. The problem has been gradually worsening. The pain is located in the LLQ. The pain is at a severity of 7/10. The pain is severe. The quality of the pain is cramping, colicky and sharp. The abdominal pain does not radiate. Pertinent negatives include no anorexia, arthralgias, belching, constipation, dysuria, fever, frequency, headaches, hematuria, melena, myalgias or nausea. Nothing aggravates the pain. The pain is relieved by nothing. She has tried nothing for the symptoms. The treatment provided no relief.     Review of Systems   Constitutional: Negative.  Negative for activity change, diaphoresis, fever and unexpected weight change.   HENT: Negative.  Negative for congestion, ear discharge, hearing loss, rhinorrhea, sore throat and voice change.    Eyes: Negative.  Negative for pain, discharge and visual disturbance.   Respiratory: Negative.  Negative for chest tightness, shortness of breath and wheezing.    Cardiovascular: Negative.  Negative for chest pain.   Gastrointestinal: Positive for abdominal pain. Negative for abdominal distention, anal bleeding, anorexia, constipation, melena and nausea.   Endocrine: Negative.  Negative for cold  intolerance, polydipsia and polyuria.   Genitourinary: Negative.  Negative for decreased urine volume, difficulty urinating, dysuria, frequency, hematuria, menstrual problem and vaginal pain.   Musculoskeletal: Negative.  Negative for arthralgias, gait problem and myalgias.   Skin: Negative.  Negative for color change, pallor and wound.   Allergic/Immunologic: Negative.  Negative for environmental allergies and immunocompromised state.   Neurological: Negative.  Negative for dizziness, tremors, seizures, speech difficulty and headaches.   Hematological: Negative.  Negative for adenopathy. Does not bruise/bleed easily.   Psychiatric/Behavioral: Negative.  Negative for agitation, confusion, decreased concentration, hallucinations, self-injury and suicidal ideas. The patient is not nervous/anxious.        PMH/PSH/FH/SH/MED/ALLERGY reviewed    Objective:       Vitals:    05/29/20 1017   BP: 130/72   Pulse: 73   Temp: 98.5 °F (36.9 °C)       Physical Exam   Constitutional: She is oriented to person, place, and time. She appears well-developed and well-nourished. No distress.   HENT:   Head: Normocephalic and atraumatic.   Right Ear: External ear normal.   Left Ear: External ear normal.   Nose: Nose normal.   Mouth/Throat: Oropharynx is clear and moist. No oropharyngeal exudate.   Eyes: Pupils are equal, round, and reactive to light. Conjunctivae and EOM are normal. Right eye exhibits no discharge. Left eye exhibits no discharge. No scleral icterus.   Neck: Normal range of motion. Neck supple. No JVD present. No tracheal deviation present. No thyromegaly present.   Cardiovascular: Normal rate, regular rhythm, normal heart sounds and intact distal pulses. Exam reveals no gallop and no friction rub.   No murmur heard.  Pulmonary/Chest: Effort normal and breath sounds normal. No stridor. She has no wheezes. She has no rales. She exhibits no tenderness.   Abdominal: Soft. Bowel sounds are normal. She exhibits no distension  and no mass. There is tenderness in the left lower quadrant. There is rebound and guarding. No hernia.       Musculoskeletal: Normal range of motion. She exhibits no edema or tenderness.   Lymphadenopathy:     She has no cervical adenopathy.   Neurological: She is alert and oriented to person, place, and time. She has normal reflexes. She displays normal reflexes. No cranial nerve deficit. She exhibits normal muscle tone. Coordination normal.   Skin: Skin is warm and dry. No rash noted. She is not diaphoretic. No erythema. No pallor.   Psychiatric: She has a normal mood and affect. Her behavior is normal. Judgment and thought content normal.       Assessment:       1. Left sided abdominal pain    2. Mild single current episode of major depressive disorder    3. Acute diverticulitis        Plan:           Candy was seen today for abdominal pain.    Diagnoses and all orders for this visit:    Left sided abdominal pain  -     ciprofloxacin HCl (CIPRO) 500 MG tablet; Take 1 tablet (500 mg total) by mouth 2 (two) times daily.  -     metroNIDAZOLE (FLAGYL) 500 MG tablet; Take 1 tablet (500 mg total) by mouth every 8 (eight) hours.    Mild single current episode of major depressive disorder  -     FLUoxetine 20 MG capsule; Take 1 capsule (20 mg total) by mouth once daily.    Acute diverticulitis  -     ciprofloxacin HCl (CIPRO) 500 MG tablet; Take 1 tablet (500 mg total) by mouth 2 (two) times daily.  -     metroNIDAZOLE (FLAGYL) 500 MG tablet; Take 1 tablet (500 mg total) by mouth every 8 (eight) hours.      offred CT - she declined and understand risks and benefits    Acute diverticulitis  -flagyl n cipro x 10 days  -Er precautions given    Depression  -uncontrolled  -increase prozac to 20    Spent adequate time in obtaining history and explaining differentials    25 minutes spent during this visit of which greater than 50% devoted to face-face counseling and coordination of care regarding diagnosis and management  plan    Follow up in about 6 months (around 11/19/2020), or if symptoms worsen or fail to improve.

## 2020-06-11 ENCOUNTER — TELEPHONE (OUTPATIENT)
Dept: FAMILY MEDICINE | Facility: CLINIC | Age: 85
End: 2020-06-11

## 2020-06-11 NOTE — TELEPHONE ENCOUNTER
----- Message from Gaston Hancock sent at 6/11/2020  9:53 AM CDT -----  Contact: self [phone]  Type:  Needs Medical Advice    Who Called: Candy  Symptoms (please be specific): scarlet fever symptoms   How long has patient had these symptoms:  Tongue is gray, feet and hands are swollen  Pharmacy name and phone #:    Would the patient rather a call back or a response via [facility]?   Best Call Back Number: [phone]  Additional Information: wants information on stronger meds

## 2020-08-03 ENCOUNTER — OFFICE VISIT (OUTPATIENT)
Dept: FAMILY MEDICINE | Facility: CLINIC | Age: 85
End: 2020-08-03
Attending: FAMILY MEDICINE
Payer: MEDICARE

## 2020-08-03 VITALS
TEMPERATURE: 98 F | HEIGHT: 60 IN | DIASTOLIC BLOOD PRESSURE: 68 MMHG | SYSTOLIC BLOOD PRESSURE: 134 MMHG | WEIGHT: 111.75 LBS | HEART RATE: 91 BPM | BODY MASS INDEX: 21.94 KG/M2 | OXYGEN SATURATION: 98 %

## 2020-08-03 DIAGNOSIS — R10.32 LLQ ABDOMINAL PAIN: Primary | ICD-10-CM

## 2020-08-03 PROCEDURE — 99499 RISK ADDL DX/OHS AUDIT: ICD-10-PCS | Mod: S$GLB,,, | Performed by: FAMILY MEDICINE

## 2020-08-03 PROCEDURE — 1101F PR PT FALLS ASSESS DOC 0-1 FALLS W/OUT INJ PAST YR: ICD-10-PCS | Mod: CPTII,S$GLB,, | Performed by: FAMILY MEDICINE

## 2020-08-03 PROCEDURE — 1159F PR MEDICATION LIST DOCUMENTED IN MEDICAL RECORD: ICD-10-PCS | Mod: S$GLB,,, | Performed by: FAMILY MEDICINE

## 2020-08-03 PROCEDURE — 99999 PR PBB SHADOW E&M-EST. PATIENT-LVL V: CPT | Mod: PBBFAC,,, | Performed by: FAMILY MEDICINE

## 2020-08-03 PROCEDURE — 1126F AMNT PAIN NOTED NONE PRSNT: CPT | Mod: S$GLB,,, | Performed by: FAMILY MEDICINE

## 2020-08-03 PROCEDURE — 99999 PR PBB SHADOW E&M-EST. PATIENT-LVL V: ICD-10-PCS | Mod: PBBFAC,,, | Performed by: FAMILY MEDICINE

## 2020-08-03 PROCEDURE — 1101F PT FALLS ASSESS-DOCD LE1/YR: CPT | Mod: CPTII,S$GLB,, | Performed by: FAMILY MEDICINE

## 2020-08-03 PROCEDURE — 99214 PR OFFICE/OUTPT VISIT, EST, LEVL IV, 30-39 MIN: ICD-10-PCS | Mod: S$GLB,,, | Performed by: FAMILY MEDICINE

## 2020-08-03 PROCEDURE — 1159F MED LIST DOCD IN RCRD: CPT | Mod: S$GLB,,, | Performed by: FAMILY MEDICINE

## 2020-08-03 PROCEDURE — 1126F PR PAIN SEVERITY QUANTIFIED, NO PAIN PRESENT: ICD-10-PCS | Mod: S$GLB,,, | Performed by: FAMILY MEDICINE

## 2020-08-03 PROCEDURE — 99499 UNLISTED E&M SERVICE: CPT | Mod: S$GLB,,, | Performed by: FAMILY MEDICINE

## 2020-08-03 PROCEDURE — 99214 OFFICE O/P EST MOD 30 MIN: CPT | Mod: S$GLB,,, | Performed by: FAMILY MEDICINE

## 2020-08-03 NOTE — PROGRESS NOTES
Subjective:       Patient ID: Candy Escobar is a 88 y.o. female.    Chief Complaint: Abdominal Pain    88 yr old pleasant white female with  DM II controlled,  Facet arthropathy neck, hypertension, depression, anxiety, GERD, OA knee, presents today for evaluation of LLQ abdominal pain.    Abdominal pain - she has diverticulosis and every few years she gets flare up diverticulitis - she finished antibiotics and still is in pain - details as bolow -         Depression/anxiety - uncontrolled - on prozac 10 daily for years and ativan as needed - fluctuating mood problems - she is lil anxious taking on surgery - no SI/HI    Abdominal Pain  This is a recurrent problem. The current episode started more than 1 month ago. The onset quality is sudden. The problem occurs constantly. The problem has been gradually worsening. The pain is located in the LLQ. The pain is at a severity of 7/10. The pain is severe. The quality of the pain is cramping, colicky and sharp. The abdominal pain does not radiate. Pertinent negatives include no anorexia, arthralgias, belching, constipation, dysuria, fever, frequency, headaches, hematuria, melena, myalgias or nausea. Nothing aggravates the pain. The pain is relieved by nothing. She has tried nothing for the symptoms. The treatment provided no relief.     Review of Systems   Constitutional: Negative.  Negative for activity change, diaphoresis, fever and unexpected weight change.   HENT: Negative.  Negative for nasal congestion, ear discharge, hearing loss, rhinorrhea, sore throat and voice change.    Eyes: Negative.  Negative for pain, discharge and visual disturbance.   Respiratory: Negative.  Negative for chest tightness, shortness of breath and wheezing.    Cardiovascular: Negative.  Negative for chest pain.   Gastrointestinal: Positive for abdominal pain. Negative for abdominal distention, anal bleeding, anorexia, constipation, melena and nausea.   Endocrine: Negative.  Negative for  cold intolerance, polydipsia and polyuria.   Genitourinary: Negative.  Negative for decreased urine volume, difficulty urinating, dysuria, frequency, hematuria, menstrual problem and vaginal pain.   Musculoskeletal: Negative.  Negative for arthralgias, gait problem and myalgias.   Integumentary:  Negative for color change, pallor and wound. Negative.   Allergic/Immunologic: Negative.  Negative for environmental allergies and immunocompromised state.   Neurological: Negative.  Negative for dizziness, tremors, seizures, speech difficulty and headaches.   Hematological: Negative.  Negative for adenopathy. Does not bruise/bleed easily.   Psychiatric/Behavioral: Negative.  Negative for agitation, confusion, decreased concentration, hallucinations, self-injury and suicidal ideas. The patient is not nervous/anxious.      PMH/PSH/FH/SH/MED/ALLERGY reviewed        Objective:       Vitals:    08/03/20 1413   BP: 134/68   Pulse: 91   Temp: 98.3 °F (36.8 °C)       Physical Exam  Constitutional:       General: She is not in acute distress.     Appearance: She is well-developed. She is not diaphoretic.   HENT:      Head: Normocephalic and atraumatic.      Right Ear: External ear normal.      Left Ear: External ear normal.      Nose: Nose normal.      Mouth/Throat:      Pharynx: No oropharyngeal exudate.   Eyes:      General: No scleral icterus.        Right eye: No discharge.         Left eye: No discharge.      Conjunctiva/sclera: Conjunctivae normal.      Pupils: Pupils are equal, round, and reactive to light.   Neck:      Musculoskeletal: Normal range of motion and neck supple.      Thyroid: No thyromegaly.      Vascular: No JVD.      Trachea: No tracheal deviation.   Cardiovascular:      Rate and Rhythm: Normal rate and regular rhythm.      Heart sounds: Normal heart sounds. No murmur. No friction rub. No gallop.    Pulmonary:      Effort: Pulmonary effort is normal.      Breath sounds: Normal breath sounds. No stridor. No  wheezing or rales.   Chest:      Chest wall: No tenderness.   Abdominal:      General: Bowel sounds are normal. There is no distension.      Palpations: Abdomen is soft. There is no mass.      Tenderness: There is abdominal tenderness in the left lower quadrant. There is guarding and rebound.      Hernia: No hernia is present.       Musculoskeletal: Normal range of motion.         General: No tenderness.   Lymphadenopathy:      Cervical: No cervical adenopathy.   Skin:     General: Skin is warm and dry.      Coloration: Skin is not pale.      Findings: No erythema or rash.   Neurological:      Mental Status: She is alert and oriented to person, place, and time.      Cranial Nerves: No cranial nerve deficit.      Motor: No abnormal muscle tone.      Coordination: Coordination normal.      Deep Tendon Reflexes: Reflexes are normal and symmetric. Reflexes normal.   Psychiatric:         Behavior: Behavior normal.         Thought Content: Thought content normal.         Judgment: Judgment normal.         Assessment:       1. LLQ abdominal pain        Plan:         Candy was seen today for abdominal pain.    Diagnoses and all orders for this visit:    LLQ abdominal pain  -     CT Abdomen Without Contrast; Future      LLQ abdominal pain  -r/o colon etiology, diverticulosis, diverticulitis  -CT abdomen  -ER precautions given    Spent adequate time in obtaining history and explaining differentials    25 minutes spent during this visit of which greater than 50% devoted to face-face counseling and coordination of care regarding diagnosis and management plan    Follow up in about 4 months (around 11/19/2020), or if symptoms worsen or fail to improve.

## 2020-08-04 ENCOUNTER — HOSPITAL ENCOUNTER (OUTPATIENT)
Dept: RADIOLOGY | Facility: HOSPITAL | Age: 85
Discharge: HOME | End: 2020-08-04
Attending: FAMILY MEDICINE
Payer: MEDICARE

## 2020-08-04 DIAGNOSIS — R10.32 LLQ ABDOMINAL PAIN: ICD-10-CM

## 2020-08-04 PROCEDURE — 74176 CT ABDOMEN PELVIS WITHOUT CONTRAST: ICD-10-PCS | Mod: 26,,, | Performed by: RADIOLOGY

## 2020-08-04 PROCEDURE — 74176 CT ABD & PELVIS W/O CONTRAST: CPT | Mod: TC

## 2020-08-04 PROCEDURE — 74176 CT ABD & PELVIS W/O CONTRAST: CPT | Mod: 26,,, | Performed by: RADIOLOGY

## 2020-08-06 ENCOUNTER — TELEPHONE (OUTPATIENT)
Dept: FAMILY MEDICINE | Facility: CLINIC | Age: 85
End: 2020-08-06

## 2020-08-06 NOTE — TELEPHONE ENCOUNTER
Called patient to notify--    CT showed diverticulosis and not diverticulitis so no infection found - continue to monitor symptoms - if need to see GI - she can     Patient verbalized understanding.

## 2020-08-21 ENCOUNTER — TELEPHONE (OUTPATIENT)
Dept: INTERNAL MEDICINE | Facility: CLINIC | Age: 85
End: 2020-08-21

## 2020-08-21 DIAGNOSIS — K57.92 DIVERTICULITIS: Primary | ICD-10-CM

## 2020-08-21 NOTE — TELEPHONE ENCOUNTER
Pt phoned states she finished antibiotics for her diverticulitis, she thinks she still has it, pt thinks she has a fever at 97.1, pt informed the normal body temp is 98.6, pt states she wants to see a specialist, she has medicare and she can see who she wants, pt informed it is best to see dr ramirez for recommendations, pt insists on talking to dr ramirez or his nurse, message sent to dr ramirez

## 2020-08-21 NOTE — TELEPHONE ENCOUNTER
----- Message from Cass Silva sent at 8/21/2020 11:37 AM CDT -----  Regarding: serious matter  Good Morning,    Pt requesting a return call back regarding some questions that she may have about possible reactions to a medications or other issues that she thinks she's going through and she's extremely worried. Please contact pt back at [phone]

## 2020-08-26 ENCOUNTER — OFFICE VISIT (OUTPATIENT)
Dept: FAMILY MEDICINE | Facility: CLINIC | Age: 85
End: 2020-08-26
Attending: FAMILY MEDICINE
Payer: MEDICARE

## 2020-08-26 VITALS
BODY MASS INDEX: 23.46 KG/M2 | OXYGEN SATURATION: 96 % | SYSTOLIC BLOOD PRESSURE: 126 MMHG | HEIGHT: 60 IN | HEART RATE: 74 BPM | DIASTOLIC BLOOD PRESSURE: 84 MMHG | WEIGHT: 119.5 LBS | TEMPERATURE: 98 F

## 2020-08-26 DIAGNOSIS — R31.9 HEMATURIA, UNSPECIFIED TYPE: Primary | ICD-10-CM

## 2020-08-26 LAB
BACTERIA #/AREA URNS AUTO: ABNORMAL /HPF
BILIRUB UR QL STRIP: NEGATIVE
CLARITY UR REFRACT.AUTO: ABNORMAL
COLOR UR AUTO: YELLOW
GLUCOSE UR QL STRIP: NEGATIVE
HGB UR QL STRIP: ABNORMAL
KETONES UR QL STRIP: NEGATIVE
LEUKOCYTE ESTERASE UR QL STRIP: ABNORMAL
MICROSCOPIC COMMENT: ABNORMAL
NITRITE UR QL STRIP: NEGATIVE
PH UR STRIP: 7 [PH] (ref 5–8)
PROT UR QL STRIP: NEGATIVE
RBC #/AREA URNS AUTO: 2 /HPF (ref 0–4)
SP GR UR STRIP: 1.01 (ref 1–1.03)
SQUAMOUS #/AREA URNS AUTO: 16 /HPF
URN SPEC COLLECT METH UR: ABNORMAL
WBC #/AREA URNS AUTO: >100 /HPF (ref 0–5)

## 2020-08-26 PROCEDURE — 1101F PR PT FALLS ASSESS DOC 0-1 FALLS W/OUT INJ PAST YR: ICD-10-PCS | Mod: CPTII,S$GLB,, | Performed by: FAMILY MEDICINE

## 2020-08-26 PROCEDURE — 1101F PT FALLS ASSESS-DOCD LE1/YR: CPT | Mod: CPTII,S$GLB,, | Performed by: FAMILY MEDICINE

## 2020-08-26 PROCEDURE — 1126F PR PAIN SEVERITY QUANTIFIED, NO PAIN PRESENT: ICD-10-PCS | Mod: S$GLB,,, | Performed by: FAMILY MEDICINE

## 2020-08-26 PROCEDURE — 99214 OFFICE O/P EST MOD 30 MIN: CPT | Mod: S$GLB,,, | Performed by: FAMILY MEDICINE

## 2020-08-26 PROCEDURE — 87186 SC STD MICRODIL/AGAR DIL: CPT

## 2020-08-26 PROCEDURE — 81001 URINALYSIS AUTO W/SCOPE: CPT

## 2020-08-26 PROCEDURE — 1159F MED LIST DOCD IN RCRD: CPT | Mod: S$GLB,,, | Performed by: FAMILY MEDICINE

## 2020-08-26 PROCEDURE — 99499 UNLISTED E&M SERVICE: CPT | Mod: S$GLB,,, | Performed by: FAMILY MEDICINE

## 2020-08-26 PROCEDURE — 1126F AMNT PAIN NOTED NONE PRSNT: CPT | Mod: S$GLB,,, | Performed by: FAMILY MEDICINE

## 2020-08-26 PROCEDURE — 99999 PR PBB SHADOW E&M-EST. PATIENT-LVL IV: ICD-10-PCS | Mod: PBBFAC,,, | Performed by: FAMILY MEDICINE

## 2020-08-26 PROCEDURE — 87086 URINE CULTURE/COLONY COUNT: CPT

## 2020-08-26 PROCEDURE — 99499 RISK ADDL DX/OHS AUDIT: ICD-10-PCS | Mod: S$GLB,,, | Performed by: FAMILY MEDICINE

## 2020-08-26 PROCEDURE — 99999 PR PBB SHADOW E&M-EST. PATIENT-LVL IV: CPT | Mod: PBBFAC,,, | Performed by: FAMILY MEDICINE

## 2020-08-26 PROCEDURE — 87077 CULTURE AEROBIC IDENTIFY: CPT

## 2020-08-26 PROCEDURE — 87088 URINE BACTERIA CULTURE: CPT

## 2020-08-26 PROCEDURE — 99214 PR OFFICE/OUTPT VISIT, EST, LEVL IV, 30-39 MIN: ICD-10-PCS | Mod: S$GLB,,, | Performed by: FAMILY MEDICINE

## 2020-08-26 PROCEDURE — 1159F PR MEDICATION LIST DOCUMENTED IN MEDICAL RECORD: ICD-10-PCS | Mod: S$GLB,,, | Performed by: FAMILY MEDICINE

## 2020-08-26 NOTE — PROGRESS NOTES
Subjective:       Patient ID: Candy Escobar is a 88 y.o. female.    Chief Complaint: Hematuria    88 yr old pleasant white female with  DM II controlled,  Facet arthropathy neck, hypertension, depression, anxiety, GERD, OA knee, presents today for evaluation of hematuria. Onset less than a week ago. No pain or abdominal pain. No fever or chills.    Hematuria  This is a new problem. The current episode started in the past 7 days. The problem is unchanged. She describes the hematuria as gross hematuria. The hematuria occurs during the initial portion of her urinary stream. Irritative symptoms do not include frequency. Pertinent negatives include no dysuria, nausea or sore throat.     Review of Systems   Constitutional: Negative.  Negative for activity change, diaphoresis and unexpected weight change.   HENT: Negative.  Negative for nasal congestion, ear discharge, hearing loss, rhinorrhea, sore throat and voice change.    Eyes: Negative.  Negative for pain, discharge and visual disturbance.   Respiratory: Negative.  Negative for chest tightness, shortness of breath and wheezing.    Cardiovascular: Negative.  Negative for chest pain.   Gastrointestinal: Negative.  Negative for abdominal distention, anal bleeding, constipation and nausea.   Endocrine: Negative.  Negative for cold intolerance, polydipsia and polyuria.   Genitourinary: Positive for hematuria. Negative for decreased urine volume, difficulty urinating, dysuria, frequency, menstrual problem and vaginal pain.   Musculoskeletal: Negative.  Negative for arthralgias, gait problem and myalgias.   Integumentary:  Negative for color change, pallor and wound. Negative.   Allergic/Immunologic: Negative.  Negative for environmental allergies and immunocompromised state.   Neurological: Negative.  Negative for dizziness, tremors, seizures, speech difficulty and headaches.   Hematological: Negative.  Negative for adenopathy. Does not bruise/bleed easily.    Psychiatric/Behavioral: Negative.  Negative for agitation, confusion, decreased concentration, hallucinations, self-injury and suicidal ideas. The patient is not nervous/anxious.      PMH/PSH/FH/SH/MED/ALLERGY reviewed        Objective:       Vitals:    08/26/20 1123   BP: 126/84   Pulse: 74   Temp: 98 °F (36.7 °C)       Physical Exam  Constitutional:       General: She is not in acute distress.     Appearance: She is well-developed. She is not diaphoretic.   HENT:      Head: Normocephalic and atraumatic.      Right Ear: External ear normal.      Left Ear: External ear normal.      Nose: Nose normal.      Mouth/Throat:      Pharynx: No oropharyngeal exudate.   Eyes:      General: No scleral icterus.        Right eye: No discharge.         Left eye: No discharge.      Conjunctiva/sclera: Conjunctivae normal.      Pupils: Pupils are equal, round, and reactive to light.   Neck:      Musculoskeletal: Normal range of motion and neck supple.      Thyroid: No thyromegaly.      Vascular: No JVD.      Trachea: No tracheal deviation.   Cardiovascular:      Rate and Rhythm: Normal rate and regular rhythm.      Heart sounds: Normal heart sounds. No murmur. No friction rub. No gallop.    Pulmonary:      Effort: Pulmonary effort is normal.      Breath sounds: Normal breath sounds. No stridor. No wheezing or rales.   Chest:      Chest wall: No tenderness.   Abdominal:      General: Bowel sounds are normal. There is no distension.      Palpations: Abdomen is soft. There is no mass.      Tenderness: There is no abdominal tenderness. There is no guarding or rebound.      Hernia: No hernia is present.   Musculoskeletal: Normal range of motion.         General: No tenderness.   Lymphadenopathy:      Cervical: No cervical adenopathy.   Skin:     General: Skin is warm and dry.      Coloration: Skin is not pale.      Findings: No erythema or rash.   Neurological:      Mental Status: She is alert and oriented to person, place, and time.       Cranial Nerves: No cranial nerve deficit.      Motor: No abnormal muscle tone.      Coordination: Coordination normal.      Deep Tendon Reflexes: Reflexes are normal and symmetric. Reflexes normal.   Psychiatric:         Behavior: Behavior normal.         Thought Content: Thought content normal.         Judgment: Judgment normal.         Assessment:       1. Hematuria, unspecified type        Plan:           Candy was seen today for hematuria.    Diagnoses and all orders for this visit:    Hematuria, unspecified type  -     Urinalysis  -     Urine culture      Hematuria  -UA n culture  -may need urology if needed    Spent adequate time in obtaining history and explaining differentials    25 minutes spent during this visit of which greater than 50% devoted to face-face counseling and coordination of care regarding diagnosis and management plan    RTC prn worsening

## 2020-08-27 ENCOUNTER — TELEPHONE (OUTPATIENT)
Dept: FAMILY MEDICINE | Facility: CLINIC | Age: 85
End: 2020-08-27

## 2020-08-27 DIAGNOSIS — A49.9 BACTERIAL UTI: Primary | ICD-10-CM

## 2020-08-27 DIAGNOSIS — N39.0 BACTERIAL UTI: Primary | ICD-10-CM

## 2020-08-27 RX ORDER — SULFAMETHOXAZOLE AND TRIMETHOPRIM 800; 160 MG/1; MG/1
1 TABLET ORAL 2 TIMES DAILY
Qty: 20 TABLET | Refills: 0 | Status: SHIPPED | OUTPATIENT
Start: 2020-08-27 | End: 2020-09-06

## 2020-08-28 LAB — BACTERIA UR CULT: ABNORMAL

## 2020-09-16 ENCOUNTER — PATIENT OUTREACH (OUTPATIENT)
Dept: ADMINISTRATIVE | Facility: OTHER | Age: 85
End: 2020-09-16

## 2020-09-17 NOTE — PROGRESS NOTES
Health Maintenance Due   Topic Date Due    Shingles Vaccine (2 of 3) 04/06/2009     Updates were requested from care everywhere.  Chart was reviewed for overdue Proactive [facility] Encounters ([facility]) topics (CRS, Breast Cancer Screening, Eye exam)  Health Maintenance has been updated.  LINKS immunization registry triggered.  Immunizations were reconciled.

## 2020-09-21 ENCOUNTER — OFFICE VISIT (OUTPATIENT)
Dept: GASTROENTEROLOGY | Facility: CLINIC | Age: 85
End: 2020-09-21
Attending: FAMILY MEDICINE
Payer: MEDICARE

## 2020-09-21 VITALS — BODY MASS INDEX: 22.46 KG/M2 | WEIGHT: 115 LBS

## 2020-09-21 DIAGNOSIS — R19.4 CHANGE IN BOWEL HABIT: ICD-10-CM

## 2020-09-21 DIAGNOSIS — R15.1 FECAL SMEARING: Primary | ICD-10-CM

## 2020-09-21 PROCEDURE — 99999 PR PBB SHADOW E&M-EST. PATIENT-LVL IV: CPT | Mod: PBBFAC,,, | Performed by: INTERNAL MEDICINE

## 2020-09-21 PROCEDURE — 1159F PR MEDICATION LIST DOCUMENTED IN MEDICAL RECORD: ICD-10-PCS | Mod: S$GLB,,, | Performed by: INTERNAL MEDICINE

## 2020-09-21 PROCEDURE — 99999 PR PBB SHADOW E&M-EST. PATIENT-LVL IV: ICD-10-PCS | Mod: PBBFAC,,, | Performed by: INTERNAL MEDICINE

## 2020-09-21 PROCEDURE — 1159F MED LIST DOCD IN RCRD: CPT | Mod: S$GLB,,, | Performed by: INTERNAL MEDICINE

## 2020-09-21 PROCEDURE — 99203 OFFICE O/P NEW LOW 30 MIN: CPT | Mod: S$GLB,,, | Performed by: INTERNAL MEDICINE

## 2020-09-21 PROCEDURE — 1101F PT FALLS ASSESS-DOCD LE1/YR: CPT | Mod: CPTII,S$GLB,, | Performed by: INTERNAL MEDICINE

## 2020-09-21 PROCEDURE — 1101F PR PT FALLS ASSESS DOC 0-1 FALLS W/OUT INJ PAST YR: ICD-10-PCS | Mod: CPTII,S$GLB,, | Performed by: INTERNAL MEDICINE

## 2020-09-21 PROCEDURE — 99203 PR OFFICE/OUTPT VISIT, NEW, LEVL III, 30-44 MIN: ICD-10-PCS | Mod: S$GLB,,, | Performed by: INTERNAL MEDICINE

## 2020-09-21 NOTE — LETTER
September 21, 2020      Francisco Munson MD  [address]  [address]  [facility] [address]           [facility]  [address], [address]  [facility] [address]  Phone: [phone]          Patient: Candy Escobar   MR Number: [id number]   YOB: 1932   Date of Visit: 9/21/2020       Dear Dr. Francisco Munson:    Thank you for referring Candy Escobar to me for evaluation. Attached you will find relevant portions of my assessment and plan of care.    If you have questions, please do not hesitate to call me. I look forward to following Candy Escobar along with you.    Sincerely,    Annamarie Miller MD    Enclosure  CC:  No Recipients    If you would like to receive this communication electronically, please contact [email or web address] or [phone] to request more information on [employer] Link access.    For providers and/or their staff who would like to refer a patient to [facility], please contact us through our one-stop-shop provider referral line, [facility], at [phone].    If you feel you have received this communication in error or would no longer like to receive these types of communications, please e-mail [email or web address]

## 2020-09-21 NOTE — PROGRESS NOTES
Subjective:       Patient ID: Candy Escobar is a 88 y.o. female.    Chief Complaint: Change in bowel habits    This is an 87yo female with a PMHx of diverticulosis here for evaluation. She has noted chronic fecal incontinence since an episiotomy decades ago which she describes involved the anal sphincter. Subsequently she also notes a procedure to improve these issues which did not improve her symptoms significantly. She wears a pad, but notes fecal incontinence daily, soft and liquid stools often with significant odor. No overt bleeding or melena. She drinks a lot of water, fairly well balanced diet. 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 Last colonoscopy in 2005 for screening was without polyps; hemorrhoids and diverticulosis was noted.     The following portions of the patient's history were reviewed and updated as appropriate: allergies, current medications, past family history, past medical history, past social history, past surgical history and problem list.    (Portions of this note were dictated using voice recognition software and may contain dictation related errors in spelling/grammar/syntax not found on text review)  HPI  Review of Systems   Constitutional: Negative for appetite change and fever.   HENT: Negative for postnasal drip and trouble swallowing.    Eyes: Negative for pain and redness.   Respiratory: Negative for chest tightness and shortness of breath.    Cardiovascular: Negative for chest pain and leg swelling.   Gastrointestinal: Negative for abdominal distention, anal bleeding, blood in stool, constipation, diarrhea, rectal pain and vomiting.   Endocrine: Negative for cold intolerance and heat intolerance.   Genitourinary: Negative for difficulty urinating and hematuria.   Musculoskeletal: Negative for arthralgias and back pain.   Skin: Negative for color change and pallor.   Allergic/Immunologic: Negative for environmental allergies and food allergies.   Neurological: Negative for  dizziness and light-headedness.   Hematological: Negative for adenopathy. Does not bruise/bleed easily.   Psychiatric/Behavioral: Negative for agitation and behavioral problems.         Objective:      Physical Exam  Constitutional:       Appearance: Normal appearance.   HENT:      Head: Normocephalic and atraumatic.   Eyes:      General: No scleral icterus.     Conjunctiva/sclera: Conjunctivae normal.   Pulmonary:      Effort: Pulmonary effort is normal. No respiratory distress.   Abdominal:      General: Abdomen is flat. There is no distension.      Palpations: There is no mass.   Musculoskeletal:         General: No swelling or tenderness.   Skin:     General: Skin is warm and dry.   Neurological:      General: No focal deficit present.      Mental Status: She is alert and oriented to person, place, and time.   Psychiatric:         Mood and Affect: Mood normal.         Behavior: Behavior normal.           Labs/imaging; reviewed  Assessment:       1. Fecal smearing    2. Change in bowel habit        Plan:   1. Increase fiber intake, maintain adequate water intake; imodium as needed  2. F/u in 4 weeks; colorectal surgery eval if needed

## 2020-11-06 ENCOUNTER — TELEPHONE (OUTPATIENT)
Dept: FAMILY MEDICINE | Facility: CLINIC | Age: 85
End: 2020-11-06

## 2020-11-06 NOTE — TELEPHONE ENCOUNTER
----- Message from Isabelle Ramachandran sent at 11/6/2020 12:00 PM CST -----  Contact: self [phone]  Patient is kun colbert speak with you concerning her records. Please call

## 2020-11-06 NOTE — TELEPHONE ENCOUNTER
Spoke to pt and stated she needs a handicap paperwork filled out regarding a [place] Disabled Parking Application that was faxed to Dr. Munson. Informed pt that Dr. Munson is not licensed in [place]. Pt became upset and stated that Dr. White did last time. Informed pt that I will send the message to Dr. Munson.

## 2020-11-19 ENCOUNTER — OFFICE VISIT (OUTPATIENT)
Dept: FAMILY MEDICINE | Facility: CLINIC | Age: 85
End: 2020-11-19
Attending: FAMILY MEDICINE
Payer: MEDICARE

## 2020-11-19 ENCOUNTER — LAB VISIT (OUTPATIENT)
Dept: LAB | Facility: HOSPITAL | Age: 85
End: 2020-11-19
Attending: FAMILY MEDICINE
Payer: MEDICARE

## 2020-11-19 VITALS
BODY MASS INDEX: 23.5 KG/M2 | OXYGEN SATURATION: 95 % | SYSTOLIC BLOOD PRESSURE: 130 MMHG | HEIGHT: 60 IN | DIASTOLIC BLOOD PRESSURE: 80 MMHG | WEIGHT: 119.69 LBS | HEART RATE: 75 BPM | TEMPERATURE: 98 F

## 2020-11-19 DIAGNOSIS — E11.9 CONTROLLED TYPE 2 DIABETES MELLITUS WITHOUT COMPLICATION, WITHOUT LONG-TERM CURRENT USE OF INSULIN: ICD-10-CM

## 2020-11-19 DIAGNOSIS — E11.59 HYPERTENSION ASSOCIATED WITH DIABETES: Primary | ICD-10-CM

## 2020-11-19 DIAGNOSIS — I15.2 HYPERTENSION ASSOCIATED WITH DIABETES: Primary | ICD-10-CM

## 2020-11-19 DIAGNOSIS — E55.9 VITAMIN D DEFICIENCY: ICD-10-CM

## 2020-11-19 DIAGNOSIS — E78.5 DYSLIPIDEMIA ASSOCIATED WITH TYPE 2 DIABETES MELLITUS: ICD-10-CM

## 2020-11-19 DIAGNOSIS — E11.69 DYSLIPIDEMIA ASSOCIATED WITH TYPE 2 DIABETES MELLITUS: ICD-10-CM

## 2020-11-19 DIAGNOSIS — E11.9 DM TYPE 2 WITHOUT RETINOPATHY: ICD-10-CM

## 2020-11-19 DIAGNOSIS — F32.4 MAJOR DEPRESSIVE DISORDER WITH SINGLE EPISODE, IN PARTIAL REMISSION: ICD-10-CM

## 2020-11-19 LAB
ALBUMIN/CREAT UR: 16.9 UG/MG (ref 0–30)
CREAT UR-MCNC: 77 MG/DL (ref 15–325)
MICROALBUMIN UR DL<=1MG/L-MCNC: 13 UG/ML

## 2020-11-19 PROCEDURE — 99999 PR PBB SHADOW E&M-EST. PATIENT-LVL III: CPT | Mod: PBBFAC,,, | Performed by: FAMILY MEDICINE

## 2020-11-19 PROCEDURE — 1159F PR MEDICATION LIST DOCUMENTED IN MEDICAL RECORD: ICD-10-PCS | Mod: S$GLB,,, | Performed by: FAMILY MEDICINE

## 2020-11-19 PROCEDURE — 99214 OFFICE O/P EST MOD 30 MIN: CPT | Mod: S$GLB,,, | Performed by: FAMILY MEDICINE

## 2020-11-19 PROCEDURE — 99214 PR OFFICE/OUTPT VISIT, EST, LEVL IV, 30-39 MIN: ICD-10-PCS | Mod: S$GLB,,, | Performed by: FAMILY MEDICINE

## 2020-11-19 PROCEDURE — 1157F ADVNC CARE PLAN IN RCRD: CPT | Mod: S$GLB,,, | Performed by: FAMILY MEDICINE

## 2020-11-19 PROCEDURE — 1157F PR ADVANCE CARE PLAN OR EQUIV PRESENT IN MEDICAL RECORD: ICD-10-PCS | Mod: S$GLB,,, | Performed by: FAMILY MEDICINE

## 2020-11-19 PROCEDURE — 99999 PR PBB SHADOW E&M-EST. PATIENT-LVL III: ICD-10-PCS | Mod: PBBFAC,,, | Performed by: FAMILY MEDICINE

## 2020-11-19 PROCEDURE — 99499 UNLISTED E&M SERVICE: CPT | Mod: S$GLB,,, | Performed by: FAMILY MEDICINE

## 2020-11-19 PROCEDURE — 82043 UR ALBUMIN QUANTITATIVE: CPT

## 2020-11-19 PROCEDURE — 99499 RISK ADDL DX/OHS AUDIT: ICD-10-PCS | Mod: S$GLB,,, | Performed by: FAMILY MEDICINE

## 2020-11-19 PROCEDURE — 1159F MED LIST DOCD IN RCRD: CPT | Mod: S$GLB,,, | Performed by: FAMILY MEDICINE

## 2020-11-19 RX ORDER — LOSARTAN POTASSIUM 50 MG/1
50 TABLET ORAL NIGHTLY
Qty: 90 TABLET | Refills: 3 | Status: SHIPPED | OUTPATIENT
Start: 2020-11-19 | End: 2021-06-01 | Stop reason: SDUPTHER

## 2020-11-19 RX ORDER — ERGOCALCIFEROL 1.25 MG/1
50000 CAPSULE ORAL
Qty: 12 CAPSULE | Refills: 3 | Status: SHIPPED | OUTPATIENT
Start: 2020-11-19 | End: 2021-06-01 | Stop reason: SDUPTHER

## 2020-11-19 NOTE — PROGRESS NOTES
Subjective:       Patient ID: Candy Escobar is a 88 y.o. female.    Chief Complaint: No chief complaint on file.    88 yr old pleasant white female with  DM II controlled,  Facet arthropathy neck, hypertension, depression, anxiety, GERD, OA knee, presents today for her routine 6 month follow up and also neuropathic pain, joint aches and lab work.    DM II - diet controlled -      HGBA1C                   5.5                 05/19/2020                              foot and eye exam UTD - not on ACE    HTN - chronic - controlled - she is asymptomatic - no CP, SOB, MINER, PND or orthopnea, no bowel or bladder problems    Facet arthropathy neck/spasms - stable - uses muscle relaxant as needed at night     Depression/anxiety - controlled - on prozac daily for years and ativan as needed - no current mood problems - she is lil anxious taking on surgery - no SI/HI    GERD - controlled - on PPI as needed    OA knee s/p surgery - on mobic as needed    HLD - at goal -  LDLCALC                  120.4               05/19/2020                                        - not on any meds - diet compliant    - need statin    History as below - reviewed      Health maintenance  -labs UTD  -vaccines reports UTD      Hypertension  This is a chronic problem. The current episode started more than 1 year ago. The problem has been gradually improving since onset. The problem is controlled. Pertinent negatives include no anxiety, chest pain, headaches, palpitations or shortness of breath. There are no associated agents to hypertension. Risk factors for coronary artery disease include dyslipidemia, diabetes mellitus and post-menopausal state. Past treatments include lifestyle changes. The current treatment provides significant improvement. There are no compliance problems.  There is no history of angina, CAD/MI, CVA, left ventricular hypertrophy, PVD or retinopathy. There is no history of chronic renal disease, hypercortisolism,  hyperparathyroidism, pheochromocytoma, renovascular disease or a thyroid problem.   Pain  Associated symptoms include arthralgias and myalgias. Pertinent negatives include no chest pain, congestion, diaphoresis, headaches, nausea or sore throat.   Dizziness:   Chronicity:  Recurrent  Onset:  1 to 4 weeks ago  Progression since onset:  Gradually improving  Frequency:  Every few days  Severity:  Mild  Duration:  5 minutes  Dizziness characteristics:  Sensation of movement   Associated symptoms: hearing loss.no headaches, no nausea, no diaphoresis, no palpitations and no chest pain.  Aggravated by:  Position changes  Treatments tried:  Rest and body position changes  Improvements on treatment:  Mildno strokes, no neurologic disease, no head trauma, no balance testing, no ear trauma, no head trauma, no anxiety, no ear tubes, no environmental allergies and no CT head.  Leg Pain   There was no injury mechanism. The pain is present in the left leg, left knee, right knee and right leg. The quality of the pain is described as aching. The pain is at a severity of 5/10. The pain is moderate. The pain has been improving since onset. The symptoms are aggravated by movement. She has tried NSAIDs for the symptoms. The treatment provided moderate relief.     Review of Systems   Constitutional: Negative.  Negative for activity change, diaphoresis and unexpected weight change.   HENT: Positive for hearing loss. Negative for nasal congestion, ear discharge, rhinorrhea, sore throat and voice change.    Eyes: Negative.  Negative for pain, discharge and visual disturbance.   Respiratory: Negative.  Negative for chest tightness, shortness of breath and wheezing.    Cardiovascular: Negative.  Negative for chest pain and palpitations.   Gastrointestinal: Negative.  Negative for abdominal distention, anal bleeding, constipation and nausea.   Endocrine: Negative.  Negative for cold intolerance, polydipsia and polyuria.   Genitourinary:  Negative.  Negative for decreased urine volume, difficulty urinating, dysuria, frequency, menstrual problem and vaginal pain.   Musculoskeletal: Positive for arthralgias, leg pain and myalgias. Negative for gait problem.   Integumentary:  Negative for color change, pallor and wound. Negative.   Allergic/Immunologic: Negative.  Negative for environmental allergies and immunocompromised state.   Neurological: Positive for dizziness. Negative for tremors, seizures, speech difficulty and headaches.   Hematological: Negative.  Negative for adenopathy. Does not bruise/bleed easily.   Psychiatric/Behavioral: Negative.  Negative for agitation, confusion, decreased concentration, hallucinations, self-injury and suicidal ideas. The patient is not nervous/anxious.          PMH/PSH/FH/SH/MED/ALLERGY reviewed    Objective:       Vitals:    11/19/20 0810   BP: 130/80   Pulse: 75   Temp: 98.4 °F (36.9 °C)       Physical Exam  Constitutional:       General: She is not in acute distress.     Appearance: She is well-developed. She is not diaphoretic.   HENT:      Head: Normocephalic and atraumatic.      Right Ear: External ear normal.      Left Ear: External ear normal.      Nose: Nose normal.      Mouth/Throat:      Pharynx: No oropharyngeal exudate.   Eyes:      General: No scleral icterus.        Right eye: No discharge.         Left eye: No discharge.      Conjunctiva/sclera: Conjunctivae normal.      Pupils: Pupils are equal, round, and reactive to light.   Neck:      Musculoskeletal: Normal range of motion and neck supple.      Thyroid: No thyromegaly.      Vascular: No JVD.      Trachea: No tracheal deviation.   Cardiovascular:      Rate and Rhythm: Normal rate and regular rhythm.      Heart sounds: Normal heart sounds. No murmur. No friction rub. No gallop.    Pulmonary:      Effort: Pulmonary effort is normal.      Breath sounds: Normal breath sounds. No stridor. No wheezing or rales.   Chest:      Chest wall: No tenderness.    Abdominal:      General: Bowel sounds are normal. There is no distension.      Palpations: Abdomen is soft. There is no mass.      Tenderness: There is no abdominal tenderness. There is no guarding or rebound.      Hernia: No hernia is present.   Musculoskeletal: Normal range of motion.         General: No tenderness.   Lymphadenopathy:      Cervical: No cervical adenopathy.   Skin:     General: Skin is warm and dry.      Coloration: Skin is not pale.      Findings: No erythema or rash.   Neurological:      Mental Status: She is alert and oriented to person, place, and time.      Cranial Nerves: No cranial nerve deficit.      Motor: No abnormal muscle tone.      Coordination: Coordination normal.      Deep Tendon Reflexes: Reflexes are normal and symmetric. Reflexes normal.   Psychiatric:         Behavior: Behavior normal.         Thought Content: Thought content normal.         Judgment: Judgment normal.         Assessment:       1. Hypertension associated with diabetes    2. Major depressive disorder with single episode, in partial remission    3. DM type 2 without retinopathy    4. Dyslipidemia associated with type 2 diabetes mellitus    5. Controlled type 2 diabetes mellitus without complication, without long-term current use of insulin    6. Vitamin D deficiency        Plan:           Diagnoses and all orders for this visit:    Hypertension associated with diabetes  -     Comprehensive Metabolic Panel; Future  -     Lipid Panel; Future  -     Urinalysis  -     losartan (COZAAR) 50 MG tablet; Take 1 tablet (50 mg total) by mouth every evening.    Major depressive disorder with single episode, in partial remission    DM type 2 without retinopathy  -     Comprehensive Metabolic Panel; Future  -     Hemoglobin A1C; Future  -     Lipid Panel; Future  -     Urinalysis  -     Microalbumin/Creatinine Ratio, Urine; Future    Dyslipidemia associated with type 2 diabetes mellitus  -     Comprehensive Metabolic Panel;  Future  -     Lipid Panel; Future    Controlled type 2 diabetes mellitus without complication, without long-term current use of insulin  -     Comprehensive Metabolic Panel; Future  -     Hemoglobin A1C; Future  -     Lipid Panel; Future  -     Urinalysis  -     Microalbumin/Creatinine Ratio, Urine; Future    Vitamin D deficiency  -     ergocalciferol (ERGOCALCIFEROL) 50,000 unit Cap; Take 1 capsule (50,000 Units total) by mouth every 7 days.        HTN  -controlled  -continue losartan 25 daily    HLD  -controlled  -start statin  -labs    DM II  -controlled  -labs    SK  -reassurance    MDD  -controlled    Chronic knee pain  -continue mobic    Neuropathic pain  -well controlled  - neurontin 300 TID    Spent adequate time in obtaining history and explaining differentials    25 minutes spent during this visit of which greater than 50% devoted to face-face counseling and coordination of care regarding diagnosis and management plan    Follow up in about 3 months (around 2/19/2021), or if symptoms worsen or fail to improve.

## 2020-11-23 ENCOUNTER — TELEPHONE (OUTPATIENT)
Dept: FAMILY MEDICINE | Facility: CLINIC | Age: 85
End: 2020-11-23

## 2020-11-23 NOTE — TELEPHONE ENCOUNTER
----- Message from Mckenzie Valadez sent at 11/23/2020  1:57 PM CST -----  Type:  Needs Medical Advice    Who Called: pt  Advice Regarding: she is now taking one low dose aspirin at noon, in addition to Losartan  Would the patient rather a call back or a response via [facility]? call  Best Call Back Number:   Additional Information: n/a

## 2021-01-26 ENCOUNTER — OFFICE VISIT (OUTPATIENT)
Dept: OPTOMETRY | Facility: CLINIC | Age: 86
End: 2021-01-26
Payer: MEDICARE

## 2021-01-26 DIAGNOSIS — H52.7 REFRACTIVE ERROR: ICD-10-CM

## 2021-01-26 DIAGNOSIS — E11.9 TYPE 2 DIABETES MELLITUS WITHOUT RETINOPATHY: Primary | ICD-10-CM

## 2021-01-26 DIAGNOSIS — H40.013 OPEN ANGLE WITH BORDERLINE FINDINGS AND LOW GLAUCOMA RISK IN BOTH EYES: ICD-10-CM

## 2021-01-26 DIAGNOSIS — H25.11 NUCLEAR SCLEROSIS OF RIGHT EYE: ICD-10-CM

## 2021-01-26 PROCEDURE — 99499 RISK ADDL DX/OHS AUDIT: ICD-10-PCS | Mod: S$GLB,,, | Performed by: OPTOMETRIST

## 2021-01-26 PROCEDURE — 3288F FALL RISK ASSESSMENT DOCD: CPT | Mod: CPTII,S$GLB,, | Performed by: OPTOMETRIST

## 2021-01-26 PROCEDURE — 92015 DETERMINE REFRACTIVE STATE: CPT | Mod: S$GLB,,, | Performed by: OPTOMETRIST

## 2021-01-26 PROCEDURE — 92133 CPTRZD OPH DX IMG PST SGM ON: CPT | Mod: S$GLB,,, | Performed by: OPTOMETRIST

## 2021-01-26 PROCEDURE — 1101F PT FALLS ASSESS-DOCD LE1/YR: CPT | Mod: CPTII,S$GLB,, | Performed by: OPTOMETRIST

## 2021-01-26 PROCEDURE — 1126F AMNT PAIN NOTED NONE PRSNT: CPT | Mod: S$GLB,,, | Performed by: OPTOMETRIST

## 2021-01-26 PROCEDURE — 1101F PR PT FALLS ASSESS DOC 0-1 FALLS W/OUT INJ PAST YR: ICD-10-PCS | Mod: CPTII,S$GLB,, | Performed by: OPTOMETRIST

## 2021-01-26 PROCEDURE — 99999 PR PBB SHADOW E&M-EST. PATIENT-LVL III: ICD-10-PCS | Mod: PBBFAC,,, | Performed by: OPTOMETRIST

## 2021-01-26 PROCEDURE — 3288F PR FALLS RISK ASSESSMENT DOCUMENTED: ICD-10-PCS | Mod: CPTII,S$GLB,, | Performed by: OPTOMETRIST

## 2021-01-26 PROCEDURE — 1157F PR ADVANCE CARE PLAN OR EQUIV PRESENT IN MEDICAL RECORD: ICD-10-PCS | Mod: S$GLB,,, | Performed by: OPTOMETRIST

## 2021-01-26 PROCEDURE — 1126F PR PAIN SEVERITY QUANTIFIED, NO PAIN PRESENT: ICD-10-PCS | Mod: S$GLB,,, | Performed by: OPTOMETRIST

## 2021-01-26 PROCEDURE — 99999 PR PBB SHADOW E&M-EST. PATIENT-LVL III: CPT | Mod: PBBFAC,,, | Performed by: OPTOMETRIST

## 2021-01-26 PROCEDURE — 2023F DILAT RTA XM W/O RTNOPTHY: CPT | Mod: S$GLB,,, | Performed by: OPTOMETRIST

## 2021-01-26 PROCEDURE — 2023F PR DILATED RETINAL EXAM W/O EVID OF RETINOPATHY: ICD-10-PCS | Mod: S$GLB,,, | Performed by: OPTOMETRIST

## 2021-01-26 PROCEDURE — 92133 POSTERIOR SEGMENT OCT OPTIC NERVE(OCULAR COHERENCE TOMOGRAPHY) - OU - BOTH EYES: ICD-10-PCS | Mod: S$GLB,,, | Performed by: OPTOMETRIST

## 2021-01-26 PROCEDURE — 99499 UNLISTED E&M SERVICE: CPT | Mod: S$GLB,,, | Performed by: OPTOMETRIST

## 2021-01-26 PROCEDURE — 1157F ADVNC CARE PLAN IN RCRD: CPT | Mod: S$GLB,,, | Performed by: OPTOMETRIST

## 2021-01-26 PROCEDURE — 92015 PR REFRACTION: ICD-10-PCS | Mod: S$GLB,,, | Performed by: OPTOMETRIST

## 2021-01-26 PROCEDURE — 92014 PR EYE EXAM, EST PATIENT,COMPREHESV: ICD-10-PCS | Mod: S$GLB,,, | Performed by: OPTOMETRIST

## 2021-01-26 PROCEDURE — 92014 COMPRE OPH EXAM EST PT 1/>: CPT | Mod: S$GLB,,, | Performed by: OPTOMETRIST

## 2021-02-08 ENCOUNTER — TELEPHONE (OUTPATIENT)
Dept: FAMILY MEDICINE | Facility: CLINIC | Age: 86
End: 2021-02-08

## 2021-02-10 ENCOUNTER — IMMUNIZATION (OUTPATIENT)
Dept: PRIMARY CARE CLINIC | Facility: CLINIC | Age: 86
End: 2021-02-10
Payer: MEDICARE

## 2021-02-10 DIAGNOSIS — Z23 NEED FOR VACCINATION: Primary | ICD-10-CM

## 2021-02-10 PROCEDURE — 0001A PR IMMUNIZ ADMIN, SARS-COV-2 COVID-19 VACC, 30MCG/0.3ML, 1ST DOSE: ICD-10-PCS | Mod: CV19,S$GLB,, | Performed by: INTERNAL MEDICINE

## 2021-02-10 PROCEDURE — 91300 PR SARS-COV- 2 COVID-19 VACCINE, NO PRSV, 30MCG/0.3ML, IM: ICD-10-PCS | Mod: S$GLB,,, | Performed by: INTERNAL MEDICINE

## 2021-02-10 PROCEDURE — 0001A PR IMMUNIZ ADMIN, SARS-COV-2 COVID-19 VACC, 30MCG/0.3ML, 1ST DOSE: CPT | Mod: CV19,S$GLB,, | Performed by: INTERNAL MEDICINE

## 2021-02-10 PROCEDURE — 91300 PR SARS-COV- 2 COVID-19 VACCINE, NO PRSV, 30MCG/0.3ML, IM: CPT | Mod: S$GLB,,, | Performed by: INTERNAL MEDICINE

## 2021-02-10 RX ADMIN — Medication 0.3 ML: at 01:02

## 2021-03-03 ENCOUNTER — IMMUNIZATION (OUTPATIENT)
Dept: PRIMARY CARE CLINIC | Facility: CLINIC | Age: 86
End: 2021-03-03
Payer: MEDICARE

## 2021-03-03 DIAGNOSIS — Z23 NEED FOR VACCINATION: Primary | ICD-10-CM

## 2021-03-03 PROCEDURE — 0002A PR IMMUNIZ ADMIN, SARS-COV-2 COVID-19 VACC, 30MCG/0.3ML, 2ND DOSE: ICD-10-PCS | Mod: CV19,S$GLB,, | Performed by: INTERNAL MEDICINE

## 2021-03-03 PROCEDURE — 91300 PR SARS-COV- 2 COVID-19 VACCINE, NO PRSV, 30MCG/0.3ML, IM: CPT | Mod: S$GLB,,, | Performed by: INTERNAL MEDICINE

## 2021-03-03 PROCEDURE — 0002A PR IMMUNIZ ADMIN, SARS-COV-2 COVID-19 VACC, 30MCG/0.3ML, 2ND DOSE: CPT | Mod: CV19,S$GLB,, | Performed by: INTERNAL MEDICINE

## 2021-03-03 PROCEDURE — 91300 PR SARS-COV- 2 COVID-19 VACCINE, NO PRSV, 30MCG/0.3ML, IM: ICD-10-PCS | Mod: S$GLB,,, | Performed by: INTERNAL MEDICINE

## 2021-03-03 RX ADMIN — Medication 0.3 ML: at 02:03

## 2021-03-09 ENCOUNTER — OFFICE VISIT (OUTPATIENT)
Dept: FAMILY MEDICINE | Facility: CLINIC | Age: 86
End: 2021-03-09
Attending: FAMILY MEDICINE
Payer: MEDICARE

## 2021-03-09 VITALS
BODY MASS INDEX: 23.98 KG/M2 | SYSTOLIC BLOOD PRESSURE: 130 MMHG | HEART RATE: 82 BPM | WEIGHT: 122.13 LBS | DIASTOLIC BLOOD PRESSURE: 80 MMHG | TEMPERATURE: 98 F | HEIGHT: 60 IN | OXYGEN SATURATION: 94 %

## 2021-03-09 DIAGNOSIS — F32.2 CURRENT SEVERE EPISODE OF MAJOR DEPRESSIVE DISORDER WITHOUT PSYCHOTIC FEATURES WITHOUT PRIOR EPISODE: ICD-10-CM

## 2021-03-09 DIAGNOSIS — F32.0 MILD SINGLE CURRENT EPISODE OF MAJOR DEPRESSIVE DISORDER: ICD-10-CM

## 2021-03-09 DIAGNOSIS — N39.46 MIXED INCONTINENCE URGE AND STRESS: ICD-10-CM

## 2021-03-09 DIAGNOSIS — J41.0 SIMPLE CHRONIC BRONCHITIS: ICD-10-CM

## 2021-03-09 DIAGNOSIS — E11.69 DYSLIPIDEMIA ASSOCIATED WITH TYPE 2 DIABETES MELLITUS: ICD-10-CM

## 2021-03-09 DIAGNOSIS — M46.92 INFLAMMATORY SPONDYLOPATHY OF CERVICAL REGION: ICD-10-CM

## 2021-03-09 DIAGNOSIS — E78.5 DYSLIPIDEMIA ASSOCIATED WITH TYPE 2 DIABETES MELLITUS: ICD-10-CM

## 2021-03-09 DIAGNOSIS — Z78.0 POST-MENOPAUSAL: ICD-10-CM

## 2021-03-09 DIAGNOSIS — F32.4 MAJOR DEPRESSIVE DISORDER WITH SINGLE EPISODE, IN PARTIAL REMISSION: ICD-10-CM

## 2021-03-09 DIAGNOSIS — E11.9 DM TYPE 2 WITHOUT RETINOPATHY: ICD-10-CM

## 2021-03-09 DIAGNOSIS — I15.2 HYPERTENSION ASSOCIATED WITH DIABETES: ICD-10-CM

## 2021-03-09 DIAGNOSIS — E11.9 CONTROLLED TYPE 2 DIABETES MELLITUS WITHOUT COMPLICATION, WITHOUT LONG-TERM CURRENT USE OF INSULIN: ICD-10-CM

## 2021-03-09 DIAGNOSIS — I10 ESSENTIAL HYPERTENSION: ICD-10-CM

## 2021-03-09 DIAGNOSIS — E11.59 HYPERTENSION ASSOCIATED WITH DIABETES: ICD-10-CM

## 2021-03-09 DIAGNOSIS — D69.2 OTHER NONTHROMBOCYTOPENIC PURPURA: ICD-10-CM

## 2021-03-09 DIAGNOSIS — Z00.00 ROUTINE GENERAL MEDICAL EXAMINATION AT HEALTH CARE FACILITY: Primary | ICD-10-CM

## 2021-03-09 PROCEDURE — 99214 PR OFFICE/OUTPT VISIT, EST, LEVL IV, 30-39 MIN: ICD-10-PCS | Mod: S$GLB,,, | Performed by: FAMILY MEDICINE

## 2021-03-09 PROCEDURE — 99214 OFFICE O/P EST MOD 30 MIN: CPT | Mod: S$GLB,,, | Performed by: FAMILY MEDICINE

## 2021-03-09 PROCEDURE — 3288F PR FALLS RISK ASSESSMENT DOCUMENTED: ICD-10-PCS | Mod: CPTII,S$GLB,, | Performed by: FAMILY MEDICINE

## 2021-03-09 PROCEDURE — 3288F FALL RISK ASSESSMENT DOCD: CPT | Mod: CPTII,S$GLB,, | Performed by: FAMILY MEDICINE

## 2021-03-09 PROCEDURE — 1157F PR ADVANCE CARE PLAN OR EQUIV PRESENT IN MEDICAL RECORD: ICD-10-PCS | Mod: S$GLB,,, | Performed by: FAMILY MEDICINE

## 2021-03-09 PROCEDURE — 99999 PR PBB SHADOW E&M-EST. PATIENT-LVL IV: ICD-10-PCS | Mod: PBBFAC,,, | Performed by: FAMILY MEDICINE

## 2021-03-09 PROCEDURE — 1101F PR PT FALLS ASSESS DOC 0-1 FALLS W/OUT INJ PAST YR: ICD-10-PCS | Mod: CPTII,S$GLB,, | Performed by: FAMILY MEDICINE

## 2021-03-09 PROCEDURE — 99499 RISK ADDL DX/OHS AUDIT: ICD-10-PCS | Mod: S$GLB,,, | Performed by: FAMILY MEDICINE

## 2021-03-09 PROCEDURE — 99499 UNLISTED E&M SERVICE: CPT | Mod: S$GLB,,, | Performed by: FAMILY MEDICINE

## 2021-03-09 PROCEDURE — 1101F PT FALLS ASSESS-DOCD LE1/YR: CPT | Mod: CPTII,S$GLB,, | Performed by: FAMILY MEDICINE

## 2021-03-09 PROCEDURE — 1157F ADVNC CARE PLAN IN RCRD: CPT | Mod: S$GLB,,, | Performed by: FAMILY MEDICINE

## 2021-03-09 PROCEDURE — 99999 PR PBB SHADOW E&M-EST. PATIENT-LVL IV: CPT | Mod: PBBFAC,,, | Performed by: FAMILY MEDICINE

## 2021-03-09 RX ORDER — ESTRADIOL 0.1 MG/D
PATCH TRANSDERMAL
Qty: 12 PATCH | Refills: 1 | Status: SHIPPED | OUTPATIENT
Start: 2021-03-09 | End: 2021-10-11

## 2021-03-09 RX ORDER — ESTRADIOL 0.1 MG/D
PATCH TRANSDERMAL
Qty: 12 PATCH | Refills: 1 | Status: SHIPPED | OUTPATIENT
Start: 2021-03-09 | End: 2021-03-09 | Stop reason: SDUPTHER

## 2021-04-30 ENCOUNTER — LAB VISIT (OUTPATIENT)
Dept: LAB | Facility: HOSPITAL | Age: 86
End: 2021-04-30
Attending: FAMILY MEDICINE
Payer: MEDICARE

## 2021-04-30 DIAGNOSIS — I15.2 HYPERTENSION ASSOCIATED WITH DIABETES: ICD-10-CM

## 2021-04-30 DIAGNOSIS — E11.9 DM TYPE 2 WITHOUT RETINOPATHY: ICD-10-CM

## 2021-04-30 DIAGNOSIS — E11.9 CONTROLLED TYPE 2 DIABETES MELLITUS WITHOUT COMPLICATION, WITHOUT LONG-TERM CURRENT USE OF INSULIN: ICD-10-CM

## 2021-04-30 DIAGNOSIS — E78.5 DYSLIPIDEMIA ASSOCIATED WITH TYPE 2 DIABETES MELLITUS: ICD-10-CM

## 2021-04-30 DIAGNOSIS — Z00.00 ROUTINE GENERAL MEDICAL EXAMINATION AT HEALTH CARE FACILITY: ICD-10-CM

## 2021-04-30 DIAGNOSIS — E11.59 HYPERTENSION ASSOCIATED WITH DIABETES: ICD-10-CM

## 2021-04-30 DIAGNOSIS — E11.69 DYSLIPIDEMIA ASSOCIATED WITH TYPE 2 DIABETES MELLITUS: ICD-10-CM

## 2021-04-30 LAB
ALBUMIN SERPL BCP-MCNC: 3.8 G/DL (ref 3.5–5.2)
ALP SERPL-CCNC: 48 U/L (ref 55–135)
ALT SERPL W/O P-5'-P-CCNC: 13 U/L (ref 10–44)
ANION GAP SERPL CALC-SCNC: 5 MMOL/L (ref 8–16)
AST SERPL-CCNC: 22 U/L (ref 10–40)
BASOPHILS # BLD AUTO: 0.07 K/UL (ref 0–0.2)
BASOPHILS NFR BLD: 1.7 % (ref 0–1.9)
BILIRUB SERPL-MCNC: 1 MG/DL (ref 0.1–1)
BUN SERPL-MCNC: 15 MG/DL (ref 8–23)
CALCIUM SERPL-MCNC: 9 MG/DL (ref 8.7–10.5)
CHLORIDE SERPL-SCNC: 98 MMOL/L (ref 95–110)
CHOLEST SERPL-MCNC: 214 MG/DL (ref 120–199)
CHOLEST/HDLC SERPL: 2.2 {RATIO} (ref 2–5)
CO2 SERPL-SCNC: 31 MMOL/L (ref 23–29)
CREAT SERPL-MCNC: 0.8 MG/DL (ref 0.5–1.4)
DIFFERENTIAL METHOD: ABNORMAL
EOSINOPHIL # BLD AUTO: 0.3 K/UL (ref 0–0.5)
EOSINOPHIL NFR BLD: 8 % (ref 0–8)
ERYTHROCYTE [DISTWIDTH] IN BLOOD BY AUTOMATED COUNT: 13.4 % (ref 11.5–14.5)
EST. GFR  (AFRICAN AMERICAN): >60 ML/MIN/1.73 M^2
EST. GFR  (NON AFRICAN AMERICAN): >60 ML/MIN/1.73 M^2
ESTIMATED AVG GLUCOSE: 105 MG/DL (ref 68–131)
GLUCOSE SERPL-MCNC: 89 MG/DL (ref 70–110)
HBA1C MFR BLD: 5.3 % (ref 4–5.6)
HCT VFR BLD AUTO: 36.7 % (ref 37–48.5)
HDLC SERPL-MCNC: 96 MG/DL (ref 40–75)
HDLC SERPL: 44.9 % (ref 20–50)
HGB BLD-MCNC: 12.1 G/DL (ref 12–16)
IMM GRANULOCYTES # BLD AUTO: 0.02 K/UL (ref 0–0.04)
IMM GRANULOCYTES NFR BLD AUTO: 0.5 % (ref 0–0.5)
LDLC SERPL CALC-MCNC: 108.4 MG/DL (ref 63–159)
LYMPHOCYTES # BLD AUTO: 1 K/UL (ref 1–4.8)
LYMPHOCYTES NFR BLD: 24.6 % (ref 18–48)
MCH RBC QN AUTO: 31.8 PG (ref 27–31)
MCHC RBC AUTO-ENTMCNC: 33 G/DL (ref 32–36)
MCV RBC AUTO: 96 FL (ref 82–98)
MONOCYTES # BLD AUTO: 0.4 K/UL (ref 0.3–1)
MONOCYTES NFR BLD: 9.7 % (ref 4–15)
NEUTROPHILS # BLD AUTO: 2.4 K/UL (ref 1.8–7.7)
NEUTROPHILS NFR BLD: 55.5 % (ref 38–73)
NONHDLC SERPL-MCNC: 118 MG/DL
NRBC BLD-RTO: 0 /100 WBC
PLATELET # BLD AUTO: 228 K/UL (ref 150–450)
PMV BLD AUTO: 11.5 FL (ref 9.2–12.9)
POTASSIUM SERPL-SCNC: 4.4 MMOL/L (ref 3.5–5.1)
PROT SERPL-MCNC: 6.8 G/DL (ref 6–8.4)
RBC # BLD AUTO: 3.81 M/UL (ref 4–5.4)
SODIUM SERPL-SCNC: 134 MMOL/L (ref 136–145)
TRIGL SERPL-MCNC: 48 MG/DL (ref 30–150)
WBC # BLD AUTO: 4.23 K/UL (ref 3.9–12.7)

## 2021-04-30 PROCEDURE — 85025 COMPLETE CBC W/AUTO DIFF WBC: CPT | Performed by: FAMILY MEDICINE

## 2021-04-30 PROCEDURE — 36415 COLL VENOUS BLD VENIPUNCTURE: CPT | Performed by: FAMILY MEDICINE

## 2021-04-30 PROCEDURE — 80053 COMPREHEN METABOLIC PANEL: CPT | Performed by: FAMILY MEDICINE

## 2021-04-30 PROCEDURE — 80061 LIPID PANEL: CPT | Performed by: FAMILY MEDICINE

## 2021-04-30 PROCEDURE — 83036 HEMOGLOBIN GLYCOSYLATED A1C: CPT | Performed by: FAMILY MEDICINE

## 2021-05-04 ENCOUNTER — OFFICE VISIT (OUTPATIENT)
Dept: FAMILY MEDICINE | Facility: CLINIC | Age: 86
End: 2021-05-04
Attending: FAMILY MEDICINE
Payer: MEDICARE

## 2021-05-04 VITALS
HEIGHT: 60 IN | WEIGHT: 118.38 LBS | SYSTOLIC BLOOD PRESSURE: 130 MMHG | DIASTOLIC BLOOD PRESSURE: 68 MMHG | HEART RATE: 86 BPM | BODY MASS INDEX: 23.24 KG/M2 | OXYGEN SATURATION: 96 %

## 2021-05-04 DIAGNOSIS — E11.9 CONTROLLED TYPE 2 DIABETES MELLITUS WITHOUT COMPLICATION, WITHOUT LONG-TERM CURRENT USE OF INSULIN: Primary | ICD-10-CM

## 2021-05-04 DIAGNOSIS — K21.9 GASTROESOPHAGEAL REFLUX DISEASE WITHOUT ESOPHAGITIS: ICD-10-CM

## 2021-05-04 DIAGNOSIS — E78.5 DYSLIPIDEMIA ASSOCIATED WITH TYPE 2 DIABETES MELLITUS: ICD-10-CM

## 2021-05-04 DIAGNOSIS — E11.69 DYSLIPIDEMIA ASSOCIATED WITH TYPE 2 DIABETES MELLITUS: ICD-10-CM

## 2021-05-04 DIAGNOSIS — M25.561 CHRONIC PAIN OF RIGHT KNEE: ICD-10-CM

## 2021-05-04 DIAGNOSIS — M17.11 OSTEOARTHRITIS OF RIGHT KNEE, UNSPECIFIED OSTEOARTHRITIS TYPE: ICD-10-CM

## 2021-05-04 DIAGNOSIS — I15.2 HYPERTENSION ASSOCIATED WITH DIABETES: ICD-10-CM

## 2021-05-04 DIAGNOSIS — L70.0 ACNE VULGARIS: ICD-10-CM

## 2021-05-04 DIAGNOSIS — E11.59 HYPERTENSION ASSOCIATED WITH DIABETES: ICD-10-CM

## 2021-05-04 DIAGNOSIS — G89.29 CHRONIC PAIN OF RIGHT KNEE: ICD-10-CM

## 2021-05-04 DIAGNOSIS — F32.4 MAJOR DEPRESSIVE DISORDER WITH SINGLE EPISODE, IN PARTIAL REMISSION: ICD-10-CM

## 2021-05-04 DIAGNOSIS — F32.0 MILD SINGLE CURRENT EPISODE OF MAJOR DEPRESSIVE DISORDER: ICD-10-CM

## 2021-05-04 DIAGNOSIS — E11.9 DM TYPE 2 WITHOUT RETINOPATHY: ICD-10-CM

## 2021-05-04 PROCEDURE — 99499 UNLISTED E&M SERVICE: CPT | Mod: S$GLB,,, | Performed by: FAMILY MEDICINE

## 2021-05-04 PROCEDURE — 1157F PR ADVANCE CARE PLAN OR EQUIV PRESENT IN MEDICAL RECORD: ICD-10-PCS | Mod: S$GLB,,, | Performed by: FAMILY MEDICINE

## 2021-05-04 PROCEDURE — 1157F ADVNC CARE PLAN IN RCRD: CPT | Mod: S$GLB,,, | Performed by: FAMILY MEDICINE

## 2021-05-04 PROCEDURE — 1159F PR MEDICATION LIST DOCUMENTED IN MEDICAL RECORD: ICD-10-PCS | Mod: S$GLB,,, | Performed by: FAMILY MEDICINE

## 2021-05-04 PROCEDURE — 3288F FALL RISK ASSESSMENT DOCD: CPT | Mod: CPTII,S$GLB,, | Performed by: FAMILY MEDICINE

## 2021-05-04 PROCEDURE — 99214 PR OFFICE/OUTPT VISIT, EST, LEVL IV, 30-39 MIN: ICD-10-PCS | Mod: S$GLB,,, | Performed by: FAMILY MEDICINE

## 2021-05-04 PROCEDURE — 99499 RISK ADDL DX/OHS AUDIT: ICD-10-PCS | Mod: S$GLB,,, | Performed by: FAMILY MEDICINE

## 2021-05-04 PROCEDURE — 3288F PR FALLS RISK ASSESSMENT DOCUMENTED: ICD-10-PCS | Mod: CPTII,S$GLB,, | Performed by: FAMILY MEDICINE

## 2021-05-04 PROCEDURE — 1126F AMNT PAIN NOTED NONE PRSNT: CPT | Mod: S$GLB,,, | Performed by: FAMILY MEDICINE

## 2021-05-04 PROCEDURE — 1101F PT FALLS ASSESS-DOCD LE1/YR: CPT | Mod: CPTII,S$GLB,, | Performed by: FAMILY MEDICINE

## 2021-05-04 PROCEDURE — 1101F PR PT FALLS ASSESS DOC 0-1 FALLS W/OUT INJ PAST YR: ICD-10-PCS | Mod: CPTII,S$GLB,, | Performed by: FAMILY MEDICINE

## 2021-05-04 PROCEDURE — 99999 PR PBB SHADOW E&M-EST. PATIENT-LVL IV: CPT | Mod: PBBFAC,,, | Performed by: FAMILY MEDICINE

## 2021-05-04 PROCEDURE — 99214 OFFICE O/P EST MOD 30 MIN: CPT | Mod: S$GLB,,, | Performed by: FAMILY MEDICINE

## 2021-05-04 PROCEDURE — 99999 PR PBB SHADOW E&M-EST. PATIENT-LVL IV: ICD-10-PCS | Mod: PBBFAC,,, | Performed by: FAMILY MEDICINE

## 2021-05-04 PROCEDURE — 1159F MED LIST DOCD IN RCRD: CPT | Mod: S$GLB,,, | Performed by: FAMILY MEDICINE

## 2021-05-04 PROCEDURE — 1126F PR PAIN SEVERITY QUANTIFIED, NO PAIN PRESENT: ICD-10-PCS | Mod: S$GLB,,, | Performed by: FAMILY MEDICINE

## 2021-05-04 RX ORDER — GABAPENTIN 300 MG/1
CAPSULE ORAL
Qty: 180 CAPSULE | Refills: 3 | Status: SHIPPED | OUTPATIENT
Start: 2021-05-04 | End: 2021-08-30 | Stop reason: SDUPTHER

## 2021-05-04 RX ORDER — FLUOXETINE HYDROCHLORIDE 20 MG/1
20 CAPSULE ORAL DAILY
Qty: 90 CAPSULE | Refills: 3 | Status: ON HOLD | OUTPATIENT
Start: 2021-05-04 | End: 2021-10-01 | Stop reason: HOSPADM

## 2021-05-04 RX ORDER — TRETINOIN 0.25 MG/G
GEL TOPICAL
Qty: 45 G | Refills: 1 | Status: ON HOLD | OUTPATIENT
Start: 2021-05-04 | End: 2021-10-01 | Stop reason: HOSPADM

## 2021-05-04 RX ORDER — OMEPRAZOLE 20 MG/1
40 CAPSULE, DELAYED RELEASE ORAL DAILY
Qty: 180 CAPSULE | Refills: 3 | Status: SHIPPED | OUTPATIENT
Start: 2021-05-04 | End: 2021-08-30 | Stop reason: SDUPTHER

## 2021-05-04 RX ORDER — MELOXICAM 7.5 MG/1
7.5 TABLET ORAL DAILY
Qty: 90 TABLET | Refills: 1 | Status: SHIPPED | OUTPATIENT
Start: 2021-05-04 | End: 2021-08-18

## 2021-05-05 ENCOUNTER — TELEPHONE (OUTPATIENT)
Dept: RESEARCH | Facility: HOSPITAL | Age: 86
End: 2021-05-05

## 2021-05-07 ENCOUNTER — TELEPHONE (OUTPATIENT)
Dept: FAMILY MEDICINE | Facility: CLINIC | Age: 86
End: 2021-05-07

## 2021-05-21 ENCOUNTER — TELEPHONE (OUTPATIENT)
Dept: OPTOMETRY | Facility: CLINIC | Age: 86
End: 2021-05-21

## 2021-05-25 ENCOUNTER — TELEPHONE (OUTPATIENT)
Dept: RESEARCH | Facility: HOSPITAL | Age: 86
End: 2021-05-25

## 2021-05-27 ENCOUNTER — TELEPHONE (OUTPATIENT)
Dept: OPHTHALMOLOGY | Facility: CLINIC | Age: 86
End: 2021-05-27

## 2021-06-01 DIAGNOSIS — K21.9 GASTROESOPHAGEAL REFLUX DISEASE WITHOUT ESOPHAGITIS: ICD-10-CM

## 2021-06-01 DIAGNOSIS — I15.2 HYPERTENSION ASSOCIATED WITH DIABETES: ICD-10-CM

## 2021-06-01 DIAGNOSIS — E11.59 HYPERTENSION ASSOCIATED WITH DIABETES: ICD-10-CM

## 2021-06-01 DIAGNOSIS — E55.9 VITAMIN D DEFICIENCY: ICD-10-CM

## 2021-06-03 ENCOUNTER — TELEPHONE (OUTPATIENT)
Dept: RESEARCH | Facility: HOSPITAL | Age: 86
End: 2021-06-03

## 2021-06-04 RX ORDER — ERGOCALCIFEROL 1.25 MG/1
50000 CAPSULE ORAL
Qty: 12 CAPSULE | Refills: 0 | Status: ON HOLD | OUTPATIENT
Start: 2021-06-04 | End: 2021-09-29

## 2021-06-04 RX ORDER — LOSARTAN POTASSIUM 50 MG/1
50 TABLET ORAL NIGHTLY
Qty: 90 TABLET | Refills: 0 | Status: SHIPPED | OUTPATIENT
Start: 2021-06-04 | End: 2021-09-04

## 2021-06-07 ENCOUNTER — TELEPHONE (OUTPATIENT)
Dept: RESEARCH | Facility: HOSPITAL | Age: 86
End: 2021-06-07

## 2021-06-07 ENCOUNTER — RESEARCH ENCOUNTER (OUTPATIENT)
Dept: RESEARCH | Facility: HOSPITAL | Age: 86
End: 2021-06-07

## 2021-07-07 ENCOUNTER — TELEPHONE (OUTPATIENT)
Dept: OPHTHALMOLOGY | Facility: CLINIC | Age: 86
End: 2021-07-07

## 2021-07-19 ENCOUNTER — OFFICE VISIT (OUTPATIENT)
Dept: OPHTHALMOLOGY | Facility: CLINIC | Age: 86
End: 2021-07-19
Payer: MEDICARE

## 2021-07-19 DIAGNOSIS — H40.013 OPEN ANGLE WITH BORDERLINE FINDINGS AND LOW GLAUCOMA RISK IN BOTH EYES: ICD-10-CM

## 2021-07-19 DIAGNOSIS — E11.59 HYPERTENSION ASSOCIATED WITH DIABETES: ICD-10-CM

## 2021-07-19 DIAGNOSIS — E11.9 DM TYPE 2 WITHOUT RETINOPATHY: ICD-10-CM

## 2021-07-19 DIAGNOSIS — H25.11 NUCLEAR SCLEROSIS OF RIGHT EYE: Primary | ICD-10-CM

## 2021-07-19 DIAGNOSIS — H52.7 REFRACTIVE ERROR: ICD-10-CM

## 2021-07-19 DIAGNOSIS — H26.9 CORTICAL CATARACT OF LEFT EYE: ICD-10-CM

## 2021-07-19 DIAGNOSIS — I15.2 HYPERTENSION ASSOCIATED WITH DIABETES: ICD-10-CM

## 2021-07-19 PROCEDURE — 2023F DILAT RTA XM W/O RTNOPTHY: CPT | Mod: CPTII,S$GLB,, | Performed by: OPHTHALMOLOGY

## 2021-07-19 PROCEDURE — 92136 BIOMETRY: ICD-10-PCS | Mod: RT,S$GLB,, | Performed by: OPHTHALMOLOGY

## 2021-07-19 PROCEDURE — 2023F PR DILATED RETINAL EXAM W/O EVID OF RETINOPATHY: ICD-10-PCS | Mod: CPTII,S$GLB,, | Performed by: OPHTHALMOLOGY

## 2021-07-19 PROCEDURE — 1157F ADVNC CARE PLAN IN RCRD: CPT | Mod: CPTII,S$GLB,, | Performed by: OPHTHALMOLOGY

## 2021-07-19 PROCEDURE — 92136 OPHTHALMIC BIOMETRY: CPT | Mod: RT,S$GLB,, | Performed by: OPHTHALMOLOGY

## 2021-07-19 PROCEDURE — 1157F PR ADVANCE CARE PLAN OR EQUIV PRESENT IN MEDICAL RECORD: ICD-10-PCS | Mod: CPTII,S$GLB,, | Performed by: OPHTHALMOLOGY

## 2021-07-19 PROCEDURE — 99999 PR PBB SHADOW E&M-EST. PATIENT-LVL II: CPT | Mod: PBBFAC,,, | Performed by: OPHTHALMOLOGY

## 2021-07-19 PROCEDURE — 99499 UNLISTED E&M SERVICE: CPT | Mod: S$GLB,,, | Performed by: OPHTHALMOLOGY

## 2021-07-19 PROCEDURE — 92014 PR EYE EXAM, EST PATIENT,COMPREHESV: ICD-10-PCS | Mod: S$GLB,,, | Performed by: OPHTHALMOLOGY

## 2021-07-19 PROCEDURE — 99499 RISK ADDL DX/OHS AUDIT: ICD-10-PCS | Mod: S$GLB,,, | Performed by: OPHTHALMOLOGY

## 2021-07-19 PROCEDURE — 92014 COMPRE OPH EXAM EST PT 1/>: CPT | Mod: S$GLB,,, | Performed by: OPHTHALMOLOGY

## 2021-07-19 PROCEDURE — 99999 PR PBB SHADOW E&M-EST. PATIENT-LVL II: ICD-10-PCS | Mod: PBBFAC,,, | Performed by: OPHTHALMOLOGY

## 2021-07-19 RX ORDER — NEPAFENAC 3 MG/ML
1 SUSPENSION/ DROPS OPHTHALMIC DAILY
Qty: 3 ML | Refills: 1 | Status: SHIPPED | OUTPATIENT
Start: 2021-10-23 | End: 2021-11-22

## 2021-07-19 RX ORDER — PREDNISOLONE ACETATE 10 MG/ML
1 SUSPENSION/ DROPS OPHTHALMIC 4 TIMES DAILY
Qty: 5 ML | Refills: 1 | Status: SHIPPED | OUTPATIENT
Start: 2021-10-26 | End: 2021-11-25

## 2021-07-19 RX ORDER — OFLOXACIN 3 MG/ML
1 SOLUTION/ DROPS OPHTHALMIC 4 TIMES DAILY
Qty: 5 ML | Refills: 1 | Status: SHIPPED | OUTPATIENT
Start: 2021-10-23 | End: 2021-10-11

## 2021-07-19 RX ORDER — OLOPATADINE HYDROCHLORIDE 1 MG/ML
1 SOLUTION/ DROPS OPHTHALMIC 2 TIMES DAILY
Qty: 5 ML | Refills: 11 | Status: ON HOLD | OUTPATIENT
Start: 2021-07-19 | End: 2021-10-01 | Stop reason: HOSPADM

## 2021-08-20 ENCOUNTER — IMMUNIZATION (OUTPATIENT)
Dept: PHARMACY | Facility: CLINIC | Age: 86
End: 2021-08-20
Payer: MEDICARE

## 2021-09-22 ENCOUNTER — TELEPHONE (OUTPATIENT)
Dept: FAMILY MEDICINE | Facility: CLINIC | Age: 86
End: 2021-09-22

## 2021-09-23 ENCOUNTER — HOSPITAL ENCOUNTER (OUTPATIENT)
Dept: RADIOLOGY | Facility: HOSPITAL | Age: 86
Discharge: HOME | End: 2021-09-23
Attending: FAMILY MEDICINE
Payer: MEDICARE

## 2021-09-23 ENCOUNTER — OFFICE VISIT (OUTPATIENT)
Dept: FAMILY MEDICINE | Facility: CLINIC | Age: 86
End: 2021-09-23
Attending: FAMILY MEDICINE
Payer: MEDICARE

## 2021-09-23 VITALS
TEMPERATURE: 98 F | HEART RATE: 81 BPM | OXYGEN SATURATION: 97 % | DIASTOLIC BLOOD PRESSURE: 52 MMHG | SYSTOLIC BLOOD PRESSURE: 122 MMHG

## 2021-09-23 DIAGNOSIS — E11.9 CONTROLLED TYPE 2 DIABETES MELLITUS WITHOUT COMPLICATION, WITHOUT LONG-TERM CURRENT USE OF INSULIN: ICD-10-CM

## 2021-09-23 DIAGNOSIS — G89.29 CHRONIC PAIN OF LEFT ANKLE: ICD-10-CM

## 2021-09-23 DIAGNOSIS — M79.672 LEFT FOOT PAIN: ICD-10-CM

## 2021-09-23 DIAGNOSIS — M25.572 CHRONIC PAIN OF LEFT ANKLE: ICD-10-CM

## 2021-09-23 DIAGNOSIS — E11.59 HYPERTENSION ASSOCIATED WITH DIABETES: Primary | ICD-10-CM

## 2021-09-23 DIAGNOSIS — F32.4 MAJOR DEPRESSIVE DISORDER WITH SINGLE EPISODE, IN PARTIAL REMISSION: ICD-10-CM

## 2021-09-23 DIAGNOSIS — E11.69 DYSLIPIDEMIA ASSOCIATED WITH TYPE 2 DIABETES MELLITUS: ICD-10-CM

## 2021-09-23 DIAGNOSIS — E78.5 DYSLIPIDEMIA ASSOCIATED WITH TYPE 2 DIABETES MELLITUS: ICD-10-CM

## 2021-09-23 DIAGNOSIS — I15.2 HYPERTENSION ASSOCIATED WITH DIABETES: Primary | ICD-10-CM

## 2021-09-23 PROCEDURE — 1159F PR MEDICATION LIST DOCUMENTED IN MEDICAL RECORD: ICD-10-PCS | Mod: CPTII,S$GLB,, | Performed by: FAMILY MEDICINE

## 2021-09-23 PROCEDURE — 99999 PR PBB SHADOW E&M-EST. PATIENT-LVL V: ICD-10-PCS | Mod: PBBFAC,,, | Performed by: FAMILY MEDICINE

## 2021-09-23 PROCEDURE — 1160F RVW MEDS BY RX/DR IN RCRD: CPT | Mod: CPTII,S$GLB,, | Performed by: FAMILY MEDICINE

## 2021-09-23 PROCEDURE — 1101F PR PT FALLS ASSESS DOC 0-1 FALLS W/OUT INJ PAST YR: ICD-10-PCS | Mod: CPTII,S$GLB,, | Performed by: FAMILY MEDICINE

## 2021-09-23 PROCEDURE — 73630 X-RAY EXAM OF FOOT: CPT | Mod: TC,FY,LT

## 2021-09-23 PROCEDURE — 1160F PR REVIEW ALL MEDS BY PRESCRIBER/CLIN PHARMACIST DOCUMENTED: ICD-10-PCS | Mod: CPTII,S$GLB,, | Performed by: FAMILY MEDICINE

## 2021-09-23 PROCEDURE — 3288F FALL RISK ASSESSMENT DOCD: CPT | Mod: CPTII,S$GLB,, | Performed by: FAMILY MEDICINE

## 2021-09-23 PROCEDURE — 99999 PR PBB SHADOW E&M-EST. PATIENT-LVL V: CPT | Mod: PBBFAC,,, | Performed by: FAMILY MEDICINE

## 2021-09-23 PROCEDURE — 1125F AMNT PAIN NOTED PAIN PRSNT: CPT | Mod: CPTII,S$GLB,, | Performed by: FAMILY MEDICINE

## 2021-09-23 PROCEDURE — 99499 UNLISTED E&M SERVICE: CPT | Mod: S$GLB,,, | Performed by: FAMILY MEDICINE

## 2021-09-23 PROCEDURE — 73610 XR ANKLE COMPLETE 3 VIEW LEFT: ICD-10-PCS | Mod: 26,LT,, | Performed by: STUDENT IN AN ORGANIZED HEALTH CARE EDUCATION/TRAINING PROGRAM

## 2021-09-23 PROCEDURE — 99499 RISK ADDL DX/OHS AUDIT: ICD-10-PCS | Mod: S$GLB,,, | Performed by: FAMILY MEDICINE

## 2021-09-23 PROCEDURE — 1101F PT FALLS ASSESS-DOCD LE1/YR: CPT | Mod: CPTII,S$GLB,, | Performed by: FAMILY MEDICINE

## 2021-09-23 PROCEDURE — 73630 X-RAY EXAM OF FOOT: CPT | Mod: 26,LT,, | Performed by: RADIOLOGY

## 2021-09-23 PROCEDURE — 73610 X-RAY EXAM OF ANKLE: CPT | Mod: TC,FY,LT

## 2021-09-23 PROCEDURE — 1125F PR PAIN SEVERITY QUANTIFIED, PAIN PRESENT: ICD-10-PCS | Mod: CPTII,S$GLB,, | Performed by: FAMILY MEDICINE

## 2021-09-23 PROCEDURE — 3288F PR FALLS RISK ASSESSMENT DOCUMENTED: ICD-10-PCS | Mod: CPTII,S$GLB,, | Performed by: FAMILY MEDICINE

## 2021-09-23 PROCEDURE — 1157F PR ADVANCE CARE PLAN OR EQUIV PRESENT IN MEDICAL RECORD: ICD-10-PCS | Mod: CPTII,S$GLB,, | Performed by: FAMILY MEDICINE

## 2021-09-23 PROCEDURE — 73610 X-RAY EXAM OF ANKLE: CPT | Mod: 26,LT,, | Performed by: STUDENT IN AN ORGANIZED HEALTH CARE EDUCATION/TRAINING PROGRAM

## 2021-09-23 PROCEDURE — 99214 PR OFFICE/OUTPT VISIT, EST, LEVL IV, 30-39 MIN: ICD-10-PCS | Mod: S$GLB,,, | Performed by: FAMILY MEDICINE

## 2021-09-23 PROCEDURE — 1157F ADVNC CARE PLAN IN RCRD: CPT | Mod: CPTII,S$GLB,, | Performed by: FAMILY MEDICINE

## 2021-09-23 PROCEDURE — 73630 XR FOOT COMPLETE 3 VIEW LEFT: ICD-10-PCS | Mod: 26,LT,, | Performed by: RADIOLOGY

## 2021-09-23 PROCEDURE — 1159F MED LIST DOCD IN RCRD: CPT | Mod: CPTII,S$GLB,, | Performed by: FAMILY MEDICINE

## 2021-09-23 PROCEDURE — 99214 OFFICE O/P EST MOD 30 MIN: CPT | Mod: S$GLB,,, | Performed by: FAMILY MEDICINE

## 2021-09-24 ENCOUNTER — TELEPHONE (OUTPATIENT)
Dept: ADMINISTRATIVE | Facility: OTHER | Age: 86
End: 2021-09-24

## 2021-09-28 ENCOUNTER — OFFICE VISIT (OUTPATIENT)
Dept: PODIATRY | Facility: CLINIC | Age: 86
End: 2021-09-28
Payer: MEDICARE

## 2021-09-28 ENCOUNTER — HOSPITAL ENCOUNTER (INPATIENT)
Facility: HOSPITAL | Age: 86
LOS: 4 days | Discharge: HOME HEALTH | DRG: 493 | End: 2021-10-02
Attending: EMERGENCY MEDICINE | Admitting: EMERGENCY MEDICINE
Payer: MEDICARE

## 2021-09-28 VITALS
SYSTOLIC BLOOD PRESSURE: 169 MMHG | BODY MASS INDEX: 23.12 KG/M2 | WEIGHT: 118.38 LBS | HEART RATE: 78 BPM | DIASTOLIC BLOOD PRESSURE: 79 MMHG

## 2021-09-28 DIAGNOSIS — R07.9 CHEST PAIN: ICD-10-CM

## 2021-09-28 DIAGNOSIS — Z96.662 INFECTION ASSOCIATED WITH PROSTHESIS OF LEFT ANKLE JOINT: ICD-10-CM

## 2021-09-28 DIAGNOSIS — E11.59 HYPERTENSION ASSOCIATED WITH DIABETES: ICD-10-CM

## 2021-09-28 DIAGNOSIS — M79.672 LEFT FOOT PAIN: ICD-10-CM

## 2021-09-28 DIAGNOSIS — M17.11 OSTEOARTHRITIS OF RIGHT KNEE, UNSPECIFIED OSTEOARTHRITIS TYPE: ICD-10-CM

## 2021-09-28 DIAGNOSIS — M25.572 ACUTE LEFT ANKLE PAIN: Primary | ICD-10-CM

## 2021-09-28 DIAGNOSIS — M00.9 SEPTIC ARTHRITIS OF LEFT ANKLE, DUE TO UNSPECIFIED ORGANISM: ICD-10-CM

## 2021-09-28 DIAGNOSIS — T84.59XA INFECTION ASSOCIATED WITH PROSTHESIS OF LEFT ANKLE JOINT: ICD-10-CM

## 2021-09-28 DIAGNOSIS — I15.2 HYPERTENSION ASSOCIATED WITH DIABETES: ICD-10-CM

## 2021-09-28 DIAGNOSIS — G89.29 CHRONIC PAIN OF LEFT ANKLE: ICD-10-CM

## 2021-09-28 DIAGNOSIS — M25.572 CHRONIC PAIN OF LEFT ANKLE: ICD-10-CM

## 2021-09-28 LAB
ALBUMIN SERPL BCP-MCNC: 3.2 G/DL (ref 3.5–5.2)
ALP SERPL-CCNC: 71 U/L (ref 55–135)
ALT SERPL W/O P-5'-P-CCNC: 15 U/L (ref 10–44)
ANION GAP SERPL CALC-SCNC: 12 MMOL/L (ref 8–16)
APPEARANCE FLD: NORMAL
AST SERPL-CCNC: 18 U/L (ref 10–40)
BASOPHILS # BLD AUTO: 0.04 K/UL (ref 0–0.2)
BASOPHILS NFR BLD: 0.5 % (ref 0–1.9)
BILIRUB SERPL-MCNC: 0.7 MG/DL (ref 0.1–1)
BODY FLD TYPE: NORMAL
BODY FLD TYPE: NORMAL
BODY FLUID COMMENTS: NORMAL
BUN SERPL-MCNC: 10 MG/DL (ref 8–23)
CALCIUM SERPL-MCNC: 9.5 MG/DL (ref 8.7–10.5)
CHLORIDE SERPL-SCNC: 94 MMOL/L (ref 95–110)
CO2 SERPL-SCNC: 24 MMOL/L (ref 23–29)
COLOR FLD: NORMAL
CREAT SERPL-MCNC: 0.8 MG/DL (ref 0.5–1.4)
CRP SERPL-MCNC: 237.1 MG/L (ref 0–8.2)
CRYSTALS FLD MICRO: NEGATIVE
CTP QC/QA: YES
DIFFERENTIAL METHOD: ABNORMAL
EOSINOPHIL # BLD AUTO: 0.1 K/UL (ref 0–0.5)
EOSINOPHIL NFR BLD: 1.4 % (ref 0–8)
ERYTHROCYTE [DISTWIDTH] IN BLOOD BY AUTOMATED COUNT: 13.2 % (ref 11.5–14.5)
ERYTHROCYTE [SEDIMENTATION RATE] IN BLOOD BY WESTERGREN METHOD: 56 MM/HR (ref 0–36)
EST. GFR  (AFRICAN AMERICAN): >60 ML/MIN/1.73 M^2
EST. GFR  (NON AFRICAN AMERICAN): >60 ML/MIN/1.73 M^2
GLUCOSE SERPL-MCNC: 98 MG/DL (ref 70–110)
GRAM STN SPEC: NORMAL
GRAM STN SPEC: NORMAL
HCT VFR BLD AUTO: 30.2 % (ref 37–48.5)
HGB BLD-MCNC: 10.3 G/DL (ref 12–16)
IMM GRANULOCYTES # BLD AUTO: 0.03 K/UL (ref 0–0.04)
IMM GRANULOCYTES NFR BLD AUTO: 0.4 % (ref 0–0.5)
LYMPHOCYTES # BLD AUTO: 0.5 K/UL (ref 1–4.8)
LYMPHOCYTES NFR BLD: 5.9 % (ref 18–48)
MCH RBC QN AUTO: 32.3 PG (ref 27–31)
MCHC RBC AUTO-ENTMCNC: 34.1 G/DL (ref 32–36)
MCV RBC AUTO: 95 FL (ref 82–98)
MONOCYTES # BLD AUTO: 0.6 K/UL (ref 0.3–1)
MONOCYTES NFR BLD: 7.5 % (ref 4–15)
NEUTROPHILS # BLD AUTO: 6.6 K/UL (ref 1.8–7.7)
NEUTROPHILS NFR BLD: 84.3 % (ref 38–73)
NRBC BLD-RTO: 0 /100 WBC
PATH INTERP FLD-IMP: NORMAL
PLATELET # BLD AUTO: 277 K/UL (ref 150–450)
PMV BLD AUTO: 11 FL (ref 9.2–12.9)
POTASSIUM SERPL-SCNC: 4.4 MMOL/L (ref 3.5–5.1)
PROCALCITONIN SERPL IA-MCNC: 0.04 NG/ML
PROT SERPL-MCNC: 6.9 G/DL (ref 6–8.4)
RBC # BLD AUTO: 3.19 M/UL (ref 4–5.4)
SARS-COV-2 RDRP RESP QL NAA+PROBE: NEGATIVE
SODIUM SERPL-SCNC: 130 MMOL/L (ref 136–145)
URATE SERPL-MCNC: 2.4 MG/DL (ref 2.4–5.7)
WBC # BLD AUTO: 7.84 K/UL (ref 3.9–12.7)
WBC # FLD: NORMAL /CU MM

## 2021-09-28 PROCEDURE — 1157F ADVNC CARE PLAN IN RCRD: CPT | Mod: CPTII,S$GLB,, | Performed by: PODIATRIST

## 2021-09-28 PROCEDURE — 1159F PR MEDICATION LIST DOCUMENTED IN MEDICAL RECORD: ICD-10-PCS | Mod: CPTII,S$GLB,, | Performed by: PODIATRIST

## 2021-09-28 PROCEDURE — 1125F AMNT PAIN NOTED PAIN PRSNT: CPT | Mod: CPTII,S$GLB,, | Performed by: PODIATRIST

## 2021-09-28 PROCEDURE — 87205 SMEAR GRAM STAIN: CPT | Performed by: STUDENT IN AN ORGANIZED HEALTH CARE EDUCATION/TRAINING PROGRAM

## 2021-09-28 PROCEDURE — 1160F PR REVIEW ALL MEDS BY PRESCRIBER/CLIN PHARMACIST DOCUMENTED: ICD-10-PCS | Mod: CPTII,S$GLB,, | Performed by: PODIATRIST

## 2021-09-28 PROCEDURE — 85025 COMPLETE CBC W/AUTO DIFF WBC: CPT

## 2021-09-28 PROCEDURE — 1157F PR ADVANCE CARE PLAN OR EQUIV PRESENT IN MEDICAL RECORD: ICD-10-PCS | Mod: CPTII,S$GLB,, | Performed by: PODIATRIST

## 2021-09-28 PROCEDURE — 99285 EMERGENCY DEPT VISIT HI MDM: CPT | Mod: GC,CS,, | Performed by: EMERGENCY MEDICINE

## 2021-09-28 PROCEDURE — 85652 RBC SED RATE AUTOMATED: CPT

## 2021-09-28 PROCEDURE — 99204 PR OFFICE/OUTPT VISIT, NEW, LEVL IV, 45-59 MIN: ICD-10-PCS | Mod: S$GLB,,, | Performed by: PODIATRIST

## 2021-09-28 PROCEDURE — 1159F MED LIST DOCD IN RCRD: CPT | Mod: CPTII,S$GLB,, | Performed by: PODIATRIST

## 2021-09-28 PROCEDURE — 89060 EXAM SYNOVIAL FLUID CRYSTALS: CPT | Performed by: STUDENT IN AN ORGANIZED HEALTH CARE EDUCATION/TRAINING PROGRAM

## 2021-09-28 PROCEDURE — 1125F PR PAIN SEVERITY QUANTIFIED, PAIN PRESENT: ICD-10-PCS | Mod: CPTII,S$GLB,, | Performed by: PODIATRIST

## 2021-09-28 PROCEDURE — 96361 HYDRATE IV INFUSION ADD-ON: CPT

## 2021-09-28 PROCEDURE — 87102 FUNGUS ISOLATION CULTURE: CPT | Performed by: STUDENT IN AN ORGANIZED HEALTH CARE EDUCATION/TRAINING PROGRAM

## 2021-09-28 PROCEDURE — 80053 COMPREHEN METABOLIC PANEL: CPT

## 2021-09-28 PROCEDURE — 1160F RVW MEDS BY RX/DR IN RCRD: CPT | Mod: CPTII,S$GLB,, | Performed by: PODIATRIST

## 2021-09-28 PROCEDURE — 87075 CULTR BACTERIA EXCEPT BLOOD: CPT | Performed by: STUDENT IN AN ORGANIZED HEALTH CARE EDUCATION/TRAINING PROGRAM

## 2021-09-28 PROCEDURE — 96374 THER/PROPH/DIAG INJ IV PUSH: CPT

## 2021-09-28 PROCEDURE — 25000003 PHARM REV CODE 250: Performed by: STUDENT IN AN ORGANIZED HEALTH CARE EDUCATION/TRAINING PROGRAM

## 2021-09-28 PROCEDURE — 20600001 HC STEP DOWN PRIVATE ROOM

## 2021-09-28 PROCEDURE — 84550 ASSAY OF BLOOD/URIC ACID: CPT

## 2021-09-28 PROCEDURE — 99999 PR PBB SHADOW E&M-EST. PATIENT-LVL IV: ICD-10-PCS | Mod: PBBFAC,,, | Performed by: PODIATRIST

## 2021-09-28 PROCEDURE — 99285 PR EMERGENCY DEPT VISIT,LEVEL V: ICD-10-PCS | Mod: GC,CS,, | Performed by: EMERGENCY MEDICINE

## 2021-09-28 PROCEDURE — 63600175 PHARM REV CODE 636 W HCPCS

## 2021-09-28 PROCEDURE — 89051 BODY FLUID CELL COUNT: CPT | Performed by: STUDENT IN AN ORGANIZED HEALTH CARE EDUCATION/TRAINING PROGRAM

## 2021-09-28 PROCEDURE — 87206 SMEAR FLUORESCENT/ACID STAI: CPT | Performed by: STUDENT IN AN ORGANIZED HEALTH CARE EDUCATION/TRAINING PROGRAM

## 2021-09-28 PROCEDURE — 87040 BLOOD CULTURE FOR BACTERIA: CPT | Mod: 59

## 2021-09-28 PROCEDURE — 87116 MYCOBACTERIA CULTURE: CPT | Performed by: STUDENT IN AN ORGANIZED HEALTH CARE EDUCATION/TRAINING PROGRAM

## 2021-09-28 PROCEDURE — 86140 C-REACTIVE PROTEIN: CPT

## 2021-09-28 PROCEDURE — 99204 OFFICE O/P NEW MOD 45 MIN: CPT | Mod: S$GLB,,, | Performed by: PODIATRIST

## 2021-09-28 PROCEDURE — 84145 PROCALCITONIN (PCT): CPT | Performed by: STUDENT IN AN ORGANIZED HEALTH CARE EDUCATION/TRAINING PROGRAM

## 2021-09-28 PROCEDURE — 25000003 PHARM REV CODE 250

## 2021-09-28 PROCEDURE — 99999 PR PBB SHADOW E&M-EST. PATIENT-LVL IV: CPT | Mod: PBBFAC,,, | Performed by: PODIATRIST

## 2021-09-28 PROCEDURE — U0002 COVID-19 LAB TEST NON-CDC: HCPCS

## 2021-09-28 PROCEDURE — 87070 CULTURE OTHR SPECIMN AEROBIC: CPT | Performed by: STUDENT IN AN ORGANIZED HEALTH CARE EDUCATION/TRAINING PROGRAM

## 2021-09-28 PROCEDURE — 99285 EMERGENCY DEPT VISIT HI MDM: CPT | Mod: 25

## 2021-09-28 RX ORDER — NALOXONE HCL 0.4 MG/ML
0.02 VIAL (ML) INJECTION
Status: DISCONTINUED | OUTPATIENT
Start: 2021-09-29 | End: 2021-10-02 | Stop reason: HOSPADM

## 2021-09-28 RX ORDER — KETOROLAC TROMETHAMINE 30 MG/ML
10 INJECTION, SOLUTION INTRAMUSCULAR; INTRAVENOUS
Status: COMPLETED | OUTPATIENT
Start: 2021-09-28 | End: 2021-09-28

## 2021-09-28 RX ORDER — PANTOPRAZOLE SODIUM 40 MG/1
40 TABLET, DELAYED RELEASE ORAL DAILY
Refills: 3 | Status: DISCONTINUED | OUTPATIENT
Start: 2021-09-29 | End: 2021-10-02 | Stop reason: HOSPADM

## 2021-09-28 RX ORDER — LOSARTAN POTASSIUM 50 MG/1
50 TABLET ORAL NIGHTLY
Status: DISCONTINUED | OUTPATIENT
Start: 2021-09-28 | End: 2021-10-02 | Stop reason: HOSPADM

## 2021-09-28 RX ORDER — ACETAMINOPHEN 500 MG
1000 TABLET ORAL EVERY 8 HOURS PRN
Status: DISCONTINUED | OUTPATIENT
Start: 2021-09-29 | End: 2021-10-02 | Stop reason: HOSPADM

## 2021-09-28 RX ORDER — SULFAMETHOXAZOLE AND TRIMETHOPRIM 800; 160 MG/1; MG/1
1 TABLET ORAL 2 TIMES DAILY
Qty: 14 TABLET | Refills: 0 | Status: ON HOLD | OUTPATIENT
Start: 2021-09-28 | End: 2021-10-01 | Stop reason: HOSPADM

## 2021-09-28 RX ORDER — INSULIN ASPART 100 [IU]/ML
0-5 INJECTION, SOLUTION INTRAVENOUS; SUBCUTANEOUS
Status: DISCONTINUED | OUTPATIENT
Start: 2021-09-29 | End: 2021-10-02 | Stop reason: HOSPADM

## 2021-09-28 RX ORDER — IBUPROFEN 200 MG
16 TABLET ORAL
Status: DISCONTINUED | OUTPATIENT
Start: 2021-09-29 | End: 2021-10-02 | Stop reason: HOSPADM

## 2021-09-28 RX ORDER — METHYLPREDNISOLONE 4 MG/1
TABLET ORAL
Qty: 1 PACKAGE | Refills: 0 | Status: ON HOLD | OUTPATIENT
Start: 2021-09-28 | End: 2021-09-29

## 2021-09-28 RX ORDER — GLUCAGON 1 MG
1 KIT INJECTION
Status: DISCONTINUED | OUTPATIENT
Start: 2021-09-29 | End: 2021-10-02 | Stop reason: HOSPADM

## 2021-09-28 RX ORDER — TRAMADOL HYDROCHLORIDE 50 MG/1
50 TABLET ORAL EVERY 12 HOURS PRN
Qty: 20 TABLET | Refills: 0 | Status: ON HOLD | OUTPATIENT
Start: 2021-09-28 | End: 2021-10-01 | Stop reason: HOSPADM

## 2021-09-28 RX ORDER — FLUOXETINE HYDROCHLORIDE 20 MG/1
20 CAPSULE ORAL DAILY
Status: DISCONTINUED | OUTPATIENT
Start: 2021-09-29 | End: 2021-09-29

## 2021-09-28 RX ORDER — HYDROCODONE BITARTRATE AND ACETAMINOPHEN 5; 325 MG/1; MG/1
1 TABLET ORAL
Status: DISCONTINUED | OUTPATIENT
Start: 2021-09-28 | End: 2021-09-28

## 2021-09-28 RX ORDER — IBUPROFEN 200 MG
24 TABLET ORAL
Status: DISCONTINUED | OUTPATIENT
Start: 2021-09-29 | End: 2021-10-02 | Stop reason: HOSPADM

## 2021-09-28 RX ORDER — SODIUM CHLORIDE 0.9 % (FLUSH) 0.9 %
10 SYRINGE (ML) INJECTION EVERY 12 HOURS PRN
Status: DISCONTINUED | OUTPATIENT
Start: 2021-09-29 | End: 2021-10-02 | Stop reason: HOSPADM

## 2021-09-28 RX ORDER — GABAPENTIN 300 MG/1
300 CAPSULE ORAL 2 TIMES DAILY
Status: DISCONTINUED | OUTPATIENT
Start: 2021-09-29 | End: 2021-10-02 | Stop reason: HOSPADM

## 2021-09-28 RX ORDER — ONDANSETRON 4 MG/1
TABLET, ORALLY DISINTEGRATING ORAL
Qty: 1 TABLET | Refills: 0 | Status: SHIPPED | OUTPATIENT
Start: 2021-09-28 | End: 2021-10-11 | Stop reason: SDUPTHER

## 2021-09-28 RX ORDER — ONDANSETRON 8 MG/1
8 TABLET, ORALLY DISINTEGRATING ORAL EVERY 8 HOURS PRN
Status: DISCONTINUED | OUTPATIENT
Start: 2021-09-29 | End: 2021-10-02 | Stop reason: HOSPADM

## 2021-09-28 RX ORDER — MUPIROCIN 20 MG/G
OINTMENT TOPICAL 2 TIMES DAILY
Status: DISCONTINUED | OUTPATIENT
Start: 2021-09-29 | End: 2021-10-02 | Stop reason: HOSPADM

## 2021-09-28 RX ADMIN — KETOROLAC TROMETHAMINE 10 MG: 30 INJECTION, SOLUTION INTRAMUSCULAR; INTRAVENOUS at 04:09

## 2021-09-28 RX ADMIN — SODIUM CHLORIDE 500 ML: 0.9 INJECTION, SOLUTION INTRAVENOUS at 07:09

## 2021-09-28 RX ADMIN — LOSARTAN POTASSIUM 50 MG: 50 TABLET, FILM COATED ORAL at 11:09

## 2021-09-29 ENCOUNTER — ANESTHESIA (OUTPATIENT)
Dept: SURGERY | Facility: HOSPITAL | Age: 86
DRG: 493 | End: 2021-09-29
Payer: MEDICARE

## 2021-09-29 ENCOUNTER — ANESTHESIA EVENT (OUTPATIENT)
Dept: SURGERY | Facility: HOSPITAL | Age: 86
DRG: 493 | End: 2021-09-29
Payer: MEDICARE

## 2021-09-29 PROBLEM — T84.50XA PROSTHETIC JOINT INFECTION: Status: ACTIVE | Noted: 2021-09-29

## 2021-09-29 PROBLEM — M25.572 ACUTE LEFT ANKLE PAIN: Status: ACTIVE | Noted: 2021-09-29

## 2021-09-29 LAB
ALBUMIN SERPL BCP-MCNC: 2.7 G/DL (ref 3.5–5.2)
ALP SERPL-CCNC: 64 U/L (ref 55–135)
ALT SERPL W/O P-5'-P-CCNC: 12 U/L (ref 10–44)
ANION GAP SERPL CALC-SCNC: 11 MMOL/L (ref 8–16)
AST SERPL-CCNC: 15 U/L (ref 10–40)
BASOPHILS # BLD AUTO: 0.03 K/UL (ref 0–0.2)
BASOPHILS NFR BLD: 0.5 % (ref 0–1.9)
BILIRUB SERPL-MCNC: 0.7 MG/DL (ref 0.1–1)
BUN SERPL-MCNC: 9 MG/DL (ref 8–23)
CALCIUM SERPL-MCNC: 9 MG/DL (ref 8.7–10.5)
CHLORIDE SERPL-SCNC: 96 MMOL/L (ref 95–110)
CO2 SERPL-SCNC: 23 MMOL/L (ref 23–29)
CREAT SERPL-MCNC: 0.7 MG/DL (ref 0.5–1.4)
DIFFERENTIAL METHOD: ABNORMAL
EOSINOPHIL # BLD AUTO: 0.2 K/UL (ref 0–0.5)
EOSINOPHIL NFR BLD: 2.3 % (ref 0–8)
ERYTHROCYTE [DISTWIDTH] IN BLOOD BY AUTOMATED COUNT: 13.3 % (ref 11.5–14.5)
EST. GFR  (AFRICAN AMERICAN): >60 ML/MIN/1.73 M^2
EST. GFR  (NON AFRICAN AMERICAN): >60 ML/MIN/1.73 M^2
ESTIMATED AVG GLUCOSE: 103 MG/DL (ref 68–131)
GLUCOSE SERPL-MCNC: 82 MG/DL (ref 70–110)
HBA1C MFR BLD: 5.2 % (ref 4–5.6)
HCT VFR BLD AUTO: 27.6 % (ref 37–48.5)
HGB BLD-MCNC: 9.2 G/DL (ref 12–16)
IMM GRANULOCYTES # BLD AUTO: 0.01 K/UL (ref 0–0.04)
IMM GRANULOCYTES NFR BLD AUTO: 0.2 % (ref 0–0.5)
LYMPHOCYTES # BLD AUTO: 0.6 K/UL (ref 1–4.8)
LYMPHOCYTES NFR BLD: 9.8 % (ref 18–48)
MAGNESIUM SERPL-MCNC: 1.3 MG/DL (ref 1.6–2.6)
MCH RBC QN AUTO: 31.7 PG (ref 27–31)
MCHC RBC AUTO-ENTMCNC: 33.3 G/DL (ref 32–36)
MCV RBC AUTO: 95 FL (ref 82–98)
MONOCYTES # BLD AUTO: 0.7 K/UL (ref 0.3–1)
MONOCYTES NFR BLD: 11.4 % (ref 4–15)
NEUTROPHILS # BLD AUTO: 4.9 K/UL (ref 1.8–7.7)
NEUTROPHILS NFR BLD: 75.8 % (ref 38–73)
NRBC BLD-RTO: 0 /100 WBC
PLATELET # BLD AUTO: 226 K/UL (ref 150–450)
PMV BLD AUTO: 11.5 FL (ref 9.2–12.9)
POCT GLUCOSE: 101 MG/DL (ref 70–110)
POCT GLUCOSE: 75 MG/DL (ref 70–110)
POCT GLUCOSE: 80 MG/DL (ref 70–110)
POCT GLUCOSE: 85 MG/DL (ref 70–110)
POTASSIUM SERPL-SCNC: 4.2 MMOL/L (ref 3.5–5.1)
PROT SERPL-MCNC: 5.9 G/DL (ref 6–8.4)
RBC # BLD AUTO: 2.9 M/UL (ref 4–5.4)
SODIUM SERPL-SCNC: 130 MMOL/L (ref 136–145)
WBC # BLD AUTO: 6.43 K/UL (ref 3.9–12.7)

## 2021-09-29 PROCEDURE — 82962 GLUCOSE BLOOD TEST: CPT | Performed by: ORTHOPAEDIC SURGERY

## 2021-09-29 PROCEDURE — 85025 COMPLETE CBC W/AUTO DIFF WBC: CPT | Performed by: STUDENT IN AN ORGANIZED HEALTH CARE EDUCATION/TRAINING PROGRAM

## 2021-09-29 PROCEDURE — 99223 1ST HOSP IP/OBS HIGH 75: CPT | Mod: 57,,, | Performed by: ORTHOPAEDIC SURGERY

## 2021-09-29 PROCEDURE — D9220A PRA ANESTHESIA: Mod: ANES,,, | Performed by: ANESTHESIOLOGY

## 2021-09-29 PROCEDURE — 83735 ASSAY OF MAGNESIUM: CPT | Performed by: STUDENT IN AN ORGANIZED HEALTH CARE EDUCATION/TRAINING PROGRAM

## 2021-09-29 PROCEDURE — D9220A PRA ANESTHESIA: ICD-10-PCS | Mod: ANES,,, | Performed by: ANESTHESIOLOGY

## 2021-09-29 PROCEDURE — 87070 CULTURE OTHR SPECIMN AEROBIC: CPT | Performed by: ORTHOPAEDIC SURGERY

## 2021-09-29 PROCEDURE — 20600001 HC STEP DOWN PRIVATE ROOM

## 2021-09-29 PROCEDURE — 25000003 PHARM REV CODE 250: Performed by: STUDENT IN AN ORGANIZED HEALTH CARE EDUCATION/TRAINING PROGRAM

## 2021-09-29 PROCEDURE — 99223 PR INITIAL HOSPITAL CARE,LEVL III: ICD-10-PCS | Mod: 57,,, | Performed by: ORTHOPAEDIC SURGERY

## 2021-09-29 PROCEDURE — 94761 N-INVAS EAR/PLS OXIMETRY MLT: CPT

## 2021-09-29 PROCEDURE — D9220A PRA ANESTHESIA: ICD-10-PCS | Mod: CRNA,,, | Performed by: NURSE ANESTHETIST, CERTIFIED REGISTERED

## 2021-09-29 PROCEDURE — 71000015 HC POSTOP RECOV 1ST HR: Performed by: ORTHOPAEDIC SURGERY

## 2021-09-29 PROCEDURE — 25000003 PHARM REV CODE 250: Performed by: NURSE ANESTHETIST, CERTIFIED REGISTERED

## 2021-09-29 PROCEDURE — 27201423 OPTIME MED/SURG SUP & DEVICES STERILE SUPPLY: Performed by: ORTHOPAEDIC SURGERY

## 2021-09-29 PROCEDURE — 63600175 PHARM REV CODE 636 W HCPCS

## 2021-09-29 PROCEDURE — 37000008 HC ANESTHESIA 1ST 15 MINUTES: Performed by: ORTHOPAEDIC SURGERY

## 2021-09-29 PROCEDURE — 83036 HEMOGLOBIN GLYCOSYLATED A1C: CPT | Performed by: STUDENT IN AN ORGANIZED HEALTH CARE EDUCATION/TRAINING PROGRAM

## 2021-09-29 PROCEDURE — 29897 ANKLE ARTHROSCOPY/SURGERY: CPT | Mod: LT,,, | Performed by: ORTHOPAEDIC SURGERY

## 2021-09-29 PROCEDURE — 36415 COLL VENOUS BLD VENIPUNCTURE: CPT | Performed by: STUDENT IN AN ORGANIZED HEALTH CARE EDUCATION/TRAINING PROGRAM

## 2021-09-29 PROCEDURE — 80053 COMPREHEN METABOLIC PANEL: CPT | Performed by: STUDENT IN AN ORGANIZED HEALTH CARE EDUCATION/TRAINING PROGRAM

## 2021-09-29 PROCEDURE — 36000709 HC OR TIME LEV III EA ADD 15 MIN: Performed by: ORTHOPAEDIC SURGERY

## 2021-09-29 PROCEDURE — 71000033 HC RECOVERY, INTIAL HOUR: Performed by: ORTHOPAEDIC SURGERY

## 2021-09-29 PROCEDURE — 63600175 PHARM REV CODE 636 W HCPCS: Performed by: STUDENT IN AN ORGANIZED HEALTH CARE EDUCATION/TRAINING PROGRAM

## 2021-09-29 PROCEDURE — 63600175 PHARM REV CODE 636 W HCPCS: Performed by: NURSE ANESTHETIST, CERTIFIED REGISTERED

## 2021-09-29 PROCEDURE — D9220A PRA ANESTHESIA: Mod: CRNA,,, | Performed by: NURSE ANESTHETIST, CERTIFIED REGISTERED

## 2021-09-29 PROCEDURE — 29897 PR ANKLE SCOPE,PART DEBRIDEMENT: ICD-10-PCS | Mod: LT,,, | Performed by: ORTHOPAEDIC SURGERY

## 2021-09-29 PROCEDURE — 87206 SMEAR FLUORESCENT/ACID STAI: CPT | Performed by: ORTHOPAEDIC SURGERY

## 2021-09-29 PROCEDURE — 87116 MYCOBACTERIA CULTURE: CPT | Performed by: ORTHOPAEDIC SURGERY

## 2021-09-29 PROCEDURE — 37000009 HC ANESTHESIA EA ADD 15 MINS: Performed by: ORTHOPAEDIC SURGERY

## 2021-09-29 PROCEDURE — 99223 PR INITIAL HOSPITAL CARE,LEVL III: ICD-10-PCS | Mod: AI,GC,, | Performed by: HOSPITALIST

## 2021-09-29 PROCEDURE — 87102 FUNGUS ISOLATION CULTURE: CPT | Performed by: ORTHOPAEDIC SURGERY

## 2021-09-29 PROCEDURE — 36000708 HC OR TIME LEV III 1ST 15 MIN: Performed by: ORTHOPAEDIC SURGERY

## 2021-09-29 PROCEDURE — 87075 CULTR BACTERIA EXCEPT BLOOD: CPT | Performed by: ORTHOPAEDIC SURGERY

## 2021-09-29 PROCEDURE — 25000003 PHARM REV CODE 250: Performed by: HOSPITALIST

## 2021-09-29 PROCEDURE — 25000003 PHARM REV CODE 250

## 2021-09-29 PROCEDURE — 63600175 PHARM REV CODE 636 W HCPCS: Performed by: HOSPITALIST

## 2021-09-29 PROCEDURE — 99223 1ST HOSP IP/OBS HIGH 75: CPT | Mod: AI,GC,, | Performed by: HOSPITALIST

## 2021-09-29 RX ORDER — CEFAZOLIN SODIUM 1 G/3ML
2 INJECTION, POWDER, FOR SOLUTION INTRAMUSCULAR; INTRAVENOUS
Status: CANCELLED | OUTPATIENT
Start: 2021-09-29

## 2021-09-29 RX ORDER — LIDOCAINE HYDROCHLORIDE 20 MG/ML
INJECTION INTRAVENOUS
Status: DISCONTINUED | OUTPATIENT
Start: 2021-09-29 | End: 2021-09-29

## 2021-09-29 RX ORDER — HYDROCORTISONE ACETATE 25 MG/1
25 SUPPOSITORY RECTAL 2 TIMES DAILY
Status: DISCONTINUED | OUTPATIENT
Start: 2021-09-29 | End: 2021-10-01

## 2021-09-29 RX ORDER — FENTANYL CITRATE 50 UG/ML
25 INJECTION, SOLUTION INTRAMUSCULAR; INTRAVENOUS EVERY 5 MIN PRN
Status: DISCONTINUED | OUTPATIENT
Start: 2021-09-29 | End: 2021-09-29 | Stop reason: HOSPADM

## 2021-09-29 RX ORDER — FENTANYL CITRATE 50 UG/ML
INJECTION, SOLUTION INTRAMUSCULAR; INTRAVENOUS
Status: DISCONTINUED | OUTPATIENT
Start: 2021-09-29 | End: 2021-09-29

## 2021-09-29 RX ORDER — VANCOMYCIN HCL IN 5 % DEXTROSE 1G/250ML
1000 PLASTIC BAG, INJECTION (ML) INTRAVENOUS ONCE
Status: COMPLETED | OUTPATIENT
Start: 2021-09-29 | End: 2021-09-29

## 2021-09-29 RX ORDER — PROPOFOL 10 MG/ML
VIAL (ML) INTRAVENOUS
Status: DISCONTINUED | OUTPATIENT
Start: 2021-09-29 | End: 2021-09-29

## 2021-09-29 RX ORDER — PHENYLEPHRINE HYDROCHLORIDE 10 MG/ML
INJECTION INTRAVENOUS
Status: DISCONTINUED | OUTPATIENT
Start: 2021-09-29 | End: 2021-09-29

## 2021-09-29 RX ORDER — METHOCARBAMOL 500 MG/1
500 TABLET, FILM COATED ORAL 4 TIMES DAILY
Status: DISCONTINUED | OUTPATIENT
Start: 2021-09-29 | End: 2021-10-02 | Stop reason: HOSPADM

## 2021-09-29 RX ORDER — FENTANYL CITRATE 50 UG/ML
INJECTION, SOLUTION INTRAMUSCULAR; INTRAVENOUS
Status: COMPLETED
Start: 2021-09-29 | End: 2021-09-29

## 2021-09-29 RX ORDER — MUPIROCIN 20 MG/G
OINTMENT TOPICAL
Status: CANCELLED | OUTPATIENT
Start: 2021-09-29

## 2021-09-29 RX ORDER — MAGNESIUM SULFATE HEPTAHYDRATE 40 MG/ML
2 INJECTION, SOLUTION INTRAVENOUS ONCE
Status: COMPLETED | OUTPATIENT
Start: 2021-09-29 | End: 2021-09-29

## 2021-09-29 RX ORDER — AMLODIPINE BESYLATE 5 MG/1
5 TABLET ORAL DAILY
Status: DISCONTINUED | OUTPATIENT
Start: 2021-09-29 | End: 2021-10-02 | Stop reason: HOSPADM

## 2021-09-29 RX ORDER — SODIUM CHLORIDE 0.9 % (FLUSH) 0.9 %
10 SYRINGE (ML) INJECTION
Status: DISCONTINUED | OUTPATIENT
Start: 2021-09-29 | End: 2021-09-30

## 2021-09-29 RX ORDER — DEXAMETHASONE SODIUM PHOSPHATE 4 MG/ML
INJECTION, SOLUTION INTRA-ARTICULAR; INTRALESIONAL; INTRAMUSCULAR; INTRAVENOUS; SOFT TISSUE
Status: DISCONTINUED | OUTPATIENT
Start: 2021-09-29 | End: 2021-09-29

## 2021-09-29 RX ORDER — FLUOXETINE 10 MG/1
10 CAPSULE ORAL DAILY
Status: DISCONTINUED | OUTPATIENT
Start: 2021-09-30 | End: 2021-10-02 | Stop reason: HOSPADM

## 2021-09-29 RX ORDER — CLINDAMYCIN PHOSPHATE 900 MG/50ML
INJECTION, SOLUTION INTRAVENOUS
Status: DISCONTINUED | OUTPATIENT
Start: 2021-09-29 | End: 2021-09-29

## 2021-09-29 RX ORDER — DIPHENHYDRAMINE HCL 25 MG
25 CAPSULE ORAL ONCE
Status: COMPLETED | OUTPATIENT
Start: 2021-09-29 | End: 2021-09-29

## 2021-09-29 RX ORDER — VANCOMYCIN HCL IN 5 % DEXTROSE 1G/250ML
1000 PLASTIC BAG, INJECTION (ML) INTRAVENOUS
Status: CANCELLED | OUTPATIENT
Start: 2021-09-29

## 2021-09-29 RX ORDER — SUCCINYLCHOLINE CHLORIDE 20 MG/ML
INJECTION INTRAMUSCULAR; INTRAVENOUS
Status: DISCONTINUED | OUTPATIENT
Start: 2021-09-29 | End: 2021-09-29

## 2021-09-29 RX ORDER — ONDANSETRON 2 MG/ML
INJECTION INTRAMUSCULAR; INTRAVENOUS
Status: DISCONTINUED | OUTPATIENT
Start: 2021-09-29 | End: 2021-09-29

## 2021-09-29 RX ORDER — ASPIRIN 81 MG/1
81 TABLET ORAL 2 TIMES DAILY
Status: DISCONTINUED | OUTPATIENT
Start: 2021-09-30 | End: 2021-10-02 | Stop reason: HOSPADM

## 2021-09-29 RX ADMIN — METHOCARBAMOL 500 MG: 500 TABLET ORAL at 10:09

## 2021-09-29 RX ADMIN — CLINDAMYCIN PHOSPHATE 900 MG: 18 INJECTION, SOLUTION INTRAVENOUS at 05:09

## 2021-09-29 RX ADMIN — GABAPENTIN 300 MG: 300 CAPSULE ORAL at 10:09

## 2021-09-29 RX ADMIN — PHENYLEPHRINE HYDROCHLORIDE 100 MCG: 10 INJECTION INTRAVENOUS at 05:09

## 2021-09-29 RX ADMIN — FENTANYL CITRATE 50 MCG: 50 INJECTION, SOLUTION INTRAMUSCULAR; INTRAVENOUS at 05:09

## 2021-09-29 RX ADMIN — FENTANYL CITRATE 25 MCG: 50 INJECTION INTRAMUSCULAR; INTRAVENOUS at 06:09

## 2021-09-29 RX ADMIN — ACETAMINOPHEN 1000 MG: 500 TABLET ORAL at 02:09

## 2021-09-29 RX ADMIN — CEFTRIAXONE SODIUM 2 G: 2 INJECTION, SOLUTION INTRAVENOUS at 09:09

## 2021-09-29 RX ADMIN — PROPOFOL 50 MG: 10 INJECTION, EMULSION INTRAVENOUS at 04:09

## 2021-09-29 RX ADMIN — MAGNESIUM SULFATE 2 G: 2 INJECTION INTRAVENOUS at 11:09

## 2021-09-29 RX ADMIN — MUPIROCIN: 20 OINTMENT TOPICAL at 08:09

## 2021-09-29 RX ADMIN — ONDANSETRON 4 MG: 2 INJECTION INTRAMUSCULAR; INTRAVENOUS at 05:09

## 2021-09-29 RX ADMIN — ACETAMINOPHEN 1000 MG: 500 TABLET ORAL at 06:09

## 2021-09-29 RX ADMIN — DEXAMETHASONE SODIUM PHOSPHATE 8 MG: 4 INJECTION, SOLUTION INTRAMUSCULAR; INTRAVENOUS at 05:09

## 2021-09-29 RX ADMIN — FENTANYL CITRATE 50 MCG: 50 INJECTION, SOLUTION INTRAMUSCULAR; INTRAVENOUS at 04:09

## 2021-09-29 RX ADMIN — LIDOCAINE HYDROCHLORIDE 50 MG: 20 INJECTION, SOLUTION INTRAVENOUS at 04:09

## 2021-09-29 RX ADMIN — METHOCARBAMOL 500 MG: 500 TABLET ORAL at 06:09

## 2021-09-29 RX ADMIN — ONDANSETRON 8 MG: 8 TABLET, ORALLY DISINTEGRATING ORAL at 07:09

## 2021-09-29 RX ADMIN — SODIUM CHLORIDE: 0.9 INJECTION, SOLUTION INTRAVENOUS at 04:09

## 2021-09-29 RX ADMIN — MUPIROCIN: 20 OINTMENT TOPICAL at 10:09

## 2021-09-29 RX ADMIN — SUCCINYLCHOLINE CHLORIDE 50 MG: 20 INJECTION, SOLUTION INTRAMUSCULAR; INTRAVENOUS at 04:09

## 2021-09-29 RX ADMIN — DIPHENHYDRAMINE HYDROCHLORIDE 25 MG: 25 CAPSULE ORAL at 10:09

## 2021-09-29 RX ADMIN — LOSARTAN POTASSIUM 50 MG: 50 TABLET, FILM COATED ORAL at 10:09

## 2021-09-29 RX ADMIN — AMLODIPINE BESYLATE 5 MG: 5 TABLET ORAL at 08:09

## 2021-09-29 RX ADMIN — HYDROCORTISONE ACETATE 25 MG: 25 SUPPOSITORY RECTAL at 01:09

## 2021-09-29 RX ADMIN — VANCOMYCIN HYDROCHLORIDE 1000 MG: 1 INJECTION, POWDER, LYOPHILIZED, FOR SOLUTION INTRAVENOUS at 10:09

## 2021-09-30 PROBLEM — Z96.662: Status: ACTIVE | Noted: 2021-09-29

## 2021-09-30 PROBLEM — T84.59XA: Status: ACTIVE | Noted: 2021-09-29

## 2021-09-30 LAB
ALBUMIN SERPL BCP-MCNC: 3 G/DL (ref 3.5–5.2)
ALP SERPL-CCNC: 72 U/L (ref 55–135)
ALT SERPL W/O P-5'-P-CCNC: 14 U/L (ref 10–44)
ANION GAP SERPL CALC-SCNC: 14 MMOL/L (ref 8–16)
AST SERPL-CCNC: 17 U/L (ref 10–40)
BASOPHILS # BLD AUTO: 0.01 K/UL (ref 0–0.2)
BASOPHILS NFR BLD: 0.1 % (ref 0–1.9)
BILIRUB SERPL-MCNC: 0.3 MG/DL (ref 0.1–1)
BUN SERPL-MCNC: 12 MG/DL (ref 8–23)
CALCIUM SERPL-MCNC: 9.3 MG/DL (ref 8.7–10.5)
CHLORIDE SERPL-SCNC: 91 MMOL/L (ref 95–110)
CO2 SERPL-SCNC: 21 MMOL/L (ref 23–29)
CREAT SERPL-MCNC: 0.8 MG/DL (ref 0.5–1.4)
DIFFERENTIAL METHOD: ABNORMAL
EOSINOPHIL # BLD AUTO: 0 K/UL (ref 0–0.5)
EOSINOPHIL NFR BLD: 0 % (ref 0–8)
ERYTHROCYTE [DISTWIDTH] IN BLOOD BY AUTOMATED COUNT: 13.3 % (ref 11.5–14.5)
EST. GFR  (AFRICAN AMERICAN): >60 ML/MIN/1.73 M^2
EST. GFR  (NON AFRICAN AMERICAN): >60 ML/MIN/1.73 M^2
GLUCOSE SERPL-MCNC: 166 MG/DL (ref 70–110)
GRAM STN SPEC: NORMAL
GRAM STN SPEC: NORMAL
HCT VFR BLD AUTO: 32.4 % (ref 37–48.5)
HGB BLD-MCNC: 10.9 G/DL (ref 12–16)
IMM GRANULOCYTES # BLD AUTO: 0.04 K/UL (ref 0–0.04)
IMM GRANULOCYTES NFR BLD AUTO: 0.4 % (ref 0–0.5)
LYMPHOCYTES # BLD AUTO: 0.3 K/UL (ref 1–4.8)
LYMPHOCYTES NFR BLD: 3.3 % (ref 18–48)
MAGNESIUM SERPL-MCNC: 1.8 MG/DL (ref 1.6–2.6)
MCH RBC QN AUTO: 32 PG (ref 27–31)
MCHC RBC AUTO-ENTMCNC: 33.6 G/DL (ref 32–36)
MCV RBC AUTO: 95 FL (ref 82–98)
MONOCYTES # BLD AUTO: 0.2 K/UL (ref 0.3–1)
MONOCYTES NFR BLD: 2 % (ref 4–15)
NEUTROPHILS # BLD AUTO: 9.1 K/UL (ref 1.8–7.7)
NEUTROPHILS NFR BLD: 94.2 % (ref 38–73)
NRBC BLD-RTO: 0 /100 WBC
PLATELET # BLD AUTO: 316 K/UL (ref 150–450)
PMV BLD AUTO: 11.2 FL (ref 9.2–12.9)
POCT GLUCOSE: 131 MG/DL (ref 70–110)
POCT GLUCOSE: 156 MG/DL (ref 70–110)
POCT GLUCOSE: 159 MG/DL (ref 70–110)
POCT GLUCOSE: 170 MG/DL (ref 70–110)
POCT GLUCOSE: 275 MG/DL (ref 70–110)
POTASSIUM SERPL-SCNC: 4.6 MMOL/L (ref 3.5–5.1)
PROT SERPL-MCNC: 7 G/DL (ref 6–8.4)
RBC # BLD AUTO: 3.41 M/UL (ref 4–5.4)
SODIUM SERPL-SCNC: 126 MMOL/L (ref 136–145)
WBC # BLD AUTO: 9.61 K/UL (ref 3.9–12.7)

## 2021-09-30 PROCEDURE — 97116 GAIT TRAINING THERAPY: CPT

## 2021-09-30 PROCEDURE — 97530 THERAPEUTIC ACTIVITIES: CPT

## 2021-09-30 PROCEDURE — 63600175 PHARM REV CODE 636 W HCPCS

## 2021-09-30 PROCEDURE — 25000003 PHARM REV CODE 250: Performed by: STUDENT IN AN ORGANIZED HEALTH CARE EDUCATION/TRAINING PROGRAM

## 2021-09-30 PROCEDURE — 36415 COLL VENOUS BLD VENIPUNCTURE: CPT | Performed by: STUDENT IN AN ORGANIZED HEALTH CARE EDUCATION/TRAINING PROGRAM

## 2021-09-30 PROCEDURE — 63600175 PHARM REV CODE 636 W HCPCS: Performed by: STUDENT IN AN ORGANIZED HEALTH CARE EDUCATION/TRAINING PROGRAM

## 2021-09-30 PROCEDURE — 63600175 PHARM REV CODE 636 W HCPCS: Performed by: HOSPITALIST

## 2021-09-30 PROCEDURE — 99223 1ST HOSP IP/OBS HIGH 75: CPT | Mod: ,,, | Performed by: PHYSICIAN ASSISTANT

## 2021-09-30 PROCEDURE — 97165 OT EVAL LOW COMPLEX 30 MIN: CPT

## 2021-09-30 PROCEDURE — 99232 PR SUBSEQUENT HOSPITAL CARE,LEVL II: ICD-10-PCS | Mod: GC,,, | Performed by: HOSPITALIST

## 2021-09-30 PROCEDURE — 87205 SMEAR GRAM STAIN: CPT | Performed by: ORTHOPAEDIC SURGERY

## 2021-09-30 PROCEDURE — 83735 ASSAY OF MAGNESIUM: CPT | Performed by: STUDENT IN AN ORGANIZED HEALTH CARE EDUCATION/TRAINING PROGRAM

## 2021-09-30 PROCEDURE — 20600001 HC STEP DOWN PRIVATE ROOM

## 2021-09-30 PROCEDURE — 99223 PR INITIAL HOSPITAL CARE,LEVL III: ICD-10-PCS | Mod: ,,, | Performed by: PHYSICIAN ASSISTANT

## 2021-09-30 PROCEDURE — 85025 COMPLETE CBC W/AUTO DIFF WBC: CPT | Performed by: STUDENT IN AN ORGANIZED HEALTH CARE EDUCATION/TRAINING PROGRAM

## 2021-09-30 PROCEDURE — 80053 COMPREHEN METABOLIC PANEL: CPT | Performed by: STUDENT IN AN ORGANIZED HEALTH CARE EDUCATION/TRAINING PROGRAM

## 2021-09-30 PROCEDURE — 99232 SBSQ HOSP IP/OBS MODERATE 35: CPT | Mod: GC,,, | Performed by: HOSPITALIST

## 2021-09-30 PROCEDURE — 97162 PT EVAL MOD COMPLEX 30 MIN: CPT

## 2021-09-30 PROCEDURE — 25000003 PHARM REV CODE 250: Performed by: HOSPITALIST

## 2021-09-30 RX ORDER — ENOXAPARIN SODIUM 100 MG/ML
40 INJECTION SUBCUTANEOUS EVERY 24 HOURS
Status: DISCONTINUED | OUTPATIENT
Start: 2021-09-30 | End: 2021-10-02 | Stop reason: HOSPADM

## 2021-09-30 RX ADMIN — AMLODIPINE BESYLATE 5 MG: 5 TABLET ORAL at 09:09

## 2021-09-30 RX ADMIN — GABAPENTIN 300 MG: 300 CAPSULE ORAL at 09:09

## 2021-09-30 RX ADMIN — FLUOXETINE 10 MG: 10 CAPSULE ORAL at 09:09

## 2021-09-30 RX ADMIN — MUPIROCIN: 20 OINTMENT TOPICAL at 09:09

## 2021-09-30 RX ADMIN — METHOCARBAMOL 500 MG: 500 TABLET ORAL at 09:09

## 2021-09-30 RX ADMIN — INSULIN ASPART 1 UNITS: 100 INJECTION, SOLUTION INTRAVENOUS; SUBCUTANEOUS at 12:09

## 2021-09-30 RX ADMIN — METHOCARBAMOL 500 MG: 500 TABLET ORAL at 01:09

## 2021-09-30 RX ADMIN — LOSARTAN POTASSIUM 50 MG: 50 TABLET, FILM COATED ORAL at 09:09

## 2021-09-30 RX ADMIN — VANCOMYCIN HYDROCHLORIDE 750 MG: 750 INJECTION, POWDER, LYOPHILIZED, FOR SOLUTION INTRAVENOUS at 09:09

## 2021-09-30 RX ADMIN — CEFTRIAXONE SODIUM 2 G: 2 INJECTION, SOLUTION INTRAVENOUS at 07:09

## 2021-09-30 RX ADMIN — ASPIRIN 81 MG: 81 TABLET, COATED ORAL at 09:09

## 2021-09-30 RX ADMIN — ONDANSETRON 8 MG: 8 TABLET, ORALLY DISINTEGRATING ORAL at 04:09

## 2021-09-30 RX ADMIN — ENOXAPARIN SODIUM 40 MG: 100 INJECTION SUBCUTANEOUS at 04:09

## 2021-09-30 RX ADMIN — PANTOPRAZOLE SODIUM 40 MG: 40 TABLET, DELAYED RELEASE ORAL at 09:09

## 2021-10-01 LAB
ALBUMIN SERPL BCP-MCNC: 2.5 G/DL (ref 3.5–5.2)
ALP SERPL-CCNC: 62 U/L (ref 55–135)
ALT SERPL W/O P-5'-P-CCNC: 11 U/L (ref 10–44)
ANION GAP SERPL CALC-SCNC: 12 MMOL/L (ref 8–16)
ASCENDING AORTA: 2.56 CM
AST SERPL-CCNC: 14 U/L (ref 10–40)
AV INDEX (PROSTH): 0.68
AV MEAN GRADIENT: 8 MMHG
AV PEAK GRADIENT: 14 MMHG
AV VALVE AREA: 2.18 CM2
AV VELOCITY RATIO: 0.51
BACTERIA SPEC AEROBE CULT: ABNORMAL
BASOPHILS # BLD AUTO: 0.05 K/UL (ref 0–0.2)
BASOPHILS NFR BLD: 0.6 % (ref 0–1.9)
BILIRUB SERPL-MCNC: 0.1 MG/DL (ref 0.1–1)
BSA FOR ECHO PROCEDURE: 1.48 M2
BUN SERPL-MCNC: 18 MG/DL (ref 8–23)
CALCIUM SERPL-MCNC: 8.7 MG/DL (ref 8.7–10.5)
CHLORIDE SERPL-SCNC: 99 MMOL/L (ref 95–110)
CO2 SERPL-SCNC: 23 MMOL/L (ref 23–29)
CREAT SERPL-MCNC: 0.8 MG/DL (ref 0.5–1.4)
CV ECHO LV RWT: 0.44 CM
DIFFERENTIAL METHOD: ABNORMAL
DOP CALC AO PEAK VEL: 1.9 M/S
DOP CALC AO VTI: 32.45 CM
DOP CALC LVOT AREA: 3.2 CM2
DOP CALC LVOT DIAMETER: 2.02 CM
DOP CALC LVOT PEAK VEL: 0.96 M/S
DOP CALC LVOT STROKE VOLUME: 70.82 CM3
DOP CALCLVOT PEAK VEL VTI: 22.11 CM
E WAVE DECELERATION TIME: 208.91 MSEC
E/A RATIO: 0.94
E/E' RATIO: 14.5 M/S
ECHO LV POSTERIOR WALL: 0.79 CM (ref 0.6–1.1)
EJECTION FRACTION: 60 %
EOSINOPHIL # BLD AUTO: 0.2 K/UL (ref 0–0.5)
EOSINOPHIL NFR BLD: 1.7 % (ref 0–8)
ERYTHROCYTE [DISTWIDTH] IN BLOOD BY AUTOMATED COUNT: 13.2 % (ref 11.5–14.5)
EST. GFR  (AFRICAN AMERICAN): >60 ML/MIN/1.73 M^2
EST. GFR  (NON AFRICAN AMERICAN): >60 ML/MIN/1.73 M^2
FRACTIONAL SHORTENING: 35 % (ref 28–44)
GLUCOSE SERPL-MCNC: 82 MG/DL (ref 70–110)
HCT VFR BLD AUTO: 29.1 % (ref 37–48.5)
HGB BLD-MCNC: 9.8 G/DL (ref 12–16)
IMM GRANULOCYTES # BLD AUTO: 0.02 K/UL (ref 0–0.04)
IMM GRANULOCYTES NFR BLD AUTO: 0.2 % (ref 0–0.5)
INTERVENTRICULAR SEPTUM: 1.05 CM (ref 0.6–1.1)
LA MAJOR: 4.46 CM
LA MINOR: 4.79 CM
LA WIDTH: 3.47 CM
LEFT ATRIUM SIZE: 3.31 CM
LEFT ATRIUM VOLUME INDEX: 30.5 ML/M2
LEFT ATRIUM VOLUME: 45.1 CM3
LEFT INTERNAL DIMENSION IN SYSTOLE: 2.31 CM (ref 2.1–4)
LEFT VENTRICLE DIASTOLIC VOLUME INDEX: 36.24 ML/M2
LEFT VENTRICLE DIASTOLIC VOLUME: 53.63 ML
LEFT VENTRICLE MASS INDEX: 64 G/M2
LEFT VENTRICLE SYSTOLIC VOLUME INDEX: 12.4 ML/M2
LEFT VENTRICLE SYSTOLIC VOLUME: 18.33 ML
LEFT VENTRICULAR INTERNAL DIMENSION IN DIASTOLE: 3.58 CM (ref 3.5–6)
LEFT VENTRICULAR MASS: 94.9 G
LV LATERAL E/E' RATIO: 12.89 M/S
LV SEPTAL E/E' RATIO: 16.57 M/S
LYMPHOCYTES # BLD AUTO: 1.4 K/UL (ref 1–4.8)
LYMPHOCYTES NFR BLD: 15.2 % (ref 18–48)
MAGNESIUM SERPL-MCNC: 1.5 MG/DL (ref 1.6–2.6)
MCH RBC QN AUTO: 32.1 PG (ref 27–31)
MCHC RBC AUTO-ENTMCNC: 33.7 G/DL (ref 32–36)
MCV RBC AUTO: 95 FL (ref 82–98)
MONOCYTES # BLD AUTO: 0.8 K/UL (ref 0.3–1)
MONOCYTES NFR BLD: 9 % (ref 4–15)
MV PEAK A VEL: 1.23 M/S
MV PEAK E VEL: 1.16 M/S
MV STENOSIS PRESSURE HALF TIME: 60.58 MS
MV VALVE AREA P 1/2 METHOD: 3.63 CM2
NEUTROPHILS # BLD AUTO: 6.6 K/UL (ref 1.8–7.7)
NEUTROPHILS NFR BLD: 73.3 % (ref 38–73)
NRBC BLD-RTO: 0 /100 WBC
PISA TR MAX VEL: 3.48 M/S
PLATELET # BLD AUTO: 318 K/UL (ref 150–450)
PMV BLD AUTO: 11.2 FL (ref 9.2–12.9)
POCT GLUCOSE: 108 MG/DL (ref 70–110)
POCT GLUCOSE: 114 MG/DL (ref 70–110)
POCT GLUCOSE: 125 MG/DL (ref 70–110)
POCT GLUCOSE: 187 MG/DL (ref 70–110)
POTASSIUM SERPL-SCNC: 4.6 MMOL/L (ref 3.5–5.1)
PROT SERPL-MCNC: 5.8 G/DL (ref 6–8.4)
RA MAJOR: 4.51 CM
RA PRESSURE: 3 MMHG
RA WIDTH: 3.04 CM
RBC # BLD AUTO: 3.05 M/UL (ref 4–5.4)
RIGHT VENTRICULAR END-DIASTOLIC DIMENSION: 3.03 CM
SINUS: 2.8 CM
SODIUM SERPL-SCNC: 134 MMOL/L (ref 136–145)
STJ: 2.8 CM
TDI LATERAL: 0.09 M/S
TDI SEPTAL: 0.07 M/S
TDI: 0.08 M/S
TR MAX PG: 48 MMHG
TRICUSPID ANNULAR PLANE SYSTOLIC EXCURSION: 1.97 CM
TV REST PULMONARY ARTERY PRESSURE: 51 MMHG
WBC # BLD AUTO: 9.03 K/UL (ref 3.9–12.7)

## 2021-10-01 PROCEDURE — C1751 CATH, INF, PER/CENT/MIDLINE: HCPCS

## 2021-10-01 PROCEDURE — 63600175 PHARM REV CODE 636 W HCPCS: Performed by: STUDENT IN AN ORGANIZED HEALTH CARE EDUCATION/TRAINING PROGRAM

## 2021-10-01 PROCEDURE — A4216 STERILE WATER/SALINE, 10 ML: HCPCS | Performed by: HOSPITALIST

## 2021-10-01 PROCEDURE — 85025 COMPLETE CBC W/AUTO DIFF WBC: CPT | Performed by: STUDENT IN AN ORGANIZED HEALTH CARE EDUCATION/TRAINING PROGRAM

## 2021-10-01 PROCEDURE — 25000003 PHARM REV CODE 250: Performed by: HOSPITALIST

## 2021-10-01 PROCEDURE — 99232 SBSQ HOSP IP/OBS MODERATE 35: CPT | Mod: GC,,, | Performed by: HOSPITALIST

## 2021-10-01 PROCEDURE — 36415 COLL VENOUS BLD VENIPUNCTURE: CPT | Performed by: STUDENT IN AN ORGANIZED HEALTH CARE EDUCATION/TRAINING PROGRAM

## 2021-10-01 PROCEDURE — 63600175 PHARM REV CODE 636 W HCPCS

## 2021-10-01 PROCEDURE — 80053 COMPREHEN METABOLIC PANEL: CPT | Performed by: STUDENT IN AN ORGANIZED HEALTH CARE EDUCATION/TRAINING PROGRAM

## 2021-10-01 PROCEDURE — 25000003 PHARM REV CODE 250: Performed by: STUDENT IN AN ORGANIZED HEALTH CARE EDUCATION/TRAINING PROGRAM

## 2021-10-01 PROCEDURE — 20600001 HC STEP DOWN PRIVATE ROOM

## 2021-10-01 PROCEDURE — 99233 SBSQ HOSP IP/OBS HIGH 50: CPT | Mod: ,,, | Performed by: PHYSICIAN ASSISTANT

## 2021-10-01 PROCEDURE — 99232 PR SUBSEQUENT HOSPITAL CARE,LEVL II: ICD-10-PCS | Mod: GC,,, | Performed by: HOSPITALIST

## 2021-10-01 PROCEDURE — 36573 INSJ PICC RS&I 5 YR+: CPT

## 2021-10-01 PROCEDURE — 83735 ASSAY OF MAGNESIUM: CPT | Performed by: STUDENT IN AN ORGANIZED HEALTH CARE EDUCATION/TRAINING PROGRAM

## 2021-10-01 PROCEDURE — 76937 US GUIDE VASCULAR ACCESS: CPT

## 2021-10-01 PROCEDURE — 63600175 PHARM REV CODE 636 W HCPCS: Performed by: HOSPITALIST

## 2021-10-01 PROCEDURE — 99233 PR SUBSEQUENT HOSPITAL CARE,LEVL III: ICD-10-PCS | Mod: ,,, | Performed by: PHYSICIAN ASSISTANT

## 2021-10-01 RX ORDER — SODIUM CHLORIDE 0.9 % (FLUSH) 0.9 %
10 SYRINGE (ML) INJECTION
Status: DISCONTINUED | OUTPATIENT
Start: 2021-10-01 | End: 2021-10-02 | Stop reason: HOSPADM

## 2021-10-01 RX ORDER — SODIUM CHLORIDE 0.9 % (FLUSH) 0.9 %
10 SYRINGE (ML) INJECTION EVERY 6 HOURS
Status: DISCONTINUED | OUTPATIENT
Start: 2021-10-01 | End: 2021-10-02 | Stop reason: HOSPADM

## 2021-10-01 RX ORDER — LOSARTAN POTASSIUM 50 MG/1
50 TABLET ORAL NIGHTLY
Qty: 90 TABLET | Refills: 0 | Status: SHIPPED | OUTPATIENT
Start: 2021-10-01 | End: 2021-10-11

## 2021-10-01 RX ORDER — MAGNESIUM SULFATE HEPTAHYDRATE 40 MG/ML
2 INJECTION, SOLUTION INTRAVENOUS ONCE
Status: COMPLETED | OUTPATIENT
Start: 2021-10-01 | End: 2021-10-01

## 2021-10-01 RX ORDER — ASPIRIN 81 MG/1
81 TABLET ORAL 2 TIMES DAILY
Qty: 90 TABLET | Refills: 0 | Status: SHIPPED | OUTPATIENT
Start: 2021-10-01 | End: 2024-02-23

## 2021-10-01 RX ORDER — ACETAMINOPHEN 500 MG
1000 TABLET ORAL EVERY 6 HOURS PRN
Qty: 90 TABLET | Refills: 0 | Status: SHIPPED | OUTPATIENT
Start: 2021-10-01 | End: 2022-05-12

## 2021-10-01 RX ORDER — AMLODIPINE BESYLATE 5 MG/1
5 TABLET ORAL DAILY
Qty: 30 TABLET | Refills: 11 | Status: SHIPPED | OUTPATIENT
Start: 2021-10-02 | End: 2022-05-12

## 2021-10-01 RX ADMIN — ENOXAPARIN SODIUM 40 MG: 100 INJECTION SUBCUTANEOUS at 05:10

## 2021-10-01 RX ADMIN — Medication 10 ML: at 11:10

## 2021-10-01 RX ADMIN — METHOCARBAMOL 500 MG: 500 TABLET ORAL at 09:10

## 2021-10-01 RX ADMIN — PANTOPRAZOLE SODIUM 40 MG: 40 TABLET, DELAYED RELEASE ORAL at 08:10

## 2021-10-01 RX ADMIN — GABAPENTIN 300 MG: 300 CAPSULE ORAL at 09:10

## 2021-10-01 RX ADMIN — MAGNESIUM SULFATE 2 G: 2 INJECTION INTRAVENOUS at 08:10

## 2021-10-01 RX ADMIN — AMLODIPINE BESYLATE 5 MG: 5 TABLET ORAL at 08:10

## 2021-10-01 RX ADMIN — METHOCARBAMOL 500 MG: 500 TABLET ORAL at 12:10

## 2021-10-01 RX ADMIN — GABAPENTIN 300 MG: 300 CAPSULE ORAL at 08:10

## 2021-10-01 RX ADMIN — METHOCARBAMOL 500 MG: 500 TABLET ORAL at 08:10

## 2021-10-01 RX ADMIN — METHOCARBAMOL 500 MG: 500 TABLET ORAL at 05:10

## 2021-10-01 RX ADMIN — MUPIROCIN: 20 OINTMENT TOPICAL at 08:10

## 2021-10-01 RX ADMIN — MUPIROCIN: 20 OINTMENT TOPICAL at 09:10

## 2021-10-01 RX ADMIN — Medication 10 ML: at 06:10

## 2021-10-01 RX ADMIN — ACETAMINOPHEN 1000 MG: 500 TABLET ORAL at 09:10

## 2021-10-01 RX ADMIN — LOSARTAN POTASSIUM 50 MG: 50 TABLET, FILM COATED ORAL at 09:10

## 2021-10-01 RX ADMIN — CEFTRIAXONE SODIUM 2 G: 2 INJECTION, SOLUTION INTRAVENOUS at 07:10

## 2021-10-02 VITALS
HEART RATE: 70 BPM | SYSTOLIC BLOOD PRESSURE: 158 MMHG | WEIGHT: 112 LBS | DIASTOLIC BLOOD PRESSURE: 68 MMHG | OXYGEN SATURATION: 98 % | HEIGHT: 61 IN | TEMPERATURE: 98 F | BODY MASS INDEX: 21.14 KG/M2 | RESPIRATION RATE: 18 BRPM

## 2021-10-02 LAB
ALBUMIN SERPL BCP-MCNC: 2.8 G/DL (ref 3.5–5.2)
ALP SERPL-CCNC: 56 U/L (ref 55–135)
ALT SERPL W/O P-5'-P-CCNC: 12 U/L (ref 10–44)
ANION GAP SERPL CALC-SCNC: 13 MMOL/L (ref 8–16)
AST SERPL-CCNC: 15 U/L (ref 10–40)
BACTERIA SPEC AEROBE CULT: ABNORMAL
BASOPHILS # BLD AUTO: 0.07 K/UL (ref 0–0.2)
BASOPHILS NFR BLD: 1.1 % (ref 0–1.9)
BILIRUB SERPL-MCNC: 0.2 MG/DL (ref 0.1–1)
BUN SERPL-MCNC: 12 MG/DL (ref 8–23)
CALCIUM SERPL-MCNC: 9.1 MG/DL (ref 8.7–10.5)
CHLORIDE SERPL-SCNC: 95 MMOL/L (ref 95–110)
CO2 SERPL-SCNC: 25 MMOL/L (ref 23–29)
CREAT SERPL-MCNC: 0.7 MG/DL (ref 0.5–1.4)
DIFFERENTIAL METHOD: ABNORMAL
EOSINOPHIL # BLD AUTO: 0.3 K/UL (ref 0–0.5)
EOSINOPHIL NFR BLD: 5.1 % (ref 0–8)
ERYTHROCYTE [DISTWIDTH] IN BLOOD BY AUTOMATED COUNT: 13.5 % (ref 11.5–14.5)
EST. GFR  (AFRICAN AMERICAN): >60 ML/MIN/1.73 M^2
EST. GFR  (NON AFRICAN AMERICAN): >60 ML/MIN/1.73 M^2
GLUCOSE SERPL-MCNC: 109 MG/DL (ref 70–110)
HCT VFR BLD AUTO: 29.4 % (ref 37–48.5)
HGB BLD-MCNC: 9.8 G/DL (ref 12–16)
IMM GRANULOCYTES # BLD AUTO: 0.04 K/UL (ref 0–0.04)
IMM GRANULOCYTES NFR BLD AUTO: 0.6 % (ref 0–0.5)
LYMPHOCYTES # BLD AUTO: 1 K/UL (ref 1–4.8)
LYMPHOCYTES NFR BLD: 16.4 % (ref 18–48)
MAGNESIUM SERPL-MCNC: 1.7 MG/DL (ref 1.6–2.6)
MCH RBC QN AUTO: 32.1 PG (ref 27–31)
MCHC RBC AUTO-ENTMCNC: 33.3 G/DL (ref 32–36)
MCV RBC AUTO: 96 FL (ref 82–98)
MONOCYTES # BLD AUTO: 0.7 K/UL (ref 0.3–1)
MONOCYTES NFR BLD: 11 % (ref 4–15)
NEUTROPHILS # BLD AUTO: 4.1 K/UL (ref 1.8–7.7)
NEUTROPHILS NFR BLD: 65.8 % (ref 38–73)
NRBC BLD-RTO: 0 /100 WBC
PLATELET # BLD AUTO: 344 K/UL (ref 150–450)
PMV BLD AUTO: 10.8 FL (ref 9.2–12.9)
POCT GLUCOSE: 100 MG/DL (ref 70–110)
POCT GLUCOSE: 104 MG/DL (ref 70–110)
POCT GLUCOSE: 119 MG/DL (ref 70–110)
POTASSIUM SERPL-SCNC: 4.5 MMOL/L (ref 3.5–5.1)
PROT SERPL-MCNC: 6.2 G/DL (ref 6–8.4)
RBC # BLD AUTO: 3.05 M/UL (ref 4–5.4)
SODIUM SERPL-SCNC: 133 MMOL/L (ref 136–145)
WBC # BLD AUTO: 6.27 K/UL (ref 3.9–12.7)

## 2021-10-02 PROCEDURE — 99233 SBSQ HOSP IP/OBS HIGH 50: CPT | Mod: ,,, | Performed by: PHYSICIAN ASSISTANT

## 2021-10-02 PROCEDURE — 83735 ASSAY OF MAGNESIUM: CPT | Performed by: STUDENT IN AN ORGANIZED HEALTH CARE EDUCATION/TRAINING PROGRAM

## 2021-10-02 PROCEDURE — A4216 STERILE WATER/SALINE, 10 ML: HCPCS | Performed by: HOSPITALIST

## 2021-10-02 PROCEDURE — 25000003 PHARM REV CODE 250: Performed by: STUDENT IN AN ORGANIZED HEALTH CARE EDUCATION/TRAINING PROGRAM

## 2021-10-02 PROCEDURE — 36415 COLL VENOUS BLD VENIPUNCTURE: CPT | Performed by: STUDENT IN AN ORGANIZED HEALTH CARE EDUCATION/TRAINING PROGRAM

## 2021-10-02 PROCEDURE — 63600175 PHARM REV CODE 636 W HCPCS: Performed by: HOSPITALIST

## 2021-10-02 PROCEDURE — 85025 COMPLETE CBC W/AUTO DIFF WBC: CPT | Performed by: STUDENT IN AN ORGANIZED HEALTH CARE EDUCATION/TRAINING PROGRAM

## 2021-10-02 PROCEDURE — 25000003 PHARM REV CODE 250: Performed by: HOSPITALIST

## 2021-10-02 PROCEDURE — 99238 PR HOSPITAL DISCHARGE DAY,<30 MIN: ICD-10-PCS | Mod: GC,,, | Performed by: HOSPITALIST

## 2021-10-02 PROCEDURE — 80053 COMPREHEN METABOLIC PANEL: CPT | Performed by: STUDENT IN AN ORGANIZED HEALTH CARE EDUCATION/TRAINING PROGRAM

## 2021-10-02 PROCEDURE — 99238 HOSP IP/OBS DSCHRG MGMT 30/<: CPT | Mod: GC,,, | Performed by: HOSPITALIST

## 2021-10-02 PROCEDURE — 99233 PR SUBSEQUENT HOSPITAL CARE,LEVL III: ICD-10-PCS | Mod: ,,, | Performed by: PHYSICIAN ASSISTANT

## 2021-10-02 RX ADMIN — ASPIRIN 81 MG: 81 TABLET, COATED ORAL at 09:10

## 2021-10-02 RX ADMIN — CEFTRIAXONE 2 G: 2 INJECTION, SOLUTION INTRAVENOUS at 05:10

## 2021-10-02 RX ADMIN — PANTOPRAZOLE SODIUM 40 MG: 40 TABLET, DELAYED RELEASE ORAL at 09:10

## 2021-10-02 RX ADMIN — AMLODIPINE BESYLATE 5 MG: 5 TABLET ORAL at 09:10

## 2021-10-02 RX ADMIN — FLUOXETINE 10 MG: 10 CAPSULE ORAL at 09:10

## 2021-10-02 RX ADMIN — MUPIROCIN: 20 OINTMENT TOPICAL at 09:10

## 2021-10-02 RX ADMIN — METHOCARBAMOL 500 MG: 500 TABLET ORAL at 04:10

## 2021-10-02 RX ADMIN — Medication 10 ML: at 05:10

## 2021-10-02 RX ADMIN — GABAPENTIN 300 MG: 300 CAPSULE ORAL at 09:10

## 2021-10-02 RX ADMIN — METHOCARBAMOL 500 MG: 500 TABLET ORAL at 09:10

## 2021-10-03 LAB
BACTERIA BLD CULT: NORMAL
BACTERIA BLD CULT: NORMAL

## 2021-10-03 PROCEDURE — G0180 PR HOME HEALTH MD CERTIFICATION: ICD-10-PCS | Mod: ,,, | Performed by: FAMILY MEDICINE

## 2021-10-03 PROCEDURE — G0180 MD CERTIFICATION HHA PATIENT: HCPCS | Mod: ,,, | Performed by: FAMILY MEDICINE

## 2021-10-04 LAB
BACTERIA SPEC ANAEROBE CULT: NORMAL
BACTERIA SPEC ANAEROBE CULT: NORMAL

## 2021-10-06 ENCOUNTER — PATIENT MESSAGE (OUTPATIENT)
Dept: OPHTHALMOLOGY | Facility: CLINIC | Age: 86
End: 2021-10-06

## 2021-10-11 ENCOUNTER — TELEPHONE (OUTPATIENT)
Dept: INFECTIOUS DISEASES | Facility: CLINIC | Age: 86
End: 2021-10-11
Payer: MEDICARE

## 2021-10-14 ENCOUNTER — OFFICE VISIT (OUTPATIENT)
Dept: ORTHOPEDICS | Facility: CLINIC | Age: 86
End: 2021-10-14
Payer: MEDICARE

## 2021-10-14 ENCOUNTER — OFFICE VISIT (OUTPATIENT)
Dept: INFECTIOUS DISEASES | Facility: CLINIC | Age: 86
End: 2021-10-14
Payer: MEDICARE

## 2021-10-14 VITALS — WEIGHT: 113 LBS | HEIGHT: 60 IN | BODY MASS INDEX: 22.19 KG/M2

## 2021-10-14 VITALS
BODY MASS INDEX: 21.61 KG/M2 | HEIGHT: 61 IN | WEIGHT: 114.44 LBS | HEART RATE: 73 BPM | SYSTOLIC BLOOD PRESSURE: 128 MMHG | DIASTOLIC BLOOD PRESSURE: 80 MMHG | TEMPERATURE: 98 F

## 2021-10-14 DIAGNOSIS — T84.59XA INFECTION ASSOCIATED WITH PROSTHESIS OF LEFT ANKLE JOINT: Primary | ICD-10-CM

## 2021-10-14 DIAGNOSIS — Z98.890 POST-OPERATIVE STATE: ICD-10-CM

## 2021-10-14 DIAGNOSIS — Z96.662 INFECTION ASSOCIATED WITH PROSTHESIS OF LEFT ANKLE JOINT: Primary | ICD-10-CM

## 2021-10-14 PROCEDURE — 1160F PR REVIEW ALL MEDS BY PRESCRIBER/CLIN PHARMACIST DOCUMENTED: ICD-10-PCS | Mod: CPTII,S$GLB,, | Performed by: NURSE PRACTITIONER

## 2021-10-14 PROCEDURE — 99999 PR PBB SHADOW E&M-EST. PATIENT-LVL III: ICD-10-PCS | Mod: PBBFAC,,, | Performed by: NURSE PRACTITIONER

## 2021-10-14 PROCEDURE — 1159F PR MEDICATION LIST DOCUMENTED IN MEDICAL RECORD: ICD-10-PCS | Mod: CPTII,S$GLB,, | Performed by: NURSE PRACTITIONER

## 2021-10-14 PROCEDURE — 1157F ADVNC CARE PLAN IN RCRD: CPT | Mod: CPTII,S$GLB,, | Performed by: PHYSICIAN ASSISTANT

## 2021-10-14 PROCEDURE — 1160F RVW MEDS BY RX/DR IN RCRD: CPT | Mod: CPTII,S$GLB,, | Performed by: NURSE PRACTITIONER

## 2021-10-14 PROCEDURE — 99213 OFFICE O/P EST LOW 20 MIN: CPT | Mod: S$GLB,,, | Performed by: PHYSICIAN ASSISTANT

## 2021-10-14 PROCEDURE — 99024 POSTOP FOLLOW-UP VISIT: CPT | Mod: S$GLB,,, | Performed by: NURSE PRACTITIONER

## 2021-10-14 PROCEDURE — 99499 RISK ADDL DX/OHS AUDIT: ICD-10-PCS | Mod: S$GLB,,, | Performed by: NURSE PRACTITIONER

## 2021-10-14 PROCEDURE — 1159F MED LIST DOCD IN RCRD: CPT | Mod: CPTII,S$GLB,, | Performed by: PHYSICIAN ASSISTANT

## 2021-10-14 PROCEDURE — 1111F DSCHRG MED/CURRENT MED MERGE: CPT | Mod: CPTII,S$GLB,, | Performed by: PHYSICIAN ASSISTANT

## 2021-10-14 PROCEDURE — 1126F PR PAIN SEVERITY QUANTIFIED, NO PAIN PRESENT: ICD-10-PCS | Mod: CPTII,S$GLB,, | Performed by: PHYSICIAN ASSISTANT

## 2021-10-14 PROCEDURE — 99999 PR PBB SHADOW E&M-EST. PATIENT-LVL IV: CPT | Mod: PBBFAC,,, | Performed by: PHYSICIAN ASSISTANT

## 2021-10-14 PROCEDURE — 99999 PR PBB SHADOW E&M-EST. PATIENT-LVL III: CPT | Mod: PBBFAC,,, | Performed by: NURSE PRACTITIONER

## 2021-10-14 PROCEDURE — 99499 UNLISTED E&M SERVICE: CPT | Mod: S$GLB,,, | Performed by: NURSE PRACTITIONER

## 2021-10-14 PROCEDURE — 99213 PR OFFICE/OUTPT VISIT, EST, LEVL III, 20-29 MIN: ICD-10-PCS | Mod: S$GLB,,, | Performed by: PHYSICIAN ASSISTANT

## 2021-10-14 PROCEDURE — 1111F PR DISCHARGE MEDS RECONCILED W/ CURRENT OUTPATIENT MED LIST: ICD-10-PCS | Mod: CPTII,S$GLB,, | Performed by: PHYSICIAN ASSISTANT

## 2021-10-14 PROCEDURE — 1126F AMNT PAIN NOTED NONE PRSNT: CPT | Mod: CPTII,S$GLB,, | Performed by: PHYSICIAN ASSISTANT

## 2021-10-14 PROCEDURE — 1159F PR MEDICATION LIST DOCUMENTED IN MEDICAL RECORD: ICD-10-PCS | Mod: CPTII,S$GLB,, | Performed by: PHYSICIAN ASSISTANT

## 2021-10-14 PROCEDURE — 1101F PR PT FALLS ASSESS DOC 0-1 FALLS W/OUT INJ PAST YR: ICD-10-PCS | Mod: CPTII,S$GLB,, | Performed by: PHYSICIAN ASSISTANT

## 2021-10-14 PROCEDURE — 1157F PR ADVANCE CARE PLAN OR EQUIV PRESENT IN MEDICAL RECORD: ICD-10-PCS | Mod: CPTII,S$GLB,, | Performed by: PHYSICIAN ASSISTANT

## 2021-10-14 PROCEDURE — 1159F MED LIST DOCD IN RCRD: CPT | Mod: CPTII,S$GLB,, | Performed by: NURSE PRACTITIONER

## 2021-10-14 PROCEDURE — 1125F PR PAIN SEVERITY QUANTIFIED, PAIN PRESENT: ICD-10-PCS | Mod: CPTII,S$GLB,, | Performed by: NURSE PRACTITIONER

## 2021-10-14 PROCEDURE — 3288F PR FALLS RISK ASSESSMENT DOCUMENTED: ICD-10-PCS | Mod: CPTII,S$GLB,, | Performed by: PHYSICIAN ASSISTANT

## 2021-10-14 PROCEDURE — 1157F PR ADVANCE CARE PLAN OR EQUIV PRESENT IN MEDICAL RECORD: ICD-10-PCS | Mod: CPTII,S$GLB,, | Performed by: NURSE PRACTITIONER

## 2021-10-14 PROCEDURE — 99999 PR PBB SHADOW E&M-EST. PATIENT-LVL IV: ICD-10-PCS | Mod: PBBFAC,,, | Performed by: PHYSICIAN ASSISTANT

## 2021-10-14 PROCEDURE — 99024 PR POST-OP FOLLOW-UP VISIT: ICD-10-PCS | Mod: S$GLB,,, | Performed by: NURSE PRACTITIONER

## 2021-10-14 PROCEDURE — 1157F ADVNC CARE PLAN IN RCRD: CPT | Mod: CPTII,S$GLB,, | Performed by: NURSE PRACTITIONER

## 2021-10-14 PROCEDURE — 1125F AMNT PAIN NOTED PAIN PRSNT: CPT | Mod: CPTII,S$GLB,, | Performed by: NURSE PRACTITIONER

## 2021-10-14 PROCEDURE — 1101F PT FALLS ASSESS-DOCD LE1/YR: CPT | Mod: CPTII,S$GLB,, | Performed by: PHYSICIAN ASSISTANT

## 2021-10-14 PROCEDURE — 3288F FALL RISK ASSESSMENT DOCD: CPT | Mod: CPTII,S$GLB,, | Performed by: PHYSICIAN ASSISTANT

## 2021-10-19 ENCOUNTER — TELEPHONE (OUTPATIENT)
Dept: FAMILY MEDICINE | Facility: CLINIC | Age: 86
End: 2021-10-19

## 2021-10-25 ENCOUNTER — EXTERNAL HOME HEALTH (OUTPATIENT)
Dept: HOME HEALTH SERVICES | Facility: HOSPITAL | Age: 86
End: 2021-10-25
Payer: MEDICARE

## 2021-10-26 ENCOUNTER — HOSPITAL ENCOUNTER (OUTPATIENT)
Dept: RADIOLOGY | Facility: HOSPITAL | Age: 86
Discharge: HOME | End: 2021-10-26
Attending: PHYSICIAN ASSISTANT
Payer: MEDICARE

## 2021-10-26 ENCOUNTER — OFFICE VISIT (OUTPATIENT)
Dept: INFECTIOUS DISEASES | Facility: CLINIC | Age: 86
End: 2021-10-26
Payer: MEDICARE

## 2021-10-26 DIAGNOSIS — R05.8 PRODUCTIVE COUGH: ICD-10-CM

## 2021-10-26 DIAGNOSIS — T84.59XA INFECTION ASSOCIATED WITH PROSTHESIS OF LEFT ANKLE JOINT: Primary | ICD-10-CM

## 2021-10-26 DIAGNOSIS — Z96.662 INFECTION ASSOCIATED WITH PROSTHESIS OF LEFT ANKLE JOINT: Primary | ICD-10-CM

## 2021-10-26 DIAGNOSIS — Z96.9 RETAINED ORTHOPEDIC HARDWARE: ICD-10-CM

## 2021-10-26 DIAGNOSIS — Z79.2 RECEIVING INTRAVENOUS ANTIBIOTIC TREATMENT AS OUTPATIENT: ICD-10-CM

## 2021-10-26 DIAGNOSIS — R11.0 NAUSEA: ICD-10-CM

## 2021-10-26 PROCEDURE — 1111F PR DISCHARGE MEDS RECONCILED W/ CURRENT OUTPATIENT MED LIST: ICD-10-PCS | Mod: CPTII,S$GLB,, | Performed by: PHYSICIAN ASSISTANT

## 2021-10-26 PROCEDURE — 71046 XR CHEST PA AND LATERAL: ICD-10-PCS | Mod: 26,,, | Performed by: RADIOLOGY

## 2021-10-26 PROCEDURE — 99999 PR PBB SHADOW E&M-EST. PATIENT-LVL II: CPT | Mod: PBBFAC,,, | Performed by: PHYSICIAN ASSISTANT

## 2021-10-26 PROCEDURE — 1111F DSCHRG MED/CURRENT MED MERGE: CPT | Mod: CPTII,S$GLB,, | Performed by: PHYSICIAN ASSISTANT

## 2021-10-26 PROCEDURE — 1159F MED LIST DOCD IN RCRD: CPT | Mod: CPTII,S$GLB,, | Performed by: PHYSICIAN ASSISTANT

## 2021-10-26 PROCEDURE — 1157F ADVNC CARE PLAN IN RCRD: CPT | Mod: CPTII,S$GLB,, | Performed by: PHYSICIAN ASSISTANT

## 2021-10-26 PROCEDURE — 71046 X-RAY EXAM CHEST 2 VIEWS: CPT | Mod: 26,,, | Performed by: RADIOLOGY

## 2021-10-26 PROCEDURE — 1160F RVW MEDS BY RX/DR IN RCRD: CPT | Mod: CPTII,S$GLB,, | Performed by: PHYSICIAN ASSISTANT

## 2021-10-26 PROCEDURE — 1160F PR REVIEW ALL MEDS BY PRESCRIBER/CLIN PHARMACIST DOCUMENTED: ICD-10-PCS | Mod: CPTII,S$GLB,, | Performed by: PHYSICIAN ASSISTANT

## 2021-10-26 PROCEDURE — 71046 X-RAY EXAM CHEST 2 VIEWS: CPT | Mod: TC,FY,PO

## 2021-10-26 PROCEDURE — 99999 PR PBB SHADOW E&M-EST. PATIENT-LVL II: ICD-10-PCS | Mod: PBBFAC,,, | Performed by: PHYSICIAN ASSISTANT

## 2021-10-26 PROCEDURE — 99215 PR OFFICE/OUTPT VISIT, EST, LEVL V, 40-54 MIN: ICD-10-PCS | Mod: S$GLB,,, | Performed by: PHYSICIAN ASSISTANT

## 2021-10-26 PROCEDURE — 1157F PR ADVANCE CARE PLAN OR EQUIV PRESENT IN MEDICAL RECORD: ICD-10-PCS | Mod: CPTII,S$GLB,, | Performed by: PHYSICIAN ASSISTANT

## 2021-10-26 PROCEDURE — 1159F PR MEDICATION LIST DOCUMENTED IN MEDICAL RECORD: ICD-10-PCS | Mod: CPTII,S$GLB,, | Performed by: PHYSICIAN ASSISTANT

## 2021-10-26 PROCEDURE — 99215 OFFICE O/P EST HI 40 MIN: CPT | Mod: S$GLB,,, | Performed by: PHYSICIAN ASSISTANT

## 2021-10-26 RX ORDER — ONDANSETRON 4 MG/1
TABLET, ORALLY DISINTEGRATING ORAL
Qty: 20 TABLET | Refills: 0 | Status: SHIPPED | OUTPATIENT
Start: 2021-10-26 | End: 2022-01-11 | Stop reason: SDUPTHER

## 2021-11-02 LAB
FUNGUS SPEC CULT: NORMAL
FUNGUS SPEC CULT: NORMAL

## 2021-11-05 ENCOUNTER — OFFICE VISIT (OUTPATIENT)
Dept: FAMILY MEDICINE | Facility: CLINIC | Age: 86
End: 2021-11-05
Payer: MEDICARE

## 2021-11-05 VITALS
HEART RATE: 84 BPM | DIASTOLIC BLOOD PRESSURE: 56 MMHG | RESPIRATION RATE: 14 BRPM | WEIGHT: 112 LBS | BODY MASS INDEX: 21.99 KG/M2 | TEMPERATURE: 99 F | SYSTOLIC BLOOD PRESSURE: 102 MMHG | HEIGHT: 60 IN

## 2021-11-05 DIAGNOSIS — Z00.00 ROUTINE HEALTH MAINTENANCE: Primary | ICD-10-CM

## 2021-11-05 DIAGNOSIS — E11.69 DYSLIPIDEMIA ASSOCIATED WITH TYPE 2 DIABETES MELLITUS: ICD-10-CM

## 2021-11-05 DIAGNOSIS — E78.5 DYSLIPIDEMIA ASSOCIATED WITH TYPE 2 DIABETES MELLITUS: ICD-10-CM

## 2021-11-05 DIAGNOSIS — I10 PRIMARY HYPERTENSION: ICD-10-CM

## 2021-11-05 DIAGNOSIS — J41.0 SIMPLE CHRONIC BRONCHITIS: ICD-10-CM

## 2021-11-05 PROBLEM — Z98.890 POST-OPERATIVE STATE: Status: RESOLVED | Noted: 2018-10-31 | Resolved: 2021-11-05

## 2021-11-05 PROBLEM — G56.00 CARPAL TUNNEL SYNDROME: Status: RESOLVED | Noted: 2018-03-05 | Resolved: 2021-11-05

## 2021-11-05 PROCEDURE — 1157F PR ADVANCE CARE PLAN OR EQUIV PRESENT IN MEDICAL RECORD: ICD-10-PCS | Mod: CPTII,S$GLB,, | Performed by: FAMILY MEDICINE

## 2021-11-05 PROCEDURE — 99215 PR OFFICE/OUTPT VISIT, EST, LEVL V, 40-54 MIN: ICD-10-PCS | Mod: S$GLB,,, | Performed by: FAMILY MEDICINE

## 2021-11-05 PROCEDURE — 3288F PR FALLS RISK ASSESSMENT DOCUMENTED: ICD-10-PCS | Mod: CPTII,S$GLB,, | Performed by: FAMILY MEDICINE

## 2021-11-05 PROCEDURE — 1101F PR PT FALLS ASSESS DOC 0-1 FALLS W/OUT INJ PAST YR: ICD-10-PCS | Mod: CPTII,S$GLB,, | Performed by: FAMILY MEDICINE

## 2021-11-05 PROCEDURE — 1159F PR MEDICATION LIST DOCUMENTED IN MEDICAL RECORD: ICD-10-PCS | Mod: CPTII,S$GLB,, | Performed by: FAMILY MEDICINE

## 2021-11-05 PROCEDURE — 1125F AMNT PAIN NOTED PAIN PRSNT: CPT | Mod: CPTII,S$GLB,, | Performed by: FAMILY MEDICINE

## 2021-11-05 PROCEDURE — 1160F PR REVIEW ALL MEDS BY PRESCRIBER/CLIN PHARMACIST DOCUMENTED: ICD-10-PCS | Mod: CPTII,S$GLB,, | Performed by: FAMILY MEDICINE

## 2021-11-05 PROCEDURE — 3288F FALL RISK ASSESSMENT DOCD: CPT | Mod: CPTII,S$GLB,, | Performed by: FAMILY MEDICINE

## 2021-11-05 PROCEDURE — 99999 PR PBB SHADOW E&M-EST. PATIENT-LVL V: CPT | Mod: PBBFAC,,, | Performed by: FAMILY MEDICINE

## 2021-11-05 PROCEDURE — 1101F PT FALLS ASSESS-DOCD LE1/YR: CPT | Mod: CPTII,S$GLB,, | Performed by: FAMILY MEDICINE

## 2021-11-05 PROCEDURE — 1125F PR PAIN SEVERITY QUANTIFIED, PAIN PRESENT: ICD-10-PCS | Mod: CPTII,S$GLB,, | Performed by: FAMILY MEDICINE

## 2021-11-05 PROCEDURE — 99999 PR PBB SHADOW E&M-EST. PATIENT-LVL V: ICD-10-PCS | Mod: PBBFAC,,, | Performed by: FAMILY MEDICINE

## 2021-11-05 PROCEDURE — 1160F RVW MEDS BY RX/DR IN RCRD: CPT | Mod: CPTII,S$GLB,, | Performed by: FAMILY MEDICINE

## 2021-11-05 PROCEDURE — 1157F ADVNC CARE PLAN IN RCRD: CPT | Mod: CPTII,S$GLB,, | Performed by: FAMILY MEDICINE

## 2021-11-05 PROCEDURE — 1159F MED LIST DOCD IN RCRD: CPT | Mod: CPTII,S$GLB,, | Performed by: FAMILY MEDICINE

## 2021-11-05 PROCEDURE — 99215 OFFICE O/P EST HI 40 MIN: CPT | Mod: S$GLB,,, | Performed by: FAMILY MEDICINE

## 2021-11-05 RX ORDER — ALBUTEROL SULFATE 90 UG/1
2 AEROSOL, METERED RESPIRATORY (INHALATION) EVERY 6 HOURS PRN
Qty: 18 G | Refills: 3 | Status: SHIPPED | OUTPATIENT
Start: 2021-11-05 | End: 2023-12-27 | Stop reason: SDUPTHER

## 2021-11-08 ENCOUNTER — PATIENT OUTREACH (OUTPATIENT)
Dept: ADMINISTRATIVE | Facility: OTHER | Age: 86
End: 2021-11-08
Payer: MEDICARE

## 2021-11-08 ENCOUNTER — PATIENT MESSAGE (OUTPATIENT)
Dept: INFECTIOUS DISEASES | Facility: CLINIC | Age: 86
End: 2021-11-08
Payer: MEDICARE

## 2021-11-10 ENCOUNTER — IMMUNIZATION (OUTPATIENT)
Dept: FAMILY MEDICINE | Facility: CLINIC | Age: 86
End: 2021-11-10
Payer: MEDICARE

## 2021-11-10 ENCOUNTER — OFFICE VISIT (OUTPATIENT)
Dept: INFECTIOUS DISEASES | Facility: CLINIC | Age: 86
End: 2021-11-10
Payer: MEDICARE

## 2021-11-10 VITALS
RESPIRATION RATE: 20 BRPM | BODY MASS INDEX: 21.99 KG/M2 | DIASTOLIC BLOOD PRESSURE: 75 MMHG | HEIGHT: 60 IN | HEART RATE: 79 BPM | TEMPERATURE: 98 F | SYSTOLIC BLOOD PRESSURE: 129 MMHG | WEIGHT: 112 LBS

## 2021-11-10 DIAGNOSIS — Z79.2 RECEIVING INTRAVENOUS ANTIBIOTIC TREATMENT AS OUTPATIENT: ICD-10-CM

## 2021-11-10 DIAGNOSIS — Z23 NEED FOR VACCINATION: Primary | ICD-10-CM

## 2021-11-10 DIAGNOSIS — Z96.662 INFECTION ASSOCIATED WITH PROSTHESIS OF LEFT ANKLE JOINT: Primary | ICD-10-CM

## 2021-11-10 DIAGNOSIS — T84.59XA INFECTION ASSOCIATED WITH PROSTHESIS OF LEFT ANKLE JOINT: Primary | ICD-10-CM

## 2021-11-10 PROCEDURE — 1157F ADVNC CARE PLAN IN RCRD: CPT | Mod: CPTII,S$GLB,, | Performed by: INTERNAL MEDICINE

## 2021-11-10 PROCEDURE — 1101F PR PT FALLS ASSESS DOC 0-1 FALLS W/OUT INJ PAST YR: ICD-10-PCS | Mod: CPTII,S$GLB,, | Performed by: INTERNAL MEDICINE

## 2021-11-10 PROCEDURE — 1159F MED LIST DOCD IN RCRD: CPT | Mod: CPTII,S$GLB,, | Performed by: INTERNAL MEDICINE

## 2021-11-10 PROCEDURE — 99999 PR PBB SHADOW E&M-EST. PATIENT-LVL III: CPT | Mod: PBBFAC,,, | Performed by: INTERNAL MEDICINE

## 2021-11-10 PROCEDURE — 99214 PR OFFICE/OUTPT VISIT, EST, LEVL IV, 30-39 MIN: ICD-10-PCS | Mod: S$GLB,,, | Performed by: INTERNAL MEDICINE

## 2021-11-10 PROCEDURE — 0004A COVID-19, MRNA, LNP-S, PF, 30 MCG/0.3 ML DOSE VACCINE: CPT | Mod: PBBFAC | Performed by: INTERNAL MEDICINE

## 2021-11-10 PROCEDURE — 3288F FALL RISK ASSESSMENT DOCD: CPT | Mod: CPTII,S$GLB,, | Performed by: INTERNAL MEDICINE

## 2021-11-10 PROCEDURE — 3288F PR FALLS RISK ASSESSMENT DOCUMENTED: ICD-10-PCS | Mod: CPTII,S$GLB,, | Performed by: INTERNAL MEDICINE

## 2021-11-10 PROCEDURE — 99999 PR PBB SHADOW E&M-EST. PATIENT-LVL III: ICD-10-PCS | Mod: PBBFAC,,, | Performed by: INTERNAL MEDICINE

## 2021-11-10 PROCEDURE — 1160F RVW MEDS BY RX/DR IN RCRD: CPT | Mod: CPTII,S$GLB,, | Performed by: INTERNAL MEDICINE

## 2021-11-10 PROCEDURE — 1159F PR MEDICATION LIST DOCUMENTED IN MEDICAL RECORD: ICD-10-PCS | Mod: CPTII,S$GLB,, | Performed by: INTERNAL MEDICINE

## 2021-11-10 PROCEDURE — 1101F PT FALLS ASSESS-DOCD LE1/YR: CPT | Mod: CPTII,S$GLB,, | Performed by: INTERNAL MEDICINE

## 2021-11-10 PROCEDURE — 1157F PR ADVANCE CARE PLAN OR EQUIV PRESENT IN MEDICAL RECORD: ICD-10-PCS | Mod: CPTII,S$GLB,, | Performed by: INTERNAL MEDICINE

## 2021-11-10 PROCEDURE — 99214 OFFICE O/P EST MOD 30 MIN: CPT | Mod: S$GLB,,, | Performed by: INTERNAL MEDICINE

## 2021-11-10 PROCEDURE — 1160F PR REVIEW ALL MEDS BY PRESCRIBER/CLIN PHARMACIST DOCUMENTED: ICD-10-PCS | Mod: CPTII,S$GLB,, | Performed by: INTERNAL MEDICINE

## 2021-11-10 RX ORDER — AMOXICILLIN 875 MG/1
875 TABLET, FILM COATED ORAL EVERY 12 HOURS
Qty: 60 TABLET | Refills: 6 | Status: SHIPPED | OUTPATIENT
Start: 2021-11-10 | End: 2022-01-11

## 2021-11-11 ENCOUNTER — TELEPHONE (OUTPATIENT)
Dept: FAMILY MEDICINE | Facility: CLINIC | Age: 86
End: 2021-11-11
Payer: MEDICARE

## 2021-11-17 LAB
ACID FAST MOD KINY STN SPEC: NORMAL
ACID FAST MOD KINY STN SPEC: NORMAL
MYCOBACTERIUM SPEC QL CULT: NORMAL
MYCOBACTERIUM SPEC QL CULT: NORMAL

## 2021-12-17 ENCOUNTER — OFFICE VISIT (OUTPATIENT)
Dept: DERMATOLOGY | Facility: CLINIC | Age: 86
End: 2021-12-17
Payer: MEDICARE

## 2021-12-17 VITALS — RESPIRATION RATE: 18 BRPM | HEIGHT: 60 IN | WEIGHT: 112 LBS | BODY MASS INDEX: 21.99 KG/M2

## 2021-12-17 DIAGNOSIS — Z12.83 ENCOUNTER FOR SCREENING FOR MALIGNANT NEOPLASM OF SKIN: ICD-10-CM

## 2021-12-17 DIAGNOSIS — L57.0 ACTINIC KERATOSIS: ICD-10-CM

## 2021-12-17 DIAGNOSIS — L82.1 SEBORRHEIC KERATOSES: Primary | ICD-10-CM

## 2021-12-17 PROCEDURE — 99203 OFFICE O/P NEW LOW 30 MIN: CPT | Mod: 25,S$GLB,, | Performed by: STUDENT IN AN ORGANIZED HEALTH CARE EDUCATION/TRAINING PROGRAM

## 2021-12-17 PROCEDURE — 17000 DESTRUCT PREMALG LESION: CPT | Mod: S$GLB,,, | Performed by: STUDENT IN AN ORGANIZED HEALTH CARE EDUCATION/TRAINING PROGRAM

## 2021-12-17 PROCEDURE — 17000 PR DESTRUCTION(LASER SURGERY,CRYOSURGERY,CHEMOSURGERY),PREMALIGNANT LESIONS,FIRST LESION: ICD-10-PCS | Mod: S$GLB,,, | Performed by: STUDENT IN AN ORGANIZED HEALTH CARE EDUCATION/TRAINING PROGRAM

## 2021-12-17 PROCEDURE — 99203 PR OFFICE/OUTPT VISIT, NEW, LEVL III, 30-44 MIN: ICD-10-PCS | Mod: 25,S$GLB,, | Performed by: STUDENT IN AN ORGANIZED HEALTH CARE EDUCATION/TRAINING PROGRAM

## 2021-12-17 PROCEDURE — 99999 PR PBB SHADOW E&M-EST. PATIENT-LVL IV: CPT | Mod: PBBFAC,,, | Performed by: STUDENT IN AN ORGANIZED HEALTH CARE EDUCATION/TRAINING PROGRAM

## 2021-12-17 PROCEDURE — 99999 PR PBB SHADOW E&M-EST. PATIENT-LVL IV: ICD-10-PCS | Mod: PBBFAC,,, | Performed by: STUDENT IN AN ORGANIZED HEALTH CARE EDUCATION/TRAINING PROGRAM

## 2021-12-17 PROCEDURE — 1157F ADVNC CARE PLAN IN RCRD: CPT | Mod: CPTII,S$GLB,, | Performed by: STUDENT IN AN ORGANIZED HEALTH CARE EDUCATION/TRAINING PROGRAM

## 2021-12-17 PROCEDURE — 1157F PR ADVANCE CARE PLAN OR EQUIV PRESENT IN MEDICAL RECORD: ICD-10-PCS | Mod: CPTII,S$GLB,, | Performed by: STUDENT IN AN ORGANIZED HEALTH CARE EDUCATION/TRAINING PROGRAM

## 2021-12-22 ENCOUNTER — TELEPHONE (OUTPATIENT)
Dept: INFECTIOUS DISEASES | Facility: CLINIC | Age: 86
End: 2021-12-22
Payer: MEDICARE

## 2022-01-06 ENCOUNTER — TELEPHONE (OUTPATIENT)
Dept: INFECTIOUS DISEASES | Facility: CLINIC | Age: 87
End: 2022-01-06
Payer: MEDICARE

## 2022-01-06 NOTE — TELEPHONE ENCOUNTER
Returned call to patient-No answer. EMERYOVM that she needs to call me back to discuss the problem that she is having, as well as reschedule her missed appt, as I have tried several times, and there is never an answer, but today is the first time there has been a voicemail.

## 2022-01-06 NOTE — TELEPHONE ENCOUNTER
----- Message from Candy Armstrong sent at 1/6/2022 12:24 PM CST -----  Contact: patient  Type: Needs Medical Advice  Who Called:  patient  Symptoms (please be specific):    How long has patient had these symptoms:    Pharmacy name and phone #:    Best Call Back Number: [phone]   Additional Information: patient states she is drinking lots of water and feel the antibiotic is an overdose for her size, she feel her bladder is to full of sediment  she states it is the same as a bladder infection with no blood and is having issues making it to the bathroom,  states she will stop taking the medication until she hears form the office.

## 2022-01-07 ENCOUNTER — TELEPHONE (OUTPATIENT)
Dept: INFECTIOUS DISEASES | Facility: CLINIC | Age: 87
End: 2022-01-07
Payer: MEDICARE

## 2022-01-07 NOTE — TELEPHONE ENCOUNTER
Tried contacting patient this morning, but a male answered her phone and stated Mrs Escobar had stepped out for a while, and asked that I call back later. I then placed a call to her daughter Taylor, but there was no answer and no option to leave a message.

## 2022-01-07 NOTE — TELEPHONE ENCOUNTER
----- Message from Henry Hurst sent at 1/6/2022  4:53 PM CST -----  Regarding: CAll back request  Contact: pt  PT requesting call back about appts and medication.    Pt @ [phone] (

## 2022-01-10 ENCOUNTER — TELEPHONE (OUTPATIENT)
Dept: INFECTIOUS DISEASES | Facility: CLINIC | Age: 87
End: 2022-01-10
Payer: MEDICARE

## 2022-01-10 NOTE — TELEPHONE ENCOUNTER
----- Message from Darlene Verdin sent at 1/10/2022  2:06 PM CST -----  Regarding: Sooner Appt  Contact: Patient  Type:  Sooner Apoointment Request    Caller is requesting a sooner appointment.  Caller declined first available appointment listed below.  Caller will not accept being placed on the waitlist and is requesting a message be sent to doctor.  Name of Caller: Patient   When is the first available appointment? No appts generated   Symptoms: follow up   Would the patient rather a call back or a response via [facility]? Call back   Best Call Back Number: [phone]   Additional Information: Patient is requesting a call back to schedule a follow up appt with physician please assist

## 2022-01-10 NOTE — TELEPHONE ENCOUNTER
Phone number listed in message is incorrect. Call placed to patients correct number and scheduled appt with Tash tomorrow morning at 9:00. Understanding verbalized by patient.

## 2022-01-11 ENCOUNTER — OFFICE VISIT (OUTPATIENT)
Dept: INFECTIOUS DISEASES | Facility: CLINIC | Age: 87
End: 2022-01-11
Payer: MEDICARE

## 2022-01-11 DIAGNOSIS — Z96.662 INFECTION ASSOCIATED WITH PROSTHESIS OF LEFT ANKLE JOINT: Primary | ICD-10-CM

## 2022-01-11 DIAGNOSIS — Z96.9 RETAINED ORTHOPEDIC HARDWARE: ICD-10-CM

## 2022-01-11 DIAGNOSIS — R11.0 NAUSEA: ICD-10-CM

## 2022-01-11 DIAGNOSIS — T84.59XA INFECTION ASSOCIATED WITH PROSTHESIS OF LEFT ANKLE JOINT: Primary | ICD-10-CM

## 2022-01-11 PROCEDURE — 3072F PR LOW RISK FOR RETINOPATHY: ICD-10-PCS | Mod: CPTII,S$GLB,, | Performed by: PHYSICIAN ASSISTANT

## 2022-01-11 PROCEDURE — 99999 PR PBB SHADOW E&M-EST. PATIENT-LVL II: CPT | Mod: PBBFAC,,, | Performed by: PHYSICIAN ASSISTANT

## 2022-01-11 PROCEDURE — 1159F MED LIST DOCD IN RCRD: CPT | Mod: CPTII,S$GLB,, | Performed by: PHYSICIAN ASSISTANT

## 2022-01-11 PROCEDURE — 1160F RVW MEDS BY RX/DR IN RCRD: CPT | Mod: CPTII,S$GLB,, | Performed by: PHYSICIAN ASSISTANT

## 2022-01-11 PROCEDURE — 1160F PR REVIEW ALL MEDS BY PRESCRIBER/CLIN PHARMACIST DOCUMENTED: ICD-10-PCS | Mod: CPTII,S$GLB,, | Performed by: PHYSICIAN ASSISTANT

## 2022-01-11 PROCEDURE — 99999 PR PBB SHADOW E&M-EST. PATIENT-LVL II: ICD-10-PCS | Mod: PBBFAC,,, | Performed by: PHYSICIAN ASSISTANT

## 2022-01-11 PROCEDURE — 99213 PR OFFICE/OUTPT VISIT, EST, LEVL III, 20-29 MIN: ICD-10-PCS | Mod: S$GLB,,, | Performed by: PHYSICIAN ASSISTANT

## 2022-01-11 PROCEDURE — 3072F LOW RISK FOR RETINOPATHY: CPT | Mod: CPTII,S$GLB,, | Performed by: PHYSICIAN ASSISTANT

## 2022-01-11 PROCEDURE — 99213 OFFICE O/P EST LOW 20 MIN: CPT | Mod: S$GLB,,, | Performed by: PHYSICIAN ASSISTANT

## 2022-01-11 PROCEDURE — 1159F PR MEDICATION LIST DOCUMENTED IN MEDICAL RECORD: ICD-10-PCS | Mod: CPTII,S$GLB,, | Performed by: PHYSICIAN ASSISTANT

## 2022-01-11 PROCEDURE — 1157F ADVNC CARE PLAN IN RCRD: CPT | Mod: CPTII,S$GLB,, | Performed by: PHYSICIAN ASSISTANT

## 2022-01-11 PROCEDURE — 1157F PR ADVANCE CARE PLAN OR EQUIV PRESENT IN MEDICAL RECORD: ICD-10-PCS | Mod: CPTII,S$GLB,, | Performed by: PHYSICIAN ASSISTANT

## 2022-01-11 RX ORDER — ONDANSETRON 4 MG/1
TABLET, ORALLY DISINTEGRATING ORAL
Qty: 30 TABLET | Refills: 0 | Status: SHIPPED | OUTPATIENT
Start: 2022-01-11

## 2022-01-11 RX ORDER — AMOXICILLIN 500 MG/1
500 TABLET, FILM COATED ORAL 3 TIMES DAILY
Qty: 270 TABLET | Refills: 0 | Status: SHIPPED | OUTPATIENT
Start: 2022-01-11 | End: 2022-06-15 | Stop reason: SDUPTHER

## 2022-01-11 NOTE — PROGRESS NOTES
Patient ID: Candy Escobar is a 89 y.o. female.    Subjective:         Chief Complaint: Medication Refill      Follow-up  Associated symptoms include nausea. Pertinent negatives include no abdominal pain, arthralgias, chest pain, chills, coughing, diaphoresis, fatigue, fever, headaches, rash or sore throat.   Medication Refill  Associated symptoms include nausea. Pertinent negatives include no abdominal pain, arthralgias, chest pain, chills, coughing, diaphoresis, fatigue, fever, headaches, rash or sore throat.     - 89 year old female with history of left total ankle replacement (2000) recently admitted on 9/28 with a left prosthetic ankle infection s/p arthroscopic I&D on 9/29. Left ankle cultures (aspiration and surgical) are positive for Strep sanguinis.   - Patient was on empiric Vancomycin and Ceftriaxone then tailored to ceftriaxone alone.  Per orthopedic surgery, no plans for hardware removal  - She was follwed by ID. Received Ceftriaxone 2 g IV q 24 hours x 6 weeks from day of surgery. End date of IV antibiotics: 11/10/21  - 10/14/21: [facility] ID f/u with SAMY Servin PA-C. Doing well and has no pain in L ankle except when walking.  Tolerating the IV abx and picc without problems. The patient denies any recent fever, chills, or sweats.  - 10/26/21: Established with [facility] ID ALY Angel PA-C. Overall doing well with some various positive ROS which were addressed. Continued on IV Ceftriaxone.  - 11/10/21: Seen by Dr. Valdez in clinic. Ceftriaxone completed and she was started on amoxicillin 875mg po BID suppression      Interval HPI:  - Patient is doing very well, adjusting and enjoying her new residence at [place]  - has been taking her amoxicillin but states she thinks the 875mg tabs are difficult for her to tolerate. Asking for lower dose  - denies fever, chills, night sweats. Occasional nausea which is relieved with prn Zofran for which she needs a refill  - denies pain or swelling in her left  ankle. She has been trying to stay off of it aside from regular walking in order to not irritate it      Past Medical History:   Diagnosis Date    Allergy     Seasonal    Cataract     Depression     Diabetes mellitus     diet controlled    Diverticulosis     Encounter for blood transfusion     Fever blister     GERD (gastroesophageal reflux disease)     Hypertension 11/12/2014    Nuclear sclerosis - Both Eyes 7/16/2013    Osteoarthritis     bilateral knee replacements, and L ankle replacement in 2000    Osteopenia     Osteoporosis     Seizures 2008    TIA       Past Surgical History:   Procedure Laterality Date    ANKLE SURGERY  1951    ankle replacement, left    ARTHROSCOPY OF ANKLE WITH DEBRIDEMENT Left 9/29/2021    Procedure: ARTHROSCOPY, ANKLE, WITH DEBRIDEMENT - LEFT ANKLE - CYSTO TUBING - BACTISURE - VANC POWDER;  Surgeon: Sadiq Lee MD;  Location: [facility] OR [address] [place] [address];  Service: Orthopedics;  Laterality: Left;    BLADDER SUSPENSION      COLONOSCOPY      FOOT SURGERY      HYSTERECTOMY      left ovary intact    INTRAOCULAR PROSTHESES INSERTION Left 10/30/2018    Procedure: INSERTION, IOL PROSTHESIS;  Surgeon: Jorge A Miller MD;  Location: [facility] OR [address] [place] [address];  Service: Ophthalmology;  Laterality: Left;    JOINT REPLACEMENT      left ankle and knee arthroplasty    KNEE SURGERY  2008    left TKR    KNEE SURGERY  11-11-14    right TKR    PHACOEMULSIFICATION OF CATARACT Left 10/30/2018    Procedure: PHACOEMULSIFICATION, CATARACT;  Surgeon: Jorge A Miller MD;  Location: [facility] OR [address] [place] [address];  Service: Ophthalmology;  Laterality: Left;    SHOULDER SURGERY  2009    right arthroscopy    TONSILLECTOMY         Review of patient's allergies indicates:   Allergen Reactions    Propofol analogues Other (See Comments)     Low blood pressure, tremors    Oxycodone Nausea And Vomiting    Pcn [penicillins] Nausea Only    Tetracyclines Nausea Only       Family History   Problem  Relation Age of Onset    Diabetes Mother     Diabetes Father     Heart disease Father     Diabetes Sister     Polychondritis Sister     Polychondritis Unknown     Cancer Neg Hx     Amblyopia Neg Hx     Blindness Neg Hx     Cataracts Neg Hx     Glaucoma Neg Hx     Hypertension Neg Hx     Macular degeneration Neg Hx     Retinal detachment Neg Hx     Strabismus Neg Hx     Stroke Neg Hx     Thyroid disease Neg Hx     Melanoma Neg Hx        Social History     Socioeconomic History    Marital status:    Occupational History    Occupation: Retired Teacher     Employer: OTHER   Tobacco Use    Smoking status: Former Smoker     Types: Cigarettes     Quit date: 1981     Years since quittin.7    Smokeless tobacco: Never Used   Substance and Sexual Activity    Alcohol use: Yes     Alcohol/week: 4.0 standard drinks     Types: 4 Glasses of wine per week     Comment: Nightly    Drug use: No    Sexual activity: Not Currently     Partners: Male   Other Topics Concern    Are you pregnant or think you may be? No    Breast-feeding No       Current Outpatient Medications   Medication Instructions    acetaminophen (TYLENOL) 1,000 mg, Oral, Every 6 hours PRN    albuterol (VENTOLIN HFA) 90 mcg/actuation inhaler 2 puffs, Inhalation, Every 6 hours PRN, Rescue    amLODIPine (NORVASC) 5 mg, Oral, Daily    amoxicillin (AMOXIL) 875 mg, Oral, Every 12 hours    aspirin (ECOTRIN) 81 mg, Oral, 2 times daily    gabapentin (NEURONTIN) 300 MG capsule TAKE 1 CAPSULE BY MOUTH  TWICE DAILY    omeprazole (PRILOSEC) 20 MG capsule TAKE 2 CAPSULES BY MOUTH  DAILY    ondansetron (ZOFRAN-ODT) 4 MG TbDL Take one 4mg tablet with Tramadol for nausea    psyllium seed, with sugar, (FIBER ORAL) Oral         Review of Systems   Constitutional: Negative for activity change, appetite change, chills, diaphoresis, fatigue, fever and unexpected weight change.   HENT: Negative for sore throat.    Eyes: Negative for  photophobia and visual disturbance.   Respiratory: Negative for cough and shortness of breath.    Cardiovascular: Negative for chest pain and leg swelling.   Gastrointestinal: Positive for nausea. Negative for abdominal distention and abdominal pain.   Genitourinary: Negative for difficulty urinating.   Musculoskeletal: Negative for arthralgias.   Skin: Negative for color change and rash.   Allergic/Immunologic: Negative for immunocompromised state.   Neurological: Negative for dizziness and headaches.   Psychiatric/Behavioral: The patient is not nervous/anxious.            Objective:     Physical Exam  Vitals reviewed.   Constitutional:       General: She is not in acute distress.     Appearance: She is well-developed. She is not ill-appearing.   HENT:      Head: Normocephalic and atraumatic.      Right Ear: External ear normal.      Left Ear: External ear normal.      Nose: Nose normal.   Eyes:      General: No scleral icterus.        Right eye: No discharge.         Left eye: No discharge.      Pupils: Pupils are equal, round, and reactive to light.   Cardiovascular:      Rate and Rhythm: Normal rate and regular rhythm.      Heart sounds: Normal heart sounds. No murmur heard.      Pulmonary:      Effort: Pulmonary effort is normal. No tachypnea, accessory muscle usage or respiratory distress.      Breath sounds: Normal breath sounds. No wheezing.   Abdominal:      General: There is no distension.      Palpations: Abdomen is soft.      Tenderness: There is no abdominal tenderness.   Musculoskeletal:         General: No deformity. Normal range of motion.      Cervical back: Normal range of motion and neck supple. No rigidity or tenderness.   Skin:     General: Skin is warm and dry.      Coloration: Skin is not jaundiced.      Comments: Left ankle without erythema, edema, or tenderness   Neurological:      Mental Status: She is alert and oriented to person, place, and time.   Psychiatric:         Mood and Affect:  Mood normal.         Behavior: Behavior normal.         Judgment: Judgment normal.                     Assessment:       Candy was seen today for medication refill.    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    Retained orthopedic hardware  -     amoxicillin (AMOXIL) 500 MG Tab; Take 1 tablet (500 mg total) by mouth 3 (three) times daily.    Nausea  -     ondansetron (ZOFRAN-ODT) 4 MG TbDL; Take one 4mg tablet with Tramadol for nausea       Plan:     - having some intolerance to 875mg amoxil, but states when she cuts the pill in half she can tolerate. She is specifically asking to try 500mg tabs with a 3 month supply  - will switch to Amoxicillin 500mg po TID for suppression of Strep sanguinis  - if this is still difficult for her, can consider decreasing to BID dose, or changing to a different medication class  - she will need suppression for at least 2 years, possibly lifelong considering retained ankle hardware  - Check CBC, CMP, CRP today  - RTC in 6 months or sooner if needed    Tash Angel PA-C  Infectious Diseases  [facility]

## 2022-01-14 ENCOUNTER — TELEPHONE (OUTPATIENT)
Dept: INFECTIOUS DISEASES | Facility: CLINIC | Age: 87
End: 2022-01-14
Payer: MEDICARE

## 2022-01-14 NOTE — TELEPHONE ENCOUNTER
----- Message from Tash Angel PA-C sent at 1/11/2022 11:58 AM CST -----  fYI I told her f/u in 6 months but didn't schedule because I have no idea what our clinic schedule will be like or when we will be on in the hospital. Just in case you want to like put a reminder in her chart or something. If she needs refills before then, that's fine

## 2022-02-02 ENCOUNTER — TELEPHONE (OUTPATIENT)
Dept: FAMILY MEDICINE | Facility: CLINIC | Age: 87
End: 2022-02-02
Payer: MEDICARE

## 2022-02-02 ENCOUNTER — OFFICE VISIT (OUTPATIENT)
Dept: OPTOMETRY | Facility: CLINIC | Age: 87
End: 2022-02-02
Payer: MEDICARE

## 2022-02-02 DIAGNOSIS — E11.9 TYPE 2 DIABETES MELLITUS WITHOUT RETINOPATHY: Primary | ICD-10-CM

## 2022-02-02 DIAGNOSIS — H40.013 OPEN ANGLE WITH BORDERLINE FINDINGS AND LOW GLAUCOMA RISK IN BOTH EYES: ICD-10-CM

## 2022-02-02 DIAGNOSIS — H52.7 REFRACTIVE ERROR: ICD-10-CM

## 2022-02-02 DIAGNOSIS — H25.11 NUCLEAR SCLEROSIS OF RIGHT EYE: ICD-10-CM

## 2022-02-02 PROCEDURE — 1159F PR MEDICATION LIST DOCUMENTED IN MEDICAL RECORD: ICD-10-PCS | Mod: CPTII,S$GLB,, | Performed by: OPTOMETRIST

## 2022-02-02 PROCEDURE — 1160F RVW MEDS BY RX/DR IN RCRD: CPT | Mod: CPTII,S$GLB,, | Performed by: OPTOMETRIST

## 2022-02-02 PROCEDURE — 1101F PT FALLS ASSESS-DOCD LE1/YR: CPT | Mod: CPTII,S$GLB,, | Performed by: OPTOMETRIST

## 2022-02-02 PROCEDURE — 1157F ADVNC CARE PLAN IN RCRD: CPT | Mod: CPTII,S$GLB,, | Performed by: OPTOMETRIST

## 2022-02-02 PROCEDURE — 99999 PR PBB SHADOW E&M-EST. PATIENT-LVL III: CPT | Mod: PBBFAC,,, | Performed by: OPTOMETRIST

## 2022-02-02 PROCEDURE — 92014 PR EYE EXAM, EST PATIENT,COMPREHESV: ICD-10-PCS | Mod: S$GLB,,, | Performed by: OPTOMETRIST

## 2022-02-02 PROCEDURE — 1126F AMNT PAIN NOTED NONE PRSNT: CPT | Mod: CPTII,S$GLB,, | Performed by: OPTOMETRIST

## 2022-02-02 PROCEDURE — 99499 UNLISTED E&M SERVICE: CPT | Mod: S$GLB,,, | Performed by: OPTOMETRIST

## 2022-02-02 PROCEDURE — 92014 COMPRE OPH EXAM EST PT 1/>: CPT | Mod: S$GLB,,, | Performed by: OPTOMETRIST

## 2022-02-02 PROCEDURE — 1159F MED LIST DOCD IN RCRD: CPT | Mod: CPTII,S$GLB,, | Performed by: OPTOMETRIST

## 2022-02-02 PROCEDURE — 99999 PR PBB SHADOW E&M-EST. PATIENT-LVL III: ICD-10-PCS | Mod: PBBFAC,,, | Performed by: OPTOMETRIST

## 2022-02-02 PROCEDURE — 3288F FALL RISK ASSESSMENT DOCD: CPT | Mod: CPTII,S$GLB,, | Performed by: OPTOMETRIST

## 2022-02-02 PROCEDURE — 1101F PR PT FALLS ASSESS DOC 0-1 FALLS W/OUT INJ PAST YR: ICD-10-PCS | Mod: CPTII,S$GLB,, | Performed by: OPTOMETRIST

## 2022-02-02 PROCEDURE — 2023F PR DILATED RETINAL EXAM W/O EVID OF RETINOPATHY: ICD-10-PCS | Mod: CPTII,S$GLB,, | Performed by: OPTOMETRIST

## 2022-02-02 PROCEDURE — 1126F PR PAIN SEVERITY QUANTIFIED, NO PAIN PRESENT: ICD-10-PCS | Mod: CPTII,S$GLB,, | Performed by: OPTOMETRIST

## 2022-02-02 PROCEDURE — 1157F PR ADVANCE CARE PLAN OR EQUIV PRESENT IN MEDICAL RECORD: ICD-10-PCS | Mod: CPTII,S$GLB,, | Performed by: OPTOMETRIST

## 2022-02-02 PROCEDURE — 1160F PR REVIEW ALL MEDS BY PRESCRIBER/CLIN PHARMACIST DOCUMENTED: ICD-10-PCS | Mod: CPTII,S$GLB,, | Performed by: OPTOMETRIST

## 2022-02-02 PROCEDURE — 2023F DILAT RTA XM W/O RTNOPTHY: CPT | Mod: CPTII,S$GLB,, | Performed by: OPTOMETRIST

## 2022-02-02 PROCEDURE — 99499 RISK ADDL DX/OHS AUDIT: ICD-10-PCS | Mod: S$GLB,,, | Performed by: OPTOMETRIST

## 2022-02-02 PROCEDURE — 3288F PR FALLS RISK ASSESSMENT DOCUMENTED: ICD-10-PCS | Mod: CPTII,S$GLB,, | Performed by: OPTOMETRIST

## 2022-02-02 NOTE — PROGRESS NOTES
HPI     Dle- 01/26/2021    Pt here for diabetic eye exam. Pt sts changes to va since last seen, pt   has been using old pair of glasses, pt does not want new glasses rx until   she has cat surgery in [place]. Denies flashes or floaters. Denies eye pain.   Headaches, after reading too long. Gtts: otc At's prn. DM controlled with   diet.     Hemoglobin A1C       Date                     Value               Ref Range             Status                09/29/2021               5.2                 4.0 - 5.6 %           Final                       04/30/2021               5.3                 4.0 - 5.6 %           Final                       11/19/2020               5.5                 4.0 - 5.6 %           Final                  Last edited by Alicia Loo MA on 2/2/2022  8:26 AM. (History)            Assessment /Plan     For exam results, see Encounter Report.    Type 2 diabetes mellitus without retinopathy    Nuclear sclerosis of right eye    Open angle with borderline findings and low glaucoma risk in both eyes    Refractive error      1. No diabetic retinopathy, no csme. Return in 1 year for dilated eye exam.  2. Blurred vision at all times right eye, Refer to Dr. Yousif for cataract evaluation and possible removal. .  3.  IOP high normal, nerve eval stable, prev OCT stable with RNFL thinning OU, no fam history of glaucoma, RTC 6 mos iop ck consider treat.     4. Hold off on spec rx until after cataract surgery

## 2022-02-03 ENCOUNTER — TELEPHONE (OUTPATIENT)
Dept: OPHTHALMOLOGY | Facility: CLINIC | Age: 87
End: 2022-02-03
Payer: MEDICARE

## 2022-02-03 DIAGNOSIS — H25.11 NUCLEAR SCLEROTIC CATARACT OF RIGHT EYE: Primary | ICD-10-CM

## 2022-02-03 RX ORDER — NEPAFENAC 3 MG/ML
1 SUSPENSION/ DROPS OPHTHALMIC DAILY
Qty: 3 ML | Refills: 1 | Status: SHIPPED | OUTPATIENT
Start: 2022-03-12 | End: 2022-04-11

## 2022-02-03 RX ORDER — DIFLUPREDNATE OPHTHALMIC 0.5 MG/ML
1 EMULSION OPHTHALMIC 4 TIMES DAILY
Qty: 5 ML | Refills: 1 | Status: SHIPPED | OUTPATIENT
Start: 2022-03-15 | End: 2022-02-18 | Stop reason: ALTCHOICE

## 2022-02-17 ENCOUNTER — TELEPHONE (OUTPATIENT)
Dept: OPHTHALMOLOGY | Facility: CLINIC | Age: 87
End: 2022-02-17
Payer: MEDICARE

## 2022-02-18 ENCOUNTER — TELEPHONE (OUTPATIENT)
Dept: OPHTHALMOLOGY | Facility: CLINIC | Age: 87
End: 2022-02-18
Payer: MEDICARE

## 2022-02-18 RX ORDER — PREDNISOLONE ACETATE 10 MG/ML
1 SUSPENSION/ DROPS OPHTHALMIC 4 TIMES DAILY
Qty: 5 ML | Refills: 1 | Status: SHIPPED | OUTPATIENT
Start: 2022-03-15 | End: 2022-04-14

## 2022-02-22 ENCOUNTER — TELEPHONE (OUTPATIENT)
Dept: OPHTHALMOLOGY | Facility: CLINIC | Age: 87
End: 2022-02-22
Payer: MEDICARE

## 2022-02-22 DIAGNOSIS — H25.11 NS (NUCLEAR SCLEROSIS), RIGHT: Primary | ICD-10-CM

## 2022-03-09 ENCOUNTER — TELEPHONE (OUTPATIENT)
Dept: OPHTHALMOLOGY | Facility: CLINIC | Age: 87
End: 2022-03-09
Payer: MEDICARE

## 2022-03-13 NOTE — H&P
[facility] ([place])  History & Physical    Subjective:      Chief Complaint/Reason for Admission:     Candy Escobar is a 89 y.o. female.    Past Medical History:   Diagnosis Date    Allergy     Seasonal    Cataract     Depression     Diabetes mellitus     diet controlled    Diverticulosis     Encounter for blood transfusion     Fever blister     GERD (gastroesophageal reflux disease)     Hypertension 11/12/2014    Nuclear sclerosis - Both Eyes 7/16/2013    Osteoarthritis     bilateral knee replacements, and L ankle replacement in 2000    Osteopenia     Osteoporosis     Seizures 2008    TIA     Past Surgical History:   Procedure Laterality Date    ANKLE SURGERY  1951    ankle replacement, left    ARTHROSCOPY OF ANKLE WITH DEBRIDEMENT Left 9/29/2021    Procedure: ARTHROSCOPY, ANKLE, WITH DEBRIDEMENT - LEFT ANKLE - CYSTO TUBING - BACTISURE - VANC POWDER;  Surgeon: Sadiq Lee MD;  Location: [facility] OR [address] [place] [address];  Service: Orthopedics;  Laterality: Left;    BLADDER SUSPENSION      CATARACT EXTRACTION Left 2018    COLONOSCOPY      FOOT SURGERY      HYSTERECTOMY      left ovary intact    INTRAOCULAR PROSTHESES INSERTION Left 10/30/2018    Procedure: INSERTION, IOL PROSTHESIS;  Surgeon: Jorge A Miller MD;  Location: [facility] OR [address] [place] [address];  Service: Ophthalmology;  Laterality: Left;    JOINT REPLACEMENT      left ankle and knee arthroplasty    KNEE SURGERY  2008    left TKR    KNEE SURGERY  11-11-14    right TKR    PHACOEMULSIFICATION OF CATARACT Left 10/30/2018    Procedure: PHACOEMULSIFICATION, CATARACT;  Surgeon: Jorge A Miller MD;  Location: [facility] OR [address] [place] [address];  Service: Ophthalmology;  Laterality: Left;    SHOULDER SURGERY  2009    right arthroscopy    TONSILLECTOMY       Family History   Problem Relation Age of Onset    Diabetes Mother     Diabetes Father     Heart disease Father     Diabetes Sister     Polychondritis Sister     Polychondritis Unknown      Cancer Neg Hx     Amblyopia Neg Hx     Blindness Neg Hx     Cataracts Neg Hx     Glaucoma Neg Hx     Hypertension Neg Hx     Macular degeneration Neg Hx     Retinal detachment Neg Hx     Strabismus Neg Hx     Stroke Neg Hx     Thyroid disease Neg Hx     Melanoma Neg Hx      Social History     Tobacco Use    Smoking status: Former Smoker     Types: Cigarettes     Quit date: 1981     Years since quittin.8    Smokeless tobacco: Never Used   Substance Use Topics    Alcohol use: Yes     Alcohol/week: 4.0 standard drinks     Types: 4 Glasses of wine per week     Comment: Nightly    Drug use: No       No medications prior to admission.     Review of patient's allergies indicates:   Allergen Reactions    Propofol analogues Other (See Comments)     Low blood pressure, tremors    Oxycodone Nausea And Vomiting    Pcn [penicillins] Nausea Only    Tetracyclines Nausea Only        Review of Systems   Eyes: Positive for blurred vision.   All other systems reviewed and are negative.      Objective:      Vital Signs (Most Recent)       Vital Signs Range (Last 24H):  BP: ()/()   Arterial Line BP: ()/()     Physical Exam  Constitutional:       Appearance: She is well-developed.   HENT:      Head: Normocephalic.   Eyes:      Conjunctiva/sclera: Conjunctivae normal.      Pupils: Pupils are equal, round, and reactive to light.   Cardiovascular:      Rate and Rhythm: Normal rate and regular rhythm.      Heart sounds: Normal heart sounds.   Pulmonary:      Effort: Pulmonary effort is normal.      Breath sounds: Normal breath sounds.   Abdominal:      General: Bowel sounds are normal.      Palpations: Abdomen is soft.   Musculoskeletal:         General: Normal range of motion.      Cervical back: Normal range of motion and neck supple.   Skin:     General: Skin is warm.   Neurological:      Mental Status: She is alert and oriented to person, place, and time.         Data Review:    ECG:     Assessment:       Cataract OD.    Plan:    CE OD.

## 2022-03-15 ENCOUNTER — HOSPITAL ENCOUNTER (OUTPATIENT)
Facility: OTHER | Age: 87
Discharge: HOME | End: 2022-03-15
Attending: OPHTHALMOLOGY | Admitting: OPHTHALMOLOGY
Payer: MEDICARE

## 2022-03-15 ENCOUNTER — ANESTHESIA EVENT (OUTPATIENT)
Dept: SURGERY | Facility: OTHER | Age: 87
End: 2022-03-15
Payer: MEDICARE

## 2022-03-15 ENCOUNTER — ANESTHESIA (OUTPATIENT)
Dept: SURGERY | Facility: OTHER | Age: 87
End: 2022-03-15
Payer: MEDICARE

## 2022-03-15 VITALS
HEART RATE: 70 BPM | SYSTOLIC BLOOD PRESSURE: 140 MMHG | TEMPERATURE: 98 F | RESPIRATION RATE: 18 BRPM | DIASTOLIC BLOOD PRESSURE: 66 MMHG | OXYGEN SATURATION: 97 %

## 2022-03-15 DIAGNOSIS — H25.11 NUCLEAR SCLEROTIC CATARACT OF RIGHT EYE: Primary | ICD-10-CM

## 2022-03-15 LAB — POCT GLUCOSE: 95 MG/DL (ref 70–110)

## 2022-03-15 PROCEDURE — 36000707: Performed by: OPHTHALMOLOGY

## 2022-03-15 PROCEDURE — 37000009 HC ANESTHESIA EA ADD 15 MINS: Performed by: OPHTHALMOLOGY

## 2022-03-15 PROCEDURE — 82962 GLUCOSE BLOOD TEST: CPT | Performed by: OPHTHALMOLOGY

## 2022-03-15 PROCEDURE — 37000008 HC ANESTHESIA 1ST 15 MINUTES: Performed by: OPHTHALMOLOGY

## 2022-03-15 PROCEDURE — 66984 XCAPSL CTRC RMVL W/O ECP: CPT | Mod: RT,,, | Performed by: OPHTHALMOLOGY

## 2022-03-15 PROCEDURE — 66984 PR REMOVAL, CATARACT, W/INSRT INTRAOC LENS, W/O ENDO CYCLO: ICD-10-PCS | Mod: RT,,, | Performed by: OPHTHALMOLOGY

## 2022-03-15 PROCEDURE — 63600175 PHARM REV CODE 636 W HCPCS: Performed by: OPHTHALMOLOGY

## 2022-03-15 PROCEDURE — V2632 POST CHMBR INTRAOCULAR LENS: HCPCS | Performed by: OPHTHALMOLOGY

## 2022-03-15 PROCEDURE — 36000706: Performed by: OPHTHALMOLOGY

## 2022-03-15 PROCEDURE — 63600175 PHARM REV CODE 636 W HCPCS: Performed by: STUDENT IN AN ORGANIZED HEALTH CARE EDUCATION/TRAINING PROGRAM

## 2022-03-15 PROCEDURE — 25000003 PHARM REV CODE 250: Performed by: OPHTHALMOLOGY

## 2022-03-15 PROCEDURE — 71000015 HC POSTOP RECOV 1ST HR: Performed by: OPHTHALMOLOGY

## 2022-03-15 DEVICE — LENS CLAREON AUTONOME 20.5D: Type: IMPLANTABLE DEVICE | Site: EYE | Status: FUNCTIONAL

## 2022-03-15 RX ORDER — SODIUM CHLORIDE 0.9 % (FLUSH) 0.9 %
3 SYRINGE (ML) INJECTION
Status: DISCONTINUED | OUTPATIENT
Start: 2022-03-15 | End: 2022-03-15 | Stop reason: HOSPADM

## 2022-03-15 RX ORDER — OFLOXACIN 3 MG/ML
1 SOLUTION/ DROPS OPHTHALMIC 4 TIMES DAILY
COMMUNITY
End: 2022-05-12

## 2022-03-15 RX ORDER — LIDOCAINE HYDROCHLORIDE 40 MG/ML
INJECTION, SOLUTION RETROBULBAR
Status: DISCONTINUED | OUTPATIENT
Start: 2022-03-15 | End: 2022-03-15 | Stop reason: HOSPADM

## 2022-03-15 RX ORDER — ACETAMINOPHEN 325 MG/1
650 TABLET ORAL EVERY 4 HOURS PRN
Status: DISCONTINUED | OUTPATIENT
Start: 2022-03-15 | End: 2022-03-15 | Stop reason: HOSPADM

## 2022-03-15 RX ORDER — TROPICAMIDE 10 MG/ML
1 SOLUTION/ DROPS OPHTHALMIC
Status: COMPLETED | OUTPATIENT
Start: 2022-03-15 | End: 2022-03-15

## 2022-03-15 RX ORDER — HYDROMORPHONE HYDROCHLORIDE 2 MG/ML
0.4 INJECTION, SOLUTION INTRAMUSCULAR; INTRAVENOUS; SUBCUTANEOUS EVERY 5 MIN PRN
Status: CANCELLED | OUTPATIENT
Start: 2022-03-15

## 2022-03-15 RX ORDER — HYDROCODONE BITARTRATE AND ACETAMINOPHEN 5; 325 MG/1; MG/1
1 TABLET ORAL EVERY 4 HOURS PRN
Status: DISCONTINUED | OUTPATIENT
Start: 2022-03-15 | End: 2022-03-15 | Stop reason: HOSPADM

## 2022-03-15 RX ORDER — DIPHENHYDRAMINE HYDROCHLORIDE 50 MG/ML
12.5 INJECTION INTRAMUSCULAR; INTRAVENOUS EVERY 30 MIN PRN
Status: CANCELLED | OUTPATIENT
Start: 2022-03-15

## 2022-03-15 RX ORDER — CYCLOPENTOLATE HYDROCHLORIDE 10 MG/ML
1 SOLUTION/ DROPS OPHTHALMIC
Status: DISCONTINUED | OUTPATIENT
Start: 2022-03-15 | End: 2022-03-15 | Stop reason: HOSPADM

## 2022-03-15 RX ORDER — TETRACAINE HYDROCHLORIDE 5 MG/ML
1 SOLUTION OPHTHALMIC
Status: COMPLETED | OUTPATIENT
Start: 2022-03-15 | End: 2022-03-15

## 2022-03-15 RX ORDER — OXYCODONE HYDROCHLORIDE 5 MG/1
5 TABLET ORAL
Status: CANCELLED | OUTPATIENT
Start: 2022-03-15

## 2022-03-15 RX ORDER — PROCHLORPERAZINE EDISYLATE 5 MG/ML
5 INJECTION INTRAMUSCULAR; INTRAVENOUS EVERY 30 MIN PRN
Status: CANCELLED | OUTPATIENT
Start: 2022-03-15

## 2022-03-15 RX ORDER — EPINEPHRINE 1 MG/ML
INJECTION, SOLUTION INTRACARDIAC; INTRAMUSCULAR; INTRAVENOUS; SUBCUTANEOUS
Status: DISCONTINUED | OUTPATIENT
Start: 2022-03-15 | End: 2022-03-15 | Stop reason: HOSPADM

## 2022-03-15 RX ORDER — PREDNISOLONE ACETATE 10 MG/ML
SUSPENSION/ DROPS OPHTHALMIC
Status: DISCONTINUED | OUTPATIENT
Start: 2022-03-15 | End: 2022-03-15 | Stop reason: HOSPADM

## 2022-03-15 RX ORDER — OFLOXACIN 3 MG/ML
1 SOLUTION/ DROPS OPHTHALMIC
Status: DISCONTINUED | OUTPATIENT
Start: 2022-03-15 | End: 2022-03-15 | Stop reason: HOSPADM

## 2022-03-15 RX ORDER — MEPERIDINE HYDROCHLORIDE 25 MG/ML
12.5 INJECTION INTRAMUSCULAR; INTRAVENOUS; SUBCUTANEOUS ONCE AS NEEDED
Status: CANCELLED | OUTPATIENT
Start: 2022-03-15 | End: 2022-03-16

## 2022-03-15 RX ORDER — MIDAZOLAM HYDROCHLORIDE 1 MG/ML
INJECTION INTRAMUSCULAR; INTRAVENOUS
Status: DISCONTINUED | OUTPATIENT
Start: 2022-03-15 | End: 2022-03-15

## 2022-03-15 RX ORDER — PHENYLEPHRINE HYDROCHLORIDE 25 MG/ML
1 SOLUTION/ DROPS OPHTHALMIC
Status: COMPLETED | OUTPATIENT
Start: 2022-03-15 | End: 2022-03-15

## 2022-03-15 RX ORDER — SODIUM CHLORIDE 0.9 % (FLUSH) 0.9 %
10 SYRINGE (ML) INJECTION
Status: DISCONTINUED | OUTPATIENT
Start: 2022-03-15 | End: 2022-03-15 | Stop reason: HOSPADM

## 2022-03-15 RX ADMIN — OFLOXACIN 1 DROP: 3 SOLUTION OPHTHALMIC at 08:03

## 2022-03-15 RX ADMIN — PHENYLEPHRINE HYDROCHLORIDE 1 DROP: 25 SOLUTION/ DROPS OPHTHALMIC at 08:03

## 2022-03-15 RX ADMIN — TROPICAMIDE 1 DROP: 10 SOLUTION/ DROPS OPHTHALMIC at 08:03

## 2022-03-15 RX ADMIN — CYCLOPENTOLATE HYDROCHLORIDE 1 DROP: 10 SOLUTION/ DROPS OPHTHALMIC at 08:03

## 2022-03-15 RX ADMIN — TETRACAINE HYDROCHLORIDE 1 DROP: 5 SOLUTION OPHTHALMIC at 08:03

## 2022-03-15 RX ADMIN — MIDAZOLAM HYDROCHLORIDE 1 MG: 1 INJECTION, SOLUTION INTRAMUSCULAR; INTRAVENOUS at 09:03

## 2022-03-15 NOTE — BRIEF OP NOTE
[facility] - [facility] ([place])  Brief Operative Note    Surgery Date: 3/15/2022     Surgeon(s) and Role:     * Jorge A Miller MD - Primary    Assisting Surgeon: None    Pre-op Diagnosis:  NS (nuclear sclerosis), right [H25.11]    Post-op Diagnosis:  Post-Op Diagnosis Codes:     * NS (nuclear sclerosis), right [H25.11]    Procedure(s) (LRB):  EXTRACTION, CATARACT, WITH IOL INSERTION (Right)    Anesthesia: Local MAC    Operative Findings:     Estimated Blood Loss: * No values recorded between 3/15/2022  9:17 AM and 3/15/2022  9:43 AM *         Specimens:   Specimen (24h ago, onward)            None            Discharge Note    OUTCOME: Patient tolerated treatment/procedure well without complication and is now ready for discharge.    DISPOSITION: Home or Self Care    FINAL DIAGNOSIS:  Nuclear sclerotic cataract of right eye    FOLLOWUP: In clinic    DISCHARGE INSTRUCTIONS:    Discharge Procedure Orders   Other restrictions (specify):   Order Comments: No heavy lifting or bending for 1 week.

## 2022-03-15 NOTE — OP NOTE
Operative Date:  03/15/2022    Discharge Date:  03/15/2022    Discharge Patient Home    Report Title: Operative Note      SURGEON: Jorge A Miller MD     ASSISTANT:     PREOPERATIVE DIAGNOSIS: Visually significant NSC cataract,  Right Eye.    POSTOPERATIVE DIAGNOSIS: Visually-significant NSC cataract,  Right Eye.    PROCEDURE PERFORMED: Phacoemulsification of the cataract with posterior chamber intraocular lens Right Eye.    ANESTHESIA: Topical with MAC    COMPLICATIONS: None    ESTIMATED BLOOD LOSS: Minimal    INDICATIONS FOR PROCEDURE:   The patient is a pleasant 89 year old woman with increasing difficulties with activities of daily living secondary to a dense visually significant cataract in the Right Eye.  Discussions have been carried out with this patient concerning the options to surgery, risks, benefits and expectations.  The patient voiced good understanding and wished to proceed with the above procedure.    PROCEDURE IN DETAIL: The patient was brought to the operating room and received topical anesthetic to the eye and then was prepped and draped in the usual sterile fashion.  Using the Cavanaugh ring and the guarded annie blade set at 0.37 mm, a partial thickness clear cornea incision was made temporally.  The paracentesis site was made at the twelve o'clock position.  Omidria was injected into the anterior chamber through the paracentesis.  Viscoat was then injected into the anterior chamber.  The eye was then reentered at the primary surgical site with a 2.4 mm keratome followed by continuous capsulotomy, hydrodissection, hydrodelineation and phacoemulsification of the cataract.  Residual cortical material was removed using automated irrigation-aspiration technique.  Healon was injected into the posterior chamber and a Clareon 20.5 diopter lens was placed in the bag without difficulty. Residual viscoelastic was removed using automated irrigation-aspiration technique. The eye was re-pressurized  using BSS solution and both the paracentesis site and the primary surgical site were demonstrated to be watertight at the end of the case with Weck--Pina manipulation.  One drop of Ofloxacin and one drop of Pred acetate 1% was applied to the Right Eye .The eye was closed, patched and a Elias shield placed.  The patient was taken to the recovery room in good and stable condition.  The patient tolerated the procedure well.  The patient was instructed to refrain from any heavy lifting, bending, stooping or straining activities, discharged home  and to follow-up in the morning for routine postoperative care with Jorge A Miller MD.

## 2022-03-15 NOTE — ANESTHESIA POSTPROCEDURE EVALUATION
Anesthesia Post Evaluation    Patient: Candy Escobar    Procedure(s) Performed: Procedure(s) (LRB):  EXTRACTION, CATARACT, WITH IOL INSERTION (Right)    Final Anesthesia Type: MAC      Patient location during evaluation: [facility]  Patient participation: Yes- Able to Participate  Level of consciousness: awake and alert  Post-procedure vital signs: reviewed and stable  Pain management: adequate  Airway patency: patent    PONV status at discharge: No PONV  Anesthetic complications: no      Cardiovascular status: blood pressure returned to baseline  Respiratory status: unassisted and spontaneous ventilation  Hydration status: euvolemic  Follow-up not needed.  Comments: Verbal report to Radha mayorga          Vitals Value Taken Time   /67 03/15/22 0757   Temp 36.7 °C (98 °F) 03/15/22 0757   Pulse 69 03/15/22 0757   Resp 18 03/15/22 0757   SpO2 96 % 03/15/22 0757         No case tracking events are documented in the log.      Pain/Roxanne Score: No data recorded

## 2022-03-15 NOTE — PLAN OF CARE
Candy Escobar has met all discharge criteria from Phase II. Vital Signs are stable, ambulating  without difficulty. Discharge instructions given, patient verbalized understanding. Discharged from facility via wheelchair in stable condition.

## 2022-03-15 NOTE — ANESTHESIA PREPROCEDURE EVALUATION
[facility]  Anesthesia Pre-Operative Evaluation         Patient Name: Candy Escobar  YOB: 1932  MRN: [id number]    SUBJECTIVE:     Pre-operative evaluation for Procedure(s) (LRB):  ARTHROSCOPY, ANKLE, WITH DEBRIDEMENT - LEFT ANKLE - CYSTO TUBING - BACTISURE - VANC POWDER (Left)     03/15/2022    Candy Escobar is a 89 y.o. female w/ a significant PMHx of HTN, DM (A1C 5.3), BL TKA (R by Dr. White 2008, L in [place] 2000), and L total ankle arthroplasty (2000 by Dr. Augustina Nj in [place]) who presents with concern for L ankle joint infection.    Patient now presents for the above procedure(s).      LDA:        Peripheral IV - Single Lumen 05/11/18 1157 Left Hand (Active)   Number of days: 1236            Peripheral IV - Single Lumen 09/28/21 1645 20 G;1 in Left Antecubital (Active)   Site Assessment Clean;Dry;Intact 09/28/21 2325   Line Status Blood return noted 09/28/21 2325   Dressing Status Clean;Dry;Intact 09/28/21 2325   Dressing Intervention First dressing 09/28/21 2325   Number of days: 0            Peripheral IV - Single Lumen 09/28/21 1705 20 G;1 in Right Forearm (Active)   Site Assessment Clean;Dry;Intact;No redness;No swelling 09/28/21 1705   Line Status Blood return noted;Flushed;Saline locked 09/28/21 1705   Dressing Status Dry;Clean;Intact 09/28/21 1705   Dressing Intervention First dressing 09/28/21 1705   Number of days: 0       Prev airway: None documented.    Drips: None documented.      Patient Active Problem List   Diagnosis    Seasonal allergies    GERD (gastroesophageal reflux disease)    Lipoma of thigh    SK (seborrheic keratosis)    Nuclear sclerosis - Both Eyes    Mixed incontinence urge and stress    Osteoarthritis of right knee    Right knee DJD    Hypertension    Status post total knee replacement    Mass of left hip region    Difficulty walking    DM type 2 without retinopathy    Hypertension associated with diabetes     Dyslipidemia associated with type 2 diabetes mellitus    Diabetes mellitus type II, controlled    Sensorineural hearing loss    Mechanical ptosis of bilateral eyelids    Major depressive disorder with single episode, in partial remission    Varicose veins of both lower extremities    Facet arthropathy, cervical    Bilateral carpal tunnel syndrome    Refractive error    Cortical cataract of left eye    Senile nuclear sclerosis    Inflammatory spondylopathy of cervical region    Other nonthrombocytopenic purpura    Simple chronic bronchitis    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  No current outpatient medications on file.    Facility-Administered Medications Ordered in Other Visits:     0.9%  NaCl infusion, , Intravenous, Continuous, Jorge A Miller MD, Stopped at 10/30/18 0918    cyclopentolate 1% ophthalmic solution 1 drop, 1 drop, Left Eye, On Call Procedure, Jorge A Miller MD, 1 drop at 10/30/18 0725    cyclopentolate 1% ophthalmic solution 1 drop, 1 drop, Right Eye, On Call Procedure, Jorge A Miller MD, 1 drop at 03/15/22 0810    ofloxacin 0.3 % ophthalmic solution 1 drop, 1 drop, Right Eye, On Call Procedure, Jorge A Miller MD, 1 drop at 03/15/22 0810    phenylephrine HCL 2.5% ophthalmic solution 1 drop, 1 drop, Left Eye, On Call Procedure, Jorge A Miller MD, 1 drop at 10/30/18 0724    proparacaine 0.5 % ophthalmic solution 1 drop, 1 drop, Left Eye, On Call Procedure, Jorge A Miller MD, 1 drop at 10/30/18 0712    sodium chloride 0.9% flush 10 mL, 10 mL, Intravenous, PRN, Jorge A PAGAN  MD Angela    tropicamide 1% ophthalmic solution 1 drop, 1 drop, Left Eye, On Call Procedure, Jorge A Miller MD, 1 drop at 10/30/18 0726    Past Surgical History:   Procedure Laterality Date    ANKLE SURGERY  1951    ankle replacement, left    ARTHROSCOPY OF ANKLE WITH DEBRIDEMENT Left 9/29/2021    Procedure: ARTHROSCOPY, ANKLE, WITH DEBRIDEMENT - LEFT ANKLE - CYSTO TUBING - BACTISURE - VANC POWDER;  Surgeon: Sadiq Lee MD;  Location: [facility] OR [address] [place] [address];  Service: Orthopedics;  Laterality: Left;    BLADDER SUSPENSION      CATARACT EXTRACTION Left 2018    COLONOSCOPY      FOOT SURGERY      HYSTERECTOMY      left ovary intact    INTRAOCULAR PROSTHESES INSERTION Left 10/30/2018    Procedure: INSERTION, IOL PROSTHESIS;  Surgeon: Jorge A Miller MD;  Location: [facility] OR [address] [place] [address];  Service: Ophthalmology;  Laterality: Left;    JOINT REPLACEMENT      left ankle and knee arthroplasty    KNEE SURGERY  2008    left TKR    KNEE SURGERY  11-11-14    right TKR    PHACOEMULSIFICATION OF CATARACT Left 10/30/2018    Procedure: PHACOEMULSIFICATION, CATARACT;  Surgeon: Jorge A Miller MD;  Location: [facility] OR [address] [place] [address];  Service: Ophthalmology;  Laterality: Left;    SHOULDER SURGERY  2009    right arthroscopy    TONSILLECTOMY         Tobacco Use: Medium Risk    Smoking Tobacco Use: Former Smoker    Smokeless Tobacco Use: Never Used     Alcohol Use: Not on file     Social History     Substance and Sexual Activity   Drug Use No       OBJECTIVE:     Vital Signs Range (Last 24H):  Temp:  [36.7 °C (98 °F)]   Pulse:  [69]   Resp:  [18]   BP: (149)/(67)   SpO2:  [96 %]       Significant Labs:  Lab Results   Component Value Date    WBC 6.27 10/02/2021    HGB 9.8 (L) 10/02/2021    HCT 29.4 (L) 10/02/2021     10/02/2021     (L) 10/02/2021    K 4.5 10/02/2021    CL 95 10/02/2021    CREATININE 0.7 10/02/2021    BUN 12 10/02/2021    CO2 25 10/02/2021    INR 1.4 12/04/2014    HGBA1C 5.2  09/29/2021       Diagnostic Studies: No relevant studies.    EKG:   Results for orders placed or performed during the hospital encounter of 11/09/19   EKG 12-lead    Collection Time: 11/09/19 10:47 AM    Narrative    Test Reason : R53.1,    Vent. Rate : 080 BPM     Atrial Rate : 080 BPM     P-R Int : 148 ms          QRS Dur : 074 ms      QT Int : 392 ms       P-R-T Axes : 039 080 -06 degrees     QTc Int : 452 ms    Normal sinus rhythm  Nonspecific ST and T wave abnormality  Anterior infarct ,age undetermined  Abnormal ECG  When compared with ECG of 11-MAY-2018 11:25,  Anterior infarct is now Present  Non-specific change in ST segment in Inferior leads  Inverted T waves have replaced nonspecific T wave abnormality in Inferior  leads  Confirmed by Arturo Vigil MD ([id number]) on 11/11/2019 12:12:34 PM    Referred By: [email or web address]   SELF           Confirmed By:Arturo Vigil MD       TTE ():  No results found for this or any previous visit.      LACI ():  No results found for this or any previous visit.        ASSESSMENT/PLAN:         Pre-op Assessment    I have reviewed the Patient Summary Reports.    I have reviewed the Nursing Notes. I have reviewed the NPO Status.   I have reviewed the Medications.     Review of Systems  Anesthesia Hx:  Hx of Anesthetic complications PONV History of prior surgery of interest to airway management or planning: Previous anesthesia: General Personal Hx of Anesthesia complications, Post-Operative Nausea/Vomiting   Social:  Former Smoker, Social Alcohol Use    Hematology/Oncology:  Hematology Normal   Oncology Normal     EENT/Dental:EENT/Dental Normal   Cardiovascular:   Exercise tolerance: good Hypertension, well controlled nml stress test 2016  nml EF  Pulmonary HTN   Pulmonary:  Pulmonary Normal    Renal/:  Renal/ Normal     Hepatic/GI:   GERD, well controlled    Musculoskeletal:   Arthritis     Neurological:   TIA, Neuromuscular Disease, Seizures, well controlled    Endocrine:    Diabetes, well controlled, type 2    Dermatological:  Skin Normal    Psych:   Psychiatric History          Physical Exam  General:  Cachexia      Airway/Jaw/Neck:  Airway Findings: Mouth Opening: Normal   Tongue: Normal   General Airway Assessment: Adult Mallampati: II  TM Distance: Normal, at least 6 cm   Jaw/Neck Findings:  Neck ROM: Normal ROM       Dental:  Dental Findings: In tact     Chest/Lungs:  Chest/Lungs Findings: Clear to auscultation, Normal Respiratory Rate      Heart/Vascular:  Heart Findings: Rate: Normal  Rhythm: Regular Rhythm        Mental Status:  Mental Status Findings:  Cooperative, Alert and Oriented         Anesthesia Plan  Type of Anesthesia, risks & benefits discussed:  Anesthesia Type:  MAC    Patient's Preference:   Plan Factors:          Intra-op Monitoring Plan: standard ASA monitors  Intra-op Monitoring Plan Comments:   Post Op Pain Control Plan: multimodal analgesia and per primary service following discharge from PACU  Post Op Pain Control Plan Comments:     Induction:   IV  Beta Blocker:         Informed Consent: Informed consent signed with the Patient and all parties understand the risks and agree with anesthesia plan.  All questions answered.  Anesthesia consent signed with patient.  ASA Score: 3     Day of Surgery Review of History & Physical:              Ready For Surgery From Anesthesia Perspective.           Physical Exam  General: Cachexia    Airway:  Mallampati: II   Mouth Opening: Normal  TM Distance: Normal, at least 6 cm  Tongue: Normal  Neck ROM: Normal ROM    Dental:  In tact    Chest/Lungs:  Clear to auscultation, Normal Respiratory Rate    Heart:  Rate: Normal  Rhythm: Regular Rhythm          Anesthesia Plan  Type of Anesthesia, risks & benefits discussed:    Anesthesia Type: MAC  Intra-op Monitoring Plan: standard ASA monitors  Post Op Pain Control Plan: multimodal analgesia and per primary service following discharge from PACU  Induction:  IV  Informed Consent:  Informed consent signed with the Patient and all parties understand the risks and agree with anesthesia plan.  All questions answered.   ASA Score: 3    Ready For Surgery From Anesthesia Perspective.       .

## 2022-03-16 ENCOUNTER — OFFICE VISIT (OUTPATIENT)
Dept: OPHTHALMOLOGY | Facility: CLINIC | Age: 87
End: 2022-03-16
Payer: MEDICARE

## 2022-03-16 DIAGNOSIS — H25.11 NUCLEAR SCLEROSIS OF RIGHT EYE: ICD-10-CM

## 2022-03-16 DIAGNOSIS — Z98.890 POST-OPERATIVE STATE: Primary | ICD-10-CM

## 2022-03-16 DIAGNOSIS — Z96.1 PSEUDOPHAKIA: ICD-10-CM

## 2022-03-16 PROCEDURE — 99999 PR PBB SHADOW E&M-EST. PATIENT-LVL II: CPT | Mod: PBBFAC,,, | Performed by: OPHTHALMOLOGY

## 2022-03-16 PROCEDURE — 99024 PR POST-OP FOLLOW-UP VISIT: ICD-10-PCS | Mod: S$GLB,,, | Performed by: OPHTHALMOLOGY

## 2022-03-16 PROCEDURE — 1160F RVW MEDS BY RX/DR IN RCRD: CPT | Mod: CPTII,S$GLB,, | Performed by: OPHTHALMOLOGY

## 2022-03-16 PROCEDURE — 1157F PR ADVANCE CARE PLAN OR EQUIV PRESENT IN MEDICAL RECORD: ICD-10-PCS | Mod: CPTII,S$GLB,, | Performed by: OPHTHALMOLOGY

## 2022-03-16 PROCEDURE — 1160F PR REVIEW ALL MEDS BY PRESCRIBER/CLIN PHARMACIST DOCUMENTED: ICD-10-PCS | Mod: CPTII,S$GLB,, | Performed by: OPHTHALMOLOGY

## 2022-03-16 PROCEDURE — 99999 PR PBB SHADOW E&M-EST. PATIENT-LVL II: ICD-10-PCS | Mod: PBBFAC,,, | Performed by: OPHTHALMOLOGY

## 2022-03-16 PROCEDURE — 1159F PR MEDICATION LIST DOCUMENTED IN MEDICAL RECORD: ICD-10-PCS | Mod: CPTII,S$GLB,, | Performed by: OPHTHALMOLOGY

## 2022-03-16 PROCEDURE — 99024 POSTOP FOLLOW-UP VISIT: CPT | Mod: S$GLB,,, | Performed by: OPHTHALMOLOGY

## 2022-03-16 PROCEDURE — 1157F ADVNC CARE PLAN IN RCRD: CPT | Mod: CPTII,S$GLB,, | Performed by: OPHTHALMOLOGY

## 2022-03-16 PROCEDURE — 1159F MED LIST DOCD IN RCRD: CPT | Mod: CPTII,S$GLB,, | Performed by: OPHTHALMOLOGY

## 2022-03-16 NOTE — PROGRESS NOTES
Subjective:       Patient ID: Candy Escobar is a 89 y.o. female.    Chief Complaint: No chief complaint on file.    HPI         Phacoemulsification of the cataract with posterior chamber intraocular   lens Right Eye  SURGEON:  Jorge A Miller M.D. (03/15/2022)     PREOPERATIVE DIAGNOSIS:  Nuclear sclerotic cataract, left eye.   S/P Clear corneal phacoemulsification with posterior chamber intraocular   lens implant, left eye.            S/p pterygium ex    Last edited by Shruthi Werner MA on 3/16/2022  1:42 PM. (History)             Assessment:       1. Post-operative state    2. Nuclear sclerosis of right eye    3. Pseudophakia        Plan:       S/p CE OD- Doing well but Pt left before I could examine her.         CPM OD.  RTC 1 wk.

## 2022-03-18 ENCOUNTER — PES CALL (OUTPATIENT)
Dept: ADMINISTRATIVE | Facility: CLINIC | Age: 87
End: 2022-03-18
Payer: MEDICARE

## 2022-03-23 ENCOUNTER — TELEPHONE (OUTPATIENT)
Dept: OPHTHALMOLOGY | Facility: CLINIC | Age: 87
End: 2022-03-23
Payer: MEDICARE

## 2022-03-29 ENCOUNTER — TELEPHONE (OUTPATIENT)
Dept: FAMILY MEDICINE | Facility: CLINIC | Age: 87
End: 2022-03-29
Payer: MEDICARE

## 2022-03-29 ENCOUNTER — TELEPHONE (OUTPATIENT)
Dept: OPHTHALMOLOGY | Facility: CLINIC | Age: 87
End: 2022-03-29
Payer: MEDICARE

## 2022-03-29 NOTE — TELEPHONE ENCOUNTER
----- Message from Viky Mcmullen sent at 3/28/2022  8:23 AM CDT -----  Regarding: Patient needs Post Op Directions on Drops, then see Dr. Criss Chaves,     I spoke with Dr. [facility]ahn[facility] clinic and they approved for Ms. Escobar to get seen on Wednesday, March 30th at 2:30 pm but they want her to be provided with the post op directions on where she needs to be at this time.  They do not want to have to provide that information to her.      You may have already given that to her, but before she is scheduled- can you please be sure she has all of her post op directions before she is scheduled.    Thanks,   Viky

## 2022-03-29 NOTE — TELEPHONE ENCOUNTER
----- Message from Samantha Santana sent at 3/29/2022 12:40 PM CDT -----  Regarding: call back  Contact: [phone]  Type:  Patient Call Back    Who Called:pt  What this is regarding?:needs appt tomorrow   Would the patient rather a call back or a response via [email or web address]? Call back  Best Call Back Number:[phone]  Additional Information:     Advised to call back directly if there are further questions, or if these symptoms fail to improve as anticipated or worsen.

## 2022-03-30 ENCOUNTER — OFFICE VISIT (OUTPATIENT)
Dept: OPTOMETRY | Facility: CLINIC | Age: 87
End: 2022-03-30
Payer: MEDICARE

## 2022-03-30 DIAGNOSIS — Z98.890 POST-OPERATIVE STATE: Primary | ICD-10-CM

## 2022-03-30 PROCEDURE — 1126F PR PAIN SEVERITY QUANTIFIED, NO PAIN PRESENT: ICD-10-PCS | Mod: CPTII,S$GLB,, | Performed by: OPTOMETRIST

## 2022-03-30 PROCEDURE — 1101F PT FALLS ASSESS-DOCD LE1/YR: CPT | Mod: CPTII,S$GLB,, | Performed by: OPTOMETRIST

## 2022-03-30 PROCEDURE — 1160F PR REVIEW ALL MEDS BY PRESCRIBER/CLIN PHARMACIST DOCUMENTED: ICD-10-PCS | Mod: CPTII,S$GLB,, | Performed by: OPTOMETRIST

## 2022-03-30 PROCEDURE — 3288F PR FALLS RISK ASSESSMENT DOCUMENTED: ICD-10-PCS | Mod: CPTII,S$GLB,, | Performed by: OPTOMETRIST

## 2022-03-30 PROCEDURE — 1157F ADVNC CARE PLAN IN RCRD: CPT | Mod: CPTII,S$GLB,, | Performed by: OPTOMETRIST

## 2022-03-30 PROCEDURE — 99999 PR PBB SHADOW E&M-EST. PATIENT-LVL III: CPT | Mod: PBBFAC,,, | Performed by: OPTOMETRIST

## 2022-03-30 PROCEDURE — 1159F PR MEDICATION LIST DOCUMENTED IN MEDICAL RECORD: ICD-10-PCS | Mod: CPTII,S$GLB,, | Performed by: OPTOMETRIST

## 2022-03-30 PROCEDURE — 3288F FALL RISK ASSESSMENT DOCD: CPT | Mod: CPTII,S$GLB,, | Performed by: OPTOMETRIST

## 2022-03-30 PROCEDURE — 99024 POSTOP FOLLOW-UP VISIT: CPT | Mod: S$GLB,,, | Performed by: OPTOMETRIST

## 2022-03-30 PROCEDURE — 99999 PR PBB SHADOW E&M-EST. PATIENT-LVL III: ICD-10-PCS | Mod: PBBFAC,,, | Performed by: OPTOMETRIST

## 2022-03-30 PROCEDURE — 1159F MED LIST DOCD IN RCRD: CPT | Mod: CPTII,S$GLB,, | Performed by: OPTOMETRIST

## 2022-03-30 PROCEDURE — 99024 PR POST-OP FOLLOW-UP VISIT: ICD-10-PCS | Mod: S$GLB,,, | Performed by: OPTOMETRIST

## 2022-03-30 PROCEDURE — 1157F PR ADVANCE CARE PLAN OR EQUIV PRESENT IN MEDICAL RECORD: ICD-10-PCS | Mod: CPTII,S$GLB,, | Performed by: OPTOMETRIST

## 2022-03-30 PROCEDURE — 1101F PR PT FALLS ASSESS DOC 0-1 FALLS W/OUT INJ PAST YR: ICD-10-PCS | Mod: CPTII,S$GLB,, | Performed by: OPTOMETRIST

## 2022-03-30 PROCEDURE — 1126F AMNT PAIN NOTED NONE PRSNT: CPT | Mod: CPTII,S$GLB,, | Performed by: OPTOMETRIST

## 2022-03-30 PROCEDURE — 1160F RVW MEDS BY RX/DR IN RCRD: CPT | Mod: CPTII,S$GLB,, | Performed by: OPTOMETRIST

## 2022-03-30 RX ORDER — LOSARTAN POTASSIUM 50 MG/1
50 TABLET ORAL DAILY
COMMUNITY
Start: 2021-12-08 | End: 2022-05-12

## 2022-03-30 NOTE — PROGRESS NOTES
HPI     Pt here for 1 week PO OD     Pt left 1 week PO due to long wait time. Pt sts she is using Ilevro, Pred,   and Ofloxacin Qam OD only. Pt sts sometimes she see's pin points (black   dots) when she reads to long. Pt denies pain.     Last edited by Teressa Day on 3/30/2022  9:32 AM. (History)            Assessment /Plan     For exam results, see Encounter Report.    Post-operative state      1. Doing well post op cataract right eye, cont all drops, iop fine, RTC 2 weeks for last post op with iop, dfe and refraction.

## 2022-04-07 ENCOUNTER — TELEPHONE (OUTPATIENT)
Dept: INFECTIOUS DISEASES | Facility: CLINIC | Age: 87
End: 2022-04-07
Payer: MEDICARE

## 2022-04-07 NOTE — TELEPHONE ENCOUNTER
Spoke with Pharmacist who stated they did not send refill request and that there is nothing pending from the patient. Per Tash, refill called in for 3 months.

## 2022-04-07 NOTE — TELEPHONE ENCOUNTER
----- Message from Cesar Blake sent at 4/7/2022 11:36 AM CDT -----  Regarding: rx refill      Rx-amoxicillin (AMOXIL) 500 MG Tab    Ph[id number] [place] [address] [place] [address] & [facility]   Phone: [phone]  Fax:  [phone]

## 2022-04-12 ENCOUNTER — IMMUNIZATION (OUTPATIENT)
Dept: FAMILY MEDICINE | Facility: CLINIC | Age: 87
End: 2022-04-12
Payer: MEDICARE

## 2022-04-12 DIAGNOSIS — Z23 NEED FOR VACCINATION: Primary | ICD-10-CM

## 2022-04-12 PROCEDURE — 91305 COVID-19, MRNA, LNP-S, PF, 30 MCG/0.3 ML DOSE VACCINE (PFIZER): CPT | Mod: PBBFAC | Performed by: FAMILY MEDICINE

## 2022-04-14 ENCOUNTER — OFFICE VISIT (OUTPATIENT)
Dept: OPTOMETRY | Facility: CLINIC | Age: 87
End: 2022-04-14
Payer: MEDICARE

## 2022-04-14 DIAGNOSIS — Z98.890 POST-OPERATIVE STATE: Primary | ICD-10-CM

## 2022-04-14 PROCEDURE — 1101F PT FALLS ASSESS-DOCD LE1/YR: CPT | Mod: CPTII,S$GLB,, | Performed by: OPTOMETRIST

## 2022-04-14 PROCEDURE — 3288F PR FALLS RISK ASSESSMENT DOCUMENTED: ICD-10-PCS | Mod: CPTII,S$GLB,, | Performed by: OPTOMETRIST

## 2022-04-14 PROCEDURE — 1159F PR MEDICATION LIST DOCUMENTED IN MEDICAL RECORD: ICD-10-PCS | Mod: CPTII,S$GLB,, | Performed by: OPTOMETRIST

## 2022-04-14 PROCEDURE — 1159F MED LIST DOCD IN RCRD: CPT | Mod: CPTII,S$GLB,, | Performed by: OPTOMETRIST

## 2022-04-14 PROCEDURE — 1126F PR PAIN SEVERITY QUANTIFIED, NO PAIN PRESENT: ICD-10-PCS | Mod: CPTII,S$GLB,, | Performed by: OPTOMETRIST

## 2022-04-14 PROCEDURE — 1160F PR REVIEW ALL MEDS BY PRESCRIBER/CLIN PHARMACIST DOCUMENTED: ICD-10-PCS | Mod: CPTII,S$GLB,, | Performed by: OPTOMETRIST

## 2022-04-14 PROCEDURE — 99999 PR PBB SHADOW E&M-EST. PATIENT-LVL III: CPT | Mod: PBBFAC,,, | Performed by: OPTOMETRIST

## 2022-04-14 PROCEDURE — 1157F PR ADVANCE CARE PLAN OR EQUIV PRESENT IN MEDICAL RECORD: ICD-10-PCS | Mod: CPTII,S$GLB,, | Performed by: OPTOMETRIST

## 2022-04-14 PROCEDURE — 1101F PR PT FALLS ASSESS DOC 0-1 FALLS W/OUT INJ PAST YR: ICD-10-PCS | Mod: CPTII,S$GLB,, | Performed by: OPTOMETRIST

## 2022-04-14 PROCEDURE — 99024 PR POST-OP FOLLOW-UP VISIT: ICD-10-PCS | Mod: S$GLB,,, | Performed by: OPTOMETRIST

## 2022-04-14 PROCEDURE — 1160F RVW MEDS BY RX/DR IN RCRD: CPT | Mod: CPTII,S$GLB,, | Performed by: OPTOMETRIST

## 2022-04-14 PROCEDURE — 1126F AMNT PAIN NOTED NONE PRSNT: CPT | Mod: CPTII,S$GLB,, | Performed by: OPTOMETRIST

## 2022-04-14 PROCEDURE — 99999 PR PBB SHADOW E&M-EST. PATIENT-LVL III: ICD-10-PCS | Mod: PBBFAC,,, | Performed by: OPTOMETRIST

## 2022-04-14 PROCEDURE — 3288F FALL RISK ASSESSMENT DOCD: CPT | Mod: CPTII,S$GLB,, | Performed by: OPTOMETRIST

## 2022-04-14 PROCEDURE — 1157F ADVNC CARE PLAN IN RCRD: CPT | Mod: CPTII,S$GLB,, | Performed by: OPTOMETRIST

## 2022-04-14 PROCEDURE — 99024 POSTOP FOLLOW-UP VISIT: CPT | Mod: S$GLB,,, | Performed by: OPTOMETRIST

## 2022-04-14 RX ORDER — AMOXICILLIN 500 MG/1
500 TABLET, FILM COATED ORAL 3 TIMES DAILY
COMMUNITY
Start: 2022-04-11 | End: 2022-05-12

## 2022-04-14 NOTE — PROGRESS NOTES
HPI     Pt here for 1 month post op OD with refraction. Pt sts va has improved   since last seen. Denies flashes/eye pain. Floaters, occasional. No gtts.     Last edited by Alicia Loo MA on 4/14/2022  9:43 AM. (History)            Assessment /Plan     For exam results, see Encounter Report.    Post-operative state      1. Doing well post op cataract, right eye, gave spec rx, iop fine, no new flashes or floaters, RTC yearly.

## 2022-05-09 ENCOUNTER — PATIENT MESSAGE (OUTPATIENT)
Dept: SMOKING CESSATION | Facility: CLINIC | Age: 87
End: 2022-05-09
Payer: MEDICARE

## 2022-05-12 ENCOUNTER — OFFICE VISIT (OUTPATIENT)
Dept: FAMILY MEDICINE | Facility: CLINIC | Age: 87
End: 2022-05-12
Payer: MEDICARE

## 2022-05-12 VITALS
WEIGHT: 115 LBS | HEART RATE: 77 BPM | SYSTOLIC BLOOD PRESSURE: 126 MMHG | OXYGEN SATURATION: 95 % | BODY MASS INDEX: 22.58 KG/M2 | DIASTOLIC BLOOD PRESSURE: 72 MMHG | HEIGHT: 60 IN

## 2022-05-12 DIAGNOSIS — I10 PRIMARY HYPERTENSION: Primary | ICD-10-CM

## 2022-05-12 PROCEDURE — 99999 PR PBB SHADOW E&M-EST. PATIENT-LVL III: ICD-10-PCS | Mod: PBBFAC,,, | Performed by: FAMILY MEDICINE

## 2022-05-12 PROCEDURE — 3072F PR LOW RISK FOR RETINOPATHY: ICD-10-PCS | Mod: CPTII,S$GLB,, | Performed by: FAMILY MEDICINE

## 2022-05-12 PROCEDURE — 1159F MED LIST DOCD IN RCRD: CPT | Mod: CPTII,S$GLB,, | Performed by: FAMILY MEDICINE

## 2022-05-12 PROCEDURE — 99499 NO LOS: ICD-10-PCS | Mod: S$GLB,,, | Performed by: FAMILY MEDICINE

## 2022-05-12 PROCEDURE — 3072F LOW RISK FOR RETINOPATHY: CPT | Mod: CPTII,S$GLB,, | Performed by: FAMILY MEDICINE

## 2022-05-12 PROCEDURE — 1157F ADVNC CARE PLAN IN RCRD: CPT | Mod: CPTII,S$GLB,, | Performed by: FAMILY MEDICINE

## 2022-05-12 PROCEDURE — 99499 UNLISTED E&M SERVICE: CPT | Mod: S$GLB,,, | Performed by: FAMILY MEDICINE

## 2022-05-12 PROCEDURE — 1157F PR ADVANCE CARE PLAN OR EQUIV PRESENT IN MEDICAL RECORD: ICD-10-PCS | Mod: CPTII,S$GLB,, | Performed by: FAMILY MEDICINE

## 2022-05-12 PROCEDURE — 1159F PR MEDICATION LIST DOCUMENTED IN MEDICAL RECORD: ICD-10-PCS | Mod: CPTII,S$GLB,, | Performed by: FAMILY MEDICINE

## 2022-05-12 PROCEDURE — 99999 PR PBB SHADOW E&M-EST. PATIENT-LVL III: CPT | Mod: PBBFAC,,, | Performed by: FAMILY MEDICINE

## 2022-05-12 NOTE — PROGRESS NOTES
Subjective:       Patient ID: Candy Escobar is a 89 y.o. female.    Chief Complaint: Annual Exam     Here today for a f/u visit.  She is a patient of Dr. Cristina, new to me today.  She was seen by her PCP in Nov 2021.  She showed up 15 min late to her 20 min appt today.  She has a medical history of HTN, DM, urinary Incontinence, MDD, GERD, OA.   When I asked her what was the reason for the visit, she told me it was for her annual exam.  I mentioned that she had already had her annual with Dr. Cristina in Nov.  Patient then stated that she did not have an annual and did not feel that Dr. Cristina was monitoring her and that is why she wanted to switch to me today.  She then stated that she needed lab work and that she was unhappy that I did order lab work to be done prior to her visit.  I told her that I had never seen her before.  She then got upset that I didn't review for chart prior to her visit as she has been a patient at [facility] for years and that if I didn't have time to treat her she would find someone else that did.  She got up and left the appointment.  She left stating that she would be reporting me to [facility] and [facility].        Review of Systems    Objective:      Vitals:    05/12/22 1523   BP: 126/72   BP Location: Right arm   Patient Position: Sitting   Pulse: 77   SpO2: 95%   Weight: 52.2 kg (115 lb)   Height: 5' (1.524 m)      Physical Exam    Results for orders placed or performed during the hospital encounter of 03/15/22   POCT glucose   Result Value Ref Range    POCT Glucose 95 70 - 110 mg/dL      Assessment:       No diagnosis found.    Plan:       There are no diagnoses linked to this encounter.      Medication List with Changes/Refills   Current Medications    ALBUTEROL (VENTOLIN HFA) 90 MCG/ACTUATION INHALER    Inhale 2 puffs into the lungs every 6 (six) hours as needed for Wheezing or Shortness of Breath. Rescue    ASPIRIN (ECOTRIN) 81 MG EC TABLET    Take 1 tablet (81 mg  total) by mouth 2 (two) times a day.    GABAPENTIN (NEURONTIN) 300 MG CAPSULE    TAKE 1 CAPSULE BY MOUTH  TWICE DAILY    OMEPRAZOLE (PRILOSEC) 20 MG CAPSULE    TAKE 2 CAPSULES BY MOUTH  DAILY    ONDANSETRON (ZOFRAN-ODT) 4 MG TBDL    Take one 4mg tablet with Tramadol for nausea    PSYLLIUM SEED, WITH SUGAR, (FIBER ORAL)    Take by mouth.   Discontinued Medications    ACETAMINOPHEN (TYLENOL) 500 MG TABLET    Take 2 tablets (1,000 mg total) by mouth every 6 (six) hours as needed for Pain.    AMLODIPINE (NORVASC) 5 MG TABLET    Take 1 tablet (5 mg total) by mouth once daily.    AMOXICILLIN (AMOXIL) 500 MG TAB    Take 500 mg by mouth 3 (three) times daily.    LOSARTAN (COZAAR) 50 MG TABLET    Take 50 mg by mouth once daily.    OFLOXACIN (OCUFLOX) 0.3 % OPHTHALMIC SOLUTION    Place 1 drop into the right eye 4 (four) times daily.

## 2022-06-13 ENCOUNTER — TELEPHONE (OUTPATIENT)
Dept: INFECTIOUS DISEASES | Facility: CLINIC | Age: 87
End: 2022-06-13
Payer: MEDICARE

## 2022-06-13 NOTE — TELEPHONE ENCOUNTER
----- Message from Chyna Cox sent at 6/13/2022  2:14 PM CDT -----  Regarding: Refills  Contact: [phone]  RX Refill Request    Who Called: Candy    Refill or New Rx: Refill    RX Name and Strength: amoxicillin (AMOXIL) 500 MG Tab    Preferred Pharmacy with phone number:  [id number] [place] [address] AT [place] [address] & [facility]    Local or Mail Order: Local    Ordering Provider:Cassius Valdez MD    Would the patient rather a call back or a response via [facility]? Call Back    Best Call Back Number:[phone]

## 2022-06-14 ENCOUNTER — DOCUMENTATION ONLY (OUTPATIENT)
Dept: RESEARCH | Facility: HOSPITAL | Age: 87
End: 2022-06-14
Payer: MEDICARE

## 2022-06-14 NOTE — PROGRESS NOTES
Study: PREVENTABLE ([id number])  Sponsor: RAZIAI[employer]JOHN  PI: Dr. June Gaines  Date: 2022      Candy Escobar is a participant in the PREVENTABLE Trial, which randomizes patients to atorvastatin 40mg or placebo (double blind) to study if statin use in older adults can prevent dementia, disability, and heart disease. This patient is being evaluated prior to re-ordering their trial medication.     1. If yes to any, study drug cannot be renewed at this time.   a. Has the participant ? NO  b. Is the participant currently known to be taking an open-label statin? NO  2. If any of the following are prewsent, verify that patient is still eligible for study drug renewal at this time:  a. Is there currently unexplained persistent transaminase elevations? NO  b. Is there current active liver disease? NO  c. Has the participant experienced markedly elevated creatine phosphokinase (CPK) levels or myopathy while taking study drug? NO  d. Has the participant experienced any other adverse effects while taking study drug that might prevent the participant from being able to take Atorvastatin 40mg once daily in a reasonably safe manner? NO  e. Is there an active order in the medical record for an open-label statin medication? NO  f. Is the participant now chronically using any of the following medications: clarithromycin, colchicine, cyclosporine, darunavir, elbasvir, fenofibrate, fosamprenavir, gemfibrozil, glecaprevir, grazoprevir, itraconazole, lopinavir, nelfinavir, niacin (dose > 1g/day), pibrentasvir, ritonavir, saquinavir, simeprevir, tipranavir? NO      I, June Gaines, confirm that the patient Candy Escobar meets criteria for drug renewal. The electronic data capture system for the trial will be updated accordingly. Please contact [phone] (PREVENTABLE [facility] Phone Number) with any questions or concerns.

## 2022-06-15 DIAGNOSIS — T84.59XA INFECTION ASSOCIATED WITH PROSTHESIS OF LEFT ANKLE JOINT: ICD-10-CM

## 2022-06-15 DIAGNOSIS — Z96.9 RETAINED ORTHOPEDIC HARDWARE: ICD-10-CM

## 2022-06-15 DIAGNOSIS — Z96.662 INFECTION ASSOCIATED WITH PROSTHESIS OF LEFT ANKLE JOINT: ICD-10-CM

## 2022-06-15 RX ORDER — AMOXICILLIN 500 MG/1
500 TABLET, FILM COATED ORAL 3 TIMES DAILY
Qty: 270 TABLET | Refills: 0 | Status: SHIPPED | OUTPATIENT
Start: 2022-06-15 | End: 2022-08-22

## 2022-06-27 ENCOUNTER — TELEPHONE (OUTPATIENT)
Dept: INFECTIOUS DISEASES | Facility: CLINIC | Age: 87
End: 2022-06-27
Payer: MEDICARE

## 2022-06-27 NOTE — TELEPHONE ENCOUNTER
Spoke with Mrs Escobar and asked if she had checked with her pharmacy, as Dr Valdez had escribed 270 tablets on 6/15 and I just confirmed with the pharmacy that the rx was still awaiting pick-up. Understanding verbalized.

## 2022-06-27 NOTE — TELEPHONE ENCOUNTER
----- Message from Deisy Stewart sent at 6/27/2022  2:52 PM CDT -----  Regarding: Refill  Contact: [phone]  Type:  RX Refill Request    Who Called: Patient  Refill or New Rx: Refill  RX Name and Strength:amoxicillin (AMOXIL) 500 MG Tab  How is the patient currently taking it? (ex. 1XDay):  Is this a 30 day or 90 day RX: 90 day  Preferred Pharmacy with phone number:[id number] [place] [address] AT [place] [address] & [facility]  Local or Mail Order:Local  Ordering Provider: Cassius Valdez  Would the patient rather a call back or a response via [facility]? Call Back  Best Call Back Number:[phone]  Additional Information: completely out of medication

## 2022-06-28 DIAGNOSIS — E11.9 TYPE 2 DIABETES MELLITUS WITHOUT COMPLICATION, UNSPECIFIED WHETHER LONG TERM INSULIN USE: ICD-10-CM

## 2022-08-02 ENCOUNTER — PES CALL (OUTPATIENT)
Dept: ADMINISTRATIVE | Facility: CLINIC | Age: 87
End: 2022-08-02
Payer: MEDICARE

## 2022-08-03 ENCOUNTER — PATIENT MESSAGE (OUTPATIENT)
Dept: ADMINISTRATIVE | Facility: HOSPITAL | Age: 87
End: 2022-08-03
Payer: MEDICARE

## 2022-08-03 ENCOUNTER — PATIENT OUTREACH (OUTPATIENT)
Dept: ADMINISTRATIVE | Facility: HOSPITAL | Age: 87
End: 2022-08-03
Payer: MEDICARE

## 2022-08-03 NOTE — PROGRESS NOTES
[facility] KIT NOT RETURNED      LMOR to return call to clinic    Portal message sent    Verbalized understanding

## 2022-08-09 ENCOUNTER — TELEPHONE (OUTPATIENT)
Dept: FAMILY MEDICINE | Facility: CLINIC | Age: 87
End: 2022-08-09
Payer: MEDICARE

## 2022-08-09 NOTE — TELEPHONE ENCOUNTER
----- Message from Lauren Castañeda sent at 8/9/2022  1:31 PM CDT -----  Type: Requesting to speak with nurse         Who Called: PT  Regarding: pt needing to get blood work before her appt as well please advise   Would the patient rather a call back or a response via [facility]? Call back  Best Call Back Number: [phone]  Additional Information: n/a

## 2022-08-22 ENCOUNTER — TELEPHONE (OUTPATIENT)
Dept: FAMILY MEDICINE | Facility: CLINIC | Age: 87
End: 2022-08-22

## 2022-08-22 ENCOUNTER — OFFICE VISIT (OUTPATIENT)
Dept: FAMILY MEDICINE | Facility: CLINIC | Age: 87
End: 2022-08-22
Attending: FAMILY MEDICINE
Payer: MEDICARE

## 2022-08-22 VITALS
HEIGHT: 60 IN | WEIGHT: 116.19 LBS | TEMPERATURE: 98 F | BODY MASS INDEX: 22.81 KG/M2 | DIASTOLIC BLOOD PRESSURE: 80 MMHG | SYSTOLIC BLOOD PRESSURE: 110 MMHG | OXYGEN SATURATION: 99 % | HEART RATE: 77 BPM

## 2022-08-22 DIAGNOSIS — Z96.9 RETAINED ORTHOPEDIC HARDWARE: ICD-10-CM

## 2022-08-22 DIAGNOSIS — E11.59 HYPERTENSION ASSOCIATED WITH DIABETES: ICD-10-CM

## 2022-08-22 DIAGNOSIS — F32.4 MAJOR DEPRESSIVE DISORDER WITH SINGLE EPISODE, IN PARTIAL REMISSION: ICD-10-CM

## 2022-08-22 DIAGNOSIS — E11.69 DYSLIPIDEMIA ASSOCIATED WITH TYPE 2 DIABETES MELLITUS: ICD-10-CM

## 2022-08-22 DIAGNOSIS — T84.59XA INFECTION ASSOCIATED WITH PROSTHESIS OF LEFT ANKLE JOINT: ICD-10-CM

## 2022-08-22 DIAGNOSIS — E11.9 CONTROLLED TYPE 2 DIABETES MELLITUS WITHOUT COMPLICATION, WITHOUT LONG-TERM CURRENT USE OF INSULIN: Primary | ICD-10-CM

## 2022-08-22 DIAGNOSIS — I15.2 HYPERTENSION ASSOCIATED WITH DIABETES: ICD-10-CM

## 2022-08-22 DIAGNOSIS — Z96.662 INFECTION ASSOCIATED WITH PROSTHESIS OF LEFT ANKLE JOINT: ICD-10-CM

## 2022-08-22 DIAGNOSIS — E78.5 DYSLIPIDEMIA ASSOCIATED WITH TYPE 2 DIABETES MELLITUS: ICD-10-CM

## 2022-08-22 PROCEDURE — 99999 PR PBB SHADOW E&M-EST. PATIENT-LVL III: CPT | Mod: PBBFAC,,, | Performed by: FAMILY MEDICINE

## 2022-08-22 PROCEDURE — 99214 PR OFFICE/OUTPT VISIT, EST, LEVL IV, 30-39 MIN: ICD-10-PCS | Mod: S$GLB,,, | Performed by: FAMILY MEDICINE

## 2022-08-22 PROCEDURE — 1157F PR ADVANCE CARE PLAN OR EQUIV PRESENT IN MEDICAL RECORD: ICD-10-PCS | Mod: CPTII,S$GLB,, | Performed by: FAMILY MEDICINE

## 2022-08-22 PROCEDURE — 99499 RISK ADDL DX/OHS AUDIT: ICD-10-PCS | Mod: S$GLB,,, | Performed by: FAMILY MEDICINE

## 2022-08-22 PROCEDURE — 1157F ADVNC CARE PLAN IN RCRD: CPT | Mod: CPTII,S$GLB,, | Performed by: FAMILY MEDICINE

## 2022-08-22 PROCEDURE — 1159F PR MEDICATION LIST DOCUMENTED IN MEDICAL RECORD: ICD-10-PCS | Mod: CPTII,S$GLB,, | Performed by: FAMILY MEDICINE

## 2022-08-22 PROCEDURE — 3072F PR LOW RISK FOR RETINOPATHY: ICD-10-PCS | Mod: CPTII,S$GLB,, | Performed by: FAMILY MEDICINE

## 2022-08-22 PROCEDURE — 99999 PR PBB SHADOW E&M-EST. PATIENT-LVL III: ICD-10-PCS | Mod: PBBFAC,,, | Performed by: FAMILY MEDICINE

## 2022-08-22 PROCEDURE — 3072F LOW RISK FOR RETINOPATHY: CPT | Mod: CPTII,S$GLB,, | Performed by: FAMILY MEDICINE

## 2022-08-22 PROCEDURE — 99499 UNLISTED E&M SERVICE: CPT | Mod: S$GLB,,, | Performed by: FAMILY MEDICINE

## 2022-08-22 PROCEDURE — 99214 OFFICE O/P EST MOD 30 MIN: CPT | Mod: S$GLB,,, | Performed by: FAMILY MEDICINE

## 2022-08-22 PROCEDURE — 1159F MED LIST DOCD IN RCRD: CPT | Mod: CPTII,S$GLB,, | Performed by: FAMILY MEDICINE

## 2022-08-22 PROCEDURE — 1160F RVW MEDS BY RX/DR IN RCRD: CPT | Mod: CPTII,S$GLB,, | Performed by: FAMILY MEDICINE

## 2022-08-22 PROCEDURE — 1160F PR REVIEW ALL MEDS BY PRESCRIBER/CLIN PHARMACIST DOCUMENTED: ICD-10-PCS | Mod: CPTII,S$GLB,, | Performed by: FAMILY MEDICINE

## 2022-08-22 RX ORDER — FLUOXETINE 10 MG/1
20 CAPSULE ORAL DAILY
Qty: 180 CAPSULE | Refills: 3 | Status: SHIPPED | OUTPATIENT
Start: 2022-08-22 | End: 2023-10-11

## 2022-08-22 RX ORDER — AMOXICILLIN 500 MG/1
1000 TABLET, FILM COATED ORAL EVERY 12 HOURS
Qty: 360 TABLET | Refills: 0 | Status: SHIPPED | OUTPATIENT
Start: 2022-08-22 | End: 2022-11-20

## 2022-08-22 NOTE — TELEPHONE ENCOUNTER
Spoke to pharmacist, Shell at [facility] and informed that that amount is correct. Pt is taking from ID standpoint - infection in her blood

## 2022-08-22 NOTE — TELEPHONE ENCOUNTER
----- Message from Marie Mora sent at 8/22/2022  1:35 PM CDT -----  Contact: [phone]  Who Called:  [facility]   Regarding:amoxicillin (AMOXIL) 500 MG Tab  quantity ?   Would the patient rather a call back or a response via [facility]? Call back  Best Call Back Number:  [phone]   Additional Information: n/a

## 2022-08-22 NOTE — TELEPHONE ENCOUNTER
Spoke to pharmacistShell at [facility] and would like to verify the quantity of pt's Amoxicillin. The qty is #360 and would like a new script sent with the correct amount. Pls advise.

## 2022-08-22 NOTE — PROGRESS NOTES
Subjective:       Patient ID: Candy Escobar is a 89 y.o. female.    Chief Complaint: No chief complaint on file.    89 yr old pleasant white female with  DM II controlled,  Facet arthropathy neck, hypertension, depression, anxiety, GERD, OA knee, presents today for her routine check up, labs and left ankle and foot pain. Onset a week ago and rapidly worsening. She had surgery in her ankle as well. She is on amoxicillin since a year now.     DM II - diet controlled - HGBA1C                   5.2                 09/29/2021                                 foot and eye exam UTD - not on ACE    HTN - chronic - controlled - she is asymptomatic - no CP, SOB, MINER, PND or orthopnea, no bowel or bladder problems    Facet arthropathy neck/spasms - stable - uses muscle relaxant as needed at night     Depression/anxiety - controlled - on prozac daily for years and ativan as needed - no current mood problems - she is lil anxious taking on surgery - no SI/HI    GERD - controlled - on PPI as needed    OA knee s/p surgery - on mobic as needed    HLD - at goal -  LDLCALC                  108.4               04/30/2021                                            - not on any meds - diet compliant    - need statin    History as below - reviewed      Health maintenance  -labs UTD  -vaccines reports UTD      Ankle Pain   The incident occurred more than 1 week ago. There was no injury mechanism. The pain is present in the left ankle and left foot. The quality of the pain is described as aching. The pain is at a severity of 8/10. The pain is severe. The pain has been constant since onset. Associated symptoms include an inability to bear weight and a loss of motion. The symptoms are aggravated by movement, palpation and weight bearing. She has tried ice, non-weight bearing and elevation for the symptoms. The treatment provided mild relief.   Follow-up  Associated symptoms include arthralgias and myalgias. Pertinent negatives include no  chest pain, congestion, diaphoresis, headaches, nausea or sore throat.   Hypertension  This is a chronic problem. The current episode started more than 1 year ago. The problem has been gradually improving since onset. The problem is controlled. Pertinent negatives include no anxiety, chest pain, headaches, palpitations or shortness of breath. There are no associated agents to hypertension. Risk factors for coronary artery disease include dyslipidemia, diabetes mellitus and post-menopausal state. Past treatments include lifestyle changes. The current treatment provides significant improvement. There are no compliance problems.  There is no history of angina, CAD/MI, CVA, left ventricular hypertrophy, PVD or retinopathy. There is no history of chronic renal disease, hypercortisolism, hyperparathyroidism, pheochromocytoma, renovascular disease or a thyroid problem.   Pain  Associated symptoms include arthralgias and myalgias. Pertinent negatives include no chest pain, congestion, diaphoresis, headaches, nausea or sore throat.   Dizziness:   Chronicity:  Recurrent  Onset:  1 to 4 weeks ago  Progression since onset:  Gradually improving  Frequency:  Every few days  Severity:  Mild  Duration:  5 minutes  Dizziness characteristics:  Sensation of movement   Associated symptoms: hearing loss.no headaches, no nausea, no diaphoresis, no palpitations and no chest pain.  Aggravated by:  Position changes  Treatments tried:  Rest and body position changes  Improvements on treatment:  Mildno strokes, no neurologic disease, no head trauma, no balance testing, no ear trauma, no head trauma, no anxiety, no ear tubes, no environmental allergies and no CT head.  Leg Pain   There was no injury mechanism. The pain is present in the left leg, left knee, right knee and right leg. The quality of the pain is described as aching. The pain is at a severity of 5/10. The pain is moderate. The pain has been improving since onset. Associated  symptoms include an inability to bear weight and a loss of motion. The symptoms are aggravated by movement. She has tried NSAIDs for the symptoms. The treatment provided moderate relief.     Review of Systems   Constitutional: Negative.  Negative for activity change, diaphoresis and unexpected weight change.   HENT: Positive for hearing loss. Negative for nasal congestion, ear discharge, rhinorrhea, sore throat and voice change.    Eyes: Negative.  Negative for pain, discharge and visual disturbance.   Respiratory: Negative.  Negative for chest tightness, shortness of breath and wheezing.    Cardiovascular: Negative.  Negative for chest pain and palpitations.   Gastrointestinal: Negative.  Negative for abdominal distention, anal bleeding, constipation and nausea.   Endocrine: Negative.  Negative for cold intolerance, polydipsia and polyuria.   Genitourinary: Negative.  Negative for decreased urine volume, difficulty urinating, dysuria, frequency, menstrual problem and vaginal pain.   Musculoskeletal: Positive for arthralgias, leg pain and myalgias. Negative for gait problem.   Integumentary:  Negative for color change, pallor and wound. Negative.   Allergic/Immunologic: Negative.  Negative for environmental allergies and immunocompromised state.   Neurological: Positive for dizziness. Negative for tremors, seizures, speech difficulty and headaches.   Hematological: Negative.  Negative for adenopathy. Does not bruise/bleed easily.   Psychiatric/Behavioral: Negative.  Negative for agitation, confusion, decreased concentration, hallucinations, self-injury and suicidal ideas. The patient is not nervous/anxious.      PMH/PSH/FH/SH/MED/ALLERGY reviewed        Objective:       Vitals:    08/22/22 1348   BP: 110/80   Pulse: 77   Temp: 98 °F (36.7 °C)       Physical Exam  Constitutional:       General: She is not in acute distress.     Appearance: She is well-developed. She is not diaphoretic.   HENT:      Head:  Normocephalic and atraumatic.      Right Ear: External ear normal.      Left Ear: External ear normal.      Nose: Nose normal.      Mouth/Throat:      Pharynx: No oropharyngeal exudate.   Eyes:      General: No scleral icterus.        Right eye: No discharge.         Left eye: No discharge.      Conjunctiva/sclera: Conjunctivae normal.      Pupils: Pupils are equal, round, and reactive to light.   Neck:      Thyroid: No thyromegaly.      Vascular: No JVD.      Trachea: No tracheal deviation.   Cardiovascular:      Rate and Rhythm: Normal rate and regular rhythm.      Heart sounds: Normal heart sounds. No murmur heard.    No friction rub. No gallop.   Pulmonary:      Effort: Pulmonary effort is normal.      Breath sounds: Normal breath sounds. No stridor. No wheezing or rales.   Chest:      Chest wall: No tenderness.   Abdominal:      General: Bowel sounds are normal. There is no distension.      Palpations: Abdomen is soft. There is no mass.      Tenderness: There is no abdominal tenderness. There is no guarding or rebound.      Hernia: No hernia is present.   Musculoskeletal:         General: Tenderness present. Normal range of motion.      Cervical back: Normal range of motion and neck supple.      Left lower leg: Edema present.      Comments: Severe ttp left ankle and foot and very limited ROM    Lymphadenopathy:      Cervical: No cervical adenopathy.   Skin:     General: Skin is warm and dry.      Coloration: Skin is not pale.      Findings: No erythema or rash.   Neurological:      Mental Status: She is alert and oriented to person, place, and time.      Cranial Nerves: No cranial nerve deficit.      Motor: No abnormal muscle tone.      Coordination: Coordination normal.      Deep Tendon Reflexes: Reflexes are normal and symmetric. Reflexes normal.   Psychiatric:         Behavior: Behavior normal.         Thought Content: Thought content normal.         Judgment: Judgment normal.         Assessment:       1.  Controlled type 2 diabetes mellitus without complication, without long-term current use of insulin    2. Dyslipidemia associated with type 2 diabetes mellitus    3. Major depressive disorder with single episode, in partial remission    4. Hypertension associated with diabetes    5. 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 Retained orthopedic hardware        Plan:          Diagnoses and all orders for this visit:    Controlled type 2 diabetes mellitus without complication, without long-term current use of insulin  -     CBC Auto Differential; Future  -     Comprehensive Metabolic Panel; Future  -     Lipid Panel; Future  -     Hemoglobin A1C; Future  -     Urinalysis; Future  -     Microalbumin/Creatinine Ratio, Urine; Future    Dyslipidemia associated with type 2 diabetes mellitus  -     CBC Auto Differential; Future  -     Comprehensive Metabolic Panel; Future  -     Lipid Panel; Future  -     Hemoglobin A1C; Future  -     Urinalysis; Future  -     Microalbumin/Creatinine Ratio, Urine; Future    Major depressive disorder with single episode, in partial remission  -     CBC Auto Differential; Future  -     Comprehensive Metabolic Panel; Future  -     Lipid Panel; Future  -     Hemoglobin A1C; Future  -     Urinalysis; Future  -     Microalbumin/Creatinine Ratio, Urine; Future  -     FLUoxetine 10 MG capsule; Take 2 capsules (20 mg total) by mouth once daily.    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    Retained orthopedic hardware  -     amoxicillin (AMOXIL) 500 MG Tab; Take 2 tablets (1,000 mg total) by mouth every 12  (twelve) hours.      Left ankle pain/foot pain  -esthesopathy vs inflamed joint  -continue amoxicillin as prescribed by ID    HTN  -controlled  -continue losartan 25 daily    HLD  -controlled  -start statin  -labs    DM II  -controlled  -labs    SK  -reassurance    MDD  -controlled    Chronic knee pain  -continue mobic    Neuropathic pain  -well controlled  - neurontin 300 TID    Spent adequate time in obtaining history and explaining differentials    25 minutes spent during this visit of which greater than 50% devoted to face-face counseling and coordination of care regarding diagnosis and management plan    Follow up in about 6 months (around 2/22/2023), or if symptoms worsen or fail to improve.

## 2022-08-26 ENCOUNTER — LAB VISIT (OUTPATIENT)
Dept: LAB | Facility: HOSPITAL | Age: 87
End: 2022-08-26
Attending: FAMILY MEDICINE
Payer: MEDICARE

## 2022-08-26 DIAGNOSIS — E11.59 HYPERTENSION ASSOCIATED WITH DIABETES: ICD-10-CM

## 2022-08-26 DIAGNOSIS — F32.4 MAJOR DEPRESSIVE DISORDER WITH SINGLE EPISODE, IN PARTIAL REMISSION: ICD-10-CM

## 2022-08-26 DIAGNOSIS — E11.69 DYSLIPIDEMIA ASSOCIATED WITH TYPE 2 DIABETES MELLITUS: ICD-10-CM

## 2022-08-26 DIAGNOSIS — E11.9 CONTROLLED TYPE 2 DIABETES MELLITUS WITHOUT COMPLICATION, WITHOUT LONG-TERM CURRENT USE OF INSULIN: ICD-10-CM

## 2022-08-26 DIAGNOSIS — E78.5 DYSLIPIDEMIA ASSOCIATED WITH TYPE 2 DIABETES MELLITUS: ICD-10-CM

## 2022-08-26 DIAGNOSIS — I15.2 HYPERTENSION ASSOCIATED WITH DIABETES: ICD-10-CM

## 2022-08-26 LAB
BACTERIA #/AREA URNS HPF: ABNORMAL /HPF
BILIRUB UR QL STRIP: NEGATIVE
CLARITY UR: CLEAR
COLOR UR: YELLOW
GLUCOSE UR QL STRIP: NEGATIVE
HGB UR QL STRIP: NEGATIVE
KETONES UR QL STRIP: NEGATIVE
LEUKOCYTE ESTERASE UR QL STRIP: ABNORMAL
MICROSCOPIC COMMENT: ABNORMAL
NITRITE UR QL STRIP: NEGATIVE
NON-SQ EPI CELLS #/AREA URNS HPF: 1 /HPF
PH UR STRIP: 6 [PH] (ref 5–8)
PROT UR QL STRIP: NEGATIVE
SP GR UR STRIP: 1.01 (ref 1–1.03)
SQUAMOUS #/AREA URNS HPF: 1 /HPF
URN SPEC COLLECT METH UR: ABNORMAL
WBC #/AREA URNS HPF: 12 /HPF (ref 0–5)
WBC CLUMPS URNS QL MICRO: ABNORMAL

## 2022-08-26 PROCEDURE — 81000 URINALYSIS NONAUTO W/SCOPE: CPT | Mod: PO | Performed by: FAMILY MEDICINE

## 2022-10-11 ENCOUNTER — PES CALL (OUTPATIENT)
Dept: ADMINISTRATIVE | Facility: CLINIC | Age: 87
End: 2022-10-11
Payer: MEDICARE

## 2022-11-02 ENCOUNTER — PES CALL (OUTPATIENT)
Dept: ADMINISTRATIVE | Facility: CLINIC | Age: 87
End: 2022-11-02
Payer: MEDICARE

## 2022-12-20 ENCOUNTER — TELEPHONE (OUTPATIENT)
Dept: INFECTIOUS DISEASES | Facility: CLINIC | Age: 87
End: 2022-12-20
Payer: MEDICARE

## 2022-12-20 NOTE — TELEPHONE ENCOUNTER
----- Message from Cassius Valdez MD sent at 12/19/2022 12:49 PM CST -----  Ok to schedule  ----- Message -----  From: Hardeep Simeon LPN  Sent: 12/16/2022   1:08 PM CST  To: Cassius Valdez MD      ----- Message -----  From: Ene Villegas MA  Sent: 12/16/2022  11:56 AM CST  To: José Miguel HELLER    Type:  Sooner Apoointment Request    Caller is requesting a sooner appointment. yes   Name of Caller: pt  When is the first available appointment? N/a  Symptoms: follow up  Would the patient rather a call back or a response via [facility]? Call back  Best Call Back Number: [phone]  Additional Information:  please contact pt to be schedule for this provider

## 2022-12-27 ENCOUNTER — OFFICE VISIT (OUTPATIENT)
Dept: INFECTIOUS DISEASES | Facility: CLINIC | Age: 87
End: 2022-12-27
Payer: MEDICARE

## 2022-12-27 ENCOUNTER — TELEPHONE (OUTPATIENT)
Dept: RESEARCH | Facility: HOSPITAL | Age: 87
End: 2022-12-27
Payer: MEDICARE

## 2022-12-27 DIAGNOSIS — Z96.662 INFECTION ASSOCIATED WITH PROSTHESIS OF LEFT ANKLE JOINT: Primary | ICD-10-CM

## 2022-12-27 DIAGNOSIS — T84.59XA INFECTION ASSOCIATED WITH PROSTHESIS OF LEFT ANKLE JOINT: Primary | ICD-10-CM

## 2022-12-27 DIAGNOSIS — Z79.2 ENCOUNTER FOR LONG-TERM (CURRENT) USE OF ANTIBIOTICS: ICD-10-CM

## 2022-12-27 PROCEDURE — 1160F PR REVIEW ALL MEDS BY PRESCRIBER/CLIN PHARMACIST DOCUMENTED: ICD-10-PCS | Mod: CPTII,S$GLB,, | Performed by: INTERNAL MEDICINE

## 2022-12-27 PROCEDURE — 1101F PR PT FALLS ASSESS DOC 0-1 FALLS W/OUT INJ PAST YR: ICD-10-PCS | Mod: CPTII,S$GLB,, | Performed by: INTERNAL MEDICINE

## 2022-12-27 PROCEDURE — 3072F LOW RISK FOR RETINOPATHY: CPT | Mod: CPTII,S$GLB,, | Performed by: INTERNAL MEDICINE

## 2022-12-27 PROCEDURE — 99213 OFFICE O/P EST LOW 20 MIN: CPT | Mod: S$GLB,,, | Performed by: INTERNAL MEDICINE

## 2022-12-27 PROCEDURE — 1157F ADVNC CARE PLAN IN RCRD: CPT | Mod: CPTII,S$GLB,, | Performed by: INTERNAL MEDICINE

## 2022-12-27 PROCEDURE — 99999 PR PBB SHADOW E&M-EST. PATIENT-LVL I: ICD-10-PCS | Mod: PBBFAC,,, | Performed by: INTERNAL MEDICINE

## 2022-12-27 PROCEDURE — 1159F MED LIST DOCD IN RCRD: CPT | Mod: CPTII,S$GLB,, | Performed by: INTERNAL MEDICINE

## 2022-12-27 PROCEDURE — 1157F PR ADVANCE CARE PLAN OR EQUIV PRESENT IN MEDICAL RECORD: ICD-10-PCS | Mod: CPTII,S$GLB,, | Performed by: INTERNAL MEDICINE

## 2022-12-27 PROCEDURE — 99213 PR OFFICE/OUTPT VISIT, EST, LEVL III, 20-29 MIN: ICD-10-PCS | Mod: S$GLB,,, | Performed by: INTERNAL MEDICINE

## 2022-12-27 PROCEDURE — 1159F PR MEDICATION LIST DOCUMENTED IN MEDICAL RECORD: ICD-10-PCS | Mod: CPTII,S$GLB,, | Performed by: INTERNAL MEDICINE

## 2022-12-27 PROCEDURE — 3288F FALL RISK ASSESSMENT DOCD: CPT | Mod: CPTII,S$GLB,, | Performed by: INTERNAL MEDICINE

## 2022-12-27 PROCEDURE — 99999 PR PBB SHADOW E&M-EST. PATIENT-LVL I: CPT | Mod: PBBFAC,,, | Performed by: INTERNAL MEDICINE

## 2022-12-27 PROCEDURE — 3072F PR LOW RISK FOR RETINOPATHY: ICD-10-PCS | Mod: CPTII,S$GLB,, | Performed by: INTERNAL MEDICINE

## 2022-12-27 PROCEDURE — 1101F PT FALLS ASSESS-DOCD LE1/YR: CPT | Mod: CPTII,S$GLB,, | Performed by: INTERNAL MEDICINE

## 2022-12-27 PROCEDURE — 3288F PR FALLS RISK ASSESSMENT DOCUMENTED: ICD-10-PCS | Mod: CPTII,S$GLB,, | Performed by: INTERNAL MEDICINE

## 2022-12-27 PROCEDURE — 1160F RVW MEDS BY RX/DR IN RCRD: CPT | Mod: CPTII,S$GLB,, | Performed by: INTERNAL MEDICINE

## 2022-12-27 RX ORDER — AMOXICILLIN 500 MG/1
500 CAPSULE ORAL 3 TIMES DAILY
Qty: 90 CAPSULE | Refills: 11 | Status: SHIPPED | OUTPATIENT
Start: 2022-12-27 | End: 2024-02-23

## 2022-12-27 NOTE — PROGRESS NOTES
Patient ID: Candy Escobar is a 89 y.o. female.    Subjective:         Chief Complaint: Follow-up      Medication Refill  Associated symptoms include nausea. Pertinent negatives include no abdominal pain, arthralgias, chest pain, chills, coughing, diaphoresis, fatigue, fever, headaches, rash or sore throat.   Follow-up  Associated symptoms include nausea. Pertinent negatives include no abdominal pain, arthralgias, chest pain, chills, coughing, diaphoresis, fatigue, fever, headaches, rash or sore throat.     - 89 year old female with history of left total ankle replacement (2000) recently admitted on 9/28 with a left prosthetic ankle infection s/p arthroscopic I&D on 9/29. Left ankle cultures (aspiration and surgical) are positive for Strep sanguinis.   - Patient was on empiric Vancomycin and Ceftriaxone then tailored to ceftriaxone alone.  Per orthopedic surgery, no plans for hardware removal  - She was follwed by ID. Received Ceftriaxone 2 g IV q 24 hours x 6 weeks from day of surgery. End date of IV antibiotics: 11/10/21  - 10/14/21: [facility] ID f/u with SAMY Servin PA-C. Doing well and has no pain in L ankle except when walking.  Tolerating the IV abx and picc without problems. The patient denies any recent fever, chills, or sweats.  - 10/26/21: Established with [facility] ID ALY Angel PA-C. Overall doing well with some various positive ROS which were addressed. Continued on IV Ceftriaxone.  - 11/10/21: Seen by Dr. Valdez in clinic. Ceftriaxone completed and she was started on amoxicillin 875mg po BID suppression      Interval HPI:  - Patient is doing very well, adjusting and enjoying her new residence at [place]  - Has been taking her amoxicillin 1gr BID and complaining of abd discomfort  - denies fever, chills, night sweats. Occasional nausea which is relieved with prn Zofran for which she needs a refill        Past Medical History:   Diagnosis Date    Allergy     Seasonal    Cataract     Depression      Diabetes mellitus     diet controlled    Diverticulosis     Encounter for blood transfusion     Fever blister     GERD (gastroesophageal reflux disease)     Hypertension 11/12/2014    Nuclear sclerosis - Both Eyes 7/16/2013    Osteoarthritis     bilateral knee replacements, and L ankle replacement in 2000    Osteopenia     Osteoporosis     Seizures 2008    TIA       Past Surgical History:   Procedure Laterality Date    ANKLE SURGERY  1951    ankle replacement, left    ARTHROSCOPY OF ANKLE WITH DEBRIDEMENT Left 9/29/2021    Procedure: ARTHROSCOPY, ANKLE, WITH DEBRIDEMENT - LEFT ANKLE - CYSTO TUBING - BACTISURE - VANC POWDER;  Surgeon: Sadiq Lee MD;  Location: [facility] OR [address] [place] [address];  Service: Orthopedics;  Laterality: Left;    BLADDER SUSPENSION      CATARACT EXTRACTION Left 2018    CATARACT EXTRACTION W/  INTRAOCULAR LENS IMPLANT Right 3/15/2022    Procedure: EXTRACTION, CATARACT, WITH IOL INSERTION;  Surgeon: Jorge A Miller MD;  Location: [facility];  Service: Ophthalmology;  Laterality: Right;    COLONOSCOPY      FOOT SURGERY      HYSTERECTOMY      left ovary intact    INTRAOCULAR PROSTHESES INSERTION Left 10/30/2018    Procedure: INSERTION, IOL PROSTHESIS;  Surgeon: Jorge A Miller MD;  Location: [facility] OR [address] [place] [address];  Service: Ophthalmology;  Laterality: Left;    JOINT REPLACEMENT      left ankle and knee arthroplasty    KNEE SURGERY  2008    left TKR    KNEE SURGERY  11-11-14    right TKR    PHACOEMULSIFICATION OF CATARACT Left 10/30/2018    Procedure: PHACOEMULSIFICATION, CATARACT;  Surgeon: Jorge A Miller MD;  Location: [facility] OR [address] [place] [address];  Service: Ophthalmology;  Laterality: Left;    SHOULDER SURGERY  2009    right arthroscopy    TONSILLECTOMY         Review of patient's allergies indicates:   Allergen Reactions    Propofol analogues Other (See Comments)     Low blood pressure, tremors    Oxycodone Nausea And Vomiting    Pcn [penicillins] Nausea Only    Tetracyclines Nausea Only        Family History   Problem Relation Age of Onset    Diabetes Mother     Diabetes Father     Heart disease Father     Diabetes Sister     Polychondritis Sister     Polychondritis Unknown     Cancer Neg Hx     Amblyopia Neg Hx     Blindness Neg Hx     Cataracts Neg Hx     Glaucoma Neg Hx     Hypertension Neg Hx     Macular degeneration Neg Hx     Retinal detachment Neg Hx     Strabismus Neg Hx     Stroke Neg Hx     Thyroid disease Neg Hx     Melanoma Neg Hx        Social History     Socioeconomic History    Marital status:    Occupational History    Occupation: Retired Teacher     Employer: OTHER   Tobacco Use    Smoking status: Former     Types: Cigarettes     Quit date: 1981     Years since quittin.6    Smokeless tobacco: Never   Substance and Sexual Activity    Alcohol use: Yes     Alcohol/week: 4.0 standard drinks     Types: 4 Glasses of wine per week     Comment: Nightly    Drug use: No    Sexual activity: Not Currently     Partners: Male   Other Topics Concern    Are you pregnant or think you may be? No    Breast-feeding No       Current Outpatient Medications   Medication Instructions    albuterol (VENTOLIN HFA) 90 mcg/actuation inhaler 2 puffs, Inhalation, Every 6 hours PRN, Rescue    amoxicillin (AMOXIL) 500 mg, Oral, 3 times daily    aspirin (ECOTRIN) 81 mg, Oral, 2 times daily    FLUoxetine 20 mg, Oral, Daily    gabapentin (NEURONTIN) 300 MG capsule TAKE 1 CAPSULE BY MOUTH  TWICE DAILY    omeprazole (PRILOSEC) 20 MG capsule TAKE 2 CAPSULES BY MOUTH  DAILY    ondansetron (ZOFRAN-ODT) 4 MG TbDL Take one 4mg tablet with Tramadol for nausea    psyllium seed, with sugar, (FIBER ORAL) Oral         Review of Systems   Constitutional:  Negative for activity change, appetite change, chills, diaphoresis, fatigue, fever and unexpected weight change.   HENT:  Negative for sore throat.    Eyes:  Negative for photophobia and visual disturbance.   Respiratory:  Negative for cough and shortness of  breath.    Cardiovascular:  Negative for chest pain and leg swelling.   Gastrointestinal:  Positive for nausea. Negative for abdominal distention and abdominal pain.   Genitourinary:  Negative for difficulty urinating.   Musculoskeletal:  Negative for arthralgias.   Skin:  Negative for color change and rash.   Allergic/Immunologic: Negative for immunocompromised state.   Neurological:  Negative for dizziness and headaches.   Psychiatric/Behavioral:  The patient is not nervous/anxious.          Objective:     Physical Exam  Vitals reviewed.   Constitutional:       General: She is not in acute distress.     Appearance: She is well-developed. She is not ill-appearing.   HENT:      Head: Normocephalic and atraumatic.      Right Ear: External ear normal.      Left Ear: External ear normal.      Nose: Nose normal.   Eyes:      General: No scleral icterus.        Right eye: No discharge.         Left eye: No discharge.      Pupils: Pupils are equal, round, and reactive to light.   Cardiovascular:      Rate and Rhythm: Normal rate and regular rhythm.      Heart sounds: Normal heart sounds. No murmur heard.  Pulmonary:      Effort: Pulmonary effort is normal. No tachypnea, accessory muscle usage or respiratory distress.      Breath sounds: Normal breath sounds. No wheezing.   Abdominal:      General: There is no distension.      Palpations: Abdomen is soft.      Tenderness: There is no abdominal tenderness.   Musculoskeletal:         General: No deformity. Normal range of motion.      Cervical back: Normal range of motion and neck supple. No rigidity or tenderness.   Skin:     General: Skin is warm and dry.      Coloration: Skin is not jaundiced.      Comments: Left ankle without erythema, edema, or tenderness   Neurological:      Mental Status: She is alert and oriented to person, place, and time.   Psychiatric:         Mood and Affect: Mood normal.         Behavior: Behavior normal.         Judgment: Judgment normal.          Assessment:       Candy was seen today for follow-up.    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    Encounter for long-term (current) use of antibiotics         Plan:     - Cont Amoxicillin 500mg po TID for suppression of Strep sanguinis  - if this is still difficult for her then she can try to stop with the understanding that this may return.   - she will need suppression for at least 2 years, possibly lifelong considering retained ankle hardware  - Check CBC, CMP, CRP today  - RTC in 6 months or sooner if needed    Cassius Valdez MD  Infectious Diseases  [facility]               DAYSI Reyes RN

## 2022-12-27 NOTE — TELEPHONE ENCOUNTER
Preventable study- [id number]    Called patient to confirm mailing address. Patient reported to study sponsor she hasn't received study medication. I will let sponsor know her correct address and to ship study medication as soon as possible.     [address] [facility] [address] [place] [id number]

## 2023-02-04 ENCOUNTER — TELEPHONE (OUTPATIENT)
Dept: FAMILY MEDICINE | Facility: CLINIC | Age: 88
End: 2023-02-04
Payer: MEDICARE

## 2023-02-04 DIAGNOSIS — Z00.00 ROUTINE GENERAL MEDICAL EXAMINATION AT HEALTH CARE FACILITY: Primary | ICD-10-CM

## 2023-02-04 DIAGNOSIS — E11.9 CONTROLLED TYPE 2 DIABETES MELLITUS WITHOUT COMPLICATION, WITHOUT LONG-TERM CURRENT USE OF INSULIN: Chronic | ICD-10-CM

## 2023-02-04 DIAGNOSIS — I15.2 HYPERTENSION ASSOCIATED WITH DIABETES: ICD-10-CM

## 2023-02-04 DIAGNOSIS — E11.59 HYPERTENSION ASSOCIATED WITH DIABETES: ICD-10-CM

## 2023-02-04 DIAGNOSIS — E11.69 DYSLIPIDEMIA ASSOCIATED WITH TYPE 2 DIABETES MELLITUS: Chronic | ICD-10-CM

## 2023-02-04 DIAGNOSIS — E78.5 DYSLIPIDEMIA ASSOCIATED WITH TYPE 2 DIABETES MELLITUS: Chronic | ICD-10-CM

## 2023-02-04 NOTE — TELEPHONE ENCOUNTER
----- Message from John Robbins sent at 2/4/2023 10:19 AM CST -----  Name Of Caller: Candy        Provider Name: Francisco Munson        Does patient feel the need to be seen today?  no         Relationship to the Pt?: patient        Contact Preference?: [phone]        What is the nature of the call?: Patient has an annual appointment scheduled for Wednesday 2- for 1:20pm, patient would like to know if she needs to get labs completed before this appointment.

## 2023-02-06 VITALS — SYSTOLIC BLOOD PRESSURE: 128 MMHG | DIASTOLIC BLOOD PRESSURE: 78 MMHG

## 2023-03-03 ENCOUNTER — TELEPHONE (OUTPATIENT)
Dept: FAMILY MEDICINE | Facility: CLINIC | Age: 88
End: 2023-03-03
Payer: MEDICARE

## 2023-03-03 NOTE — TELEPHONE ENCOUNTER
8:37am:  Unable to lvm for pt. Due to invalid number.    8:38am: Unable to lvm for pt. Requesting she call office for appt. Scheduling per her request.

## 2023-04-12 ENCOUNTER — TELEPHONE (OUTPATIENT)
Dept: PRIMARY CARE CLINIC | Facility: CLINIC | Age: 88
End: 2023-04-12
Payer: MEDICARE

## 2023-04-12 NOTE — TELEPHONE ENCOUNTER
Called Bettina, no answer, did leave message and asked if pt is willing to have a 9am appt and come for 845am, on 4/17/2023, no answer, left message for call back.    Called Shell, no answer. Left same message with call back number.

## 2023-04-17 ENCOUNTER — OFFICE VISIT (OUTPATIENT)
Dept: PRIMARY CARE CLINIC | Facility: CLINIC | Age: 88
End: 2023-04-17
Payer: MEDICARE

## 2023-04-17 VITALS
RESPIRATION RATE: 20 BRPM | SYSTOLIC BLOOD PRESSURE: 138 MMHG | WEIGHT: 112.44 LBS | OXYGEN SATURATION: 94 % | HEIGHT: 60 IN | BODY MASS INDEX: 22.07 KG/M2 | TEMPERATURE: 98 F | HEART RATE: 67 BPM | DIASTOLIC BLOOD PRESSURE: 76 MMHG

## 2023-04-17 DIAGNOSIS — N18.31 CHRONIC KIDNEY DISEASE, STAGE 3A: ICD-10-CM

## 2023-04-17 DIAGNOSIS — E78.5 DYSLIPIDEMIA ASSOCIATED WITH TYPE 2 DIABETES MELLITUS: Chronic | ICD-10-CM

## 2023-04-17 DIAGNOSIS — Z96.651 STATUS POST TOTAL RIGHT KNEE REPLACEMENT: Chronic | ICD-10-CM

## 2023-04-17 DIAGNOSIS — I15.2 HYPERTENSION ASSOCIATED WITH DIABETES: ICD-10-CM

## 2023-04-17 DIAGNOSIS — Z76.89 ENCOUNTER TO ESTABLISH CARE: Primary | ICD-10-CM

## 2023-04-17 DIAGNOSIS — M06.9 RHEUMATOID ARTHRITIS, INVOLVING UNSPECIFIED SITE, UNSPECIFIED WHETHER RHEUMATOID FACTOR PRESENT: ICD-10-CM

## 2023-04-17 DIAGNOSIS — K21.9 GASTROESOPHAGEAL REFLUX DISEASE, UNSPECIFIED WHETHER ESOPHAGITIS PRESENT: Chronic | ICD-10-CM

## 2023-04-17 DIAGNOSIS — J41.0 SIMPLE CHRONIC BRONCHITIS: ICD-10-CM

## 2023-04-17 DIAGNOSIS — D69.2 OTHER NONTHROMBOCYTOPENIC PURPURA: ICD-10-CM

## 2023-04-17 DIAGNOSIS — E11.9 CONTROLLED TYPE 2 DIABETES MELLITUS WITHOUT COMPLICATION, WITHOUT LONG-TERM CURRENT USE OF INSULIN: Chronic | ICD-10-CM

## 2023-04-17 DIAGNOSIS — N39.46 MIXED INCONTINENCE URGE AND STRESS: ICD-10-CM

## 2023-04-17 DIAGNOSIS — F32.4 MAJOR DEPRESSIVE DISORDER WITH SINGLE EPISODE, IN PARTIAL REMISSION: ICD-10-CM

## 2023-04-17 DIAGNOSIS — J30.2 SEASONAL ALLERGIES: ICD-10-CM

## 2023-04-17 DIAGNOSIS — M46.92 INFLAMMATORY SPONDYLOPATHY OF CERVICAL REGION: ICD-10-CM

## 2023-04-17 DIAGNOSIS — M17.11 OSTEOARTHRITIS OF RIGHT KNEE, UNSPECIFIED OSTEOARTHRITIS TYPE: ICD-10-CM

## 2023-04-17 DIAGNOSIS — I73.9 PERIPHERAL VASCULAR DISEASE, UNSPECIFIED: ICD-10-CM

## 2023-04-17 DIAGNOSIS — E11.59 HYPERTENSION ASSOCIATED WITH DIABETES: ICD-10-CM

## 2023-04-17 DIAGNOSIS — E11.69 DYSLIPIDEMIA ASSOCIATED WITH TYPE 2 DIABETES MELLITUS: Chronic | ICD-10-CM

## 2023-04-17 PROCEDURE — 99215 PR OFFICE/OUTPT VISIT, EST, LEVL V, 40-54 MIN: ICD-10-PCS | Mod: S$GLB,,, | Performed by: INTERNAL MEDICINE

## 2023-04-17 PROCEDURE — 3072F PR LOW RISK FOR RETINOPATHY: ICD-10-PCS | Mod: CPTII,S$GLB,, | Performed by: INTERNAL MEDICINE

## 2023-04-17 PROCEDURE — 1123F PR ADV CARE PLAN DISCUSSED, PLAN OR SURROGATE DOCUMENTED: ICD-10-PCS | Mod: CPTII,S$GLB,, | Performed by: INTERNAL MEDICINE

## 2023-04-17 PROCEDURE — 99215 OFFICE O/P EST HI 40 MIN: CPT | Mod: S$GLB,,, | Performed by: INTERNAL MEDICINE

## 2023-04-17 PROCEDURE — 1159F MED LIST DOCD IN RCRD: CPT | Mod: CPTII,S$GLB,, | Performed by: INTERNAL MEDICINE

## 2023-04-17 PROCEDURE — 1159F PR MEDICATION LIST DOCUMENTED IN MEDICAL RECORD: ICD-10-PCS | Mod: CPTII,S$GLB,, | Performed by: INTERNAL MEDICINE

## 2023-04-17 PROCEDURE — 1125F AMNT PAIN NOTED PAIN PRSNT: CPT | Mod: CPTII,S$GLB,, | Performed by: INTERNAL MEDICINE

## 2023-04-17 PROCEDURE — 1160F RVW MEDS BY RX/DR IN RCRD: CPT | Mod: CPTII,S$GLB,, | Performed by: INTERNAL MEDICINE

## 2023-04-17 PROCEDURE — 1125F PR PAIN SEVERITY QUANTIFIED, PAIN PRESENT: ICD-10-PCS | Mod: CPTII,S$GLB,, | Performed by: INTERNAL MEDICINE

## 2023-04-17 PROCEDURE — 99999 PR PBB SHADOW E&M-EST. PATIENT-LVL IV: CPT | Mod: PBBFAC,,, | Performed by: INTERNAL MEDICINE

## 2023-04-17 PROCEDURE — 1160F PR REVIEW ALL MEDS BY PRESCRIBER/CLIN PHARMACIST DOCUMENTED: ICD-10-PCS | Mod: CPTII,S$GLB,, | Performed by: INTERNAL MEDICINE

## 2023-04-17 PROCEDURE — 3072F LOW RISK FOR RETINOPATHY: CPT | Mod: CPTII,S$GLB,, | Performed by: INTERNAL MEDICINE

## 2023-04-17 PROCEDURE — 1123F ACP DISCUSS/DSCN MKR DOCD: CPT | Mod: CPTII,S$GLB,, | Performed by: INTERNAL MEDICINE

## 2023-04-17 PROCEDURE — 99999 PR PBB SHADOW E&M-EST. PATIENT-LVL IV: ICD-10-PCS | Mod: PBBFAC,,, | Performed by: INTERNAL MEDICINE

## 2023-04-17 NOTE — PROGRESS NOTES
"[facility] INITIAL VISIT NOTE      CHIEF COMPLAINT     Chief Complaint   Patient presents with    Rheumatoid Arthritis     Pt reports she was dx with this years ago.     Establish Care     Patient and her  are establishing care. He will be coming in later this week. Pt is the primary caretaker for her .     Systemic Infection      Patient states she had a systemic infection that started in her left ankle. She has had a joint replacement in that ankle approx. 23 years ago.     Urinary Incontinence     Pt reports she has "leaks" from her bladder.        HPI     Candy Escobar is a 89 y.o.  female who presents with a PMHx of  DM2-controlled, HLD, HTN, seasonal allergies, OA, depression, cataract, seizures, osteoporosis, TIA,and assisted who presents today to establish care.   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     DM2: last hgba 1c done over a year ago was 5.6 and has maintained this level of control for 6 years. LDL not at goal. Apparently patient is in an investigational study and we are not able to reorder lipid panel.   Takes gabapentin for neuropathy and reports and its well controlled.     Depression: patients relays that her mood is "pretty good" just a bit stressed due to caring for her  with dementia.   PHQ 9 is not completed duet to time   Sleep "sleeps very well"   Appetite normal   Support system grandchildren     Osteoporosis: takes no medication for this and reports declines medication.     Seizures history: poor historian per the seizures. Difficult to get a good history.     HISTORY OF TIAs:. On baby ASA and no statin.       CHRONIC HEALTH ISSUES:   Past Medical History:  Past Medical History:   Diagnosis Date    Allergy     Seasonal    Anxiety     Cataract     Depression     Diabetes mellitus     diet controlled    Diabetes mellitus, type 2     Diverticulosis     Encounter for blood transfusion     Fever blister     GERD " (gastroesophageal reflux disease)     Hyperlipidemia     Hypertension 11/12/2014    Nuclear sclerosis - Both Eyes 07/16/2013    Osteoarthritis     bilateral knee replacements, and L ankle replacement in 2000    Osteopenia     Osteoporosis     Stroke     2008 TIA         Geriatric Assessment    ADLs : independent   iADLs: independent     Polypharmacy:yes     Visual impairments: no change. Wears glasses    Hearing impairment: seems [place] (loud tone of voice)but declines desire for testing    Urinary incontinence: yes. But mild.     Malnutrition: no     Gait/balance/falls:     Depression: PHQ2.PHQ 9 not done.     Sleep: sleeps very well.    Cognitive Problems: minicog.4/5.    Environmental/social problems: lives with . Safe at home. Lives at [facility]. No guns at home. No rugs. Grab bars in the bathroom.     Functional status:     Support system:     Advanced care planning:  does not have on file. But discussed at length today and she will fill out the paperwork and return it to clinic to be scanned in.    Date: 04/17/2023    Power of   I initiated the process of advance care planning today and explained the importance of this process to the patient.  I introduced the concept of advance directives to the patient, as well. Then the patient received detailed information about the importance of designating a Health Care Power of  (HCPOA). She was also instructed to communicate with this person about their wishes for future healthcare, should she become sick and lose decision-making capacity. The patient has previously appointed a HCPOA. After our discussion, the patient says the document has not changed.        AWV last done over a year . Ordered today      Worry score 2    Past Surgical History:  Past Surgical History:   Procedure Laterality Date    ANKLE SURGERY  1951    ankle replacement, left    ARTHROSCOPY OF ANKLE WITH DEBRIDEMENT Left 09/29/2021    Procedure: ARTHROSCOPY, ANKLE, WITH  DEBRIDEMENT - LEFT ANKLE - CYSTO TUBING - BACTISURE - VANC POWDER;  Surgeon: Sadiq Lee MD;  Location: [facility] OR 2ND FLR;  Service: Orthopedics;  Laterality: Left;    BLADDER SUSPENSION      CARPAL TUNNEL RELEASE      CATARACT EXTRACTION Left 2018    CATARACT EXTRACTION W/  INTRAOCULAR LENS IMPLANT Right 03/15/2022    Procedure: EXTRACTION, CATARACT, WITH IOL INSERTION;  Surgeon: Jorge A Miller MD;  Location: [facility];  Service: Ophthalmology;  Laterality: Right;    COLONOSCOPY      FOOT SURGERY      HYSTERECTOMY      left ovary intact    INTRAOCULAR PROSTHESES INSERTION Left 10/30/2018    Procedure: INSERTION, IOL PROSTHESIS;  Surgeon: Jorge A Miller MD;  Location: [facility] OR 1ST FLR;  Service: Ophthalmology;  Laterality: Left;    JOINT REPLACEMENT      left ankle and knee arthroplasty    KNEE SURGERY  2008    left TKR    KNEE SURGERY  11/11/2014    right TKR    PHACOEMULSIFICATION OF CATARACT Left 10/30/2018    Procedure: PHACOEMULSIFICATION, CATARACT;  Surgeon: Jorge A Miller MD;  Location: [facility] OR 1ST FLR;  Service: Ophthalmology;  Laterality: Left;    SHOULDER SURGERY  2009    right arthroscopy    TONSILLECTOMY         Allergies:  Review of patient's allergies indicates:   Allergen Reactions    Propofol analogues Other (See Comments)     Low blood pressure, tremors    Oxycodone Nausea And Vomiting    Pcn [penicillins] Nausea Only    Tetracyclines Nausea Only       Home Medications:  Prior to Admission medications    Medication Sig Start Date End Date Taking? Authorizing Provider   albuterol (VENTOLIN HFA) 90 mcg/actuation inhaler Inhale 2 puffs into the lungs every 6 (six) hours as needed for Wheezing or Shortness of Breath. Rescue 11/5/21   Bhargavi Cristina MD   amoxicillin (AMOXIL) 500 MG capsule Take 1 capsule (500 mg total) by mouth 3 (three) times daily. 12/27/22   Cassius Valdez MD   aspirin (ECOTRIN) 81 MG EC tablet Take 1 tablet (81 mg total) by mouth 2 (two) times a  day. 10/1/21 10/1/22  Dez Oglesby MD   FLUoxetine 10 MG capsule Take 2 capsules (20 mg total) by mouth once daily. 22  Francisco Munson MD   gabapentin (NEURONTIN) 300 MG capsule TAKE 1 CAPSULE BY MOUTH  TWICE DAILY 22   Bhargavi Cristina MD   omeprazole (PRILOSEC) 20 MG capsule TAKE 2 CAPSULES BY MOUTH  DAILY 22   Bhargavi Cristina MD   ondansetron (ZOFRAN-ODT) 4 MG TbDL Take one 4mg tablet with Tramadol for nausea 22   Tash Angel PA-C   psyllium seed, with sugar, (FIBER ORAL) Take by mouth.    Historical Provider       Family History:  Family History   Problem Relation Age of Onset    Diabetes Mother     Diabetes Father     Heart disease Father     Diabetes Sister     Polychondritis Sister     Polychondritis Unknown     Cancer Neg Hx     Amblyopia Neg Hx     Blindness Neg Hx     Cataracts Neg Hx     Glaucoma Neg Hx     Hypertension Neg Hx     Macular degeneration Neg Hx     Retinal detachment Neg Hx     Strabismus Neg Hx     Stroke Neg Hx     Thyroid disease Neg Hx     Melanoma Neg Hx        Social History:  Social History     Tobacco Use    Smoking status: Former     Packs/day: 0.50     Years: 9.00     Pack years: 4.50     Types: Cigarettes     Start date:      Quit date: 1981     Years since quittin.9    Smokeless tobacco: Never   Substance Use Topics    Alcohol use: Not Currently    Drug use: No       Review of Systems:  ROS    Health Maintainence:   TDap is up to date, Influenza is up to date   Pneumovax is up to date.   Zostavax is UTD.       Covid   Cancer Screening:  DEXA:  is up to date.   Screening:  Hepatitis C is up to date in pts born between 0067-6463. **    PHYSICAL EXAM     /76   Pulse 67   Temp 98.2 °F (36.8 °C) (Oral)   Resp 20   Ht 5' (1.524 m)   Wt 51 kg (112 lb 7 oz)   SpO2 (!) 94%   BMI 21.96 kg/m²     GEN - A+OX4, NAD   HEENT - PERRL, EOMI, OP clear. MMM.   Neck - No thyromegaly or cervical LAD. No thyroid masses felt.  CV - RRR,  no m/r   Chest - CTAB, no wheezing or rhonchi  Abd - S/NT/ND/+BS.   Ext - 2+BDP and radial pulses. No LE edema.   Neuro - PERRL, EOMI, no nystagmus, eyebrow raise, facial sensation, hearing, m of mastication, smile, palatal raise, shoulder shrug, tongue protrusion symmetric and intact. 5/5 BUE and BLE strength. Sensation to light touch intact throughout. 2+ DTRs. Normal gait.   MSK - No spinal tenderness to palpation. Normal gait.   Skin - No rash.  Protective Sensation (w/ 10 gram monofilament):  Right: Intact  Left: Intact    Visual Inspection:  Normal -  Bilateral    Pedal Pulses:   Right: Present  Left: Present    Posterior Tibialis Pulses:   Right:Present  Left: Present      LABS     Previous labs reviewed.      ASSESSMENT/PLAN     Candy Escobar is a 89 y.o. female with  1. Encounter to establish care  -medications reviewed and consolidated  -reviewed PMH, medications, HCM including vaccinations.   -reviewed most recent lab work. Reordered as needed. Discussed abnormalities with patient with time allowed for questions.   -reviewed clinic policy with patient including to arrive 15 mins before appointments, labs and results including expected turn around for results.   -outside records and consultants notes reviewed as time allowed. Updated treatment team with name of other providers.   -reviewed and confirmed patients contact information, preferred pharmacy etc.   -AVS provided after visit to summarized things discussed.   -The following assessments were completed:  Living Situation  CAGE  Depression Screening  Timed Get Up and Go  Cognitive Function Screening-Minicog   Nutrition Screening  ADL Screening      2. Major depressive disorder with single episode, in partial remission  Overview:  Well controlled  Continue current treatment       3. Seasonal allergies  Overview:  Antihistamine and flonase as needed  Avoid allergens      4. Hypertension associated with diabetes  Overview:  BP very well  controlled  Continue current management  Limit salt intake  Encouraged cardio exercise  Encouraged heart healthy diet    Orders:  -     Comprehensive Metabolic Panel; Future; Expected date: 04/17/2023  -     TSH; Future; Expected date: 04/17/2023    5. Dyslipidemia associated with type 2 diabetes mellitus  Overview:  Encouraged heart healthy diet and cardio excercise      6. Mixed incontinence urge and stress  Overview:  Stable   Encouraged incontinence pads.   counseled about fall prevention      7. Controlled type 2 diabetes mellitus without complication, without long-term current use of insulin  -     Hemoglobin A1C; Future; Expected date: 04/17/2023  -     Microalbumin/Creatinine Ratio, Urine; Future; Expected date: 04/17/2023    8. Gastroesophageal reflux disease, unspecified whether esophagitis present  Overview:  Well controlled with diet   Limit foods known to exacerbate symptoms       9. Status post total right knee replacement  Overview:  Pain well controlled  Encouraged tylenol as needed and topical analgesics      10. Osteoarthritis of right knee, unspecified osteoarthritis type  Overview:  S/p knee replacement  Pain well controlled      11. Peripheral vascular disease, unspecified  Overview:  Not on statin. Part of a study.   On baby ASA       12. Chronic kidney disease, stage 3a  Overview:  Stable renal function  Advised adequate hydration.   Avoid nephrotoxins including NSAIDs.   Continue to monitor      13. Inflammatory spondylopathy of cervical region  Overview:  RA. No symptoms       14. Other nonthrombocytopenic purpura  -     CBC Auto Differential; Future; Expected date: 04/17/2023    15. Simple chronic bronchitis  No symptoms     16. Rheumatoid arthritis, involving unspecified site, unspecified whether rheumatoid factor present  Overview:  Symptoms well controlled      ACP: discussion had about ACP to include definition of ACP, HCP, CODE STATUS. Paperwork handed to patient, to review, discuss  with family and return it to clinic to be scanned into the chart.   Advance Care Planning       My total time spent on this encounter was at least 60 minutes which included  the following activities: preparing to see the patient, performing a medically appropriate and/or evaluation, counseling and educating the patient and family/caregiver, ordering medications, tests, or procedures, referring and communicating with other healthcare providers, documenting clinical information in the electronic or other health record. This time is independent and non-overlapping.         RTC in 6 months, sooner if needed and depending on labs.    Pamela Luevano MD  Board Certified Internist/Geriatrician  [facility]

## 2023-04-24 ENCOUNTER — TELEPHONE (OUTPATIENT)
Dept: PRIMARY CARE CLINIC | Facility: CLINIC | Age: 88
End: 2023-04-24
Payer: MEDICARE

## 2023-04-24 NOTE — TELEPHONE ENCOUNTER
----- Message from Pamela Luevano MD sent at 4/24/2023  9:16 AM CDT -----  Labs show: mild elevation in urinary protein.   Mildly low sodium.   Normal kidney and liver function.   CBC shows mild and chronic anemia but much better than its been in the past.   Hgb a1c  Thyroid is in the normal range.

## 2023-05-04 ENCOUNTER — TELEPHONE (OUTPATIENT)
Dept: FAMILY MEDICINE | Facility: CLINIC | Age: 88
End: 2023-05-04
Payer: MEDICARE

## 2023-05-04 NOTE — TELEPHONE ENCOUNTER
10:09am:  pt.'s granddaughter, Shell, aware that pt. And spouse missed appt.'s with Dr. Pat today.  She requests office call [phone] ([facility]) to speak with pt.     10:10am:  Pt. Confirmed she and her , Mr. Escobar, will be seeing [facility] for care.

## 2023-06-05 ENCOUNTER — TELEPHONE (OUTPATIENT)
Dept: NEUROLOGY | Facility: CLINIC | Age: 88
End: 2023-06-05
Payer: MEDICARE

## 2023-06-05 ENCOUNTER — TELEPHONE (OUTPATIENT)
Dept: PRIMARY CARE CLINIC | Facility: CLINIC | Age: 88
End: 2023-06-05

## 2023-06-05 ENCOUNTER — OFFICE VISIT (OUTPATIENT)
Dept: PRIMARY CARE CLINIC | Facility: CLINIC | Age: 88
End: 2023-06-05
Payer: MEDICARE

## 2023-06-05 VITALS
SYSTOLIC BLOOD PRESSURE: 120 MMHG | HEART RATE: 78 BPM | DIASTOLIC BLOOD PRESSURE: 72 MMHG | OXYGEN SATURATION: 98 % | TEMPERATURE: 98 F | BODY MASS INDEX: 22.75 KG/M2 | RESPIRATION RATE: 18 BRPM | WEIGHT: 115.88 LBS | HEIGHT: 60 IN

## 2023-06-05 DIAGNOSIS — M25.561 CHRONIC PAIN OF RIGHT KNEE: ICD-10-CM

## 2023-06-05 DIAGNOSIS — R20.2 NUMBNESS AND TINGLING IN LEFT HAND: Primary | ICD-10-CM

## 2023-06-05 DIAGNOSIS — R20.0 NUMBNESS AND TINGLING IN LEFT HAND: Primary | ICD-10-CM

## 2023-06-05 DIAGNOSIS — G89.29 CHRONIC PAIN OF RIGHT KNEE: ICD-10-CM

## 2023-06-05 PROCEDURE — 3072F LOW RISK FOR RETINOPATHY: CPT | Mod: CPTII,S$GLB,, | Performed by: INTERNAL MEDICINE

## 2023-06-05 PROCEDURE — 1101F PR PT FALLS ASSESS DOC 0-1 FALLS W/OUT INJ PAST YR: ICD-10-PCS | Mod: CPTII,S$GLB,, | Performed by: INTERNAL MEDICINE

## 2023-06-05 PROCEDURE — 1125F PR PAIN SEVERITY QUANTIFIED, PAIN PRESENT: ICD-10-PCS | Mod: CPTII,S$GLB,, | Performed by: INTERNAL MEDICINE

## 2023-06-05 PROCEDURE — 3072F PR LOW RISK FOR RETINOPATHY: ICD-10-PCS | Mod: CPTII,S$GLB,, | Performed by: INTERNAL MEDICINE

## 2023-06-05 PROCEDURE — 3288F FALL RISK ASSESSMENT DOCD: CPT | Mod: CPTII,S$GLB,, | Performed by: INTERNAL MEDICINE

## 2023-06-05 PROCEDURE — 1159F PR MEDICATION LIST DOCUMENTED IN MEDICAL RECORD: ICD-10-PCS | Mod: CPTII,S$GLB,, | Performed by: INTERNAL MEDICINE

## 2023-06-05 PROCEDURE — 1101F PT FALLS ASSESS-DOCD LE1/YR: CPT | Mod: CPTII,S$GLB,, | Performed by: INTERNAL MEDICINE

## 2023-06-05 PROCEDURE — 1125F AMNT PAIN NOTED PAIN PRSNT: CPT | Mod: CPTII,S$GLB,, | Performed by: INTERNAL MEDICINE

## 2023-06-05 PROCEDURE — 99999 PR PBB SHADOW E&M-EST. PATIENT-LVL IV: CPT | Mod: PBBFAC,,, | Performed by: INTERNAL MEDICINE

## 2023-06-05 PROCEDURE — 1159F MED LIST DOCD IN RCRD: CPT | Mod: CPTII,S$GLB,, | Performed by: INTERNAL MEDICINE

## 2023-06-05 PROCEDURE — 99213 OFFICE O/P EST LOW 20 MIN: CPT | Mod: S$GLB,,, | Performed by: INTERNAL MEDICINE

## 2023-06-05 PROCEDURE — 99999 PR PBB SHADOW E&M-EST. PATIENT-LVL IV: ICD-10-PCS | Mod: PBBFAC,,, | Performed by: INTERNAL MEDICINE

## 2023-06-05 PROCEDURE — 3288F PR FALLS RISK ASSESSMENT DOCUMENTED: ICD-10-PCS | Mod: CPTII,S$GLB,, | Performed by: INTERNAL MEDICINE

## 2023-06-05 PROCEDURE — 1160F RVW MEDS BY RX/DR IN RCRD: CPT | Mod: CPTII,S$GLB,, | Performed by: INTERNAL MEDICINE

## 2023-06-05 PROCEDURE — 99213 PR OFFICE/OUTPT VISIT, EST, LEVL III, 20-29 MIN: ICD-10-PCS | Mod: S$GLB,,, | Performed by: INTERNAL MEDICINE

## 2023-06-05 PROCEDURE — 1157F PR ADVANCE CARE PLAN OR EQUIV PRESENT IN MEDICAL RECORD: ICD-10-PCS | Mod: CPTII,S$GLB,, | Performed by: INTERNAL MEDICINE

## 2023-06-05 PROCEDURE — 1160F PR REVIEW ALL MEDS BY PRESCRIBER/CLIN PHARMACIST DOCUMENTED: ICD-10-PCS | Mod: CPTII,S$GLB,, | Performed by: INTERNAL MEDICINE

## 2023-06-05 PROCEDURE — 1157F ADVNC CARE PLAN IN RCRD: CPT | Mod: CPTII,S$GLB,, | Performed by: INTERNAL MEDICINE

## 2023-06-05 RX ORDER — GABAPENTIN 300 MG/1
300 CAPSULE ORAL 2 TIMES DAILY
Qty: 180 CAPSULE | Refills: 3 | Status: SHIPPED | OUTPATIENT
Start: 2023-06-05 | End: 2023-09-05 | Stop reason: SDUPTHER

## 2023-06-05 NOTE — TELEPHONE ENCOUNTER
I informed the pt of her neurology appt with Lina Leblanc NP and that Dr. Gonzales's office would call her to schedule her nerve conduction study. Patient was argumentative, repeatedly asked why she was seeing a neurologist and asked when she would have the nerve conduction test.  Patient continued to ask the same questions, she abruptly stated she would call a neurologist herself to schedule her test and hung up.

## 2023-06-05 NOTE — TELEPHONE ENCOUNTER
----- Message from Adele Givens sent at 6/5/2023  9:54 AM CDT -----  Regarding: EMG appointment  Contact: Gato with [employer]  Type:  Sooner Appointment Request    Caller is requesting a sooner appointment.  Caller declined first available appointment listed below.  Caller will not accept being placed on the waitlist and is requesting a message be sent to doctor.    Name of Caller:  Gato with [facility]  When is the first available appointment?    Symptoms:  EMG and doctor// pain and numbness in left arm  Best Call Back Number:  [phone] (home)   Additional Information:  Patient is being referred by Dr Lorna Luevano. Please call patient to advise.Thanks!

## 2023-06-05 NOTE — PROGRESS NOTES
INTERNAL MEDICINE PROGRESS/URGENT CARE NOTE    CHIEF COMPLAINT     Chief Complaint   Patient presents with    Numbness       HPI     Candy Escobar is a 89 y.o.  female who presents for an urgent/follow up visit today.  Here today with many complaints.     Main one is numbness of th left hand. Had carpel tunnel surgery years ago. Very difficult history to obtain. Says the left arm numbness is now for a few months. Also has pain in the hands which is her arthritis.     Other concerns are: dry skin which she uses vaseline. Wants abx for a skin scab. Advise triple abx OTC.no infection whatsover at this time. No indication for abx.     Her  has been on hospice per our order. Shes had some concerns with the [employer] company. Says she asked im to turn the mattress and he was reluctant.     Past Medical History:  Past Medical History:   Diagnosis Date    Allergy     Seasonal    Anxiety     Cataract     Depression     Diabetes mellitus     diet controlled    Diabetes mellitus, type 2     Diverticulosis     Encounter for blood transfusion     Fever blister     GERD (gastroesophageal reflux disease)     Hyperlipidemia     Hypertension 11/12/2014    Nuclear sclerosis - Both Eyes 07/16/2013    Osteoarthritis     bilateral knee replacements, and L ankle replacement in 2000    Osteopenia     Osteoporosis     Stroke     2008 TIA       Home Medications:  Prior to Admission medications    Medication Sig Start Date End Date Taking? Authorizing Provider   albuterol (VENTOLIN HFA) 90 mcg/actuation inhaler Inhale 2 puffs into the lungs every 6 (six) hours as needed for Wheezing or Shortness of Breath. Rescue 11/5/21   Bhargavi Cristina MD   amoxicillin (AMOXIL) 500 MG capsule Take 1 capsule (500 mg total) by mouth 3 (three) times daily. 12/27/22   Cassius Valdez MD   aspirin (ECOTRIN) 81 MG EC tablet Take 1 tablet (81 mg total) by mouth 2 (two) times a day.  Patient taking differently: Take 81 mg by mouth 2 (two) times a  day. Pt only takes occasionally. 10/1/21 10/1/22  Dez Oglesby MD   FLUoxetine 10 MG capsule Take 2 capsules (20 mg total) by mouth once daily.  Patient taking differently: Take 10 mg by mouth once daily. 8/22/22 8/22/23  Francisco Munson MD   gabapentin (NEURONTIN) 300 MG capsule TAKE 1 CAPSULE BY MOUTH  TWICE DAILY 8/8/22   Bhargavi Cristina MD   omeprazole (PRILOSEC) 20 MG capsule TAKE 2 CAPSULES BY MOUTH  DAILY 8/8/22   Bhargavi Cristina MD   ondansetron (ZOFRAN-ODT) 4 MG TbDL Take one 4mg tablet with Tramadol for nausea 1/11/22   Tash Angel PA-C   psyllium seed, with sugar, (FIBER ORAL) Take by mouth.    Historical Provider       Review of Systems:  Review of Systems      Advance Care Planning         PHYSICAL EXAM     /72 (BP Location: Left arm, Patient Position: Sitting, BP Method: Medium (Manual))   Pulse 78   Temp 98.2 °F (36.8 °C) (Oral)   Resp 18   Ht 5' (1.524 m)   Wt 52.6 kg (115 lb 13.6 oz)   SpO2 98%   BMI 22.63 kg/m²     GEN - A+OX4, NAD   Skin - No rash.small scab on right leg. Dry skin    LABS       ASSESSMENT/PLAN     Candy Escobar is a 89 y.o. female with  1. Numbness and tingling in left hand  Previously with carpel tunnel. S/p surgery.   -     EMG W/ ULTRASOUND AND NERVE CONDUCTION TEST 1 Extremity; Future  -     Ambulatory referral/consult to Neurology; Future; Expected date: 06/12/2023    2. Chronic pain of right knee  -     gabapentin (NEURONTIN) 300 MG capsule; Take 1 capsule (300 mg total) by mouth 2 (two) times daily.  Dispense: 180 capsule; Refill: 3        RTC in 6 months, sooner if needed and depending on labs.    Pamela Luevano MD  Board Certified Internist/Geriatrician  [facility]

## 2023-06-05 NOTE — TELEPHONE ENCOUNTER
Spoke with patient to offer an EMG appt tomorrow with Dr. Caruso.  Patient does not wish to schedule at this time until the appt tanner Neurologist in [facility].

## 2023-06-07 ENCOUNTER — OFFICE VISIT (OUTPATIENT)
Dept: NEUROLOGY | Facility: CLINIC | Age: 88
End: 2023-06-07
Payer: MEDICARE

## 2023-06-07 VITALS
HEART RATE: 71 BPM | SYSTOLIC BLOOD PRESSURE: 164 MMHG | BODY MASS INDEX: 22.07 KG/M2 | HEIGHT: 61 IN | WEIGHT: 116.88 LBS | DIASTOLIC BLOOD PRESSURE: 78 MMHG

## 2023-06-07 DIAGNOSIS — R20.2 NUMBNESS AND TINGLING IN LEFT HAND: ICD-10-CM

## 2023-06-07 DIAGNOSIS — R20.0 NUMBNESS AND TINGLING IN LEFT HAND: ICD-10-CM

## 2023-06-07 PROCEDURE — 3072F LOW RISK FOR RETINOPATHY: CPT | Mod: CPTII,S$GLB,, | Performed by: STUDENT IN AN ORGANIZED HEALTH CARE EDUCATION/TRAINING PROGRAM

## 2023-06-07 PROCEDURE — 3288F FALL RISK ASSESSMENT DOCD: CPT | Mod: CPTII,S$GLB,, | Performed by: STUDENT IN AN ORGANIZED HEALTH CARE EDUCATION/TRAINING PROGRAM

## 2023-06-07 PROCEDURE — 1159F PR MEDICATION LIST DOCUMENTED IN MEDICAL RECORD: ICD-10-PCS | Mod: CPTII,S$GLB,, | Performed by: STUDENT IN AN ORGANIZED HEALTH CARE EDUCATION/TRAINING PROGRAM

## 2023-06-07 PROCEDURE — 1157F ADVNC CARE PLAN IN RCRD: CPT | Mod: CPTII,S$GLB,, | Performed by: STUDENT IN AN ORGANIZED HEALTH CARE EDUCATION/TRAINING PROGRAM

## 2023-06-07 PROCEDURE — 1159F MED LIST DOCD IN RCRD: CPT | Mod: CPTII,S$GLB,, | Performed by: STUDENT IN AN ORGANIZED HEALTH CARE EDUCATION/TRAINING PROGRAM

## 2023-06-07 PROCEDURE — 99999 PR PBB SHADOW E&M-EST. PATIENT-LVL III: CPT | Mod: PBBFAC,,, | Performed by: STUDENT IN AN ORGANIZED HEALTH CARE EDUCATION/TRAINING PROGRAM

## 2023-06-07 PROCEDURE — 1160F RVW MEDS BY RX/DR IN RCRD: CPT | Mod: CPTII,S$GLB,, | Performed by: STUDENT IN AN ORGANIZED HEALTH CARE EDUCATION/TRAINING PROGRAM

## 2023-06-07 PROCEDURE — 99203 PR OFFICE/OUTPT VISIT, NEW, LEVL III, 30-44 MIN: ICD-10-PCS | Mod: S$GLB,,, | Performed by: STUDENT IN AN ORGANIZED HEALTH CARE EDUCATION/TRAINING PROGRAM

## 2023-06-07 PROCEDURE — 1101F PR PT FALLS ASSESS DOC 0-1 FALLS W/OUT INJ PAST YR: ICD-10-PCS | Mod: CPTII,S$GLB,, | Performed by: STUDENT IN AN ORGANIZED HEALTH CARE EDUCATION/TRAINING PROGRAM

## 2023-06-07 PROCEDURE — 1160F PR REVIEW ALL MEDS BY PRESCRIBER/CLIN PHARMACIST DOCUMENTED: ICD-10-PCS | Mod: CPTII,S$GLB,, | Performed by: STUDENT IN AN ORGANIZED HEALTH CARE EDUCATION/TRAINING PROGRAM

## 2023-06-07 PROCEDURE — 1157F PR ADVANCE CARE PLAN OR EQUIV PRESENT IN MEDICAL RECORD: ICD-10-PCS | Mod: CPTII,S$GLB,, | Performed by: STUDENT IN AN ORGANIZED HEALTH CARE EDUCATION/TRAINING PROGRAM

## 2023-06-07 PROCEDURE — 99203 OFFICE O/P NEW LOW 30 MIN: CPT | Mod: S$GLB,,, | Performed by: STUDENT IN AN ORGANIZED HEALTH CARE EDUCATION/TRAINING PROGRAM

## 2023-06-07 PROCEDURE — 1125F PR PAIN SEVERITY QUANTIFIED, PAIN PRESENT: ICD-10-PCS | Mod: CPTII,S$GLB,, | Performed by: STUDENT IN AN ORGANIZED HEALTH CARE EDUCATION/TRAINING PROGRAM

## 2023-06-07 PROCEDURE — 99999 PR PBB SHADOW E&M-EST. PATIENT-LVL III: ICD-10-PCS | Mod: PBBFAC,,, | Performed by: STUDENT IN AN ORGANIZED HEALTH CARE EDUCATION/TRAINING PROGRAM

## 2023-06-07 PROCEDURE — 1125F AMNT PAIN NOTED PAIN PRSNT: CPT | Mod: CPTII,S$GLB,, | Performed by: STUDENT IN AN ORGANIZED HEALTH CARE EDUCATION/TRAINING PROGRAM

## 2023-06-07 PROCEDURE — 3072F PR LOW RISK FOR RETINOPATHY: ICD-10-PCS | Mod: CPTII,S$GLB,, | Performed by: STUDENT IN AN ORGANIZED HEALTH CARE EDUCATION/TRAINING PROGRAM

## 2023-06-07 PROCEDURE — 3288F PR FALLS RISK ASSESSMENT DOCUMENTED: ICD-10-PCS | Mod: CPTII,S$GLB,, | Performed by: STUDENT IN AN ORGANIZED HEALTH CARE EDUCATION/TRAINING PROGRAM

## 2023-06-07 PROCEDURE — 1101F PT FALLS ASSESS-DOCD LE1/YR: CPT | Mod: CPTII,S$GLB,, | Performed by: STUDENT IN AN ORGANIZED HEALTH CARE EDUCATION/TRAINING PROGRAM

## 2023-06-07 NOTE — PROGRESS NOTES
[facility] - NEUROLOGY [address]TH [address] [facility], [place]    Date: 6/7/23  Patient Name: Candy Escobar   MRN: [id number]   PCP: Pamela Luevano  Referring Provider: Pamela Luevano MD    Assessment:   Candy Escobar is a 89 y.o. female presenting for evaluation of hand numbness.  Agree with concern for peripheral nerve compression.  Will reschedule EMG/NCS.  For symptoms, advised patient to take gabapentin in the morning as well as the evening - she was previously just taking both doses at night.     Plan:     Problem List Items Addressed This Visit          Other    Numbness and tingling in left hand       Meron Ocampo MD  [facility]   Department of Neurology/Epilepsy      Patient note was created using MModal Dictation.  Any errors in syntax or even information may not have been identified and edited on initial review prior to signing this note.  Subjective:   Patient seen in consultation at the request of Pamela Luevano MD for the evaluation of left hand numbness. A copy of this note will be sent to the referring physician.        HPI:   Ms. Candy Escobar is a 89 y.o. female presenting for evaluation of hand numbness.      She reports numbness in her hand, worse left compared to right.  Symptoms have been occurring for at least several months, and the right hand has started to also have numbness.  She reports this is present throughout all her fingers, and on the left side she has pain extending into her forearm.  She does not feel weak per se, but notes that it is difficult to coordinate her movements due to the numbness.  She denies any neck pain or radicular type pain.She does have a history of carpal tunnel in the left hand s/p surgery years ago.      PAST MEDICAL HISTORY:  Past Medical History:   Diagnosis Date    Allergy     Seasonal    Anxiety     Cataract     Depression     Diabetes mellitus     diet controlled    Diabetes  mellitus, type 2     Diverticulosis     Encounter for blood transfusion     Fever blister     GERD (gastroesophageal reflux disease)     Hyperlipidemia     Hypertension 11/12/2014    Nuclear sclerosis - Both Eyes 07/16/2013    Osteoarthritis     bilateral knee replacements, and L ankle replacement in 2000    Osteopenia     Osteoporosis     Stroke     2008 TIA       PAST SURGICAL HISTORY:  Past Surgical History:   Procedure Laterality Date    ANKLE SURGERY  1951    ankle replacement, left    ARTHROSCOPY OF ANKLE WITH DEBRIDEMENT Left 09/29/2021    Procedure: ARTHROSCOPY, ANKLE, WITH DEBRIDEMENT - LEFT ANKLE - CYSTO TUBING - BACTISURE - VANC POWDER;  Surgeon: Sadiq Lee MD;  Location: [facility] OR [address] [place] [address];  Service: Orthopedics;  Laterality: Left;    BLADDER SUSPENSION      CARPAL TUNNEL RELEASE      CATARACT EXTRACTION Left 2018    CATARACT EXTRACTION W/  INTRAOCULAR LENS IMPLANT Right 03/15/2022    Procedure: EXTRACTION, CATARACT, WITH IOL INSERTION;  Surgeon: Jorge A Miller MD;  Location: [facility];  Service: Ophthalmology;  Laterality: Right;    COLONOSCOPY      FOOT SURGERY      HYSTERECTOMY      left ovary intact    INTRAOCULAR PROSTHESES INSERTION Left 10/30/2018    Procedure: INSERTION, IOL PROSTHESIS;  Surgeon: Jorge A Miller MD;  Location: [facility] OR [address] [place] [address];  Service: Ophthalmology;  Laterality: Left;    JOINT REPLACEMENT      left ankle and knee arthroplasty    KNEE SURGERY  2008    left TKR    KNEE SURGERY  11/11/2014    right TKR    PHACOEMULSIFICATION OF CATARACT Left 10/30/2018    Procedure: PHACOEMULSIFICATION, CATARACT;  Surgeon: Jorge A Miller MD;  Location: [facility] OR [address] [place] [address];  Service: Ophthalmology;  Laterality: Left;    SHOULDER SURGERY  2009    right arthroscopy    TONSILLECTOMY         CURRENT MEDS:  Current Outpatient Medications   Medication Sig Dispense Refill    albuterol (VENTOLIN HFA) 90 mcg/actuation inhaler Inhale 2 puffs into the lungs every 6 (six) hours as  needed for Wheezing or Shortness of Breath. Rescue 18 g 3    amoxicillin (AMOXIL) 500 MG capsule Take 1 capsule (500 mg total) by mouth 3 (three) times daily. 90 capsule 11    aspirin (ECOTRIN) 81 MG EC tablet Take 1 tablet (81 mg total) by mouth 2 (two) times a day. (Patient taking differently: Take 81 mg by mouth 2 (two) times a day. Pt only takes occasionally.) 90 tablet 0    FLUoxetine 10 MG capsule Take 2 capsules (20 mg total) by mouth once daily. (Patient taking differently: Take 10 mg by mouth once daily.) 180 capsule 3    gabapentin (NEURONTIN) 300 MG capsule Take 1 capsule (300 mg total) by mouth 2 (two) times daily. 180 capsule 3    omeprazole (PRILOSEC) 20 MG capsule TAKE 2 CAPSULES BY MOUTH  DAILY 180 capsule 3    ondansetron (ZOFRAN-ODT) 4 MG TbDL Take one 4mg tablet with Tramadol for nausea 30 tablet 0    psyllium seed, with sugar, (FIBER ORAL) Take by mouth.       No current facility-administered medications for this visit.     Facility-Administered Medications Ordered in Other Visits   Medication Dose Route Frequency Provider Last Rate Last Admin    0.9%  NaCl infusion   Intravenous Continuous Jorge A Miller MD   Stopped at 10/30/18 0918    cyclopentolate 1% ophthalmic solution 1 drop  1 drop Left Eye On Call Procedure Jorge A Miller MD   1 drop at 10/30/18 0725    phenylephrine HCL 2.5% ophthalmic solution 1 drop  1 drop Left Eye On Call Procedure Jorge A Miller MD   1 drop at 10/30/18 0724    proparacaine 0.5 % ophthalmic solution 1 drop  1 drop Left Eye On Call Procedure Jorge A Miller MD   1 drop at 10/30/18 0712    tropicamide 1% ophthalmic solution 1 drop  1 drop Left Eye On Call Procedure Jorge A Miller MD   1 drop at 10/30/18 0726       ALLERGIES:  Review of patient's allergies indicates:   Allergen Reactions    Propofol analogues Other (See Comments)     Low blood pressure, tremors    Oxycodone Nausea And Vomiting    Pcn [penicillins] Nausea Only     "Tetracyclines Nausea Only       FAMILY HISTORY:  Family History   Problem Relation Age of Onset    Diabetes Mother     Diabetes Father     Heart disease Father     Diabetes Sister     Polychondritis Sister     Polychondritis Unknown     Cancer Neg Hx     Amblyopia Neg Hx     Blindness Neg Hx     Cataracts Neg Hx     Glaucoma Neg Hx     Hypertension Neg Hx     Macular degeneration Neg Hx     Retinal detachment Neg Hx     Strabismus Neg Hx     Stroke Neg Hx     Thyroid disease Neg Hx     Melanoma Neg Hx        SOCIAL HISTORY:  Social History     Tobacco Use    Smoking status: Former     Packs/day: 0.50     Years: 9.00     Pack years: 4.50     Types: Cigarettes     Start date:      Quit date: 1981     Years since quittin.1    Smokeless tobacco: Never   Substance Use Topics    Alcohol use: Not Currently    Drug use: No       Review of Systems:  12 system review of systems is negative except for the symptoms mentioned in HPI.      Objective:     Vitals:    23 0817   BP: (!) 164/78   Pulse: 71   Weight: 53 kg (116 lb 13.5 oz)   Height: 5' 1" (1.549 m)     General: NAD, well nourished   Eyes: no tearing, discharge, no erythema   ENT: moist mucous membranes of the oral cavity, nares patent    Neck: Supple, full range of motion  Cardiovascular: Warm and well perfused, pulses equal and symmetrical  Lungs: Normal work of breathing, normal chest wall excursions  Skin: No rash, lesions, or breakdown on exposed skin  Psychiatry: Mood and affect are appropriate   Abdomen: soft, non tender, non distended  Extremeties: No cyanosis, clubbing or edema.    Neurological   MENTAL STATUS: Alert and oriented to person, place, and time. Attention and concentration within normal limits. Speech without dysarthria, able to name and repeat without difficulty. Recent and remote memory within normal limits   CRANIAL NERVES: Visual fields intact. PERRL. EOMI. Facial sensation intact. Face symmetrical. Hearing grossly intact. " Full shoulder shrug bilaterally. Tongue protrudes midline   SENSORY: numbness/paresthesias fingertips in both hands (patient found difficult to tell whether it was worse with   MOTOR: Decreased bulk thenar eminence left hand compared to right.  Thumb abduction weak 4-/5.  SA/EE/EF 5/5.  5/5 iliopsoas, knee extension/flexion, foot dorsi/plantarflexion bilaterally.    REFLEXES: Symmetric and 1+ throughout. Toes down going bilaterally.   CEREBELLAR/COORDINATION/GAIT: Gait steady with normal arm swing and stride length.  Heel to shin intact. Finger to nose intact. Normal rapid alternating movements.

## 2023-06-07 NOTE — PATIENT INSTRUCTIONS
VISIT FOLLOW UP    It was nice to see you today.  Here is what we discussed at your visit:    - Try taking gabapentin in the morning and in the evening  - EMG/NCS (nerve test) to look at the nerves in your arms   - Follow up after EMG to go over results    Dr. HELLER's contact information: office phone [phone], or contact via [employer]

## 2023-06-08 ENCOUNTER — TELEPHONE (OUTPATIENT)
Dept: CARDIOLOGY | Facility: CLINIC | Age: 88
End: 2023-06-08
Payer: MEDICARE

## 2023-06-08 ENCOUNTER — RESEARCH ENCOUNTER (OUTPATIENT)
Dept: CARDIOLOGY | Facility: CLINIC | Age: 88
End: 2023-06-08
Payer: MEDICARE

## 2023-06-08 ENCOUNTER — TELEPHONE (OUTPATIENT)
Dept: NEUROLOGY | Facility: CLINIC | Age: 88
End: 2023-06-08
Payer: MEDICARE

## 2023-06-08 NOTE — PROGRESS NOTES
Study: PREVENTABLE ([id number])  Sponsor: RAZIAI/JOHN  PI: Jay Sarabia MD  Date: 2023      Candy Escobar is a participant in the PREVENTABLE Trial, which randomizes patients to atorvastatin 40mg or placebo (double blind) to study if statin use in older adults can prevent dementia, disability, and heart disease. This patient is being evaluated prior to re-ordering their trial medication.     If yes to any, study drug cannot be renewed at this time.   Has the participant ? no  Is the participant currently known to be taking an open-label statin? no  If any of the following are present, verify that patient is still eligible for study drug renewal at this time:  Is there currently unexplained persistent transaminase elevations? no  Is there current active liver disease? no  Has the participant experienced markedly elevated creatine phosphokinase (CPK) levels or myopathy while taking study drug? no*  Has the participant experienced any other adverse effects while taking study drug that might prevent the participant from being able to take Atorvastatin 40mg once daily in a reasonably safe manner? no  Is there an active order in the medical record for an open-label statin medication? no  Is the participant now chronically using any of the following medications: clarithromycin, colchicine, cyclosporine, darunavir, elbasvir, fenofibrate, fosamprenavir, gemfibrozil, glecaprevir, grazoprevir, itraconazole, lopinavir, nelfinavir, niacin (dose > 1g/day), pibrentasvir, ritonavir, saquinavir, simeprevir, tipranavir? no      I, Jay Sarabia MD, confirm that the patient Candy Escobar meets criteria for drug renewal. The electronic data capture system for the trial will be updated accordingly. Please contact [phone] (PREVENTABLE [facility] Phone Number) with any questions or concerns.

## 2023-06-08 NOTE — TELEPHONE ENCOUNTER
----- Message from Rai Forman sent at 6/7/2023  8:50 AM CDT -----  Regarding: EMG request  Good morning,    Mrs. Escobar was seen in Neurology clinic today and Dr. Ocampo has asked that her EMG be rescheduled.  Apparently she was scheduled but canceled. I let Dr. HELLER and PATRICK Meza know that I was sending a message to you all requesting assistance with scheduling. Can someone please reach out to the patient to reschedule her study?  Please let me know if there is anything further needed.     Thanks,    SAMY [email or web address].  Sr. Neurodiagnostic Technician  EMG Procedural Lab  P [phone]  F [phone]

## 2023-06-09 ENCOUNTER — TELEPHONE (OUTPATIENT)
Dept: RESEARCH | Facility: HOSPITAL | Age: 88
End: 2023-06-09
Payer: MEDICARE

## 2023-06-09 NOTE — TELEPHONE ENCOUNTER
[facility] IRB# [id number]    Called patient to confirm if she was still using the study drug and if it was received previously. Spoke with the granddaughter who said I could try calling back after lunch time.    Mobile # listed belongs to granddaughters cell. Patient and her spouse got rid of their cell phones.

## 2023-06-12 ENCOUNTER — TELEPHONE (OUTPATIENT)
Dept: NEUROLOGY | Facility: CLINIC | Age: 88
End: 2023-06-12
Payer: MEDICARE

## 2023-06-12 NOTE — TELEPHONE ENCOUNTER
Message received from Jazz Grissom, RNCST with Dr. SILVIA Gonzales's office requesting that patient be rescheduled for her EMG procedure here at the [facility]. Spoke with someone at her home in regards to getting her scheduled. He informed me that she was not home but she should be back later on and asked that I call back.

## 2023-06-14 ENCOUNTER — TELEPHONE (OUTPATIENT)
Dept: RESEARCH | Facility: HOSPITAL | Age: 88
End: 2023-06-14
Payer: MEDICARE

## 2023-06-14 NOTE — TELEPHONE ENCOUNTER
PREVENTABLE-    Called Mrs. Escobar to confirm if she was still taking the study drug. She confirmed yes she is still taking it and has about 3 weeks left remaining before needing a refill. She also mentioned to me there was a significant drop in her memory recently.

## 2023-06-21 DIAGNOSIS — K21.9 GASTROESOPHAGEAL REFLUX DISEASE WITHOUT ESOPHAGITIS: ICD-10-CM

## 2023-06-22 RX ORDER — OMEPRAZOLE 20 MG/1
CAPSULE, DELAYED RELEASE ORAL
Qty: 180 CAPSULE | Refills: 1 | Status: SHIPPED | OUTPATIENT
Start: 2023-06-22 | End: 2023-12-07

## 2023-06-22 NOTE — TELEPHONE ENCOUNTER
Refill Routing Note   Medication(s) are not appropriate for processing by [facility] for the following reason(s):      Non-participating provider    [facility] action(s):  Route Care Due:  None identified          Appointments  past 12m or future 3m with PCP    Date Provider   Last Visit   11/5/2021 Bhargavi Cristina MD   Next Visit   Visit date not found Bhargavi Cristina MD   ED visits in past 90 days: 0        Note composed:1:52 PM 06/22/2023

## 2023-06-27 ENCOUNTER — TELEPHONE (OUTPATIENT)
Dept: PRIMARY CARE CLINIC | Facility: CLINIC | Age: 88
End: 2023-06-27
Payer: MEDICARE

## 2023-06-27 NOTE — TELEPHONE ENCOUNTER
Called pt, no answer. Per recording, not able to leave VM  Called Bettina. Bettina stated that her mom would probably want to schedule an appt for an AWV with Dr. Luevano.     Bettina would like to know when the AWV is scheduled, so please call her and let her know.

## 2023-07-11 NOTE — TELEPHONE ENCOUNTER
Called pt, no answer. Not able to leave message. Scheduled AWV for pt. Mailed appt letter with a note to call to cancel or reschedule appt.     Called pt, dtr. Bettina. Explained that pt is scheduled for AWV in August and appt letter is mailed with a note to call to reschedule if she needed to reschedule.    Bettina stated pt may want to keep PCP on [facility]. She will call to let me know.

## 2023-07-14 ENCOUNTER — PROCEDURE VISIT (OUTPATIENT)
Dept: NEUROLOGY | Facility: CLINIC | Age: 88
End: 2023-07-14
Payer: MEDICARE

## 2023-07-14 DIAGNOSIS — R20.2 NUMBNESS AND TINGLING IN LEFT HAND: ICD-10-CM

## 2023-07-14 DIAGNOSIS — R20.0 NUMBNESS AND TINGLING IN LEFT HAND: ICD-10-CM

## 2023-07-14 PROCEDURE — 95886 MUSC TEST DONE W/N TEST COMP: CPT | Mod: S$GLB,,, | Performed by: PSYCHIATRY & NEUROLOGY

## 2023-07-14 PROCEDURE — 95913 NRV CNDJ TEST 13/> STUDIES: CPT | Mod: S$GLB,,, | Performed by: PSYCHIATRY & NEUROLOGY

## 2023-07-14 PROCEDURE — 95886 PR EMG COMPLETE, W/ NERVE CONDUCTION STUDIES, 5+ MUSCLES: ICD-10-PCS | Mod: S$GLB,,, | Performed by: PSYCHIATRY & NEUROLOGY

## 2023-07-14 PROCEDURE — 95913 PR NERVE CONDUCTION STUDY; 13 OR MORE STUDIES: ICD-10-PCS | Mod: S$GLB,,, | Performed by: PSYCHIATRY & NEUROLOGY

## 2023-07-18 ENCOUNTER — TELEPHONE (OUTPATIENT)
Dept: PRIMARY CARE CLINIC | Facility: CLINIC | Age: 88
End: 2023-07-18
Payer: MEDICARE

## 2023-07-18 DIAGNOSIS — G56.03 BILATERAL CARPAL TUNNEL SYNDROME: Primary | ICD-10-CM

## 2023-07-18 NOTE — TELEPHONE ENCOUNTER
Patient's EMG shows severe bilateral carpal tunnel. Dr. Luevano would like to refer her to hand surgery specialist. Can start with wearing hand braces.

## 2023-07-18 NOTE — TELEPHONE ENCOUNTER
The referral will be sent via email to Dr. Castano c/o NACHO when I am able to print the order, demographic sheet, clinic note, etc.

## 2023-07-18 NOTE — TELEPHONE ENCOUNTER
Patient's daughter notified of EMG results and Dr. Luevano's recs. She will speak with her mother about going to see a hand specialist.

## 2023-07-20 ENCOUNTER — TELEPHONE (OUTPATIENT)
Dept: ORTHOPEDICS | Facility: CLINIC | Age: 88
End: 2023-07-20
Payer: MEDICARE

## 2023-07-20 ENCOUNTER — TELEPHONE (OUTPATIENT)
Dept: NEUROLOGY | Facility: CLINIC | Age: 88
End: 2023-07-20
Payer: MEDICARE

## 2023-07-20 NOTE — TELEPHONE ENCOUNTER
Called patient told her the referral was already put in for her to schedule the hand surgery. Gave her the number to orthopedics to call and make the surgery appointment [phone].

## 2023-07-20 NOTE — TELEPHONE ENCOUNTER
Spoke with the patient. Advised that if she has already had surgery on the left this would be a revision procedure. Booked appointment for 8/22 at the patient's convenience. Explained to her that this would be an appointment to meet Dr. Alcantara and review the procedures and sign consent forms and schedule surgery. Patient requests these forms be mailed to her and to have surgery on 8/22 or to sign consents virtually. I did explain to her that we do need to see her in person prior to having surgery as she is a new patient. Patient said to forget this appointment she'd find another carpal tunnel specialist. Requested I transfer her to someone else which I explained I can not do but she may call back to the main line. Patient requested I stay on the line but was silent, explained that I cannot remain on the line indefinitely. She said I may hang up and she would remain on the line.  All questions answered. Patient verbalized understanding and was thankful.     Malathi Gomez MS, OTC  Clinical & OR Assistant to [facility]. [facility]  [phone]

## 2023-07-20 NOTE — TELEPHONE ENCOUNTER
----- Message from Ronen Tran sent at 7/20/2023 12:18 PM CDT -----  Contact: pt  Type:  procedure     Who Called: pt   Would the patient rather a call back or a response via [facility]? call  Best Call Back Number: [phone]  Additional Information:   Pt requesting a call regarding having hand surgery

## 2023-08-21 DIAGNOSIS — M25.532 BILATERAL WRIST PAIN: Primary | ICD-10-CM

## 2023-08-21 DIAGNOSIS — M79.642 BILATERAL HAND PAIN: ICD-10-CM

## 2023-08-21 DIAGNOSIS — M25.531 BILATERAL WRIST PAIN: Primary | ICD-10-CM

## 2023-08-21 DIAGNOSIS — M79.641 BILATERAL HAND PAIN: ICD-10-CM

## 2023-08-23 ENCOUNTER — OFFICE VISIT (OUTPATIENT)
Dept: ORTHOPEDICS | Facility: CLINIC | Age: 88
End: 2023-08-23
Payer: MEDICARE

## 2023-08-23 ENCOUNTER — HOSPITAL ENCOUNTER (OUTPATIENT)
Dept: RADIOLOGY | Facility: HOSPITAL | Age: 88
Discharge: HOME | End: 2023-08-23
Attending: ORTHOPAEDIC SURGERY
Payer: MEDICARE

## 2023-08-23 VITALS — HEIGHT: 61 IN | BODY MASS INDEX: 21.9 KG/M2 | WEIGHT: 116 LBS

## 2023-08-23 DIAGNOSIS — M25.532 BILATERAL WRIST PAIN: ICD-10-CM

## 2023-08-23 DIAGNOSIS — G56.02 CARPAL TUNNEL SYNDROME ON LEFT: Primary | ICD-10-CM

## 2023-08-23 DIAGNOSIS — M79.641 BILATERAL HAND PAIN: ICD-10-CM

## 2023-08-23 DIAGNOSIS — M79.642 BILATERAL HAND PAIN: ICD-10-CM

## 2023-08-23 DIAGNOSIS — M25.531 BILATERAL WRIST PAIN: ICD-10-CM

## 2023-08-23 DIAGNOSIS — G56.03 CARPAL TUNNEL SYNDROME, BILATERAL: Primary | ICD-10-CM

## 2023-08-23 DIAGNOSIS — G56.03 BILATERAL CARPAL TUNNEL SYNDROME: ICD-10-CM

## 2023-08-23 PROCEDURE — 73110 XR WRIST COMPLETE 3 VIEWS BILATERAL: ICD-10-PCS | Mod: 26,50,, | Performed by: RADIOLOGY

## 2023-08-23 PROCEDURE — 73110 X-RAY EXAM OF WRIST: CPT | Mod: 26,50,, | Performed by: RADIOLOGY

## 2023-08-23 PROCEDURE — 73130 XR HAND COMPLETE 3 VIEWS BILATERAL: ICD-10-PCS | Mod: 26,50,, | Performed by: RADIOLOGY

## 2023-08-23 PROCEDURE — 3288F PR FALLS RISK ASSESSMENT DOCUMENTED: ICD-10-PCS | Mod: CPTII,S$GLB,, | Performed by: ORTHOPAEDIC SURGERY

## 2023-08-23 PROCEDURE — 73110 X-RAY EXAM OF WRIST: CPT | Mod: TC,50,PO

## 2023-08-23 PROCEDURE — 1157F PR ADVANCE CARE PLAN OR EQUIV PRESENT IN MEDICAL RECORD: ICD-10-PCS | Mod: CPTII,S$GLB,, | Performed by: ORTHOPAEDIC SURGERY

## 2023-08-23 PROCEDURE — 1101F PT FALLS ASSESS-DOCD LE1/YR: CPT | Mod: CPTII,S$GLB,, | Performed by: ORTHOPAEDIC SURGERY

## 2023-08-23 PROCEDURE — 73130 X-RAY EXAM OF HAND: CPT | Mod: TC,50,PO

## 2023-08-23 PROCEDURE — 1157F ADVNC CARE PLAN IN RCRD: CPT | Mod: CPTII,S$GLB,, | Performed by: ORTHOPAEDIC SURGERY

## 2023-08-23 PROCEDURE — 1125F PR PAIN SEVERITY QUANTIFIED, PAIN PRESENT: ICD-10-PCS | Mod: CPTII,S$GLB,, | Performed by: ORTHOPAEDIC SURGERY

## 2023-08-23 PROCEDURE — 3288F FALL RISK ASSESSMENT DOCD: CPT | Mod: CPTII,S$GLB,, | Performed by: ORTHOPAEDIC SURGERY

## 2023-08-23 PROCEDURE — 1159F PR MEDICATION LIST DOCUMENTED IN MEDICAL RECORD: ICD-10-PCS | Mod: CPTII,S$GLB,, | Performed by: ORTHOPAEDIC SURGERY

## 2023-08-23 PROCEDURE — 1159F MED LIST DOCD IN RCRD: CPT | Mod: CPTII,S$GLB,, | Performed by: ORTHOPAEDIC SURGERY

## 2023-08-23 PROCEDURE — 1101F PR PT FALLS ASSESS DOC 0-1 FALLS W/OUT INJ PAST YR: ICD-10-PCS | Mod: CPTII,S$GLB,, | Performed by: ORTHOPAEDIC SURGERY

## 2023-08-23 PROCEDURE — 99999 PR PBB SHADOW E&M-EST. PATIENT-LVL III: ICD-10-PCS | Mod: PBBFAC,,, | Performed by: ORTHOPAEDIC SURGERY

## 2023-08-23 PROCEDURE — 73130 X-RAY EXAM OF HAND: CPT | Mod: 26,50,, | Performed by: RADIOLOGY

## 2023-08-23 PROCEDURE — 99999 PR PBB SHADOW E&M-EST. PATIENT-LVL III: CPT | Mod: PBBFAC,,, | Performed by: ORTHOPAEDIC SURGERY

## 2023-08-23 PROCEDURE — 99205 PR OFFICE/OUTPT VISIT, NEW, LEVL V, 60-74 MIN: ICD-10-PCS | Mod: 57,S$GLB,, | Performed by: ORTHOPAEDIC SURGERY

## 2023-08-23 PROCEDURE — 99205 OFFICE O/P NEW HI 60 MIN: CPT | Mod: 57,S$GLB,, | Performed by: ORTHOPAEDIC SURGERY

## 2023-08-23 PROCEDURE — 1125F AMNT PAIN NOTED PAIN PRSNT: CPT | Mod: CPTII,S$GLB,, | Performed by: ORTHOPAEDIC SURGERY

## 2023-08-23 RX ORDER — MUPIROCIN 20 MG/G
OINTMENT TOPICAL
Status: CANCELLED | OUTPATIENT
Start: 2023-08-23

## 2023-08-23 NOTE — PROGRESS NOTES
89 years old reports pain and paresthesias of her left hand and wrist for about 6 months time no trauma 2 on good days 6 on bad days.  She is tried bracing with partial relief patient has a history of similar symptoms on the right hand and underwent carpal tunnel release surgery in 2018 and she reports be satisfied with the result.      Exam shows positive Tinel's at the wrist, thenar atrophy is noted,    X-rays show arthritic changes    Assessment:  Left wrist arthrosis, carpal tunnel syndrome left wrist    Plan:  We will schedule the patient for carpal tunnel release surgery, she is aware the risks and limitations of surgery and still wants to proceed

## 2023-08-30 ENCOUNTER — ANESTHESIA EVENT (OUTPATIENT)
Dept: SURGERY | Facility: HOSPITAL | Age: 88
End: 2023-08-30
Payer: MEDICARE

## 2023-08-30 DIAGNOSIS — G56.02 CARPAL TUNNEL SYNDROME ON LEFT: Primary | ICD-10-CM

## 2023-08-31 ENCOUNTER — ANESTHESIA (OUTPATIENT)
Dept: SURGERY | Facility: HOSPITAL | Age: 88
End: 2023-08-31
Payer: MEDICARE

## 2023-08-31 ENCOUNTER — HOSPITAL ENCOUNTER (OUTPATIENT)
Facility: HOSPITAL | Age: 88
Discharge: HOME | End: 2023-08-31
Attending: ORTHOPAEDIC SURGERY | Admitting: ORTHOPAEDIC SURGERY
Payer: MEDICARE

## 2023-08-31 DIAGNOSIS — G56.02 CARPAL TUNNEL SYNDROME ON LEFT: ICD-10-CM

## 2023-08-31 LAB — GLUCOSE SERPL-MCNC: 80 MG/DL (ref 70–110)

## 2023-08-31 PROCEDURE — 71000033 HC RECOVERY, INTIAL HOUR: Mod: PO | Performed by: ORTHOPAEDIC SURGERY

## 2023-08-31 PROCEDURE — D9220A PRA ANESTHESIA: Mod: ANES,,, | Performed by: ANESTHESIOLOGY

## 2023-08-31 PROCEDURE — 63600175 PHARM REV CODE 636 W HCPCS: Mod: PO | Performed by: ORTHOPAEDIC SURGERY

## 2023-08-31 PROCEDURE — 36000706: Mod: PO | Performed by: ORTHOPAEDIC SURGERY

## 2023-08-31 PROCEDURE — 36000707: Mod: PO | Performed by: ORTHOPAEDIC SURGERY

## 2023-08-31 PROCEDURE — 63600175 PHARM REV CODE 636 W HCPCS: Mod: PO | Performed by: NURSE ANESTHETIST, CERTIFIED REGISTERED

## 2023-08-31 PROCEDURE — 71000015 HC POSTOP RECOV 1ST HR: Mod: PO | Performed by: ORTHOPAEDIC SURGERY

## 2023-08-31 PROCEDURE — D9220A PRA ANESTHESIA: ICD-10-PCS | Mod: ANES,,, | Performed by: ANESTHESIOLOGY

## 2023-08-31 PROCEDURE — 82962 GLUCOSE BLOOD TEST: CPT | Mod: PO | Performed by: ORTHOPAEDIC SURGERY

## 2023-08-31 PROCEDURE — 37000008 HC ANESTHESIA 1ST 15 MINUTES: Mod: PO | Performed by: ORTHOPAEDIC SURGERY

## 2023-08-31 PROCEDURE — 63600175 PHARM REV CODE 636 W HCPCS: Mod: PO | Performed by: ANESTHESIOLOGY

## 2023-08-31 PROCEDURE — D9220A PRA ANESTHESIA: Mod: CRNA,,, | Performed by: NURSE ANESTHETIST, CERTIFIED REGISTERED

## 2023-08-31 PROCEDURE — 64721 CARPAL TUNNEL SURGERY: CPT | Mod: LT,,, | Performed by: ORTHOPAEDIC SURGERY

## 2023-08-31 PROCEDURE — 25000003 PHARM REV CODE 250: Mod: PO | Performed by: ORTHOPAEDIC SURGERY

## 2023-08-31 PROCEDURE — D9220A PRA ANESTHESIA: ICD-10-PCS | Mod: CRNA,,, | Performed by: NURSE ANESTHETIST, CERTIFIED REGISTERED

## 2023-08-31 PROCEDURE — 37000009 HC ANESTHESIA EA ADD 15 MINS: Mod: PO | Performed by: ORTHOPAEDIC SURGERY

## 2023-08-31 PROCEDURE — 64721 PR REVISE MEDIAN N/CARPAL TUNNEL SURG: ICD-10-PCS | Mod: LT,,, | Performed by: ORTHOPAEDIC SURGERY

## 2023-08-31 RX ORDER — MEPERIDINE HYDROCHLORIDE 50 MG/ML
12.5 INJECTION INTRAMUSCULAR; INTRAVENOUS; SUBCUTANEOUS ONCE AS NEEDED
Status: ACTIVE | OUTPATIENT
Start: 2023-08-31 | End: 2023-09-01

## 2023-08-31 RX ORDER — LIDOCAINE HYDROCHLORIDE 10 MG/ML
INJECTION, SOLUTION EPIDURAL; INFILTRATION; INTRACAUDAL; PERINEURAL
Status: DISCONTINUED | OUTPATIENT
Start: 2023-08-31 | End: 2023-08-31 | Stop reason: HOSPADM

## 2023-08-31 RX ORDER — HYDROMORPHONE HYDROCHLORIDE 2 MG/ML
0.2 INJECTION, SOLUTION INTRAMUSCULAR; INTRAVENOUS; SUBCUTANEOUS EVERY 5 MIN PRN
Status: DISCONTINUED | OUTPATIENT
Start: 2023-08-31 | End: 2024-02-23

## 2023-08-31 RX ORDER — SODIUM CHLORIDE, SODIUM LACTATE, POTASSIUM CHLORIDE, CALCIUM CHLORIDE 600; 310; 30; 20 MG/100ML; MG/100ML; MG/100ML; MG/100ML
INJECTION, SOLUTION INTRAVENOUS CONTINUOUS
Status: DISCONTINUED | OUTPATIENT
Start: 2023-08-31 | End: 2024-02-23

## 2023-08-31 RX ORDER — ONDANSETRON 2 MG/ML
INJECTION INTRAMUSCULAR; INTRAVENOUS
Status: DISCONTINUED | OUTPATIENT
Start: 2023-08-31 | End: 2023-08-31

## 2023-08-31 RX ORDER — CEFAZOLIN SODIUM 2 G/50ML
2 SOLUTION INTRAVENOUS
Status: COMPLETED | OUTPATIENT
Start: 2023-08-31 | End: 2023-08-31

## 2023-08-31 RX ORDER — DIPHENHYDRAMINE HYDROCHLORIDE 50 MG/ML
12.5 INJECTION INTRAMUSCULAR; INTRAVENOUS EVERY 6 HOURS PRN
Status: DISCONTINUED | OUTPATIENT
Start: 2023-08-31 | End: 2024-02-23

## 2023-08-31 RX ORDER — MUPIROCIN 20 MG/G
OINTMENT TOPICAL
Status: DISCONTINUED | OUTPATIENT
Start: 2023-08-31 | End: 2023-08-31 | Stop reason: HOSPADM

## 2023-08-31 RX ORDER — HYDROCODONE BITARTRATE AND ACETAMINOPHEN 7.5; 325 MG/1; MG/1
1 TABLET ORAL
Qty: 32 TABLET | Refills: 0 | Status: SHIPPED | OUTPATIENT
Start: 2023-08-31 | End: 2024-02-23

## 2023-08-31 RX ORDER — FENTANYL CITRATE 50 UG/ML
25 INJECTION, SOLUTION INTRAMUSCULAR; INTRAVENOUS EVERY 5 MIN PRN
Status: DISCONTINUED | OUTPATIENT
Start: 2023-08-31 | End: 2024-02-23

## 2023-08-31 RX ORDER — LIDOCAINE HYDROCHLORIDE 10 MG/ML
1 INJECTION, SOLUTION EPIDURAL; INFILTRATION; INTRACAUDAL; PERINEURAL ONCE
Status: DISCONTINUED | OUTPATIENT
Start: 2023-08-31 | End: 2024-02-23

## 2023-08-31 RX ORDER — FENTANYL CITRATE 50 UG/ML
INJECTION, SOLUTION INTRAMUSCULAR; INTRAVENOUS
Status: DISCONTINUED | OUTPATIENT
Start: 2023-08-31 | End: 2023-08-31

## 2023-08-31 RX ORDER — HYDROCODONE BITARTRATE AND ACETAMINOPHEN 5; 325 MG/1; MG/1
1 TABLET ORAL
Status: DISCONTINUED | OUTPATIENT
Start: 2023-08-31 | End: 2024-02-23

## 2023-08-31 RX ADMIN — SODIUM CHLORIDE, POTASSIUM CHLORIDE, SODIUM LACTATE AND CALCIUM CHLORIDE: 600; 310; 30; 20 INJECTION, SOLUTION INTRAVENOUS at 08:08

## 2023-08-31 RX ADMIN — FENTANYL CITRATE 25 MCG: 50 INJECTION, SOLUTION INTRAMUSCULAR; INTRAVENOUS at 08:08

## 2023-08-31 RX ADMIN — FENTANYL CITRATE 50 MCG: 50 INJECTION, SOLUTION INTRAMUSCULAR; INTRAVENOUS at 08:08

## 2023-08-31 RX ADMIN — MUPIROCIN: 20 OINTMENT TOPICAL at 08:08

## 2023-08-31 RX ADMIN — ONDANSETRON 4 MG: 2 INJECTION, SOLUTION INTRAMUSCULAR; INTRAVENOUS at 08:08

## 2023-08-31 RX ADMIN — CEFAZOLIN SODIUM 2 G: 2 SOLUTION INTRAVENOUS at 08:08

## 2023-08-31 NOTE — ANESTHESIA PREPROCEDURE EVALUATION
08/31/2023  Candy Escobar is a 89 y.o., female.    Pre-op Assessment    I have reviewed the Patient Summary Reports.   I have reviewed the NPO Status.   I have reviewed the Medications.     Review of Systems  Anesthesia Hx:  No problems with previous Anesthesia  History of prior surgery of interest to airway management or planning: Previous anesthesia: General Personal Hx of Anesthesia complications   Social:  Former Smoker, Social Alcohol Use    Hematology/Oncology:  Hematology Normal   Oncology Normal     EENT/Dental:EENT/Dental Normal   Cardiovascular:   Exercise tolerance: good Hypertension, well controlled    Pulmonary:  Pulmonary Normal    Renal/:   Chronic Renal Disease    Hepatic/GI:   GERD, well controlled    Musculoskeletal:   Arthritis     Neurological:   CVA Neuromuscular Disease, Seizures, well controlled    Endocrine:   Diabetes, well controlled, type 2    Dermatological:  Skin Normal    Psych:   Psychiatric History          Physical Exam  General:  Well nourished      Airway/Jaw/Neck:  Airway Findings: Mouth Opening: Normal   Tongue: Normal   General Airway Assessment: Adult Mallampati: II  TM Distance: Normal, at least 6 cm   Jaw/Neck Findings:  Neck ROM: Normal ROM       Dental:  Dental Findings: In tact          Mental Status:  Mental Status Findings:  Cooperative, Alert and Oriented      Hx of Anesthetic complications Reports very low blood pressure in response to propofol        Anesthesia Plan  Type of Anesthesia, risks & benefits discussed:  Anesthesia Type:  MAC    Patient's Preference: MAC  Plan Factors:          Intra-op Monitoring Plan: standard ASA monitors  Intra-op Monitoring Plan Comments:   Post Op Pain Control Plan: multimodal analgesia  Post Op Pain Control Plan Comments:     Induction:   IV  Beta Blocker:  Patient is not currently on a Beta-Blocker (No further  documentation required).       Informed Consent: Informed consent signed with the Patient and all parties understand the risks and agree with anesthesia plan.  All questions answered.  Anesthesia consent signed with patient.  ASA Score: 3     Day of Surgery Review of History & Physical:              Ready For Surgery From Anesthesia Perspective.           Physical Exam  General: Well nourished    Airway:  Mallampati: II   Mouth Opening: Normal  TM Distance: Normal, at least 6 cm  Tongue: Normal  Neck ROM: Normal ROM    Dental:  In tact          Anesthesia Plan  Type of Anesthesia, risks & benefits discussed:    Anesthesia Type: MAC  Intra-op Monitoring Plan: standard ASA monitors  Post Op Pain Control Plan: multimodal analgesia  Induction:  IV  Informed Consent: Informed consent signed with the Patient and all parties understand the risks and agree with anesthesia plan.  All questions answered.  (other)  Patient blood refusal form needs to be completed. Please review the form for refusal details in the  tab of Chart Review.  ASA Score: 3    Ready For Surgery From Anesthesia Perspective.       .

## 2023-08-31 NOTE — DISCHARGE SUMMARY
Discharge Note  Short Stay      SUMMARY     Admit Date: 8/31/2023    Attending Physician: Eder Ferguson MD     Discharge Physician: Eder Ferguson MD    Discharge Date: 8/31/2023 9:03 AM    Final Diagnosis: Carpal tunnel syndrome on left [G56.02]    Hospital Course: Patient tolerated procedure well.     Disposition: Home or Self Care    Patient Instructions:   Current Discharge Medication List        CONTINUE these medications which have NOT CHANGED    Details   albuterol (VENTOLIN HFA) 90 mcg/actuation inhaler Inhale 2 puffs into the lungs every 6 (six) hours as needed for Wheezing or Shortness of Breath. Rescue  Qty: 18 g, Refills: 3      amoxicillin (AMOXIL) 500 MG capsule Take 1 capsule (500 mg total) by mouth 3 (three) times daily.  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  Qty: 180 capsule, Refills: 3    Associated Diagnoses: Major depressive disorder with single episode, in partial remission      gabapentin (NEURONTIN) 300 MG capsule Take 1 capsule (300 mg total) by mouth 2 (two) times daily.  Qty: 180 capsule, Refills: 3    Comments: Requesting 1 year supply  Associated Diagnoses: Chronic pain of right knee      omeprazole (PRILOSEC) 20 MG capsule TAKE 2 CAPSULES BY MOUTH  DAILY  Qty: 180 capsule, Refills: 1    Comments: Requesting 1 year supply  Associated Diagnoses: Gastroesophageal reflux disease without esophagitis      psyllium seed, with sugar, (FIBER ORAL) Take by mouth.      aspirin (ECOTRIN) 81 MG EC tablet Take 1 tablet (81 mg total) by mouth 2 (two) times a day.  Qty: 90 tablet, Refills: 0      ondansetron (ZOFRAN-ODT) 4 MG TbDL Take one 4mg tablet with Tramadol for nausea  Qty: 30 tablet, Refills: 0    Associated Diagnoses: Nausea             Discharge Procedure Orders (must include Diet, Follow-up, Activity):   Discharge Procedure Orders  (must include Diet, Follow-up, Activity)   Leave dressing on - Keep it clean, dry, and intact until clinic visit     Activity as tolerated        Follow Up:  Follow up as scheduled.  Resume routine diet.  Activity as tolerated.    No driving day of procedure.

## 2023-08-31 NOTE — TRANSFER OF CARE
Anesthesia Transfer of Care Note    Patient: Candy Escobar    Procedure(s) Performed: Procedure(s) (LRB):  RELEASE, CARPAL TUNNEL (Left)    Patient location: PACU    Anesthesia Type: MAC    Transport from OR: Transported from OR on room air with adequate spontaneous ventilation    Post pain: adequate analgesia    Post assessment: no apparent anesthetic complications    Post vital signs: stable    Level of consciousness: awake    Nausea/Vomiting: no nausea/vomiting    Complications: none    Transfer of care protocol was followed      Last vitals:   Visit Vitals  BP (!) 154/74 (BP Location: Right arm, Patient Position: Lying)   Pulse 72   Temp 36.4 °C (97.5 °F)   Resp 12   Ht 5' (1.524 m)   Wt 52.6 kg (116 lb)   SpO2 96%   Breastfeeding No   BMI 22.65 kg/m²

## 2023-08-31 NOTE — DISCHARGE INSTRUCTIONS
AFTER HAND SURGERY    DO:  Keep affected extremity elevated above the level of your heart.  Check circulation frequently in fingers by pressing on the nail bed. Nail bed should turn white and then pink when released.  Exercise fingers to promote circulation.  Advance diet as tolerated.  Resume home medications.    Carpal Tunnel/Trigger Finger Release  Do not remove dressing. They will do so at follow up.     DO NOT:  Do not drive for 24 hours or while taking narcotic pain medication.  Do not take additional tylenol/acetaminophen while taking narcotic pain medication that contains tylenol/acetaminophen.     CALL PHYSICIAN FOR:  Obvious bleeding.  Excessive swelling.  Drainage (pus) from the wound.  Pain unrelieved by pain medication.    Contact your physician for emergencies.  [phone]

## 2023-08-31 NOTE — OP NOTE
[facility] - Surgery  Operative Note - Carpal Tunnel        DATE OF PROCEDURE: 8/31/2023      PROCEDURE: left  carpal tunnel release.     PREOPERATIVE DIAGNOSIS: Carpal tunnel syndrome on left [G56.02]    POSTOPERATIVE DIAGNOSIS: Carpal tunnel syndrome on left [G56.02]    SURGEON: Eder Ferguson M.D.    Assistant: None     ANESTHESIA: local / mac    ESTIMATED BLOOD LOSS: < 20cc    FLUIDS: Crystalloid.     DRAINS: None.     TOURNIQUET TIME: Approximately 8 minutes at 250 mmHg.    Specimens:   Specimen (24h ago, onward)      None                INDICATIONS FOR PROCEDURE: Candy Escobar is a 89 y.o. year-old female  with left Carpal Tunnel syndrome. The patient has had signs and symptoms of carpal tunnel syndrome for quite some time. It has been nonresponsive to nonoperative measures. It was felt that the patient would benefit from carpal tunnel release.   PROCEDURE IN DETAIL: After obtaining informed consent and starting the patient   on preoperative IV antibiotics, the patient was taken back to the operating   room.  Monitored anesthesia care by the anesthesia team. The left upper   extremity was prepped with DuraPrep and draped in a normal sterile fashion. I then injected the incision site with 3 cc of 1% lidocaine plain   An   Esmarch bandage was used to exsanguinate the upper extremity and the   pneumatic tourniquet was inflated to 250 mmHg. An approximately 3 cm   longitudinal incision was made in line with the radial border of the fourth   digit beginning at the distal wrist flexion crease and extending distally   approximately 3 cm. Full thickness skin flaps were raised. The palmar fascia   was identified and split the length of the skin incision. A hemostat retractor   was then placed deep to the transverse carpal ligament. This was then cut using   a [place] blade scalpel from distal to proximal, the most proximal portion of   which was cut with Metzenbaum scissors in a push fashion under direct    visualization. I was able to place my small finger into the distal aspect of   the forearm. I was satisfied with the release. The wound was then irrigated   with saline solution. The skin was closed with 4-0 nylon stitches in a   horizontal mattress fashion.  A sterile dressing was applied.  The pneumatic tourniquet was then deflated.  This concluded the operative procedure.

## 2023-08-31 NOTE — ANESTHESIA POSTPROCEDURE EVALUATION
Anesthesia Post Evaluation    Patient: Candy Escobar    Procedure(s) Performed: Procedure(s) (LRB):  RELEASE, CARPAL TUNNEL (Left)    Final Anesthesia Type: MAC      Patient location during evaluation: PACU  Patient participation: Yes- Able to Participate  Level of consciousness: awake  Post-procedure vital signs: reviewed and stable  Pain management: adequate  Airway patency: patent    PONV status at discharge: No PONV  Anesthetic complications: no      Cardiovascular status: blood pressure returned to baseline and hypertensive  Respiratory status: spontaneous ventilation  Hydration status: euvolemic  Follow-up not needed.          Vitals Value Taken Time   /72 08/31/23 0857   Temp  08/31/23 1102   Pulse 82 08/31/23 0857   Resp 14 08/31/23 0857   SpO2 95 % 08/31/23 0857         Event Time   Out of Recovery 08:58:00         Pain/Roxanne Score: Roxanne Score: 10 (8/31/2023  8:57 AM)

## 2023-08-31 NOTE — INTERVAL H&P NOTE
Mixture of precancerous polyps and benign polyps  Recall 3 years The patient has been examined and the H&P has been reviewed:    I concur with the findings and no changes have occurred since H&P was written.    Surgery risks, benefits and alternative options discussed and understood by patient/family.          There are no hospital problems to display for this patient.  Imaging studies ordered and reviewed by me    Further History  Aching pain  Worse with activity  Relieved with rest  No other associated symptoms  No other radiation    Further Exam  Alert and oriented  Pleasant  Contralateral limb has appropriate range of motion for age and condition  Contralateral limb has appropriate strength for age and condition  Contralateral limb has appropriate stability  for age and condition  No adenopathy  Pulses are appropriate for current condition  Skin is intact        Chief Complaint    No chief complaint on file.      HPI  Candy Escobar is a 89 y.o.  female who presents with       Past Medical History  Past Medical History:   Diagnosis Date    Allergy     Seasonal    Anxiety     Cataract     Depression     Diabetes mellitus     diet controlled    Diabetes mellitus, type 2     Diverticulosis     Encounter for blood transfusion     Fever blister     GERD (gastroesophageal reflux disease)     Hyperlipidemia     Hypertension 11/12/2014    Nuclear sclerosis - Both Eyes 07/16/2013    Osteoarthritis     bilateral knee replacements, and L ankle replacement in 2000    Osteopenia     Osteoporosis     Stroke     2008 TIA       Past Surgical History  Past Surgical History:   Procedure Laterality Date    ANKLE SURGERY  1951    ankle replacement, left    ARTHROSCOPY OF ANKLE WITH DEBRIDEMENT Left 09/29/2021    Procedure: ARTHROSCOPY, ANKLE, WITH DEBRIDEMENT - LEFT ANKLE - CYSTO TUBING - BACTISURE - VANC POWDER;  Surgeon: Sadiq Lee MD;  Location: [facility] OR [address] [place] [address];  Service: Orthopedics;  Laterality: Left;    BLADDER SUSPENSION      CARPAL TUNNEL RELEASE      CATARACT EXTRACTION  Left 2018    CATARACT EXTRACTION W/  INTRAOCULAR LENS IMPLANT Right 03/15/2022    Procedure: EXTRACTION, CATARACT, WITH IOL INSERTION;  Surgeon: Jorge A Miller MD;  Location: [facility];  Service: Ophthalmology;  Laterality: Right;    COLONOSCOPY      FOOT SURGERY      HYSTERECTOMY      left ovary intact    INTRAOCULAR PROSTHESES INSERTION Left 10/30/2018    Procedure: INSERTION, IOL PROSTHESIS;  Surgeon: Jorge A Miller MD;  [address];  Service: Ophthalmology;  Laterality: Left;    JOINT REPLACEMENT      left ankle and knee arthroplasty    KNEE SURGERY  2008    left TKR    KNEE SURGERY  11/11/2014    right TKR    PHACOEMULSIFICATION OF CATARACT Left 10/30/2018    Procedure: PHACOEMULSIFICATION, CATARACT;  Surgeon: Jorge A Miller MD;  Location: [facility] OR [address] [place] [address];  Service: Ophthalmology;  Laterality: Left;    SHOULDER SURGERY  2009    right arthroscopy    TONSILLECTOMY         Medications  Current Facility-Administered Medications   Medication    cefazolin (ANCEF) 2 gram in dextrose 5% 50 mL IVPB (premix)    electrolyte-S (ISOLYTE)    lactated ringers infusion    LIDOcaine (PF) 10 mg/ml (1%) injection 10 mg    mupirocin 2 % ointment     Facility-Administered Medications Ordered in Other Encounters   Medication    0.9%  NaCl infusion    cyclopentolate 1% ophthalmic solution 1 drop    phenylephrine HCL 2.5% ophthalmic solution 1 drop    proparacaine 0.5 % ophthalmic solution 1 drop    tropicamide 1% ophthalmic solution 1 drop       Allergies  Review of patient's allergies indicates:   Allergen Reactions    Propofol analogues Other (See Comments)     Low blood pressure, tremors    Oxycodone Nausea And Vomiting    Pcn [penicillins] Nausea Only    Tetracyclines Nausea Only       Family History  Family History   Problem Relation Age of Onset    Diabetes Mother     Diabetes Father     Heart disease Father     Diabetes Sister     Polychondritis Sister     Polychondritis Unknown     Cancer  Neg Hx     Amblyopia Neg Hx     Blindness Neg Hx     Cataracts Neg Hx     Glaucoma Neg Hx     Hypertension Neg Hx     Macular degeneration Neg Hx     Retinal detachment Neg Hx     Strabismus Neg Hx     Stroke Neg Hx     Thyroid disease Neg Hx     Melanoma Neg Hx        Social History  Social History     Socioeconomic History    Marital status:      Spouse name: All    Number of children: 3   Occupational History    Occupation: Retired Teacher     Employer: OTHER   Tobacco Use    Smoking status: Former     Current packs/day: 0.00     Average packs/day: 0.5 packs/day for 9.3 years (4.7 ttl pk-yrs)     Types: Cigarettes     Start date:      Quit date: 1981     Years since quittin.3    Smokeless tobacco: Never   Substance and Sexual Activity    Alcohol use: Yes     Alcohol/week: 1.0 standard drink of alcohol     Types: 1 Glasses of wine per week     Comment: wine occasionally    Drug use: No    Sexual activity: Not Currently     Partners: Male   Other Topics Concern    Are you pregnant or think you may be? No    Breast-feeding No   Social History Narrative    Pt is her  All' primary caretaker.     She enjoys doing puzzles, reading and dancing. She is participating in a medication study with [facility] [employer]. She may be getting Lipitor, but may be getting as placebo.                Review of Systems     Constitutional: Negative    HENT: Negative  Eyes: Negative  Respiratory: Negative  Cardiovascular: Negative  Musculoskeletal: HPI  Skin: Negative  Neurological: Negative  Hematological: Negative  Endocrine: Negative                 Physical Exam    Vitals:    23 0759   BP: (!) 154/74   Pulse: 72   Resp: 12   Temp: 97.5 °F (36.4 °C)     Body mass index is 22.65 kg/m².  Physical Examination:     General appearance -  well appearing, and in no distress  Mental status - awake  Neck - supple  Chest -  symmetric air entry  Heart - normal rate   Abdomen - soft      Assessment     1. Carpal  tunnel syndrome on left          Plan

## 2023-09-01 VITALS
DIASTOLIC BLOOD PRESSURE: 72 MMHG | HEIGHT: 60 IN | TEMPERATURE: 98 F | WEIGHT: 116 LBS | SYSTOLIC BLOOD PRESSURE: 166 MMHG | HEART RATE: 82 BPM | RESPIRATION RATE: 14 BRPM | BODY MASS INDEX: 22.78 KG/M2 | OXYGEN SATURATION: 95 %

## 2023-09-05 ENCOUNTER — DOCUMENTATION ONLY (OUTPATIENT)
Dept: PRIMARY CARE CLINIC | Facility: CLINIC | Age: 88
End: 2023-09-05

## 2023-09-05 ENCOUNTER — OFFICE VISIT (OUTPATIENT)
Dept: PRIMARY CARE CLINIC | Facility: CLINIC | Age: 88
End: 2023-09-05
Payer: MEDICARE

## 2023-09-05 VITALS
OXYGEN SATURATION: 94 % | BODY MASS INDEX: 23.77 KG/M2 | DIASTOLIC BLOOD PRESSURE: 66 MMHG | HEART RATE: 69 BPM | TEMPERATURE: 98 F | HEIGHT: 60 IN | WEIGHT: 121.06 LBS | SYSTOLIC BLOOD PRESSURE: 136 MMHG | RESPIRATION RATE: 18 BRPM

## 2023-09-05 DIAGNOSIS — H81.13 BPPV (BENIGN PAROXYSMAL POSITIONAL VERTIGO), BILATERAL: ICD-10-CM

## 2023-09-05 DIAGNOSIS — E78.5 DYSLIPIDEMIA ASSOCIATED WITH TYPE 2 DIABETES MELLITUS: ICD-10-CM

## 2023-09-05 DIAGNOSIS — M25.561 CHRONIC PAIN OF RIGHT KNEE: Primary | ICD-10-CM

## 2023-09-05 DIAGNOSIS — R42 DIZZINESS: ICD-10-CM

## 2023-09-05 DIAGNOSIS — R53.83 FATIGUE, UNSPECIFIED TYPE: ICD-10-CM

## 2023-09-05 DIAGNOSIS — G89.29 CHRONIC PAIN OF RIGHT KNEE: Primary | ICD-10-CM

## 2023-09-05 DIAGNOSIS — E11.69 DYSLIPIDEMIA ASSOCIATED WITH TYPE 2 DIABETES MELLITUS: ICD-10-CM

## 2023-09-05 PROBLEM — Z00.00 ENCOUNTER FOR ANNUAL WELLNESS EXAM IN MEDICARE PATIENT: Status: ACTIVE | Noted: 2023-09-05

## 2023-09-05 PROCEDURE — 1123F ACP DISCUSS/DSCN MKR DOCD: CPT | Mod: CPTII,S$GLB,, | Performed by: INTERNAL MEDICINE

## 2023-09-05 PROCEDURE — 99214 PR OFFICE/OUTPT VISIT, EST, LEVL IV, 30-39 MIN: ICD-10-PCS | Mod: S$GLB,,, | Performed by: INTERNAL MEDICINE

## 2023-09-05 PROCEDURE — 1159F MED LIST DOCD IN RCRD: CPT | Mod: CPTII,S$GLB,, | Performed by: INTERNAL MEDICINE

## 2023-09-05 PROCEDURE — 1125F PR PAIN SEVERITY QUANTIFIED, PAIN PRESENT: ICD-10-PCS | Mod: CPTII,S$GLB,, | Performed by: INTERNAL MEDICINE

## 2023-09-05 PROCEDURE — 1159F PR MEDICATION LIST DOCUMENTED IN MEDICAL RECORD: ICD-10-PCS | Mod: CPTII,S$GLB,, | Performed by: INTERNAL MEDICINE

## 2023-09-05 PROCEDURE — 1101F PR PT FALLS ASSESS DOC 0-1 FALLS W/OUT INJ PAST YR: ICD-10-PCS | Mod: CPTII,S$GLB,, | Performed by: INTERNAL MEDICINE

## 2023-09-05 PROCEDURE — 1125F AMNT PAIN NOTED PAIN PRSNT: CPT | Mod: CPTII,S$GLB,, | Performed by: INTERNAL MEDICINE

## 2023-09-05 PROCEDURE — 3288F FALL RISK ASSESSMENT DOCD: CPT | Mod: CPTII,S$GLB,, | Performed by: INTERNAL MEDICINE

## 2023-09-05 PROCEDURE — 1160F RVW MEDS BY RX/DR IN RCRD: CPT | Mod: CPTII,S$GLB,, | Performed by: INTERNAL MEDICINE

## 2023-09-05 PROCEDURE — 1123F PR ADV CARE PLAN DISCUSSED, PLAN OR SURROGATE DOCUMENTED: ICD-10-PCS | Mod: CPTII,S$GLB,, | Performed by: INTERNAL MEDICINE

## 2023-09-05 PROCEDURE — 1101F PT FALLS ASSESS-DOCD LE1/YR: CPT | Mod: CPTII,S$GLB,, | Performed by: INTERNAL MEDICINE

## 2023-09-05 PROCEDURE — 3288F PR FALLS RISK ASSESSMENT DOCUMENTED: ICD-10-PCS | Mod: CPTII,S$GLB,, | Performed by: INTERNAL MEDICINE

## 2023-09-05 PROCEDURE — 99999 PR PBB SHADOW E&M-EST. PATIENT-LVL IV: ICD-10-PCS | Mod: PBBFAC,,, | Performed by: INTERNAL MEDICINE

## 2023-09-05 PROCEDURE — 1160F PR REVIEW ALL MEDS BY PRESCRIBER/CLIN PHARMACIST DOCUMENTED: ICD-10-PCS | Mod: CPTII,S$GLB,, | Performed by: INTERNAL MEDICINE

## 2023-09-05 PROCEDURE — 99214 OFFICE O/P EST MOD 30 MIN: CPT | Mod: S$GLB,,, | Performed by: INTERNAL MEDICINE

## 2023-09-05 PROCEDURE — 99999 PR PBB SHADOW E&M-EST. PATIENT-LVL IV: CPT | Mod: PBBFAC,,, | Performed by: INTERNAL MEDICINE

## 2023-09-05 RX ORDER — GABAPENTIN 300 MG/1
300 CAPSULE ORAL 2 TIMES DAILY
Qty: 180 CAPSULE | Refills: 1 | Status: SHIPPED | OUTPATIENT
Start: 2023-09-05 | End: 2023-09-05

## 2023-09-05 RX ORDER — GABAPENTIN 300 MG/1
CAPSULE ORAL
Qty: 270 CAPSULE | Refills: 0 | Status: SHIPPED | OUTPATIENT
Start: 2023-09-05 | End: 2024-02-23 | Stop reason: SDUPTHER

## 2023-09-05 NOTE — PROGRESS NOTES
"INTERNAL MEDICINE PROGRESS/URGENT CARE NOTE    CHIEF COMPLAINT     Chief Complaint   Patient presents with    Follow-up     Carpal tunnel release surgery, and possible allergic reaction on left thumb (burning sensation)   Bilateral tinnitus (right ear mostly impacted)  Acute Dizziness with position changes       HPI     Candy Escobar is a 89 y.o.  female who presents with a PMHx of  DM2-controlled, HLD, HTN, seasonal allergies, OA, depression, cataract, seizures, osteoporosis, TIA,and skilled nursing who presents today for follow up    She is here with her friend today. 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 says that  now that hes on hospice and getting a bit of help with bathing etc shes doing a lot better    A week ago she had carpel tunnel surgery with Dr. Mead and she says it went well.pain in controlled with tylenol and the gabapentin.     another is ongoing dizziness when she turns her head. None when she gets up from a seated position.     DM2: last hgba 1c done over a year ago was 5.6 and has maintained this level of control for 6 years. LDL not at goal. Apparently patient is in an investigational study and we are not able to reorder lipid panel.   Takes gabapentin for neuropathy and reports and its well controlled.      Depression: patients relays that her mood is "pretty good" just a bit stressed due to caring for her  with dementia.   PHQ 9 is not completed duet to time   Sleep "sleeps very well"   Appetite normal   Support system grandchildren      Osteoporosis: takes no medication for this and reports declines medication.      Seizures history: poor historian per the seizures. Difficult to get a good history.      HISTORY OF TIAs:. On baby ASA and no statin.     Home Medications:  Prior to Admission medications    Medication Sig Start Date End Date Taking? Authorizing Provider   albuterol (VENTOLIN HFA) 90 mcg/actuation inhaler " Inhale 2 puffs into the lungs every 6 (six) hours as needed for Wheezing or Shortness of Breath. Rescue 11/5/21  Yes Bhargavi Cristina MD   amoxicillin (AMOXIL) 500 MG capsule Take 1 capsule (500 mg total) by mouth 3 (three) times daily. 12/27/22  Yes Cassius Valdez MD   omeprazole (PRILOSEC) 20 MG capsule TAKE 2 CAPSULES BY MOUTH  DAILY 6/22/23  Yes Pamela Luevano MD   psyllium seed, with sugar, (FIBER ORAL) Take by mouth.   Yes Provider, Historical   gabapentin (NEURONTIN) 300 MG capsule Take 1 capsule (300 mg total) by mouth 2 (two) times daily. 6/5/23 9/5/23 Yes Pamela Luevano MD   aspirin (ECOTRIN) 81 MG EC tablet Take 1 tablet (81 mg total) by mouth 2 (two) times a day.  Patient not taking: Reported on 9/5/2023 10/1/21 10/1/22  Dez Oglesby MD   FLUoxetine 10 MG capsule Take 2 capsules (20 mg total) by mouth once daily.  Patient not taking: Reported on 9/5/2023 8/22/22 8/28/23  Francisco Munson MD   gabapentin (NEURONTIN) 300 MG capsule Take one tab in  the morning and two tabs at night. 9/5/23   Pamela Luevano MD   HYDROcodone-acetaminophen (NORCO) 7.5-325 mg per tablet Take 1 tablet by mouth every 4 to 6 hours as needed for Pain.  Patient not taking: Reported on 9/5/2023 8/31/23   Eder Ferguson MD   ondansetron (ZOFRAN-ODT) 4 MG TbDL Take one 4mg tablet with Tramadol for nausea  Patient not taking: Reported on 9/5/2023 1/11/22   Tash Angel PA-C   gabapentin (NEURONTIN) 300 MG capsule Take 1 capsule (300 mg total) by mouth 2 (two) times daily. 9/5/23 9/5/23  Pamela Luevano MD       Review of Systems:  Review of Systems      Advance Care Planning     Date: 09/05/2023    Power of   I initiated the process of voluntary advance care planning today and explained the importance of this process to the patient.  I introduced the concept of advance directives to the patient, as well. Then the patient received detailed information about the importance of  designating a Health Care Power of  (HCPOA). She was also instructed to communicate with this person about their wishes for future healthcare, should she become sick and lose decision-making capacity. The patient has previously appointed a HCPOA. After our discussion, the patient has decided to complete a HCPOA and has appointed her significant other, health care agent:  name on file  & health care agent number:  on file  I spent a total time of 10 minutes discussing this issue with the patient.              PHYSICAL EXAM     /66   Pulse 69   Temp 98 °F (36.7 °C) (Oral)   Resp 18   Ht 5' (1.524 m)   Wt 54.9 kg (121 lb 0.5 oz)   SpO2 (!) 94%   BMI 23.64 kg/m²     GEN - A+OX4, NAD   HEENT - PERRL, EOMI, OP clear  Neck - No thyromegaly or cervical LAD. No thyroid masses felt.  CV - RRR, no m/r   Chest - CTAB, no wheezing or rhonchi  Abd - S/NT/ND/+BS.   Ext - 2+BDP and radial pulses. No C/C/E.  Skin - No rash.    Left hand in a wrap with swollen thumb from an allergy to the bandaid   LABS       ASSESSMENT/PLAN     Candy Escobar is a 89 y.o. female with  1. Chronic pain of right knee  Stable with tylenol   -     Discontinue: gabapentin (NEURONTIN) 300 MG capsule; Take 1 capsule (300 mg total) by mouth 2 (two) times daily.  Dispense: 180 capsule; Refill: 1  -     gabapentin (NEURONTIN) 300 MG capsule; Take one tab in  the morning and two tabs at night.  Dispense: 270 capsule; Refill: 0    2. Dizziness  Bppv per history   -     Ambulatory referral/consult to Physical/Occupational Therapy; Future; Expected date: 09/12/2023    3. BPPV (benign paroxysmal positional vertigo), bilateral  PT referral     4. Fatigue, unspecified type  -     CBC Auto Differential; Future; Expected date: 09/05/2023  -     Comprehensive Metabolic Panel; Future; Expected date: 09/05/2023  -     TSH; Future; Expected date: 09/05/2023    5. Dyslipidemia associated with type 2 diabetes mellitus  Overview:  Encouraged heart  healthy diet and cardio excercise    Orders:  -     Lipid Panel; Future; Expected date: 09/05/2023      WORRY SCORE 3    RTC in 6 months, sooner if needed and depending on labs.    Pamela Luevano MD  Board Certified Internist/Geriatrician  [facility] ([place])

## 2023-09-14 ENCOUNTER — OFFICE VISIT (OUTPATIENT)
Dept: ORTHOPEDICS | Facility: CLINIC | Age: 88
End: 2023-09-14
Payer: MEDICARE

## 2023-09-14 VITALS — BODY MASS INDEX: 23.75 KG/M2 | WEIGHT: 121 LBS | HEIGHT: 60 IN

## 2023-09-14 DIAGNOSIS — Z98.890 S/P CARPAL TUNNEL RELEASE: Primary | ICD-10-CM

## 2023-09-14 PROCEDURE — 1126F AMNT PAIN NOTED NONE PRSNT: CPT | Mod: CPTII,S$GLB,, | Performed by: ORTHOPAEDIC SURGERY

## 2023-09-14 PROCEDURE — 3288F PR FALLS RISK ASSESSMENT DOCUMENTED: ICD-10-PCS | Mod: CPTII,S$GLB,, | Performed by: ORTHOPAEDIC SURGERY

## 2023-09-14 PROCEDURE — 99024 PR POST-OP FOLLOW-UP VISIT: ICD-10-PCS | Mod: S$GLB,,, | Performed by: ORTHOPAEDIC SURGERY

## 2023-09-14 PROCEDURE — 1101F PT FALLS ASSESS-DOCD LE1/YR: CPT | Mod: CPTII,S$GLB,, | Performed by: ORTHOPAEDIC SURGERY

## 2023-09-14 PROCEDURE — 3288F FALL RISK ASSESSMENT DOCD: CPT | Mod: CPTII,S$GLB,, | Performed by: ORTHOPAEDIC SURGERY

## 2023-09-14 PROCEDURE — 99024 POSTOP FOLLOW-UP VISIT: CPT | Mod: S$GLB,,, | Performed by: ORTHOPAEDIC SURGERY

## 2023-09-14 PROCEDURE — 1126F PR PAIN SEVERITY QUANTIFIED, NO PAIN PRESENT: ICD-10-PCS | Mod: CPTII,S$GLB,, | Performed by: ORTHOPAEDIC SURGERY

## 2023-09-14 PROCEDURE — 1157F ADVNC CARE PLAN IN RCRD: CPT | Mod: CPTII,S$GLB,, | Performed by: ORTHOPAEDIC SURGERY

## 2023-09-14 PROCEDURE — 99999 PR PBB SHADOW E&M-EST. PATIENT-LVL III: CPT | Mod: PBBFAC,,, | Performed by: ORTHOPAEDIC SURGERY

## 2023-09-14 PROCEDURE — 1157F PR ADVANCE CARE PLAN OR EQUIV PRESENT IN MEDICAL RECORD: ICD-10-PCS | Mod: CPTII,S$GLB,, | Performed by: ORTHOPAEDIC SURGERY

## 2023-09-14 PROCEDURE — 1159F PR MEDICATION LIST DOCUMENTED IN MEDICAL RECORD: ICD-10-PCS | Mod: CPTII,S$GLB,, | Performed by: ORTHOPAEDIC SURGERY

## 2023-09-14 PROCEDURE — 1159F MED LIST DOCD IN RCRD: CPT | Mod: CPTII,S$GLB,, | Performed by: ORTHOPAEDIC SURGERY

## 2023-09-14 PROCEDURE — 99999 PR PBB SHADOW E&M-EST. PATIENT-LVL III: ICD-10-PCS | Mod: PBBFAC,,, | Performed by: ORTHOPAEDIC SURGERY

## 2023-09-14 PROCEDURE — 1101F PR PT FALLS ASSESS DOC 0-1 FALLS W/OUT INJ PAST YR: ICD-10-PCS | Mod: CPTII,S$GLB,, | Performed by: ORTHOPAEDIC SURGERY

## 2023-09-14 NOTE — PROGRESS NOTES
2 weeks post carpal tunnel release.  Doing well.  No signs of infection.  Sutures removed.  Gradually return to activity to tolerance.  Avoid aggravating activities.  We will give the patient a short wrist splint to use over the next few weeks as needed for comfort and protection.  Continue to keep the wound clean.  Follow up and or contact us if any problems or concerns.

## 2023-09-19 ENCOUNTER — PATIENT MESSAGE (OUTPATIENT)
Dept: PRIMARY CARE CLINIC | Facility: CLINIC | Age: 88
End: 2023-09-19
Payer: MEDICARE

## 2023-10-11 DIAGNOSIS — F32.4 MAJOR DEPRESSIVE DISORDER WITH SINGLE EPISODE, IN PARTIAL REMISSION: ICD-10-CM

## 2023-10-11 RX ORDER — FLUOXETINE 10 MG/1
20 CAPSULE ORAL DAILY
Qty: 180 CAPSULE | Refills: 3 | Status: SHIPPED | OUTPATIENT
Start: 2023-10-11 | End: 2024-10-10

## 2023-10-11 NOTE — TELEPHONE ENCOUNTER
Refill Routing Note   Medication(s) are not appropriate for processing by [facility] for the following reason(s):      Non-participating provider    [facility] action(s):  Route Care Due:  None identified            Appointments  past 12m or future 3m with PCP    Date Provider   Last Visit   9/5/2023 Pamela Luevano MD   Next Visit   3/5/2024 Pamela Luevano MD   ED visits in past 90 days: 0        Note composed:11:20 AM 10/11/2023

## 2023-10-13 ENCOUNTER — RESEARCH ENCOUNTER (OUTPATIENT)
Dept: CARDIOLOGY | Facility: CLINIC | Age: 88
End: 2023-10-13
Payer: MEDICARE

## 2023-10-13 NOTE — PROGRESS NOTES
Received word from patient that she has been feeling wiped out for the last 1 week to 1 month, since she received her new packet of study drug medication (atorvastatin 40 mg or placebo) she said when she cut the medication down to 1 pill 3 times a week, she felt better.  I reminded the patient that she may actually be on placebo and therefore any side effects may not be related to the medication.  I also reminded her that for the past 2 years she is been taking the medication without any problems and that the new medication is not different than what she has been taking.    In agreement was made with the patient.  The patient will stop taking study medication for 2 weeks.  After the 2 week.  If the patient is feeling better, she will restart the study medication at 1 pill per day and then will follow up with us 2 weeks afterwards (4 weeks from now).   Patient acknowledged understanding of information and agreement with plan.   Regardless of whether the patient is taking study drug, the patient has agreed to remain in the study.

## 2023-12-07 DIAGNOSIS — K21.9 GASTROESOPHAGEAL REFLUX DISEASE WITHOUT ESOPHAGITIS: ICD-10-CM

## 2023-12-07 RX ORDER — OMEPRAZOLE 20 MG/1
CAPSULE, DELAYED RELEASE ORAL
Qty: 180 CAPSULE | Refills: 3 | Status: SHIPPED | OUTPATIENT
Start: 2023-12-07

## 2023-12-11 PROBLEM — Z00.00 ENCOUNTER FOR ANNUAL WELLNESS EXAM IN MEDICARE PATIENT: Status: RESOLVED | Noted: 2023-09-05 | Resolved: 2023-12-11

## 2023-12-27 DIAGNOSIS — J41.0 SIMPLE CHRONIC BRONCHITIS: Primary | ICD-10-CM

## 2023-12-27 DIAGNOSIS — R23.8 SKIN IRRITATION: ICD-10-CM

## 2023-12-27 RX ORDER — TRIAMCINOLONE ACETONIDE 1 MG/G
CREAM TOPICAL DAILY PRN
Qty: 15 G | Refills: 0 | Status: SHIPPED | OUTPATIENT
Start: 2023-12-27

## 2023-12-27 RX ORDER — ALBUTEROL SULFATE 90 UG/1
2 AEROSOL, METERED RESPIRATORY (INHALATION) EVERY 6 HOURS PRN
Qty: 18 G | Refills: 3 | Status: SHIPPED | OUTPATIENT
Start: 2023-12-27 | End: 2024-01-19 | Stop reason: SDUPTHER

## 2024-01-10 ENCOUNTER — TELEPHONE (OUTPATIENT)
Dept: RESEARCH | Facility: HOSPITAL | Age: 89
End: 2024-01-10
Payer: MEDICARE

## 2024-01-10 NOTE — TELEPHONE ENCOUNTER
"PREVENTABLE: (2020-192)    I called Mrs. Escobar to discuss the discontinuation of her research provided study drug (atorvastatin 40 mg or placebo). After her conversation with Dr. Sarabia, October 2023, she restarted the study medication as prescribed. She said she once again started experiencing "severe pain all over" not so much her joints because those have been replaced, but every else. These pains would wake her up in the night. She discontinued use of the drug on 1/4/2024, then threw it away so it's no longer in her possession. She said now that she is no longer taking the medication, the severe pain she was experiencing has gone away.  Mrs. Escobar said she would be happy to stay enrolled in the study and continue receiving calls from the Study Sponsor. It will be reported to the sponsor she has permanently discontinued the medication but will remain enrolled. The research medication will be removed from her epic chart.   "

## 2024-01-10 NOTE — TELEPHONE ENCOUNTER
"----- Message from Jay Sarabia MD sent at 1/10/2024 11:41 AM CST -----  Regarding: PREVENTABLE "drug" adverse reaction (Candy Escobar MRN [id number])  Corry,    Please call Ms Escobar to find out her reaction to study drug.  Try to convince to stay in the study even if she is not taking study drug.  If I need to speak with her, please let me know. You will probably need to report this.    ThanksJay  ----- Message -----  From: Mireya Ta RN  Sent: 1/8/2024   4:03 PM CST  To: Jay Sarabia MD  Subject: FW: Call Back Needed  MRN # [id number]               Patient said she stopped the medication and then resumed it a while back.  She stopped it again on 1/4.  She said she just can't take the pain.  She never experienced the pain like this before.  She through the medication away.    Mireya Edwards  ----- Message -----  From: Claude Freedman  Sent: 1/8/2024   3:42 PM CST  To: Mireya Ta RN  Subject: Call Back Needed                                 Pt would like a callback about getting off meds given to her by research.  Joint and muscle pain all over her body.     Contact @ [phone]    Thanks         "

## 2024-01-19 DIAGNOSIS — J41.0 SIMPLE CHRONIC BRONCHITIS: ICD-10-CM

## 2024-01-19 RX ORDER — ALBUTEROL SULFATE 90 UG/1
2 AEROSOL, METERED RESPIRATORY (INHALATION) EVERY 6 HOURS PRN
Qty: 18 G | Refills: 3 | Status: SHIPPED | OUTPATIENT
Start: 2024-01-19

## 2024-01-19 NOTE — TELEPHONE ENCOUNTER
"Patient called very upset that [facility] told her they do not accept electronic Rx for Albuterol, but I informed patient that Rx was sent there successfully 12/27/23. Patient said she just spoke with [facility] and would like Rx sent there instead, but that she wants Albuterol, not Ventolin, which patient stated "is a totally different Rx". Patient demanded I call [facility] to get Rx filled, but kept saying that I need to call [facility], too, so I kept asking her to clarify what she needed, until she told me "to shut up and just do my job", then hung up. I told Dr Luevano what happened, and she called patient for clarification. Patient said she wants Albuterol sent to [facility], so MD will send Rx there, since patient doesn't use [facility] anymore (this is why [facility] told patient they're not accepting her electronic Rx anymore).  "

## 2024-01-22 RX ORDER — ALBUTEROL SULFATE 90 UG/1
2 AEROSOL, METERED RESPIRATORY (INHALATION) EVERY 6 HOURS PRN
Qty: 18 G | Refills: 0 | OUTPATIENT
Start: 2024-01-22

## 2024-01-23 ENCOUNTER — TELEPHONE (OUTPATIENT)
Dept: FAMILY MEDICINE | Facility: CLINIC | Age: 89
End: 2024-01-23
Payer: MEDICARE

## 2024-01-23 NOTE — TELEPHONE ENCOUNTER
----- Message from Reyna Mercer sent at 1/23/2024 10:14 AM CST -----  Regarding: Est Care  Type:  est care    Who Called:pt  Does the patient know what this is regarding?:would like to est care books were closed  Would the patient rather a call back or a response via [facility]? Call   Best Call Back Number: [phone]  Additional Information:

## 2024-02-01 ENCOUNTER — TELEPHONE (OUTPATIENT)
Dept: FAMILY MEDICINE | Facility: CLINIC | Age: 89
End: 2024-02-01
Payer: MEDICARE

## 2024-02-01 NOTE — TELEPHONE ENCOUNTER
----- Message from Kathy Caldwell sent at 2/1/2024  3:31 PM CST -----  Contact: self  Type:  Sooner Appointment Request     Name of Caller: Pt   When is the first available appointment? ASAP pt would like to return to Dr. Pat's care  Symptoms: Pt was moved over to 65 plus care and pt would like to be back w/Dr. Pat's  Would the patient rather a call back or a response via [facility]? call  Best Call Back Number:[phone]  Please call to advise... Thank you...

## 2024-02-12 ENCOUNTER — TELEPHONE (OUTPATIENT)
Dept: FAMILY MEDICINE | Facility: CLINIC | Age: 89
End: 2024-02-12
Payer: MEDICARE

## 2024-02-12 NOTE — TELEPHONE ENCOUNTER
----- Message from Rohini Crawford sent at 2/12/2024  1:46 PM CST -----  Contact: Nneka quiroz [facility]  Type:  Pharmacy Calling to Clarify an RX    Name of Caller:  Nneka  Pharmacy Name:  [facility]   Prescription Name:  albuterol (VENTOLIN HFA) 90 mcg/actuation inhaler  What do they need to clarify?:  Needs PA, since pt switched from [facility] to [facility] Medicare  Best Call Back Number:  [phone] PA#  Additional Information:  Thank You

## 2024-02-14 NOTE — TELEPHONE ENCOUNTER
I tried to call pt to get clarification on medication. No answer and can not leave voicemail due to stating unavailable.

## 2024-02-19 NOTE — TELEPHONE ENCOUNTER
"Patient is now seeing a new PCP.   Per patients problem list she has "simple chronic bronchitis" and has been prescribed this for a very long time to use for wheezing and sob. "

## 2024-02-23 ENCOUNTER — OFFICE VISIT (OUTPATIENT)
Dept: FAMILY MEDICINE | Facility: CLINIC | Age: 89
End: 2024-02-23
Payer: MEDICARE

## 2024-02-23 VITALS
HEIGHT: 60 IN | DIASTOLIC BLOOD PRESSURE: 88 MMHG | OXYGEN SATURATION: 96 % | TEMPERATURE: 98 F | BODY MASS INDEX: 23.89 KG/M2 | WEIGHT: 121.69 LBS | HEART RATE: 75 BPM | SYSTOLIC BLOOD PRESSURE: 139 MMHG

## 2024-02-23 DIAGNOSIS — E11.69 DYSLIPIDEMIA ASSOCIATED WITH TYPE 2 DIABETES MELLITUS: ICD-10-CM

## 2024-02-23 DIAGNOSIS — G89.29 CHRONIC PAIN OF RIGHT KNEE: ICD-10-CM

## 2024-02-23 DIAGNOSIS — E78.5 DYSLIPIDEMIA ASSOCIATED WITH TYPE 2 DIABETES MELLITUS: ICD-10-CM

## 2024-02-23 DIAGNOSIS — E11.9 CONTROLLED TYPE 2 DIABETES MELLITUS WITHOUT COMPLICATION, WITHOUT LONG-TERM CURRENT USE OF INSULIN: Primary | ICD-10-CM

## 2024-02-23 DIAGNOSIS — M06.9 RHEUMATOID ARTHRITIS OF BOTH WRISTS, UNSPECIFIED WHETHER RHEUMATOID FACTOR PRESENT: ICD-10-CM

## 2024-02-23 DIAGNOSIS — R53.83 FATIGUE, UNSPECIFIED TYPE: ICD-10-CM

## 2024-02-23 DIAGNOSIS — J41.0 SIMPLE CHRONIC BRONCHITIS: ICD-10-CM

## 2024-02-23 DIAGNOSIS — M25.561 CHRONIC PAIN OF RIGHT KNEE: ICD-10-CM

## 2024-02-23 DIAGNOSIS — E11.59 HYPERTENSION ASSOCIATED WITH DIABETES: ICD-10-CM

## 2024-02-23 DIAGNOSIS — I15.2 HYPERTENSION ASSOCIATED WITH DIABETES: ICD-10-CM

## 2024-02-23 PROBLEM — N18.31 CHRONIC KIDNEY DISEASE, STAGE 3A: Status: RESOLVED | Noted: 2023-04-17 | Resolved: 2024-02-23

## 2024-02-23 PROBLEM — I73.9 PERIPHERAL VASCULAR DISEASE, UNSPECIFIED: Status: RESOLVED | Noted: 2023-04-17 | Resolved: 2024-02-23

## 2024-02-23 PROBLEM — D69.2 OTHER NONTHROMBOCYTOPENIC PURPURA: Status: RESOLVED | Noted: 2021-03-09 | Resolved: 2024-02-23

## 2024-02-23 PROBLEM — F32.4 MAJOR DEPRESSIVE DISORDER WITH SINGLE EPISODE, IN PARTIAL REMISSION: Status: RESOLVED | Noted: 2017-04-24 | Resolved: 2024-02-23

## 2024-02-23 PROCEDURE — 99999 PR PBB SHADOW E&M-EST. PATIENT-LVL IV: CPT | Mod: PBBFAC,,, | Performed by: FAMILY MEDICINE

## 2024-02-23 PROCEDURE — 1159F MED LIST DOCD IN RCRD: CPT | Mod: CPTII,S$GLB,, | Performed by: FAMILY MEDICINE

## 2024-02-23 PROCEDURE — 3288F FALL RISK ASSESSMENT DOCD: CPT | Mod: CPTII,S$GLB,, | Performed by: FAMILY MEDICINE

## 2024-02-23 PROCEDURE — 1160F RVW MEDS BY RX/DR IN RCRD: CPT | Mod: CPTII,S$GLB,, | Performed by: FAMILY MEDICINE

## 2024-02-23 PROCEDURE — 99214 OFFICE O/P EST MOD 30 MIN: CPT | Mod: S$GLB,,, | Performed by: FAMILY MEDICINE

## 2024-02-23 PROCEDURE — 1126F AMNT PAIN NOTED NONE PRSNT: CPT | Mod: CPTII,S$GLB,, | Performed by: FAMILY MEDICINE

## 2024-02-23 PROCEDURE — 1101F PT FALLS ASSESS-DOCD LE1/YR: CPT | Mod: CPTII,S$GLB,, | Performed by: FAMILY MEDICINE

## 2024-02-23 PROCEDURE — 1157F ADVNC CARE PLAN IN RCRD: CPT | Mod: CPTII,S$GLB,, | Performed by: FAMILY MEDICINE

## 2024-02-23 RX ORDER — GABAPENTIN 300 MG/1
CAPSULE ORAL
Qty: 270 CAPSULE | Refills: 3 | Status: SHIPPED | OUTPATIENT
Start: 2024-02-23

## 2024-02-23 NOTE — PROGRESS NOTES
Subjective:       Patient ID: Candy Escobar is a 89 y.o. female.    Chief Complaint: Establish Care, Hypertension, and Diabetes    HPI:  Pleasant 89-year-old patient here with her daughter Taylor to reestablish care.  She was in the 65+ clinic but decided not to stay.  She was enrolled in some prevention study but she has not on that anymore.  She has diet-controlled diabetes currently.  Hypertension is well controlled for age.  No overt symptoms of CAD or CHF.  The patient needs new shoe wear therefore podiatry will be consulted.  She also needs to have an eye exam done so optometry has been consulted as well.  All medication refills being come to the portal.  She was due for labs and those have been ordered already and I should be copy done on those when they are done fully today.  She does suffer from fatigue.  It has not clear whether it is aging her perhaps due to some endocrine issue therefore TSH has been ordered along with CBC to rule out hypothyroidism anemia.  She is due for vaccinations.  We will follow up with her in about 6 months.    Review of Systems   Constitutional: Negative.    HENT: Negative.     Eyes: Negative.    Respiratory: Negative.     Cardiovascular: Negative.    Gastrointestinal: Negative.    Endocrine: Negative.    Genitourinary: Negative.    Musculoskeletal: Negative.    Skin: Negative.    Allergic/Immunologic: Negative.    Neurological: Negative.    Hematological: Negative.    Psychiatric/Behavioral: Negative.         Objective:      Vitals:    02/23/24 0931 02/23/24 0939   BP: (!) 148/92 139/88   Pulse: 75    Temp: 98.1 °F (36.7 °C)    TempSrc: Oral    SpO2: 96%    Weight: 55.2 kg (121 lb 11.1 oz)    Height: 5' (1.524 m)       Physical Exam  Vitals and nursing note reviewed.   Constitutional:       Appearance: Normal appearance. She is normal weight.   HENT:      Head: Normocephalic and atraumatic.      Nose: Nose normal.      Mouth/Throat:      Mouth: Mucous membranes are moist.       Pharynx: Oropharynx is clear.   Eyes:      Extraocular Movements: Extraocular movements intact.      Conjunctiva/sclera: Conjunctivae normal.      Pupils: Pupils are equal, round, and reactive to light.   Cardiovascular:      Rate and Rhythm: Normal rate and regular rhythm.      Pulses: Normal pulses.      Heart sounds: Normal heart sounds.   Pulmonary:      Effort: Pulmonary effort is normal.      Breath sounds: Normal breath sounds.   Abdominal:      General: Abdomen is flat. Bowel sounds are normal.      Palpations: Abdomen is soft.   Musculoskeletal:         General: Normal range of motion.      Cervical back: Normal range of motion and neck supple.   Skin:     General: Skin is warm and dry.      Capillary Refill: Capillary refill takes less than 2 seconds.   Neurological:      General: No focal deficit present.      Mental Status: She is alert and oriented to person, place, and time. Mental status is at baseline.   Psychiatric:         Mood and Affect: Mood normal.         Behavior: Behavior normal.         Thought Content: Thought content normal.         Judgment: Judgment normal.         Results for orders placed or performed during the hospital encounter of 08/31/23   POCT Glucose, Hand-Held Device   Result Value Ref Range    POC Glucose 80 70 - 110 MG/DL      Assessment:       1. Controlled type 2 diabetes mellitus without complication, without long-term current use of insulin    2. Simple chronic bronchitis    3. Rheumatoid arthritis of both wrists, unspecified whether rheumatoid factor present    4. Hypertension associated with diabetes    5. Dyslipidemia associated with type 2 diabetes mellitus    6. Fatigue, unspecified type        Plan:       Controlled type 2 diabetes mellitus without complication, without long-term current use of insulin  -     Microalbumin/Creatinine Ratio, Urine; Future  -     Ambulatory referral/consult to Optometry; Future; Expected date: 03/01/2024  -     Ambulatory  referral/consult to Podiatry; Future; Expected date: 03/01/2024    Simple chronic bronchitis  Comments:  Stable, same plan.    Rheumatoid arthritis of both wrists, unspecified whether rheumatoid factor present  Comments:  Quiescent.    Hypertension associated with diabetes  Comments:  Controlled    Dyslipidemia associated with type 2 diabetes mellitus  Comments:  Controlled    Fatigue, unspecified type  Comments:  TSH ordered.          Medication List with Changes/Refills   Current Medications    ALBUTEROL (VENTOLIN HFA) 90 MCG/ACTUATION INHALER    Inhale 2 puffs into the lungs every 6 (six) hours as needed for Wheezing or Shortness of Breath. Rescue    FLUOXETINE 10 MG CAPSULE    TAKE 2 CAPSULES (20 MG TOTAL) BY MOUTH ONCE DAILY.    GABAPENTIN (NEURONTIN) 300 MG CAPSULE    Take one tab in  the morning and two tabs at night.    OMEPRAZOLE (PRILOSEC) 20 MG CAPSULE    TAKE 2 CAPSULES BY MOUTH DAILY    ONDANSETRON (ZOFRAN-ODT) 4 MG TBDL    Take one 4mg tablet with Tramadol for nausea    PSYLLIUM SEED, WITH SUGAR, (FIBER ORAL)    Take by mouth.    TRIAMCINOLONE ACETONIDE 0.1% (KENALOG) 0.1 % CREAM    Apply topically daily as needed (Itching).   Discontinued Medications    AMOXICILLIN (AMOXIL) 500 MG CAPSULE    Take 1 capsule (500 mg total) by mouth 3 (three) times daily.    ASPIRIN (ECOTRIN) 81 MG EC TABLET    Take 1 tablet (81 mg total) by mouth 2 (two) times a day.    HYDROCODONE-ACETAMINOPHEN (NORCO) 7.5-325 MG PER TABLET    Take 1 tablet by mouth every 4 to 6 hours as needed for Pain.

## 2024-02-23 NOTE — TELEPHONE ENCOUNTER
No care due was identified.  [facility] Embedded Care Due Messages. Reference number: [id number].   2/23/2024 1:21:30 PM CST

## 2024-02-28 ENCOUNTER — OFFICE VISIT (OUTPATIENT)
Dept: PODIATRY | Facility: CLINIC | Age: 89
End: 2024-02-28
Payer: MEDICARE

## 2024-02-28 DIAGNOSIS — M76.821 POSTERIOR TIBIAL TENDON DYSFUNCTION (PTTD) OF BOTH LOWER EXTREMITIES: Primary | ICD-10-CM

## 2024-02-28 DIAGNOSIS — E11.9 CONTROLLED TYPE 2 DIABETES MELLITUS WITHOUT COMPLICATION, WITHOUT LONG-TERM CURRENT USE OF INSULIN: ICD-10-CM

## 2024-02-28 DIAGNOSIS — M76.822 POSTERIOR TIBIAL TENDON DYSFUNCTION (PTTD) OF BOTH LOWER EXTREMITIES: Primary | ICD-10-CM

## 2024-02-28 PROCEDURE — 99999 PR PBB SHADOW E&M-EST. PATIENT-LVL II: CPT | Mod: PBBFAC,,, | Performed by: STUDENT IN AN ORGANIZED HEALTH CARE EDUCATION/TRAINING PROGRAM

## 2024-02-28 PROCEDURE — 1160F RVW MEDS BY RX/DR IN RCRD: CPT | Mod: CPTII,S$GLB,, | Performed by: STUDENT IN AN ORGANIZED HEALTH CARE EDUCATION/TRAINING PROGRAM

## 2024-02-28 PROCEDURE — 1157F ADVNC CARE PLAN IN RCRD: CPT | Mod: CPTII,S$GLB,, | Performed by: STUDENT IN AN ORGANIZED HEALTH CARE EDUCATION/TRAINING PROGRAM

## 2024-02-28 PROCEDURE — 1159F MED LIST DOCD IN RCRD: CPT | Mod: CPTII,S$GLB,, | Performed by: STUDENT IN AN ORGANIZED HEALTH CARE EDUCATION/TRAINING PROGRAM

## 2024-02-28 PROCEDURE — 99213 OFFICE O/P EST LOW 20 MIN: CPT | Mod: S$GLB,,, | Performed by: STUDENT IN AN ORGANIZED HEALTH CARE EDUCATION/TRAINING PROGRAM

## 2024-02-28 NOTE — PROGRESS NOTES
Subjective:      Patient ID: Candy Escobar is a 89 y.o. female.    Chief Complaint: No chief complaint on file.    Ms. Esocbar presents today with complaints of pain to both feet and ankles. 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 She has a 23 year old TAR on the left ankle. Pain is bad with walking and standing.    Review of Systems   Musculoskeletal:  Positive for arthritis, joint pain and joint swelling.   All other systems reviewed and are negative.          Objective:      Physical Exam  Cardiovascular:      Pulses:           Dorsalis pedis pulses are 1+ on the right side and 1+ on the left side.        Posterior tibial pulses are 1+ on the right side and 1+ on the left side.   Feet:      Right foot:      Skin integrity: Callus and dry skin present.      Toenail Condition: Right toenails are abnormally thick and ingrown. Fungal disease present.     Left foot:      Skin integrity: Callus and dry skin present.      Toenail Condition: Left toenails are abnormally thick and ingrown. Fungal disease present.     Comments: R foot:  Collapse of the MLA. Pain along the STJ, PT, peroneals.    L foot:  Incision at the anterior ankle. Limited ankle ROM with + pain. Pain with palpation of the ankle.               Assessment:       Encounter Diagnoses   Name Primary?    Controlled type 2 diabetes mellitus without complication, without long-term current use of insulin     Posterior tibial tendon dysfunction (PTTD) of both lower extremities Yes         Plan:       Diagnoses and all orders for this visit:    Posterior tibial tendon dysfunction (PTTD) of both lower extremities  -     ORTHOTIC DEVICE (DME)    Controlled type 2 diabetes mellitus without complication, without long-term current use of insulin  -     Ambulatory referral/consult to Podiatry  -     ORTHOTIC DEVICE (DME)      I counseled the patient on her conditions, their implications and medical  management.    Recommend b/l solid AFO. Rx given.    F/u as needed.    John Pan DPM

## 2024-03-11 ENCOUNTER — LAB VISIT (OUTPATIENT)
Dept: LAB | Facility: HOSPITAL | Age: 89
End: 2024-03-11
Attending: FAMILY MEDICINE
Payer: MEDICARE

## 2024-03-11 DIAGNOSIS — R53.83 FATIGUE, UNSPECIFIED TYPE: ICD-10-CM

## 2024-03-11 DIAGNOSIS — E11.69 DYSLIPIDEMIA ASSOCIATED WITH TYPE 2 DIABETES MELLITUS: ICD-10-CM

## 2024-03-11 DIAGNOSIS — E11.9 CONTROLLED TYPE 2 DIABETES MELLITUS WITHOUT COMPLICATION, WITHOUT LONG-TERM CURRENT USE OF INSULIN: ICD-10-CM

## 2024-03-11 DIAGNOSIS — E78.5 DYSLIPIDEMIA ASSOCIATED WITH TYPE 2 DIABETES MELLITUS: ICD-10-CM

## 2024-03-11 LAB
ALBUMIN SERPL BCP-MCNC: 3.7 G/DL (ref 3.5–5.2)
ALBUMIN/CREAT UR: 47.9 UG/MG (ref 0–30)
ALP SERPL-CCNC: 55 U/L (ref 55–135)
ALT SERPL W/O P-5'-P-CCNC: 10 U/L (ref 10–44)
ANION GAP SERPL CALC-SCNC: 10 MMOL/L (ref 8–16)
AST SERPL-CCNC: 21 U/L (ref 10–40)
BASOPHILS # BLD AUTO: 0.07 K/UL (ref 0–0.2)
BASOPHILS NFR BLD: 1.5 % (ref 0–1.9)
BILIRUB SERPL-MCNC: 0.6 MG/DL (ref 0.1–1)
BUN SERPL-MCNC: 12 MG/DL (ref 10–30)
CALCIUM SERPL-MCNC: 10.1 MG/DL (ref 8.7–10.5)
CHLORIDE SERPL-SCNC: 98 MMOL/L (ref 95–110)
CHOLEST SERPL-MCNC: 239 MG/DL (ref 120–199)
CHOLEST/HDLC SERPL: 2.7 {RATIO} (ref 2–5)
CO2 SERPL-SCNC: 26 MMOL/L (ref 23–29)
CREAT SERPL-MCNC: 0.9 MG/DL (ref 0.5–1.4)
CREAT UR-MCNC: 94 MG/DL (ref 15–325)
DIFFERENTIAL METHOD BLD: ABNORMAL
EOSINOPHIL # BLD AUTO: 0.4 K/UL (ref 0–0.5)
EOSINOPHIL NFR BLD: 8.1 % (ref 0–8)
ERYTHROCYTE [DISTWIDTH] IN BLOOD BY AUTOMATED COUNT: 13.5 % (ref 11.5–14.5)
EST. GFR  (NO RACE VARIABLE): 60 ML/MIN/1.73 M^2
GLUCOSE SERPL-MCNC: 92 MG/DL (ref 70–110)
HCT VFR BLD AUTO: 37.7 % (ref 37–48.5)
HDLC SERPL-MCNC: 89 MG/DL (ref 40–75)
HDLC SERPL: 37.2 % (ref 20–50)
HGB BLD-MCNC: 12.5 G/DL (ref 12–16)
IMM GRANULOCYTES # BLD AUTO: 0.01 K/UL (ref 0–0.04)
IMM GRANULOCYTES NFR BLD AUTO: 0.2 % (ref 0–0.5)
LDLC SERPL CALC-MCNC: 138.6 MG/DL (ref 63–159)
LYMPHOCYTES # BLD AUTO: 0.8 K/UL (ref 1–4.8)
LYMPHOCYTES NFR BLD: 17.6 % (ref 18–48)
MCH RBC QN AUTO: 31.5 PG (ref 27–31)
MCHC RBC AUTO-ENTMCNC: 33.2 G/DL (ref 32–36)
MCV RBC AUTO: 95 FL (ref 82–98)
MICROALBUMIN UR DL<=1MG/L-MCNC: 45 UG/ML
MONOCYTES # BLD AUTO: 0.4 K/UL (ref 0.3–1)
MONOCYTES NFR BLD: 9.7 % (ref 4–15)
NEUTROPHILS # BLD AUTO: 2.9 K/UL (ref 1.8–7.7)
NEUTROPHILS NFR BLD: 62.9 % (ref 38–73)
NONHDLC SERPL-MCNC: 150 MG/DL
NRBC BLD-RTO: 0 /100 WBC
PLATELET # BLD AUTO: 213 K/UL (ref 150–450)
PMV BLD AUTO: 12.2 FL (ref 9.2–12.9)
POTASSIUM SERPL-SCNC: 4.9 MMOL/L (ref 3.5–5.1)
PROT SERPL-MCNC: 6.9 G/DL (ref 6–8.4)
RBC # BLD AUTO: 3.97 M/UL (ref 4–5.4)
SODIUM SERPL-SCNC: 134 MMOL/L (ref 136–145)
TRIGL SERPL-MCNC: 57 MG/DL (ref 30–150)
TSH SERPL DL<=0.005 MIU/L-ACNC: 2.77 UIU/ML (ref 0.4–4)
WBC # BLD AUTO: 4.54 K/UL (ref 3.9–12.7)

## 2024-03-11 PROCEDURE — 85025 COMPLETE CBC W/AUTO DIFF WBC: CPT | Performed by: INTERNAL MEDICINE

## 2024-03-11 PROCEDURE — 82043 UR ALBUMIN QUANTITATIVE: CPT | Performed by: FAMILY MEDICINE

## 2024-03-11 PROCEDURE — 36415 COLL VENOUS BLD VENIPUNCTURE: CPT | Mod: PO | Performed by: INTERNAL MEDICINE

## 2024-03-11 PROCEDURE — 80053 COMPREHEN METABOLIC PANEL: CPT | Performed by: INTERNAL MEDICINE

## 2024-03-11 PROCEDURE — 84443 ASSAY THYROID STIM HORMONE: CPT | Performed by: INTERNAL MEDICINE

## 2024-03-11 PROCEDURE — 80061 LIPID PANEL: CPT | Performed by: INTERNAL MEDICINE

## 2024-04-12 ENCOUNTER — OFFICE VISIT (OUTPATIENT)
Dept: OPTOMETRY | Facility: CLINIC | Age: 89
End: 2024-04-12
Payer: MEDICARE

## 2024-04-12 DIAGNOSIS — H52.203 HYPEROPIA WITH ASTIGMATISM AND PRESBYOPIA, BILATERAL: ICD-10-CM

## 2024-04-12 DIAGNOSIS — Z96.1 PSEUDOPHAKIA OF BOTH EYES: ICD-10-CM

## 2024-04-12 DIAGNOSIS — H53.2 DIPLOPIA: ICD-10-CM

## 2024-04-12 DIAGNOSIS — H40.013 OPEN ANGLE WITH BORDERLINE FINDINGS AND LOW GLAUCOMA RISK IN BOTH EYES: ICD-10-CM

## 2024-04-12 DIAGNOSIS — H52.4 HYPEROPIA WITH ASTIGMATISM AND PRESBYOPIA, BILATERAL: ICD-10-CM

## 2024-04-12 DIAGNOSIS — H04.123 DRY EYE SYNDROME OF BILATERAL LACRIMAL GLANDS: ICD-10-CM

## 2024-04-12 DIAGNOSIS — E11.9 CONTROLLED TYPE 2 DIABETES MELLITUS WITHOUT COMPLICATION, WITHOUT LONG-TERM CURRENT USE OF INSULIN: ICD-10-CM

## 2024-04-12 DIAGNOSIS — H52.03 HYPEROPIA WITH ASTIGMATISM AND PRESBYOPIA, BILATERAL: ICD-10-CM

## 2024-04-12 DIAGNOSIS — E11.9 TYPE 2 DIABETES MELLITUS WITHOUT RETINOPATHY: Primary | ICD-10-CM

## 2024-04-12 PROCEDURE — 92014 COMPRE OPH EXAM EST PT 1/>: CPT | Mod: S$GLB,,,

## 2024-04-12 PROCEDURE — 1126F AMNT PAIN NOTED NONE PRSNT: CPT | Mod: CPTII,S$GLB,,

## 2024-04-12 PROCEDURE — 3288F FALL RISK ASSESSMENT DOCD: CPT | Mod: CPTII,S$GLB,,

## 2024-04-12 PROCEDURE — 1101F PT FALLS ASSESS-DOCD LE1/YR: CPT | Mod: CPTII,S$GLB,,

## 2024-04-12 PROCEDURE — 1157F ADVNC CARE PLAN IN RCRD: CPT | Mod: CPTII,S$GLB,,

## 2024-04-12 PROCEDURE — 99999 PR PBB SHADOW E&M-EST. PATIENT-LVL III: CPT | Mod: PBBFAC,,,

## 2024-04-12 PROCEDURE — 92015 DETERMINE REFRACTIVE STATE: CPT | Mod: S$GLB,,,

## 2024-04-12 PROCEDURE — 2023F DILAT RTA XM W/O RTNOPTHY: CPT | Mod: CPTII,S$GLB,,

## 2024-04-12 PROCEDURE — 92133 CPTRZD OPH DX IMG PST SGM ON: CPT | Mod: S$GLB,,,

## 2024-04-12 PROCEDURE — 1159F MED LIST DOCD IN RCRD: CPT | Mod: CPTII,S$GLB,,

## 2024-04-12 NOTE — PROGRESS NOTES
HPI    Pt presents for DM exam     Complains of double vision OS following cataract surgery OS x 2 years ago.     States double vision is consistent with the left eye open. States when she   closes the left eye the double vision goes away. States he left eye is   physically getting smaller in size due to not using it as much.     States she would like to be checked for an updated glasses prescription   today.    States she uses eye drops but is unsure of the name.     Hemoglobin A1C       Date                     Value               Ref Range             Status                04/19/2023               5.6                 0.0 - 5.6 %           Final                     08/26/2022               5.6                 4.0 - 5.6 %           Final            Last edited by Danitza Porter, OD on 4/12/2024  2:29 PM.            Assessment /Plan     For exam results, see Encounter Report.    Type 2 diabetes mellitus without retinopathy    Controlled type 2 diabetes mellitus without complication, without long-term current use of insulin  -     Ambulatory referral/consult to Optometry    Diplopia    Open angle with borderline findings and low glaucoma risk in both eyes  -     Posterior Segment OCT Optic Nerve- Both eyes    Dry eye syndrome of bilateral lacrimal glands    Pseudophakia of both eyes    Hyperopia with astigmatism and presbyopia, bilateral      1-2. No diabetic retinopathy or macular edema evident on today's exam OD, OS. Ed pt on importance of maintaining strict BS control due to potential for visual fluctuations and/or vision loss. Monitor with yearly dilated exam.    3. Pt reports diplopia OS, often shutting eye for relief. No phoria or strabismus noted on cover test. Pt intermittently diplopic in-office - appears to be monocular, likely secondary to dry eye (see below). If symptoms persist despite dry eye treatment and new specs, can consider YAG for mild PCO.    4. Longstanding glaucoma suspect secondary to  moderate CD ratio. IOP always WNL, no known family history. Repeat RNFL OCT shows progression from previous OD, OS stable. Thinning ST/IT OD, superior OS. Continue untreated and monitor yearly for changes with repeat RNFL OCT, IOP, and optic nerve assessment.    5. Diffuse, 2+ SPK OU. Pt noticing monocular diplopia OS. Ed pt that monocular diplopia likely secondary to dry eye. Gave OTC artificial tear recommendations to use BID-QID daily for relief. Ed pt to RTC if symptoms worsen or fail to resolve. Otherwise, monitor.    6. 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 Stable, good BCVA. Monitor yearly for changes.    7. Discussed spectacle options with pt and released final spec rx. Ed pt on change in rx and adaptation.      RTC: 1 year for comprehensive exam or sooner prn

## 2024-04-23 ENCOUNTER — TELEPHONE (OUTPATIENT)
Dept: RESEARCH | Facility: HOSPITAL | Age: 89
End: 2024-04-23
Payer: MEDICARE

## 2024-04-23 NOTE — TELEPHONE ENCOUNTER
[id number] PREVENTABLE     Mrs. Escobar called because she received a PREVENTABLE letter in the mail which explained she will be receiving payments for completing  telephone follow-ups conducted by [facility] call center, She inquired about going back on the study medication. I reminded her she stopped the medication in January 2024 due to muscle aches and pains. She also added she is having some memory issues and is under a lot of stress caring for her dying . At this time, it would be karine to stay off the study medication and remain in the study with telephone follow-up only. Ms. Escobar agreed to this plan.

## 2024-06-10 ENCOUNTER — TELEPHONE (OUTPATIENT)
Dept: FAMILY MEDICINE | Facility: CLINIC | Age: 89
End: 2024-06-10
Payer: MEDICARE

## 2024-06-10 NOTE — TELEPHONE ENCOUNTER
----- Message from Izzy Diaz sent at 6/10/2024  2:37 PM CDT -----  Regarding: Sooner Appointment  Type:  Sooner Appointment Request    Caller is requesting a sooner appointment.  Caller declined first available appointment listed below.  Caller will not accept being placed on the waitlist and is requesting a message be sent to doctor.    Name of Caller:Pt    When is the first available appointment?10/08    Symptoms:6 month follow up     Would the patient rather a call back or a response via [facility]? Call Back    Best Call Back Number:[phone]      Additional Information: Needs to schedule 6 month follow up but first available is in October and she needs to be scheduled on a Friday.       Have a great day. Thank you!

## 2024-08-05 ENCOUNTER — TELEPHONE (OUTPATIENT)
Dept: FAMILY MEDICINE | Facility: CLINIC | Age: 89
End: 2024-08-05
Payer: MEDICARE

## 2024-08-05 DIAGNOSIS — F32.4 MAJOR DEPRESSIVE DISORDER WITH SINGLE EPISODE, IN PARTIAL REMISSION: ICD-10-CM

## 2024-08-05 RX ORDER — FLUOXETINE 10 MG/1
20 CAPSULE ORAL DAILY
Qty: 180 CAPSULE | Refills: 3 | OUTPATIENT
Start: 2024-08-05 | End: 2025-08-05

## 2024-09-25 DIAGNOSIS — K21.9 GASTROESOPHAGEAL REFLUX DISEASE WITHOUT ESOPHAGITIS: ICD-10-CM

## 2024-09-25 RX ORDER — OMEPRAZOLE 20 MG/1
CAPSULE, DELAYED RELEASE ORAL
Qty: 180 CAPSULE | Refills: 3 | OUTPATIENT
Start: 2024-09-25

## 2024-10-18 NOTE — TELEPHONE ENCOUNTER
Spoke to pt and states that the Urgent Care for arm pain. Pt thinks she may have sprained her arm. Pt will go to the Urgent Care.    fol

## 2024-10-29 DIAGNOSIS — Z00.00 ENCOUNTER FOR MEDICARE ANNUAL WELLNESS EXAM: ICD-10-CM

## 2024-11-09 DIAGNOSIS — F32.4 MAJOR DEPRESSIVE DISORDER WITH SINGLE EPISODE, IN PARTIAL REMISSION: ICD-10-CM

## 2024-11-10 NOTE — TELEPHONE ENCOUNTER
Refill Routing Note   Medication(s) are not appropriate for processing by [facility] for the following reason(s):        No active prescription written by provider    [facility] action(s):  Defer               Appointments  past 12m or future 3m with PCP    Date Provider   Last Visit   2/23/2024 Vasyl Pat MD   Next Visit   1/8/2025 Vasyl Pat MD   ED visits in past 90 days: 0        Note composed:9:05 PM 11/09/2024

## 2024-11-11 RX ORDER — FLUOXETINE 10 MG/1
20 CAPSULE ORAL DAILY
Qty: 180 CAPSULE | Refills: 3 | Status: SHIPPED | OUTPATIENT
Start: 2024-11-11 | End: 2025-11-11

## 2024-11-22 ENCOUNTER — PATIENT MESSAGE (OUTPATIENT)
Dept: PODIATRY | Facility: CLINIC | Age: 89
End: 2024-11-22
Payer: MEDICARE

## 2024-12-26 ENCOUNTER — TELEPHONE (OUTPATIENT)
Dept: FAMILY MEDICINE | Facility: CLINIC | Age: 89
End: 2024-12-26
Payer: MEDICARE

## 2024-12-26 NOTE — TELEPHONE ENCOUNTER
----- Message from Damián sent at 12/26/2024 12:30 PM CST -----  Regarding: advice  Contact: CANDY ALMAGUER KALIE [[id number]]  Type: Needs Medical Advice  Who Called:  Candy    Symptoms (please be specific):  na    How long has patient had these symptoms:  na    Pharmacy name and phone #:  na    Best Call Back Number: [phone]    Additional Information: Asking for lab orders for 1/8/25 appt.Please call to advise.

## 2025-01-30 ENCOUNTER — OFFICE VISIT (OUTPATIENT)
Dept: FAMILY MEDICINE | Facility: CLINIC | Age: OVER 89
End: 2025-01-30
Payer: MEDICARE

## 2025-01-30 ENCOUNTER — LAB VISIT (OUTPATIENT)
Dept: LAB | Facility: HOSPITAL | Age: OVER 89
End: 2025-01-30
Payer: MEDICARE

## 2025-01-30 VITALS
SYSTOLIC BLOOD PRESSURE: 138 MMHG | HEIGHT: 60 IN | OXYGEN SATURATION: 93 % | WEIGHT: 121.69 LBS | HEART RATE: 75 BPM | BODY MASS INDEX: 23.89 KG/M2 | DIASTOLIC BLOOD PRESSURE: 68 MMHG

## 2025-01-30 DIAGNOSIS — K21.9 GASTROESOPHAGEAL REFLUX DISEASE WITHOUT ESOPHAGITIS: ICD-10-CM

## 2025-01-30 DIAGNOSIS — M05.711 RHEUMATOID ARTHRITIS INVOLVING RIGHT SHOULDER WITH POSITIVE RHEUMATOID FACTOR: ICD-10-CM

## 2025-01-30 DIAGNOSIS — E11.9 CONTROLLED TYPE 2 DIABETES MELLITUS WITHOUT COMPLICATION, WITHOUT LONG-TERM CURRENT USE OF INSULIN: ICD-10-CM

## 2025-01-30 DIAGNOSIS — Z00.00 WELLNESS EXAMINATION: Primary | ICD-10-CM

## 2025-01-30 PROBLEM — J41.0 SIMPLE CHRONIC BRONCHITIS: Status: RESOLVED | Noted: 2021-03-09 | Resolved: 2025-01-30

## 2025-01-30 LAB
ALBUMIN/CREAT UR: 14.2 UG/MG (ref 0–30)
CREAT UR-MCNC: 120 MG/DL (ref 15–325)
MICROALBUMIN UR DL<=1MG/L-MCNC: 17 UG/ML

## 2025-01-30 PROCEDURE — 3072F LOW RISK FOR RETINOPATHY: CPT | Mod: CPTII,S$GLB,, | Performed by: FAMILY MEDICINE

## 2025-01-30 PROCEDURE — 3288F FALL RISK ASSESSMENT DOCD: CPT | Mod: CPTII,S$GLB,, | Performed by: FAMILY MEDICINE

## 2025-01-30 PROCEDURE — 82043 UR ALBUMIN QUANTITATIVE: CPT | Performed by: FAMILY MEDICINE

## 2025-01-30 PROCEDURE — 1125F AMNT PAIN NOTED PAIN PRSNT: CPT | Mod: CPTII,S$GLB,, | Performed by: FAMILY MEDICINE

## 2025-01-30 PROCEDURE — 99397 PER PM REEVAL EST PAT 65+ YR: CPT | Mod: S$GLB,,, | Performed by: FAMILY MEDICINE

## 2025-01-30 PROCEDURE — 99999 PR PBB SHADOW E&M-EST. PATIENT-LVL III: CPT | Mod: PBBFAC,,, | Performed by: FAMILY MEDICINE

## 2025-01-30 PROCEDURE — 1160F RVW MEDS BY RX/DR IN RCRD: CPT | Mod: CPTII,S$GLB,, | Performed by: FAMILY MEDICINE

## 2025-01-30 PROCEDURE — 1101F PT FALLS ASSESS-DOCD LE1/YR: CPT | Mod: CPTII,S$GLB,, | Performed by: FAMILY MEDICINE

## 2025-01-30 PROCEDURE — 1157F ADVNC CARE PLAN IN RCRD: CPT | Mod: CPTII,S$GLB,, | Performed by: FAMILY MEDICINE

## 2025-01-30 PROCEDURE — 1159F MED LIST DOCD IN RCRD: CPT | Mod: CPTII,S$GLB,, | Performed by: FAMILY MEDICINE

## 2025-01-30 NOTE — PROGRESS NOTES
Patient ID: Candy Escobar is a 89 y.o. female.    Chief Complaint: Diabetes (Pt needs care at home, unsure of any of the medications she is taking and seems to be very confused), Fatigue, and Depression    History of Present Illness    CHIEF COMPLAINT:  Patient presents today for annual follow up    MEDICAL HISTORY:  She has type 2 diabetes with normal A1c for the past 5 years. Her rheumatoid arthritis is currently in remission and she is not being followed by rheumatology. Her gastroesophageal reflux is well-controlled with omeprazole.    ANXIETY:  Her generalized anxiety disorder (RODRÍGUEZ) is well-controlled on current low-dose fluoxetine regimen.      ROS:  General: -fever, -chills, -fatigue, -weight gain, -weight loss  Eyes: -vision changes, -redness, -discharge  ENT: -ear pain, -nasal congestion, -sore throat  Cardiovascular: -chest pain, -palpitations, -lower extremity edema  Respiratory: -cough, -shortness of breath  Gastrointestinal: -abdominal pain, -nausea, -vomiting, -diarrhea, -constipation, -blood in stool  Genitourinary: -dysuria, -hematuria, -frequency  Musculoskeletal: -joint pain, -muscle pain  Skin: -rash, -lesion  Neurological: -headache, -dizziness, -numbness, -tingling  Psychiatric: -anxiety, -depression, -sleep difficulty         Physical Exam    General: No acute distress. Well-developed. Well-nourished.  Eyes: EOMI. Sclerae anicteric.  HENT: Normocephalic. Atraumatic. Nares patent. Moist oral mucosa.  Ears: Bilateral TMs clear. Bilateral EACs clear.  Cardiovascular: Regular rate. Regular rhythm. No murmurs. No rubs. No gallops. Normal S1, S2.  Respiratory: Normal respiratory effort. Clear to auscultation bilaterally. No rales. No rhonchi. No wheezing.  Abdomen: Soft. Non-tender. Non-distended. Normoactive bowel sounds.  Musculoskeletal: No  obvious deformity.  Extremities: No lower extremity edema.  Neurological: Alert & oriented x3. No slurred speech. Normal gait.  Psychiatric: Normal mood.  Normal affect. Good insight. Good judgment.  Skin: Warm. Dry. No rash.         Assessment & Plan    IMPRESSION:  - Enrolled patient in atorvastatin study; lipids not ordered per study protocol  - GERD controlled on omeprazole  - Type 2 diabetes: A1c normal for past 5 years, no complications  - Rheumatoid arthritis in remission; will refer to rheumatology if flares  - RODRÍGUEZ controlled with low-dose fluoxetine    ANNUAL VISIT:  - Conducted annual visit and registered the patient in a study on atorvastatin.  - Noted that health maintenance is relatively up to date.  - Scheduled follow up in 1 year.    RSV VACCINATION:  - Advised the patient to ask pharmacist for RSV vaccine.    EYE EXAMINATION:  - Reminded the patient of eye exam due on April 12, 2025.    TYPE 2 DIABETES:  - Monitored the patient's type 2 diabetes, noting normal A1c for the past 5 years and no diabetic complications.    CARDIOVASCULAR HEALTH:  - Assessed that there are no current symptoms of CAD, PVD, or CHF.    GASTROESOPHAGEAL REFLUX DISEASE (GERD):  - Continued omeprazole for GERD control.  - Noted that the patient's gastroesophageal reflux is well-controlled with current medication.    RHEUMATOID ARTHRITIS:  - Monitored the patient's rheumatoid arthritis, which is currently in remission.  - Noted that the condition is not currently being followed by rheumatology.  - Planned to refer to rheumatology if condition flares up.    GENERALIZED ANXIETY DISORDER (RODRÍGUEZ):  - Continued low-dose fluoxetine for Generalized Anxiety Disorder (RODRÍGUEZ).  - Noted that the patient's RODRÍGUEZ is well-controlled with current medication.    ATORVASTATIN STUDY:  - Registered the patient in a study on atorvastatin.  - Did not order lipids based on the study's request.            Follow up in about 1 year (around 1/30/2026).    This note was generated with the assistance of ambient listening technology. Verbal consent was obtained by the patient and accompanying visitor(s) for the  recording of patient appointment to facilitate this note. I attest to having reviewed and edited the generated note for accuracy, though some syntax or spelling errors may persist. Please contact the author of this note for any clarification.

## 2025-02-05 ENCOUNTER — TELEPHONE (OUTPATIENT)
Dept: RESEARCH | Facility: HOSPITAL | Age: OVER 89
End: 2025-02-05
Payer: MEDICARE

## 2025-02-05 NOTE — TELEPHONE ENCOUNTER
Preventable (2020.192) I got a message from the study sponsor that Mrs. Escobar was attempting to enroll in the research study, becoming a duplicate patient. She is still currently enrolled in the study but has been marked as 'permanently discontinued med' because she stopped the study medication in 2024 due to body pains. I called to reach out and see if she is wanting to restart the medication again. The call went to , I left my number for a return call.

## 2025-02-06 DIAGNOSIS — M25.561 CHRONIC PAIN OF RIGHT KNEE: ICD-10-CM

## 2025-02-06 DIAGNOSIS — G89.29 CHRONIC PAIN OF RIGHT KNEE: ICD-10-CM

## 2025-02-06 RX ORDER — GABAPENTIN 300 MG/1
CAPSULE ORAL
Qty: 270 CAPSULE | Refills: 3 | Status: ON HOLD | OUTPATIENT
Start: 2025-02-06

## 2025-02-06 NOTE — TELEPHONE ENCOUNTER
No care due was identified.  [facility] Embedded Care Due Messages. Reference number: [id number].   2/06/2025 12:10:06 AM CST

## 2025-02-11 ENCOUNTER — TELEPHONE (OUTPATIENT)
Dept: RESEARCH | Facility: HOSPITAL | Age: OVER 89
End: 2025-02-11
Payer: MEDICARE

## 2025-02-11 NOTE — TELEPHONE ENCOUNTER
Preventable (2020.192) I got a message from the study sponsor that Mrs. Escobar was attempting to enroll in the research study, becoming a duplicate patient. She is still currently enrolled in the study but has been marked as 'permanently discontinued med' because she stopped the study medication in 2024 due to body pains. I called to reach out and see if she is wanting to restart the medication again. The call went to , I left my number for a return call

## 2025-02-18 ENCOUNTER — TELEPHONE (OUTPATIENT)
Dept: RESEARCH | Facility: HOSPITAL | Age: OVER 89
End: 2025-02-18
Payer: MEDICARE

## 2025-02-18 NOTE — TELEPHONE ENCOUNTER
Preventable (2020.192) I got a message from the study sponsor that Mrs. Escobar was attempting to enroll in the research study, becoming a duplicate patient. She is still currently enrolled in the study but has been marked as 'permanently discontinued med' because she stopped the study medication in 2024 due to body pains. I called to reach out and see if she is wanting to restart the medication again. The call went to , I left my number for a return call.    The sponsor has been informed of the current patient status and we have been attempting to get in contact with her. She will not be dual enrolled.

## 2025-02-24 DIAGNOSIS — Z00.00 ENCOUNTER FOR MEDICARE ANNUAL WELLNESS EXAM: ICD-10-CM

## 2025-03-03 PROBLEM — E83.42 HYPOMAGNESEMIA: Status: ACTIVE | Noted: 2025-03-03

## 2025-03-03 PROBLEM — Z71.89 ACP (ADVANCE CARE PLANNING): Status: ACTIVE | Noted: 2023-04-17

## 2025-03-03 PROBLEM — F41.1 GAD (GENERALIZED ANXIETY DISORDER): Status: ACTIVE | Noted: 2025-03-03

## 2025-03-03 PROBLEM — M00.9 SEPTIC ARTHRITIS: Status: ACTIVE | Noted: 2025-03-03

## 2025-03-04 PROBLEM — E87.1 HYPONATREMIA: Status: ACTIVE | Noted: 2025-03-04

## 2025-03-04 PROBLEM — D64.9 ANEMIA: Status: ACTIVE | Noted: 2025-03-04

## 2025-03-04 PROBLEM — Z73.6 LIMITATION OF ACTIVITY DUE TO DISABILITY: Status: ACTIVE | Noted: 2025-03-04

## 2025-03-14 ENCOUNTER — TELEPHONE (OUTPATIENT)
Dept: FAMILY MEDICINE | Facility: CLINIC | Age: OVER 89
End: 2025-03-14
Payer: MEDICARE

## 2025-03-14 DIAGNOSIS — Z73.6 LIMITATION OF ACTIVITY DUE TO DISABILITY: Primary | ICD-10-CM

## 2025-03-14 NOTE — TELEPHONE ENCOUNTER
----- Message from Dianna sent at 3/14/2025  2:21 PM CDT -----  Contact: Dior  Type:  Needs Medical AdviceWho Called: [facility] patient rather a call back or a response via textPlusner? Call back Best Call Back Number: Dior -  [phone] Information:  [facility] is requesting an order for physical therapy and occupational therapy if possible please fax it to [phone]

## 2025-03-18 DIAGNOSIS — J41.0 SIMPLE CHRONIC BRONCHITIS: ICD-10-CM

## 2025-03-18 RX ORDER — ALBUTEROL SULFATE 90 UG/1
2 INHALANT RESPIRATORY (INHALATION) EVERY 6 HOURS PRN
Qty: 18 G | Refills: 3 | Status: SHIPPED | OUTPATIENT
Start: 2025-03-18

## 2025-03-18 NOTE — TELEPHONE ENCOUNTER
No care due was identified.  [facility] Embedded Care Due Messages. Reference number: [id number].   3/18/2025 3:23:35 PM CDT

## 2025-03-18 NOTE — TELEPHONE ENCOUNTER
----- Message from Candy sent at 3/18/2025  3:16 PM CDT -----  Contact: SELF  [phone]  Type:  RX Refill RequestWho Called:DONNARefill or New Rx:REFILLRX Name and Strength:albuterol (VENTOLIN HFA) 90 mcg/actuation inhalerHow is the patient currently taking it? (ex. 1XDay)Is this a 30 day or 90 day RX[facility] [address] [facility] [id number] [facility] [address] [address]Txwdb: [phone] Fax: [phone] or Mail Order:LOCAL Ordering ProviderWould the patient rather a call back or a response via [facility]? CBBest Call Back Number:Additional Information: PATIENT PREFERS THE GENERIC BRAND OF THE ABOVE MEDICATION

## 2025-03-18 NOTE — TELEPHONE ENCOUNTER
----- Message from Candy sent at 3/18/2025  3:14 PM CDT -----  Contact: SELF  [phone]  Type:  RX Refill RequestWho Called: DonnaRefill or New Rx:RefillRX Name and Strength:Lactobacillus rhamnosus GG (CULTURELLE) 10 billion cell capsuleHow is the patient currently taking it? (ex. 1XDay)Is this a 30 day or 90 day RX:Preferred Pharmacy with phone number: [facility] [address] [facility] [id number] [facility] [address] [address]Loqjd: [phone] Fax: [phone] Local or Mail Order:local Ordering Provider:Would the patient rather a call back or a response via [facility]? Call back Best Call Back Number: [phone] Information: PATIENT PREFERS THE GENERIC BRAND OF THE ABOVE MEDICATION  ----- Message -----  From: Candy Hurst  Sent: 3/18/2025   3:16 PM CDT  To: Bi Ayala    Type:  RX Refill RequestWho Called: DonnaRefill or New Rx:RefillRX Name and Strength:Lactobacillus rhamnosus GG (CULTURELLE) 10 billion cell capsuleHow is the patient currently taking it? (ex. 1XDay)Is this a 30 day or 90 day RX:Preferred Pharmacy with phone number: [facility] [place], [place] - [id number] [facility] [address] [address]Clvfl: [phone] Fax: [phone] Local or Mail Order:local Ordering Provider:Would the patient rather a call back or a response via [facility]? Call back Best Call Back Number:Additional Information: PATIENT PREFERS THE GENERIC BRAND OF THE ABOVE MEDICATION

## 2025-03-19 ENCOUNTER — TELEPHONE (OUTPATIENT)
Dept: FAMILY MEDICINE | Facility: CLINIC | Age: OVER 89
End: 2025-03-19
Payer: MEDICARE

## 2025-03-19 NOTE — TELEPHONE ENCOUNTER
----- Message from Jenae sent at 3/19/2025  8:45 AM CDT -----  Name of Who is Calling:KENDALL ALMAGUER KALIE [[id number]] ([facility])  What is the request in detail:Dior called following up on some order PTNOT that was sent 03/10/2025.  Can the clinic reply by RoomlrCHSNER:Call  What Number to Call Back if not in RoomlrSNER:Telephone Information:Posibl.          [phone] [phone]

## 2025-03-21 DIAGNOSIS — B37.31 YEAST VAGINITIS: Primary | ICD-10-CM

## 2025-03-21 RX ORDER — FLUCONAZOLE 150 MG/1
TABLET ORAL
Qty: 3 TABLET | Refills: 2 | Status: SHIPPED | OUTPATIENT
Start: 2025-03-21 | End: 2025-03-21 | Stop reason: SDUPTHER

## 2025-03-21 RX ORDER — FLUCONAZOLE 150 MG/1
TABLET ORAL
Qty: 3 TABLET | Refills: 2 | Status: SHIPPED | OUTPATIENT
Start: 2025-03-21

## 2025-03-21 NOTE — TELEPHONE ENCOUNTER
----- Message from Julia sent at 3/21/2025  9:32 AM CDT -----  Contact: Perla/Dominik  Type: Needs Medical AdviceWho Called:  Perla/home health nurse Pharmacy name and phone #:   [facility] [place] - [address] [facility] [id number] [facility] [address] [address]Suimp: [phone] Fax: [phone] Call Back Number: [phone] Information: [phone] Perla called to see if pt could have some diflucan called in to pharm, please call

## 2025-03-24 ENCOUNTER — TELEPHONE (OUTPATIENT)
Dept: INFECTIOUS DISEASES | Facility: CLINIC | Age: OVER 89
End: 2025-03-24
Payer: MEDICARE

## 2025-03-24 NOTE — TELEPHONE ENCOUNTER
----- Message from Fay sent at 3/24/2025 12:45 PM CDT -----  Contact: Bettina Giraldo (Emergency Contact) for Candy Escobar  Type: Needs Medical Advice Who Called: Bettina Giraldo (Emergency Contact) for Candy Lashay Call Back Number: Additional Information: Requesting a call back regarding  Daughter returned office call and asking for a call back. Pt is needing appt with Dr Valdez. Hx of bone infection. HH is stating pt is needing to be seen. Please Advise- Thank you

## 2025-03-24 NOTE — TELEPHONE ENCOUNTER
Returned call to Bettina, pt's daughter. Pt is resident at [facility].   Hospitalized March 2, 2025 at [facility].  Dr. Valdez's 3/7/25 hospital note: Recs:   - MRSA swab negative.  Discontinue IV vancomycin   - Continue IV Rocephin 2 g daily for the treatment of native septic joint left shoulder.    - Length therapy 4 weeks. Until 3/31/25  ID Follow Up:     - PICC line/midline can be removed by Home Health or infusion company after last IV dose  - Patient does not need follow-up in ID clinic     Pt  has HH, daughter does not know name of HH.

## 2025-03-24 NOTE — TELEPHONE ENCOUNTER
----- Message from Hong sent at 3/24/2025 10:20 AM CDT -----  Contact: Taylor [phone]  Type:  Sooner Apoointment RequestCaller is requesting a sooner appointment.  Caller declined first available appointment listed below.  Caller will not accept being placed on the waitlist and is requesting a message be sent to doctor.Name of Caller:Pt daughter Harshal is the first available appointment?NASymptoms:Hx of bone infectionWould the patient rather a call back or a response via [facility]? CallPrivateMarkets Call Back Number:[phone] Information: Pt req appt access. Her  nurse has suggested she be seen. Pls call back and adv.

## 2025-03-25 ENCOUNTER — TELEPHONE (OUTPATIENT)
Dept: INFECTIOUS DISEASES | Facility: CLINIC | Age: OVER 89
End: 2025-03-25
Payer: MEDICARE

## 2025-03-25 NOTE — TELEPHONE ENCOUNTER
Called [facility] at [phone], spoke with Beverly, gave verbal order to discontinue  PICC line/midline and dressing change, labs at [facility] 3/31/25 after last dose of IV abx.

## 2025-05-07 ENCOUNTER — DOCUMENT SCAN (OUTPATIENT)
Dept: HOME HEALTH SERVICES | Facility: HOSPITAL | Age: OVER 89
End: 2025-05-07
Payer: MEDICARE

## 2025-05-07 ENCOUNTER — EXTERNAL HOME HEALTH (OUTPATIENT)
Dept: HOME HEALTH SERVICES | Facility: HOSPITAL | Age: OVER 89
End: 2025-05-07
Payer: MEDICARE

## 2025-05-09 RX ORDER — AZELASTINE HYDROCHLORIDE 0.5 MG/ML
SOLUTION/ DROPS OPHTHALMIC
COMMUNITY
Start: 2025-05-08 | End: 2025-05-09 | Stop reason: SDUPTHER

## 2025-05-09 RX ORDER — AZELASTINE HYDROCHLORIDE 0.5 MG/ML
1 SOLUTION/ DROPS OPHTHALMIC 2 TIMES DAILY
Qty: 6 ML | Refills: 5 | Status: SHIPPED | OUTPATIENT
Start: 2025-05-09

## 2025-05-09 NOTE — TELEPHONE ENCOUNTER
----- Message from Paige sent at 5/8/2025  4:14 PM CDT -----  Regarding: Candy  Who Called: DonnaRefill or New Rx: RefillRX Name and Strength: eye dropsIs this a 30 day or 90 day RX:Preferred Pharmacy with phone number:.[facility] [id number] [address]Gypop: [phone] Fax: [phone] the patient rather a call back or a response via My [place]? callbackBest Call Back Number:.[phone] Information:

## 2025-05-13 ENCOUNTER — DOCUMENT SCAN (OUTPATIENT)
Dept: HOME HEALTH SERVICES | Facility: HOSPITAL | Age: OVER 89
End: 2025-05-13
Payer: MEDICARE

## 2025-05-19 ENCOUNTER — DOCUMENT SCAN (OUTPATIENT)
Dept: HOME HEALTH SERVICES | Facility: HOSPITAL | Age: OVER 89
End: 2025-05-19
Payer: MEDICARE

## 2025-05-26 ENCOUNTER — TELEPHONE (OUTPATIENT)
Dept: FAMILY MEDICINE | Facility: CLINIC | Age: OVER 89
End: 2025-05-26
Payer: MEDICARE

## 2025-05-29 ENCOUNTER — DOCUMENT SCAN (OUTPATIENT)
Dept: HOME HEALTH SERVICES | Facility: HOSPITAL | Age: OVER 89
End: 2025-05-29
Payer: MEDICARE

## 2025-06-11 ENCOUNTER — DOCUMENT SCAN (OUTPATIENT)
Dept: HOME HEALTH SERVICES | Facility: HOSPITAL | Age: OVER 89
End: 2025-06-11
Payer: MEDICARE

## 2025-06-20 DIAGNOSIS — K21.9 GASTROESOPHAGEAL REFLUX DISEASE WITHOUT ESOPHAGITIS: ICD-10-CM

## 2025-06-20 NOTE — TELEPHONE ENCOUNTER
Copied from CRM #[id number]. Topic: Medications - Medication Refill  >> Jun 20, 2025  3:15 PM Leslie wrote:  Type:  RX Refill Request    Who Called: pt  Refill or New Rx:refill  RX Name and Strength:  Disp Refills Start End    azelastine (OPTIVAR) 0.05 % ophthalmic solution 6 mL 5 5/9/2025 -   Sig - Route: Place 1 drop into both eyes 2 (two) times daily. - Both Eyes   Sent to pharmacy as: azelastine (OPTIVAR) 0.05 % ophthalmic solution       omeprazole (PRILOSEC) 20 MG capsule 180 capsule 3 12/7/2023 -   Sig: TAKE 2 CAPSULES BY MOUTH DAILY   Sent to pharmacy as: omeprazole (PRILOSEC) 20 MG capsule   FLUoxetine 10 MG capsule 180 capsule 3 11/11/2024 11/11/2025   Sig - Route: TAKE 2 CAPSULES (20 MG TOTAL) BY MOUTH ONCE DAILY. - Oral   Sent to pharmacy as: FLUoxetine 10 MG capsule    How is the patient currently taking it? (ex. 1XDay):see above  Is this a 30 day or 90 day RX: see above  Preferred Pharmacy with phone number:  [facility] [place], [place] - [address] [facility] [address]  [address] [facility] [address]  [facility] [address]  Phone: [phone] Fax: [phone]    [facility] - [facility] [address]  [address]  [address] [address]  Phone: [phone] Fax: [phone]  Local or Mail Order:local  Ordering Provider:  Would the patient rather a call back or a response via [facility]? call  Best Call Back Number:[phone]

## 2025-06-20 NOTE — TELEPHONE ENCOUNTER
No care due was identified.  [facility] Embedded Care Due Messages. Reference number: [id number].   6/20/2025 3:44:00 PM CDT

## 2025-06-21 RX ORDER — OMEPRAZOLE 20 MG/1
40 CAPSULE, DELAYED RELEASE ORAL DAILY
Qty: 180 CAPSULE | Refills: 3 | Status: SHIPPED | OUTPATIENT
Start: 2025-06-21

## 2025-06-21 RX ORDER — AZELASTINE HYDROCHLORIDE 0.5 MG/ML
1 SOLUTION/ DROPS OPHTHALMIC 2 TIMES DAILY
Qty: 6 ML | Refills: 5 | Status: SHIPPED | OUTPATIENT
Start: 2025-06-21

## 2025-07-22 ENCOUNTER — OFFICE VISIT (OUTPATIENT)
Dept: OPTOMETRY | Facility: CLINIC | Age: OVER 89
End: 2025-07-22
Payer: MEDICARE

## 2025-07-22 DIAGNOSIS — H52.03 HYPEROPIA WITH ASTIGMATISM AND PRESBYOPIA, BILATERAL: ICD-10-CM

## 2025-07-22 DIAGNOSIS — Z96.1 PSEUDOPHAKIA OF BOTH EYES: ICD-10-CM

## 2025-07-22 DIAGNOSIS — E11.9 TYPE 2 DIABETES MELLITUS WITHOUT RETINOPATHY: Primary | ICD-10-CM

## 2025-07-22 DIAGNOSIS — H52.203 HYPEROPIA WITH ASTIGMATISM AND PRESBYOPIA, BILATERAL: ICD-10-CM

## 2025-07-22 DIAGNOSIS — H40.013 OPEN ANGLE WITH BORDERLINE FINDINGS AND LOW GLAUCOMA RISK IN BOTH EYES: ICD-10-CM

## 2025-07-22 DIAGNOSIS — H52.4 HYPEROPIA WITH ASTIGMATISM AND PRESBYOPIA, BILATERAL: ICD-10-CM

## 2025-07-22 PROBLEM — H25.10 SENILE NUCLEAR SCLEROSIS: Status: RESOLVED | Noted: 2018-10-30 | Resolved: 2025-07-22

## 2025-07-22 PROBLEM — H26.9 CORTICAL CATARACT OF LEFT EYE: Status: RESOLVED | Noted: 2018-10-22 | Resolved: 2025-07-22

## 2025-07-22 PROBLEM — H25.11 NUCLEAR SCLEROTIC CATARACT OF RIGHT EYE: Status: RESOLVED | Noted: 2022-03-15 | Resolved: 2025-07-22

## 2025-07-22 PROBLEM — H52.7 REFRACTIVE ERROR: Status: RESOLVED | Noted: 2018-10-22 | Resolved: 2025-07-22

## 2025-07-22 PROCEDURE — 1157F ADVNC CARE PLAN IN RCRD: CPT | Mod: CPTII,S$GLB,,

## 2025-07-22 PROCEDURE — 92014 COMPRE OPH EXAM EST PT 1/>: CPT | Mod: S$GLB,,,

## 2025-07-22 PROCEDURE — 1101F PT FALLS ASSESS-DOCD LE1/YR: CPT | Mod: CPTII,S$GLB,,

## 2025-07-22 PROCEDURE — 3288F FALL RISK ASSESSMENT DOCD: CPT | Mod: CPTII,S$GLB,,

## 2025-07-22 PROCEDURE — 99999 PR PBB SHADOW E&M-EST. PATIENT-LVL II: CPT | Mod: PBBFAC,,,

## 2025-07-22 PROCEDURE — 92015 DETERMINE REFRACTIVE STATE: CPT | Mod: S$GLB,,,

## 2025-07-22 PROCEDURE — 1159F MED LIST DOCD IN RCRD: CPT | Mod: CPTII,S$GLB,,

## 2025-07-22 PROCEDURE — 1126F AMNT PAIN NOTED NONE PRSNT: CPT | Mod: CPTII,S$GLB,,

## 2025-07-22 NOTE — PROGRESS NOTES
HPI    Pt here for diabetic eye exam. Last exam- 1 year    Pt sts VA OU could be better. Pt has old sunglasses that are not working   well. Specs from last year are great. Floaters OU x years, no changes. Pt   denies flashes/pain. Pt using allergy gtt and AT's PRN.     Hemoglobin A1C       Date                     Value               Ref Range             Status                01/30/2025               5.4                 4.0 - 5.6 %           Final                  04/19/2023               5.6                 0.0 - 5.6 %           Final                  08/26/2022               5.6                 4.0 - 5.6 %           Final               Last edited by Teressa Day on 7/22/2025  9:36 AM.            Assessment /Plan     For exam results, see Encounter Report.    Type 2 diabetes mellitus without retinopathy    Open angle with borderline findings and low glaucoma risk in both eyes    Pseudophakia of both eyes    Hyperopia with astigmatism and presbyopia, bilateral      No diabetic retinopathy or macular edema evident on today's exam OD, OS. Reviewed findings with pt and ed pt on importance of maintaining strict blood sugar control to prevent visual fluctuations and/or vision loss. Monitor yearly for changes with repeat DFE, sooner prn.  Longstanding glaucoma suspect secondary to larger than average CD ratio and RNFL thinning OD>OS. No known family history, angles open, normal IOP. Continue to monitor untreated. Repeat RNFL OCT next year.  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 Good BCVA, stable. Monitor yearly for changes.  Discussed spectacle options with pt and released final spec rx. Ed pt on change in rx and adaptation.    RTC: 1 year for comprehensive exam or sooner prn

## 2025-07-30 ENCOUNTER — TELEPHONE (OUTPATIENT)
Dept: ADMINISTRATIVE | Facility: CLINIC | Age: OVER 89
End: 2025-07-30
Payer: MEDICARE

## 2025-07-30 NOTE — TELEPHONE ENCOUNTER
Called pt; informed pt I was calling to cancel her 8/6/25 eawv home visit due to completing her last eawv on 4/1/25; pt verbalized understanding and appt canceled

## 2025-08-13 ENCOUNTER — DOCUMENT SCAN (OUTPATIENT)
Dept: HOME HEALTH SERVICES | Facility: HOSPITAL | Age: OVER 89
End: 2025-08-13
Payer: MEDICARE

## (undated) DEVICE — NDL REG BEVEL 27GX1/2IN

## (undated) DEVICE — MANIFOLD 4 PORT

## (undated) DEVICE — TAPE SURG DURAPORE 2 X10YD

## (undated) DEVICE — DRAPE STERI U-SHAPED 47X51IN

## (undated) DEVICE — PAD CAST SPECIALIST 2X4

## (undated) DEVICE — DRAPE PLASTIC U 60X72

## (undated) DEVICE — UNDERGLOVES BIOGEL PI SIZE 8

## (undated) DEVICE — SUT MCRYL PLUS 4-0 PS2 27IN

## (undated) DEVICE — PAD PREP 50/CA

## (undated) DEVICE — SEE MEDLINE ITEM 157117

## (undated) DEVICE — GAUZE SPONGE 4X4 12PLY

## (undated) DEVICE — SUT ETHILON 4-0 PS2 18 BLK

## (undated) DEVICE — SEE MEDLINE ITEM 152496

## (undated) DEVICE — SYR 10CC LUER LOCK

## (undated) DEVICE — SEE MEDLINE ITEM 152622

## (undated) DEVICE — SHEET EENT SPLIT

## (undated) DEVICE — SOL WATER STRL IRR 1000ML

## (undated) DEVICE — ELECTRODE REM PLYHSV RETURN 9

## (undated) DEVICE — BLADE SURG #15 CARBON STEEL

## (undated) DEVICE — SKINMARKER & RULER REGULAR X-F

## (undated) DEVICE — SEE MEDLINE ITEM 157116

## (undated) DEVICE — NDL STRAIGHT 4CM LEIBINGER

## (undated) DEVICE — ADHESIVE DERMABOND ADVANCED

## (undated) DEVICE — SOL IRR NACL .9% 3000ML

## (undated) DEVICE — SEE MEDLINE ITEM 156955

## (undated) DEVICE — SEE MEDLINE ITEM 152487

## (undated) DEVICE — TRAY MUSCLE LID EYE

## (undated) DEVICE — KIT GREY EYE

## (undated) DEVICE — CLEANER TIP ELECSURG 2X2IN

## (undated) DEVICE — DENTAL ROLL 1 1/2 X 3/8

## (undated) DEVICE — NDL HYPO REG 25G X 1 1/2

## (undated) DEVICE — TRAY ARTHROSCOPY 2/CS

## (undated) DEVICE — BANDAGE ESMARK LATEX FREE 4INX

## (undated) DEVICE — SHEILD & GARTERS FOX METAL EYE

## (undated) DEVICE — GLOVE BIOGEL SKINSENSE PI 7.5

## (undated) DEVICE — DRAPE STERI INSTRUMENT 1018

## (undated) DEVICE — STOCKINET 4INX48

## (undated) DEVICE — COVER LIGHT HANDLE 80/CA

## (undated) DEVICE — SOL BETADINE 5%

## (undated) DEVICE — BLADE SCALP OPHTL BEVEL STR

## (undated) DEVICE — NDL FLTR 5MCRN BLNT TIP 18GX1

## (undated) DEVICE — SUT ETHILON 3/0 18IN PS-1

## (undated) DEVICE — SYR SLIP TIP 1CC

## (undated) DEVICE — DRAPE HAND STERILE

## (undated) DEVICE — SUT MONOCRYL 4-0 PS-2

## (undated) DEVICE — KIT ARTHROSCOPY ANKLE CUSTOM

## (undated) DEVICE — BLADE ULTRACUT 3.5MM

## (undated) DEVICE — PAD ABDOMINAL 5X9 STERILE

## (undated) DEVICE — SOL IRR STRL WATER 500ML

## (undated) DEVICE — PAD CAST SPECIALIST STRL 4

## (undated) DEVICE — NDL 18GA X1 1/2 REG BEVEL

## (undated) DEVICE — GOWN SURGICAL X-LARGE

## (undated) DEVICE — BANDAGE ELAS SOFTWRAP ST 4X5YD

## (undated) DEVICE — Device

## (undated) DEVICE — SPLINT WRIST FOAM 8.5IN LG/RT

## (undated) DEVICE — DRESSING XEROFORM 1X8IN

## (undated) DEVICE — GLOVE PROTEXIS HYDROGEL SZ7.5

## (undated) DEVICE — NDL HYPO A BEVEL 22X1 1/2

## (undated) DEVICE — COVER OVERHEAD SURG LT BLUE

## (undated) DEVICE — DURAPREP SURG SCRUB 26ML

## (undated) DEVICE — TUBING ARTHROSCOPY

## (undated) DEVICE — INSTRUMENT SUCTION FRAZIER 12F

## (undated) DEVICE — DRAPE THREE-QTR REINF 53X77IN

## (undated) DEVICE — STRAP OR TABLE 5IN X 72IN

## (undated) DEVICE — BANDAGE ELASTIC 3X5 VELCRO ST

## (undated) DEVICE — BANDAGE ELASTIC 2X5 VELCRO ST

## (undated) DEVICE — SEE MEDLINE ITEM 157131

## (undated) DEVICE — PENCIL ROCKER SWITCH 10FT CORD

## (undated) DEVICE — SPONGE GAUZE 16PLY 4X4

## (undated) DEVICE — SUCTION COAGULATOR 10FR 6IN

## (undated) DEVICE — CORD BIPOLAR 12 FOOT

## (undated) DEVICE — SOL 9P NACL IRR PIC IL

## (undated) DEVICE — SEE MEDLINE ITEM 157173

## (undated) DEVICE — REMOVER LOTION

## (undated) DEVICE — APPLICATOR CHLORAPREP ORN 26ML

## (undated) DEVICE — TOURNIQUET SB QC DP 18X4IN

## (undated) DEVICE — SOLUTION BSS PLUS

## (undated) DEVICE — KIT SAHARA DRAPE DRAW/LIFT

## (undated) DEVICE — HYDRODISSECTOR NUCLEUS 27GX7/8

## (undated) DEVICE — FORCEP CURVED DISP

## (undated) DEVICE — STOCKINET TUBULAR 1 PLY 6X60IN

## (undated) DEVICE — SEE MEDLINE ITEM 146298

## (undated) DEVICE — DROPPER MEDICINE

## (undated) DEVICE — SEE MEDLINE ITEM 152522

## (undated) DEVICE — BLADE SURG BVL ANG COAX 2.4MM

## (undated) DEVICE — DRESSING XEROFORM FOIL PK 1X8

## (undated) DEVICE — KIT ASSISTARM ANKLE CUSTOM

## (undated) DEVICE — STRIP MG FML-GLO .06 - ORDER F

## (undated) DEVICE — SEE MEDLINE ITEM 152515

## (undated) DEVICE — DRAPE STERI 32 X 50

## (undated) DEVICE — SOL PVP-I SCRUB 7.5% 4OZ

## (undated) DEVICE — PACK BASIC

## (undated) DEVICE — CUP MEDICINE STERILE 2OZ